# Patient Record
Sex: FEMALE | Race: WHITE | NOT HISPANIC OR LATINO | URBAN - METROPOLITAN AREA
[De-identification: names, ages, dates, MRNs, and addresses within clinical notes are randomized per-mention and may not be internally consistent; named-entity substitution may affect disease eponyms.]

---

## 2017-03-08 ENCOUNTER — APPOINTMENT (OUTPATIENT)
Dept: URBAN - METROPOLITAN AREA CLINIC 200 | Age: 62
Setting detail: DERMATOLOGY
End: 2017-03-15

## 2017-03-08 DIAGNOSIS — Z41.9 ENCOUNTER FOR PROCEDURE FOR PURPOSES OTHER THAN REMEDYING HEALTH STATE, UNSPECIFIED: ICD-10-CM

## 2017-03-08 DIAGNOSIS — L98.8 OTHER SPECIFIED DISORDERS OF THE SKIN AND SUBCUTANEOUS TISSUE: ICD-10-CM

## 2017-03-08 PROBLEM — L90.8 OTHER ATROPHIC DISORDERS OF SKIN: Status: ACTIVE | Noted: 2017-03-08

## 2017-03-08 PROCEDURE — OTHER BOTOX (U OR CC) ADDITIVE: OTHER

## 2017-03-08 PROCEDURE — OTHER EXCISION (ELECTIVE): OTHER

## 2017-03-08 PROCEDURE — OTHER BOTOX (U OR CC): OTHER

## 2017-03-08 ASSESSMENT — LOCATION ZONE DERM
LOCATION ZONE: FACE
LOCATION ZONE: EAR
LOCATION ZONE: FACE

## 2017-03-08 ASSESSMENT — LOCATION SIMPLE DESCRIPTION DERM
LOCATION SIMPLE: GLABELLA
LOCATION SIMPLE: RIGHT FOREHEAD
LOCATION SIMPLE: LEFT FOREHEAD
LOCATION SIMPLE: GLABELLA
LOCATION SIMPLE: LEFT EAR

## 2017-03-08 ASSESSMENT — LOCATION DETAILED DESCRIPTION DERM
LOCATION DETAILED: LEFT ANTERIOR EARLOBE
LOCATION DETAILED: GLABELLA
LOCATION DETAILED: RIGHT INFERIOR MEDIAL FOREHEAD
LOCATION DETAILED: GLABELLA
LOCATION DETAILED: LEFT INFERIOR MEDIAL FOREHEAD

## 2017-03-08 NOTE — PROCEDURE: EXCISION (ELECTIVE)
Price (Use Numbers Only, No Special Characters Or $): 586 Price (Use Numbers Only, No Special Characters Or $): 778

## 2017-03-15 ENCOUNTER — APPOINTMENT (OUTPATIENT)
Dept: URBAN - METROPOLITAN AREA CLINIC 200 | Age: 62
Setting detail: DERMATOLOGY
End: 2017-03-27

## 2017-03-15 ENCOUNTER — RX ONLY (RX ONLY)
Age: 62
End: 2017-03-15

## 2017-03-15 DIAGNOSIS — Z48.02 ENCOUNTER FOR REMOVAL OF SUTURES: ICD-10-CM

## 2017-03-15 PROCEDURE — OTHER PRESCRIPTION: OTHER

## 2017-03-15 PROCEDURE — OTHER SUTURE REMOVAL (GLOBAL PERIOD): OTHER

## 2017-03-15 RX ORDER — MUPIROCIN 20 MG/G
OINTMENT TOPICAL
Qty: 1 | Refills: 1 | Status: ERX | COMMUNITY
Start: 2017-03-15

## 2017-03-15 RX ORDER — TRIAMCINOLONE ACETONIDE 1 MG/G
CREAM TOPICAL
Qty: 1 | Refills: 3 | Status: ERX

## 2017-03-15 ASSESSMENT — LOCATION SIMPLE DESCRIPTION DERM: LOCATION SIMPLE: LEFT EAR

## 2017-03-15 ASSESSMENT — LOCATION ZONE DERM: LOCATION ZONE: EAR

## 2017-03-15 ASSESSMENT — LOCATION DETAILED DESCRIPTION DERM: LOCATION DETAILED: LEFT ANTERIOR EARLOBE

## 2017-03-15 NOTE — PROCEDURE: SUTURE REMOVAL (GLOBAL PERIOD)
Add 56581 Cpt? (Important Note: In 2017 The Use Of 94613 Is Being Tracked By Cms To Determine Future Global Period Reimbursement For Global Periods): no
Detail Level: Detailed

## 2017-04-04 ENCOUNTER — APPOINTMENT (OUTPATIENT)
Dept: URBAN - METROPOLITAN AREA CLINIC 200 | Age: 62
Setting detail: DERMATOLOGY
End: 2017-04-18

## 2017-04-04 ENCOUNTER — RX ONLY (RX ONLY)
Age: 62
End: 2017-04-04

## 2017-04-04 DIAGNOSIS — H61.11 ACQUIRED DEFORMITY OF PINNA: ICD-10-CM

## 2017-04-04 DIAGNOSIS — Z41.9 ENCOUNTER FOR PROCEDURE FOR PURPOSES OTHER THAN REMEDYING HEALTH STATE, UNSPECIFIED: ICD-10-CM

## 2017-04-04 PROBLEM — H61.111 ACQUIRED DEFORMITY OF PINNA, RIGHT EAR: Status: ACTIVE | Noted: 2017-04-04

## 2017-04-04 PROBLEM — Z85.828 PERSONAL HISTORY OF OTHER MALIGNANT NEOPLASM OF SKIN: Status: ACTIVE | Noted: 2017-04-04

## 2017-04-04 PROCEDURE — OTHER EARLOBE REPAIR COSMETIC: OTHER

## 2017-04-04 RX ORDER — OXYCODONE HYDROCHLORIDE AND ACETAMINOPHEN 5; 325 MG/1; MG/1
TABLET ORAL
Qty: 6 | Refills: 0

## 2017-04-04 ASSESSMENT — LOCATION ZONE DERM: LOCATION ZONE: EAR

## 2017-04-04 ASSESSMENT — LOCATION SIMPLE DESCRIPTION DERM: LOCATION SIMPLE: RIGHT EAR

## 2017-04-04 ASSESSMENT — LOCATION DETAILED DESCRIPTION DERM: LOCATION DETAILED: RIGHT ANTERIOR EARLOBE

## 2017-04-04 NOTE — PROCEDURE: EARLOBE REPAIR COSMETIC
Price (Use Numbers Only, No Special Characters Or $): 020 Price (Use Numbers Only, No Special Characters Or $): 134

## 2017-04-11 ENCOUNTER — APPOINTMENT (OUTPATIENT)
Dept: URBAN - METROPOLITAN AREA CLINIC 200 | Age: 62
Setting detail: DERMATOLOGY
End: 2017-04-20

## 2017-04-11 DIAGNOSIS — Z48.02 ENCOUNTER FOR REMOVAL OF SUTURES: ICD-10-CM

## 2017-04-11 PROCEDURE — OTHER SUTURE REMOVAL (GLOBAL PERIOD): OTHER

## 2017-04-11 ASSESSMENT — LOCATION ZONE DERM: LOCATION ZONE: EAR

## 2017-04-11 ASSESSMENT — LOCATION SIMPLE DESCRIPTION DERM: LOCATION SIMPLE: RIGHT EAR

## 2017-04-11 ASSESSMENT — LOCATION DETAILED DESCRIPTION DERM: LOCATION DETAILED: RIGHT ANTERIOR EARLOBE

## 2017-04-11 NOTE — PROCEDURE: SUTURE REMOVAL (GLOBAL PERIOD)
Add 55954 Cpt? (Important Note: In 2017 The Use Of 09501 Is Being Tracked By Cms To Determine Future Global Period Reimbursement For Global Periods): no
Detail Level: Detailed

## 2017-06-29 ENCOUNTER — APPOINTMENT (OUTPATIENT)
Dept: URBAN - METROPOLITAN AREA CLINIC 200 | Age: 62
Setting detail: DERMATOLOGY
End: 2017-07-18

## 2017-06-29 DIAGNOSIS — L98.8 OTHER SPECIFIED DISORDERS OF THE SKIN AND SUBCUTANEOUS TISSUE: ICD-10-CM

## 2017-06-29 PROBLEM — L90.8 OTHER ATROPHIC DISORDERS OF SKIN: Status: ACTIVE | Noted: 2017-06-29

## 2017-06-29 PROCEDURE — OTHER BOTOX (U OR CC) ADDITIVE: OTHER

## 2017-06-29 PROCEDURE — OTHER BOTOX (U OR CC): OTHER

## 2017-06-29 ASSESSMENT — LOCATION DETAILED DESCRIPTION DERM
LOCATION DETAILED: LEFT INFERIOR FOREHEAD
LOCATION DETAILED: GLABELLA
LOCATION DETAILED: RIGHT INFERIOR FOREHEAD

## 2017-06-29 ASSESSMENT — LOCATION SIMPLE DESCRIPTION DERM
LOCATION SIMPLE: LEFT FOREHEAD
LOCATION SIMPLE: RIGHT FOREHEAD
LOCATION SIMPLE: GLABELLA

## 2017-06-29 ASSESSMENT — LOCATION ZONE DERM: LOCATION ZONE: FACE

## 2017-07-19 ENCOUNTER — APPOINTMENT (OUTPATIENT)
Dept: URBAN - METROPOLITAN AREA CLINIC 200 | Age: 62
Setting detail: DERMATOLOGY
End: 2017-07-26

## 2017-07-19 DIAGNOSIS — T1490XA CONTUSION OF UNSPECIFIED SITE: ICD-10-CM

## 2017-07-19 DIAGNOSIS — S31000A OPEN WOUND(S) (MULTIPLE) OF UNSPECIFIED SITE(S), WITHOUT MENTION OF COMPLICATION: ICD-10-CM

## 2017-07-19 PROBLEM — S80.11XA CONTUSION OF RIGHT LOWER LEG, INITIAL ENCOUNTER: Status: ACTIVE | Noted: 2017-07-19

## 2017-07-19 PROBLEM — S01.302A UNSPECIFIED OPEN WOUND OF LEFT EAR, INITIAL ENCOUNTER: Status: ACTIVE | Noted: 2017-07-19

## 2017-07-19 PROBLEM — S01.301A UNSPECIFIED OPEN WOUND OF RIGHT EAR, INITIAL ENCOUNTER: Status: ACTIVE | Noted: 2017-07-19

## 2017-07-19 PROCEDURE — OTHER REASSURANCE: OTHER

## 2017-07-19 PROCEDURE — 99213 OFFICE O/P EST LOW 20 MIN: CPT

## 2017-07-19 ASSESSMENT — LOCATION SIMPLE DESCRIPTION DERM
LOCATION SIMPLE: LEFT EAR
LOCATION SIMPLE: RIGHT EAR
LOCATION SIMPLE: RIGHT PRETIBIAL REGION

## 2017-07-19 ASSESSMENT — LOCATION DETAILED DESCRIPTION DERM
LOCATION DETAILED: RIGHT DISTAL PRETIBIAL REGION
LOCATION DETAILED: LEFT ANTERIOR EARLOBE
LOCATION DETAILED: RIGHT ANTERIOR EARLOBE

## 2017-07-19 ASSESSMENT — LOCATION ZONE DERM
LOCATION ZONE: EAR
LOCATION ZONE: LEG

## 2017-08-05 ENCOUNTER — RX ONLY (RX ONLY)
Age: 62
End: 2017-08-05

## 2017-08-05 RX ORDER — BIMATOPROST 0.3 MG/ML
SOLUTION/ DROPS OPHTHALMIC
Qty: 1 | Refills: 11 | Status: ERX

## 2017-09-05 ENCOUNTER — RX ONLY (RX ONLY)
Age: 62
End: 2017-09-05

## 2017-09-05 RX ORDER — TRIAMCINOLONE ACETONIDE 1 MG/G
CREAM TOPICAL
Qty: 1 | Refills: 3 | Status: ERX

## 2017-10-30 ENCOUNTER — APPOINTMENT (OUTPATIENT)
Dept: URBAN - METROPOLITAN AREA CLINIC 200 | Age: 62
Setting detail: DERMATOLOGY
End: 2017-11-13

## 2017-10-30 DIAGNOSIS — L08.9 LOCAL INFECTION OF THE SKIN AND SUBCUTANEOUS TISSUE, UNSPECIFIED: ICD-10-CM

## 2017-10-30 PROCEDURE — 99212 OFFICE O/P EST SF 10 MIN: CPT

## 2017-10-30 PROCEDURE — OTHER PRESCRIPTION: OTHER

## 2017-10-30 PROCEDURE — OTHER ORDER TESTS: OTHER

## 2017-10-30 RX ORDER — LEVOFLOXACIN 500 MG/1
TABLET, FILM COATED ORAL
Qty: 10 | Refills: 0 | Status: ERX

## 2017-10-30 ASSESSMENT — LOCATION DETAILED DESCRIPTION DERM: LOCATION DETAILED: RIGHT DORSAL FOOT

## 2017-10-30 ASSESSMENT — LOCATION SIMPLE DESCRIPTION DERM: LOCATION SIMPLE: RIGHT FOOT

## 2017-10-30 ASSESSMENT — LOCATION ZONE DERM: LOCATION ZONE: FEET

## 2017-10-30 NOTE — HPI: WOUND
Is The Wound New Or Recurrent?: new
How Severe Is It?: moderate
How Is Your Wound Healing?: not healing
Is This A New Presentation, Or A Follow-Up?: Wound
Any Other Pertinent Features?: Hx of PG

## 2017-11-08 ENCOUNTER — APPOINTMENT (OUTPATIENT)
Dept: URBAN - METROPOLITAN AREA CLINIC 200 | Age: 62
Setting detail: DERMATOLOGY
End: 2017-11-10

## 2017-11-08 DIAGNOSIS — L08.9 LOCAL INFECTION OF THE SKIN AND SUBCUTANEOUS TISSUE, UNSPECIFIED: ICD-10-CM

## 2017-11-08 PROBLEM — Z85.828 PERSONAL HISTORY OF OTHER MALIGNANT NEOPLASM OF SKIN: Status: ACTIVE | Noted: 2017-11-08

## 2017-11-08 PROCEDURE — OTHER ORDER TESTS: OTHER

## 2017-11-08 PROCEDURE — OTHER RECOMMENDATIONS: OTHER

## 2017-11-08 PROCEDURE — OTHER OBSERVATION: OTHER

## 2017-11-08 PROCEDURE — 99212 OFFICE O/P EST SF 10 MIN: CPT

## 2017-11-08 PROCEDURE — OTHER OBSERVATION AND MEASURE: OTHER

## 2017-11-08 ASSESSMENT — LOCATION DETAILED DESCRIPTION DERM: LOCATION DETAILED: RIGHT DORSAL FOOT

## 2017-11-08 ASSESSMENT — LOCATION ZONE DERM: LOCATION ZONE: FEET

## 2017-11-08 ASSESSMENT — LOCATION SIMPLE DESCRIPTION DERM: LOCATION SIMPLE: RIGHT FOOT

## 2017-11-08 NOTE — PROCEDURE: RECOMMENDATIONS
Detail Level: Zone
Recommendation Preamble: The following recommendations were made during the visit:
Recommendations (Free Text): Gentamicin

## 2017-11-14 ENCOUNTER — APPOINTMENT (OUTPATIENT)
Dept: URBAN - METROPOLITAN AREA CLINIC 200 | Age: 62
Setting detail: DERMATOLOGY
End: 2017-11-17

## 2017-11-14 DIAGNOSIS — L98.8 OTHER SPECIFIED DISORDERS OF THE SKIN AND SUBCUTANEOUS TISSUE: ICD-10-CM

## 2017-11-14 DIAGNOSIS — L88 PYODERMA GANGRENOSUM: ICD-10-CM

## 2017-11-14 PROBLEM — L90.8 OTHER ATROPHIC DISORDERS OF SKIN: Status: ACTIVE | Noted: 2017-11-14

## 2017-11-14 PROCEDURE — OTHER PRESCRIPTION: OTHER

## 2017-11-14 PROCEDURE — OTHER INTRALESIONAL KENALOG: OTHER

## 2017-11-14 PROCEDURE — OTHER OBSERVATION: OTHER

## 2017-11-14 PROCEDURE — OTHER BOTOX (U OR CC): OTHER

## 2017-11-14 PROCEDURE — 11900 INJECT SKIN LESIONS </W 7: CPT

## 2017-11-14 PROCEDURE — OTHER BOTOX (U OR CC) ADDITIVE: OTHER

## 2017-11-14 PROCEDURE — OTHER ORDER TESTS: OTHER

## 2017-11-14 PROCEDURE — OTHER OBSERVATION AND MEASURE: OTHER

## 2017-11-14 RX ORDER — PREDNISONE 10 MG/1
TABLET ORAL QAM
Qty: 120 | Refills: 3

## 2017-11-14 ASSESSMENT — LOCATION SIMPLE DESCRIPTION DERM
LOCATION SIMPLE: RIGHT FOOT
LOCATION SIMPLE: LEFT FOREHEAD
LOCATION SIMPLE: RIGHT FOREHEAD
LOCATION SIMPLE: RIGHT TEMPLE
LOCATION SIMPLE: GLABELLA
LOCATION SIMPLE: LEFT TEMPLE

## 2017-11-14 ASSESSMENT — LOCATION DETAILED DESCRIPTION DERM
LOCATION DETAILED: LEFT INFERIOR TEMPLE
LOCATION DETAILED: RIGHT LATERAL DORSAL FOOT
LOCATION DETAILED: RIGHT DORSAL FOOT
LOCATION DETAILED: RIGHT INFERIOR MEDIAL FOREHEAD
LOCATION DETAILED: LEFT INFERIOR MEDIAL FOREHEAD
LOCATION DETAILED: GLABELLA
LOCATION DETAILED: RIGHT INFERIOR TEMPLE

## 2017-11-14 ASSESSMENT — LOCATION ZONE DERM
LOCATION ZONE: FEET
LOCATION ZONE: FACE

## 2017-11-14 NOTE — PROCEDURE: INTRALESIONAL KENALOG
Total Volume Injected (Ccs- Only Use Numbers And Decimals): .1
Administered By (Optional): ar
Detail Level: Detailed
Kenalog Preparation: Kenalog
X Size Of Lesion In Cm (Optional): 0
Consent: The risks of atrophy were reviewed with the patient.
Concentration Of Solution Injected (Mg/Ml): 5.0
Include Z78.9 (Other Specified Conditions Influencing Health Status) As An Associated Diagnosis?: No
Expiration Date (Optional): 05/2018
Medical Necessity Clause: This procedure was medically necessary because the lesions that were treated were:

## 2017-11-17 ENCOUNTER — APPOINTMENT (OUTPATIENT)
Dept: URBAN - METROPOLITAN AREA CLINIC 200 | Age: 62
Setting detail: DERMATOLOGY
End: 2017-11-17

## 2017-11-17 DIAGNOSIS — L88 PYODERMA GANGRENOSUM: ICD-10-CM

## 2017-11-17 PROCEDURE — OTHER OBSERVATION AND MEASURE: OTHER

## 2017-11-17 PROCEDURE — 99212 OFFICE O/P EST SF 10 MIN: CPT

## 2017-11-17 PROCEDURE — OTHER OBSERVATION: OTHER

## 2017-11-17 ASSESSMENT — LOCATION ZONE DERM: LOCATION ZONE: FEET

## 2017-11-17 ASSESSMENT — LOCATION SIMPLE DESCRIPTION DERM: LOCATION SIMPLE: RIGHT FOOT

## 2017-11-17 ASSESSMENT — PAIN INTENSITY VAS: HOW INTENSE IS YOUR PAIN 0 BEING NO PAIN, 10 BEING THE MOST SEVERE PAIN POSSIBLE?: 6/10 PAIN

## 2017-11-17 ASSESSMENT — LOCATION DETAILED DESCRIPTION DERM: LOCATION DETAILED: RIGHT DORSAL FOOT

## 2017-11-17 ASSESSMENT — SEVERITY ASSESSMENT: SEVERITY: MODERATE

## 2017-11-22 ENCOUNTER — APPOINTMENT (OUTPATIENT)
Dept: URBAN - METROPOLITAN AREA CLINIC 200 | Age: 62
Setting detail: DERMATOLOGY
End: 2017-11-22

## 2017-11-22 DIAGNOSIS — L88 PYODERMA GANGRENOSUM: ICD-10-CM

## 2017-11-22 PROCEDURE — OTHER PRESCRIPTION: OTHER

## 2017-11-22 PROCEDURE — OTHER PRESCRIPTION MEDICATION MANAGEMENT: OTHER

## 2017-11-22 PROCEDURE — 99212 OFFICE O/P EST SF 10 MIN: CPT

## 2017-11-22 PROCEDURE — OTHER OBSERVATION AND MEASURE: OTHER

## 2017-11-22 PROCEDURE — OTHER OBSERVATION: OTHER

## 2017-11-22 RX ORDER — CLOBETASOL PROPIONATE 0.5 MG/G
CREAM TOPICAL BID
Qty: 1 | Refills: 3 | Status: ERX | COMMUNITY
Start: 2017-11-22

## 2017-11-22 ASSESSMENT — LOCATION ZONE DERM: LOCATION ZONE: FEET

## 2017-11-22 ASSESSMENT — LOCATION SIMPLE DESCRIPTION DERM: LOCATION SIMPLE: RIGHT FOOT

## 2017-11-22 ASSESSMENT — LOCATION DETAILED DESCRIPTION DERM: LOCATION DETAILED: RIGHT DORSAL FOOT

## 2017-11-29 ENCOUNTER — RX ONLY (RX ONLY)
Age: 62
End: 2017-11-29

## 2017-11-29 ENCOUNTER — APPOINTMENT (OUTPATIENT)
Dept: URBAN - METROPOLITAN AREA CLINIC 200 | Age: 62
Setting detail: DERMATOLOGY
End: 2017-11-30

## 2017-11-29 DIAGNOSIS — L88 PYODERMA GANGRENOSUM: ICD-10-CM

## 2017-11-29 PROCEDURE — 99212 OFFICE O/P EST SF 10 MIN: CPT

## 2017-11-29 PROCEDURE — OTHER PRESCRIPTION MEDICATION MANAGEMENT: OTHER

## 2017-11-29 PROCEDURE — OTHER PRESCRIPTION: OTHER

## 2017-11-29 PROCEDURE — OTHER OBSERVATION AND MEASURE: OTHER

## 2017-11-29 PROCEDURE — OTHER OBSERVATION: OTHER

## 2017-11-29 RX ORDER — CLOBETASOL PROPIONATE 0.5 MG/G
CREAM TOPICAL BID
Qty: 1 | Refills: 3 | Status: CANCELLED

## 2017-11-29 RX ORDER — HYDROMORPHONE HYDROCHLORIDE 4 MG/1
TABLET ORAL
Qty: 80 | Refills: 0

## 2017-11-29 ASSESSMENT — LOCATION DETAILED DESCRIPTION DERM: LOCATION DETAILED: RIGHT DORSAL FOOT

## 2017-11-29 ASSESSMENT — LOCATION SIMPLE DESCRIPTION DERM: LOCATION SIMPLE: RIGHT FOOT

## 2017-11-29 ASSESSMENT — LOCATION ZONE DERM: LOCATION ZONE: FEET

## 2017-12-06 ENCOUNTER — APPOINTMENT (OUTPATIENT)
Dept: URBAN - METROPOLITAN AREA CLINIC 200 | Age: 62
Setting detail: DERMATOLOGY
End: 2017-12-13

## 2017-12-06 DIAGNOSIS — L88 PYODERMA GANGRENOSUM: ICD-10-CM

## 2017-12-06 PROCEDURE — OTHER OBSERVATION AND MEASURE: OTHER

## 2017-12-06 PROCEDURE — OTHER OBSERVATION: OTHER

## 2017-12-06 PROCEDURE — OTHER COUNSELING: OTHER

## 2017-12-06 PROCEDURE — 99212 OFFICE O/P EST SF 10 MIN: CPT

## 2017-12-06 ASSESSMENT — LOCATION DETAILED DESCRIPTION DERM
LOCATION DETAILED: RIGHT ANKLE
LOCATION DETAILED: RIGHT DORSAL FOOT

## 2017-12-06 ASSESSMENT — PAIN INTENSITY VAS: HOW INTENSE IS YOUR PAIN 0 BEING NO PAIN, 10 BEING THE MOST SEVERE PAIN POSSIBLE?: 8/10 PAIN

## 2017-12-06 ASSESSMENT — LOCATION SIMPLE DESCRIPTION DERM
LOCATION SIMPLE: RIGHT ANKLE
LOCATION SIMPLE: RIGHT FOOT

## 2017-12-06 ASSESSMENT — SEVERITY ASSESSMENT: SEVERITY: MILD TO MODERATE

## 2017-12-06 ASSESSMENT — LOCATION ZONE DERM
LOCATION ZONE: FEET
LOCATION ZONE: LEG

## 2017-12-13 ENCOUNTER — APPOINTMENT (OUTPATIENT)
Dept: URBAN - METROPOLITAN AREA CLINIC 200 | Age: 62
Setting detail: DERMATOLOGY
End: 2017-12-14

## 2017-12-13 DIAGNOSIS — L88 PYODERMA GANGRENOSUM: ICD-10-CM

## 2017-12-13 PROCEDURE — OTHER COUNSELING: OTHER

## 2017-12-13 PROCEDURE — 11900 INJECT SKIN LESIONS </W 7: CPT

## 2017-12-13 PROCEDURE — OTHER ORDER TESTS: OTHER

## 2017-12-13 PROCEDURE — OTHER OBSERVATION AND MEASURE: OTHER

## 2017-12-13 PROCEDURE — OTHER PRESCRIPTION: OTHER

## 2017-12-13 PROCEDURE — OTHER INTRALESIONAL KENALOG: OTHER

## 2017-12-13 PROCEDURE — OTHER OBSERVATION: OTHER

## 2017-12-13 RX ORDER — CYCLOSPORINE 100 MG/1
CAPSULE, GELATIN COATED ORAL
Qty: 60 | Refills: 4 | Status: ERX

## 2017-12-13 ASSESSMENT — LOCATION DETAILED DESCRIPTION DERM
LOCATION DETAILED: RIGHT ANKLE
LOCATION DETAILED: RIGHT DORSAL FOOT

## 2017-12-13 ASSESSMENT — LOCATION ZONE DERM
LOCATION ZONE: FEET
LOCATION ZONE: LEG

## 2017-12-13 ASSESSMENT — LOCATION SIMPLE DESCRIPTION DERM
LOCATION SIMPLE: RIGHT ANKLE
LOCATION SIMPLE: RIGHT FOOT

## 2017-12-13 ASSESSMENT — SEVERITY ASSESSMENT: SEVERITY: MODERATE

## 2017-12-13 ASSESSMENT — PAIN INTENSITY VAS: HOW INTENSE IS YOUR PAIN 0 BEING NO PAIN, 10 BEING THE MOST SEVERE PAIN POSSIBLE?: 10/10 PAIN

## 2017-12-20 ENCOUNTER — RX ONLY (RX ONLY)
Age: 62
End: 2017-12-20

## 2017-12-20 ENCOUNTER — APPOINTMENT (OUTPATIENT)
Dept: URBAN - METROPOLITAN AREA CLINIC 200 | Age: 62
Setting detail: DERMATOLOGY
End: 2017-12-29

## 2017-12-20 DIAGNOSIS — L88 PYODERMA GANGRENOSUM: ICD-10-CM

## 2017-12-20 PROCEDURE — OTHER OBSERVATION AND MEASURE: OTHER

## 2017-12-20 PROCEDURE — OTHER OBSERVATION: OTHER

## 2017-12-20 PROCEDURE — 99212 OFFICE O/P EST SF 10 MIN: CPT

## 2017-12-20 PROCEDURE — OTHER COUNSELING: OTHER

## 2017-12-20 RX ORDER — HYDROMORPHONE HYDROCHLORIDE 4 MG/1
TABLET ORAL
Qty: 84 | Refills: 0

## 2017-12-20 ASSESSMENT — LOCATION DETAILED DESCRIPTION DERM
LOCATION DETAILED: RIGHT DORSAL FOOT
LOCATION DETAILED: RIGHT ANKLE

## 2017-12-20 ASSESSMENT — LOCATION SIMPLE DESCRIPTION DERM
LOCATION SIMPLE: RIGHT FOOT
LOCATION SIMPLE: RIGHT ANKLE

## 2017-12-20 ASSESSMENT — LOCATION ZONE DERM
LOCATION ZONE: FEET
LOCATION ZONE: LEG

## 2017-12-27 ENCOUNTER — APPOINTMENT (OUTPATIENT)
Dept: URBAN - METROPOLITAN AREA CLINIC 200 | Age: 62
Setting detail: DERMATOLOGY
End: 2017-12-31

## 2017-12-27 DIAGNOSIS — L88 PYODERMA GANGRENOSUM: ICD-10-CM

## 2017-12-27 PROCEDURE — 99212 OFFICE O/P EST SF 10 MIN: CPT

## 2017-12-27 PROCEDURE — OTHER COUNSELING: OTHER

## 2017-12-27 PROCEDURE — OTHER ORDER TESTS: OTHER

## 2017-12-27 ASSESSMENT — LOCATION ZONE DERM
LOCATION ZONE: LEG
LOCATION ZONE: FEET
LOCATION ZONE: FEET

## 2017-12-27 ASSESSMENT — LOCATION DETAILED DESCRIPTION DERM
LOCATION DETAILED: RIGHT ANKLE
LOCATION DETAILED: RIGHT DORSAL FOOT
LOCATION DETAILED: RIGHT DORSAL FOOT

## 2017-12-27 ASSESSMENT — SEVERITY ASSESSMENT: SEVERITY: MILD TO MODERATE

## 2017-12-27 ASSESSMENT — LOCATION SIMPLE DESCRIPTION DERM
LOCATION SIMPLE: RIGHT ANKLE
LOCATION SIMPLE: RIGHT FOOT
LOCATION SIMPLE: RIGHT FOOT

## 2018-01-03 ENCOUNTER — APPOINTMENT (OUTPATIENT)
Dept: URBAN - METROPOLITAN AREA CLINIC 200 | Age: 63
Setting detail: DERMATOLOGY
End: 2018-01-14

## 2018-01-03 ENCOUNTER — RX ONLY (RX ONLY)
Age: 63
End: 2018-01-03

## 2018-01-03 DIAGNOSIS — L88 PYODERMA GANGRENOSUM: ICD-10-CM

## 2018-01-03 PROCEDURE — OTHER OBSERVATION: OTHER

## 2018-01-03 PROCEDURE — OTHER ORDER TESTS: OTHER

## 2018-01-03 PROCEDURE — OTHER OBSERVATION AND MEASURE: OTHER

## 2018-01-03 PROCEDURE — 99212 OFFICE O/P EST SF 10 MIN: CPT

## 2018-01-03 RX ORDER — HYDROMORPHONE HYDROCHLORIDE 4 MG/1
TABLET ORAL
Qty: 84 | Refills: 0

## 2018-01-03 ASSESSMENT — LOCATION ZONE DERM: LOCATION ZONE: FEET

## 2018-01-03 ASSESSMENT — LOCATION DETAILED DESCRIPTION DERM: LOCATION DETAILED: RIGHT DORSAL FOOT

## 2018-01-03 ASSESSMENT — PAIN INTENSITY VAS: HOW INTENSE IS YOUR PAIN 0 BEING NO PAIN, 10 BEING THE MOST SEVERE PAIN POSSIBLE?: 9/10 PAIN

## 2018-01-03 ASSESSMENT — LOCATION SIMPLE DESCRIPTION DERM: LOCATION SIMPLE: RIGHT FOOT

## 2018-01-03 ASSESSMENT — SEVERITY ASSESSMENT: SEVERITY: MILD TO MODERATE

## 2018-01-12 ENCOUNTER — APPOINTMENT (OUTPATIENT)
Dept: URBAN - METROPOLITAN AREA CLINIC 200 | Age: 63
Setting detail: DERMATOLOGY
End: 2018-01-14

## 2018-01-12 DIAGNOSIS — L88 PYODERMA GANGRENOSUM: ICD-10-CM

## 2018-01-12 PROCEDURE — OTHER ORDER TESTS: OTHER

## 2018-01-12 PROCEDURE — 99212 OFFICE O/P EST SF 10 MIN: CPT

## 2018-01-12 PROCEDURE — OTHER COUNSELING: OTHER

## 2018-01-12 ASSESSMENT — PAIN INTENSITY VAS: HOW INTENSE IS YOUR PAIN 0 BEING NO PAIN, 10 BEING THE MOST SEVERE PAIN POSSIBLE?: 4/10 PAIN

## 2018-01-12 ASSESSMENT — LOCATION SIMPLE DESCRIPTION DERM: LOCATION SIMPLE: RIGHT ANKLE

## 2018-01-12 ASSESSMENT — LOCATION DETAILED DESCRIPTION DERM: LOCATION DETAILED: RIGHT ANKLE

## 2018-01-12 ASSESSMENT — LOCATION ZONE DERM: LOCATION ZONE: LEG

## 2018-01-12 ASSESSMENT — SEVERITY ASSESSMENT: SEVERITY: MILD TO MODERATE

## 2018-01-17 ENCOUNTER — RX ONLY (RX ONLY)
Age: 63
End: 2018-01-17

## 2018-01-17 ENCOUNTER — APPOINTMENT (OUTPATIENT)
Dept: URBAN - METROPOLITAN AREA CLINIC 200 | Age: 63
Setting detail: DERMATOLOGY
End: 2018-02-01

## 2018-01-17 DIAGNOSIS — L88 PYODERMA GANGRENOSUM: ICD-10-CM

## 2018-01-17 PROCEDURE — OTHER COUNSELING: OTHER

## 2018-01-17 PROCEDURE — OTHER OBSERVATION: OTHER

## 2018-01-17 PROCEDURE — 99212 OFFICE O/P EST SF 10 MIN: CPT

## 2018-01-17 PROCEDURE — OTHER OBSERVATION AND MEASURE: OTHER

## 2018-01-17 RX ORDER — HYDROMORPHONE HYDROCHLORIDE 4 MG/1
TABLET ORAL
Qty: 84 | Refills: 0

## 2018-01-17 ASSESSMENT — LOCATION SIMPLE DESCRIPTION DERM
LOCATION SIMPLE: RIGHT FOOT
LOCATION SIMPLE: RIGHT ANKLE

## 2018-01-17 ASSESSMENT — LOCATION ZONE DERM
LOCATION ZONE: FEET
LOCATION ZONE: LEG

## 2018-01-17 ASSESSMENT — SEVERITY ASSESSMENT: SEVERITY: MILD TO MODERATE

## 2018-01-17 ASSESSMENT — PAIN INTENSITY VAS: HOW INTENSE IS YOUR PAIN 0 BEING NO PAIN, 10 BEING THE MOST SEVERE PAIN POSSIBLE?: 10/10 PAIN

## 2018-01-17 ASSESSMENT — LOCATION DETAILED DESCRIPTION DERM
LOCATION DETAILED: RIGHT DORSAL FOOT
LOCATION DETAILED: RIGHT ANKLE

## 2018-01-24 ENCOUNTER — APPOINTMENT (OUTPATIENT)
Dept: URBAN - METROPOLITAN AREA CLINIC 200 | Age: 63
Setting detail: DERMATOLOGY
End: 2018-01-25

## 2018-01-24 DIAGNOSIS — D47.2 MONOCLONAL GAMMOPATHY: ICD-10-CM

## 2018-01-24 DIAGNOSIS — L88 PYODERMA GANGRENOSUM: ICD-10-CM

## 2018-01-24 DIAGNOSIS — R74.01 ELEVATION OF LEVELS OF LIVER TRANSAMINASE LEVELS: ICD-10-CM

## 2018-01-24 PROBLEM — Z85.828 PERSONAL HISTORY OF OTHER MALIGNANT NEOPLASM OF SKIN: Status: ACTIVE | Noted: 2018-01-24

## 2018-01-24 PROBLEM — R74.0 NONSPECIFIC ELEVATION OF LEVELS OF TRANSAMINASE AND LACTIC ACID DEHYDROGENASE [LDH]: Status: ACTIVE | Noted: 2018-01-24

## 2018-01-24 PROCEDURE — OTHER RECORDS REQUESTED: OTHER

## 2018-01-24 PROCEDURE — OTHER OBSERVATION AND MEASURE: OTHER

## 2018-01-24 PROCEDURE — 99212 OFFICE O/P EST SF 10 MIN: CPT

## 2018-01-24 PROCEDURE — OTHER RECOMMENDATIONS: OTHER

## 2018-01-24 PROCEDURE — OTHER REFERRAL: OTHER

## 2018-01-24 PROCEDURE — OTHER OBSERVATION: OTHER

## 2018-01-24 PROCEDURE — OTHER ORDER TESTS: OTHER

## 2018-01-24 ASSESSMENT — LOCATION DETAILED DESCRIPTION DERM
LOCATION DETAILED: RIGHT DORSAL FOOT
LOCATION DETAILED: RIGHT DISTAL PRETIBIAL REGION

## 2018-01-24 ASSESSMENT — LOCATION SIMPLE DESCRIPTION DERM
LOCATION SIMPLE: RIGHT FOOT
LOCATION SIMPLE: RIGHT PRETIBIAL REGION

## 2018-01-24 ASSESSMENT — PAIN INTENSITY VAS: HOW INTENSE IS YOUR PAIN 0 BEING NO PAIN, 10 BEING THE MOST SEVERE PAIN POSSIBLE?: 8/10 PAIN

## 2018-01-24 ASSESSMENT — SEVERITY ASSESSMENT: SEVERITY: MILD TO MODERATE

## 2018-01-24 ASSESSMENT — LOCATION ZONE DERM
LOCATION ZONE: FEET
LOCATION ZONE: LEG

## 2018-01-24 NOTE — PROCEDURE: RECORDS REQUESTED
Detail Level: Zone
Preface: I requested the records from the following providers.(previous LFT and colonoscopy records)
Providers To Request Records From: Dr. Pedro James

## 2018-01-29 ENCOUNTER — RX ONLY (RX ONLY)
Age: 63
End: 2018-01-29

## 2018-01-29 RX ORDER — HYDROMORPHONE HYDROCHLORIDE 4 MG/1
TABLET ORAL
Qty: 42 | Refills: 0

## 2018-02-08 ENCOUNTER — APPOINTMENT (OUTPATIENT)
Dept: URBAN - METROPOLITAN AREA CLINIC 200 | Age: 63
Setting detail: DERMATOLOGY
End: 2018-02-09

## 2018-02-08 DIAGNOSIS — L88 PYODERMA GANGRENOSUM: ICD-10-CM

## 2018-02-08 PROCEDURE — 99212 OFFICE O/P EST SF 10 MIN: CPT

## 2018-02-08 PROCEDURE — OTHER RECOMMENDATIONS: OTHER

## 2018-02-08 ASSESSMENT — LOCATION ZONE DERM: LOCATION ZONE: FEET

## 2018-02-08 ASSESSMENT — SEVERITY ASSESSMENT: SEVERITY: MILD

## 2018-02-08 ASSESSMENT — LOCATION DETAILED DESCRIPTION DERM: LOCATION DETAILED: RIGHT DORSAL FOOT

## 2018-02-08 ASSESSMENT — PAIN INTENSITY VAS: HOW INTENSE IS YOUR PAIN 0 BEING NO PAIN, 10 BEING THE MOST SEVERE PAIN POSSIBLE?: 1/10 PAIN

## 2018-02-08 ASSESSMENT — LOCATION SIMPLE DESCRIPTION DERM: LOCATION SIMPLE: RIGHT FOOT

## 2018-02-08 NOTE — PROCEDURE: RECOMMENDATIONS
Recommendations (Free Text): Continue with pain clinic-pain controlled much better on oxycontin.  She returned the last dilaudid prescription to me since she did not need/use it\\nContinue cyclosporine
Recommendation Preamble: The following recommendations were made during the visit:
Detail Level: Zone

## 2018-02-14 ENCOUNTER — APPOINTMENT (OUTPATIENT)
Dept: URBAN - METROPOLITAN AREA CLINIC 200 | Age: 63
Setting detail: DERMATOLOGY
End: 2018-02-15

## 2018-02-14 DIAGNOSIS — L88 PYODERMA GANGRENOSUM: ICD-10-CM

## 2018-02-14 PROCEDURE — 99212 OFFICE O/P EST SF 10 MIN: CPT

## 2018-02-14 PROCEDURE — OTHER RECOMMENDATIONS: OTHER

## 2018-02-14 ASSESSMENT — LOCATION ZONE DERM: LOCATION ZONE: FEET

## 2018-02-14 ASSESSMENT — LOCATION SIMPLE DESCRIPTION DERM: LOCATION SIMPLE: RIGHT FOOT

## 2018-02-14 ASSESSMENT — LOCATION DETAILED DESCRIPTION DERM: LOCATION DETAILED: RIGHT DORSAL FOOT

## 2018-02-14 NOTE — PROCEDURE: RECOMMENDATIONS
Recommendations (Free Text): Continue with pain clinic-pain controlled much better on oxycontin. \\nContinue cyclosporine. \\nMake appointment with [facility] and [facility].\\nHave tried to find last colonoscopy.  Cannot find it.  It was reportedly normal.  This was more than 10 years ago and she needs a new one.
Detail Level: Zone
Recommendation Preamble: The following recommendations were made during the visit:

## 2018-02-21 ENCOUNTER — APPOINTMENT (OUTPATIENT)
Dept: URBAN - METROPOLITAN AREA CLINIC 200 | Age: 63
Setting detail: DERMATOLOGY
End: 2018-03-09

## 2018-02-21 DIAGNOSIS — L88 PYODERMA GANGRENOSUM: ICD-10-CM

## 2018-02-21 PROBLEM — Z85.828 PERSONAL HISTORY OF OTHER MALIGNANT NEOPLASM OF SKIN: Status: ACTIVE | Noted: 2018-02-21

## 2018-02-21 PROCEDURE — 99213 OFFICE O/P EST LOW 20 MIN: CPT

## 2018-02-21 PROCEDURE — OTHER RECOMMENDATIONS: OTHER

## 2018-02-21 PROCEDURE — OTHER OBSERVATION AND MEASURE: OTHER

## 2018-02-21 PROCEDURE — OTHER OBSERVATION: OTHER

## 2018-02-21 ASSESSMENT — LOCATION SIMPLE DESCRIPTION DERM: LOCATION SIMPLE: RIGHT FOOT

## 2018-02-21 ASSESSMENT — LOCATION DETAILED DESCRIPTION DERM: LOCATION DETAILED: RIGHT DORSAL FOOT

## 2018-02-21 ASSESSMENT — LOCATION ZONE DERM: LOCATION ZONE: FEET

## 2018-02-21 NOTE — PROCEDURE: RECOMMENDATIONS
Recommendations (Free Text): Continue with pain clinic-pain controlled much better on oxycontin. \\nContinue cyclosporine. \\nMake appointment with [facility] and [facility].\\nHave tried to find last colonoscopy.  Cannot find it.  It was reportedly normal.  This was more than 10 years ago and she needs a new one.
Recommendation Preamble: The following recommendations were made during the visit:
Detail Level: Zone

## 2018-03-01 ENCOUNTER — APPOINTMENT (OUTPATIENT)
Dept: URBAN - METROPOLITAN AREA CLINIC 200 | Age: 63
Setting detail: DERMATOLOGY
End: 2018-03-22

## 2018-03-01 DIAGNOSIS — L88 PYODERMA GANGRENOSUM: ICD-10-CM

## 2018-03-01 PROCEDURE — 99212 OFFICE O/P EST SF 10 MIN: CPT

## 2018-03-01 PROCEDURE — OTHER ORDER TESTS: OTHER

## 2018-03-01 PROCEDURE — OTHER OBSERVATION: OTHER

## 2018-03-01 PROCEDURE — OTHER OBSERVATION AND MEASURE: OTHER

## 2018-03-01 ASSESSMENT — LOCATION ZONE DERM: LOCATION ZONE: FEET

## 2018-03-01 ASSESSMENT — LOCATION DETAILED DESCRIPTION DERM: LOCATION DETAILED: RIGHT DORSAL FOOT

## 2018-03-01 ASSESSMENT — LOCATION SIMPLE DESCRIPTION DERM: LOCATION SIMPLE: RIGHT FOOT

## 2018-03-14 ENCOUNTER — APPOINTMENT (OUTPATIENT)
Dept: URBAN - METROPOLITAN AREA CLINIC 200 | Age: 63
Setting detail: DERMATOLOGY
End: 2018-03-27

## 2018-03-14 DIAGNOSIS — L88 PYODERMA GANGRENOSUM: ICD-10-CM

## 2018-03-14 PROBLEM — Z85.828 PERSONAL HISTORY OF OTHER MALIGNANT NEOPLASM OF SKIN: Status: ACTIVE | Noted: 2018-03-14

## 2018-03-14 PROCEDURE — 99212 OFFICE O/P EST SF 10 MIN: CPT

## 2018-03-14 PROCEDURE — OTHER OBSERVATION: OTHER

## 2018-03-14 PROCEDURE — OTHER OBSERVATION AND MEASURE: OTHER

## 2018-03-14 PROCEDURE — OTHER ORDER TESTS: OTHER

## 2018-03-14 ASSESSMENT — LOCATION SIMPLE DESCRIPTION DERM
LOCATION SIMPLE: RIGHT FOOT
LOCATION SIMPLE: RIGHT ANKLE

## 2018-03-14 ASSESSMENT — SEVERITY ASSESSMENT: SEVERITY: MODERATE

## 2018-03-14 ASSESSMENT — LOCATION DETAILED DESCRIPTION DERM
LOCATION DETAILED: RIGHT DORSAL FOOT
LOCATION DETAILED: RIGHT ANKLE

## 2018-03-14 ASSESSMENT — PAIN INTENSITY VAS: HOW INTENSE IS YOUR PAIN 0 BEING NO PAIN, 10 BEING THE MOST SEVERE PAIN POSSIBLE?: 2/10 PAIN

## 2018-03-14 ASSESSMENT — LOCATION ZONE DERM
LOCATION ZONE: LEG
LOCATION ZONE: FEET

## 2018-03-28 ENCOUNTER — APPOINTMENT (OUTPATIENT)
Dept: URBAN - METROPOLITAN AREA CLINIC 200 | Age: 63
Setting detail: DERMATOLOGY
End: 2018-03-28

## 2018-03-28 DIAGNOSIS — L88 PYODERMA GANGRENOSUM: ICD-10-CM

## 2018-03-28 PROCEDURE — OTHER OBSERVATION: OTHER

## 2018-03-28 PROCEDURE — OTHER ORDER TESTS: OTHER

## 2018-03-28 PROCEDURE — 99213 OFFICE O/P EST LOW 20 MIN: CPT

## 2018-03-28 PROCEDURE — OTHER OBSERVATION AND MEASURE: OTHER

## 2018-03-28 ASSESSMENT — LOCATION DETAILED DESCRIPTION DERM
LOCATION DETAILED: RIGHT ANKLE
LOCATION DETAILED: RIGHT DORSAL FOOT

## 2018-03-28 ASSESSMENT — LOCATION SIMPLE DESCRIPTION DERM
LOCATION SIMPLE: RIGHT FOOT
LOCATION SIMPLE: RIGHT ANKLE

## 2018-03-28 ASSESSMENT — LOCATION ZONE DERM
LOCATION ZONE: FEET
LOCATION ZONE: LEG

## 2018-04-04 ENCOUNTER — APPOINTMENT (OUTPATIENT)
Dept: URBAN - METROPOLITAN AREA CLINIC 200 | Age: 63
Setting detail: DERMATOLOGY
End: 2018-04-06

## 2018-04-04 DIAGNOSIS — L88 PYODERMA GANGRENOSUM: ICD-10-CM

## 2018-04-04 DIAGNOSIS — L57.8 OTHER SKIN CHANGES DUE TO CHRONIC EXPOSURE TO NONIONIZING RADIATION: ICD-10-CM

## 2018-04-04 DIAGNOSIS — D22 MELANOCYTIC NEVI: ICD-10-CM

## 2018-04-04 DIAGNOSIS — Z85.828 PERSONAL HISTORY OF OTHER MALIGNANT NEOPLASM OF SKIN: ICD-10-CM

## 2018-04-04 PROBLEM — D22.71 MELANOCYTIC NEVI OF RIGHT LOWER LIMB, INCLUDING HIP: Status: ACTIVE | Noted: 2018-04-04

## 2018-04-04 PROCEDURE — OTHER ORDER TESTS: OTHER

## 2018-04-04 PROCEDURE — OTHER PRESCRIPTION MEDICATION MANAGEMENT: OTHER

## 2018-04-04 PROCEDURE — OTHER OBSERVATION AND MEASURE: OTHER

## 2018-04-04 PROCEDURE — 99213 OFFICE O/P EST LOW 20 MIN: CPT

## 2018-04-04 PROCEDURE — OTHER COUNSELING: OTHER

## 2018-04-04 PROCEDURE — OTHER OBSERVATION: OTHER

## 2018-04-04 ASSESSMENT — LOCATION ZONE DERM
LOCATION ZONE: TRUNK
LOCATION ZONE: FEET
LOCATION ZONE: LEG

## 2018-04-04 ASSESSMENT — LOCATION SIMPLE DESCRIPTION DERM
LOCATION SIMPLE: RIGHT PLANTAR SURFACE
LOCATION SIMPLE: CHEST
LOCATION SIMPLE: RIGHT FOOT
LOCATION SIMPLE: RIGHT ANKLE

## 2018-04-04 ASSESSMENT — LOCATION DETAILED DESCRIPTION DERM
LOCATION DETAILED: LEFT MEDIAL SUPERIOR CHEST
LOCATION DETAILED: RIGHT DORSAL FOOT
LOCATION DETAILED: RIGHT MEDIAL PLANTAR MIDFOOT
LOCATION DETAILED: RIGHT ANKLE

## 2018-04-04 NOTE — PROCEDURE: OBSERVATION
Size Of Lesion: 5mm
Morphology Per Location (Optional): now appears reepithelialized
Detail Level: Detailed
Size Of Lesion: 10 x4

## 2018-04-18 ENCOUNTER — APPOINTMENT (OUTPATIENT)
Dept: URBAN - METROPOLITAN AREA CLINIC 200 | Age: 63
Setting detail: DERMATOLOGY
End: 2018-04-23

## 2018-04-18 DIAGNOSIS — L88 PYODERMA GANGRENOSUM: ICD-10-CM

## 2018-04-18 DIAGNOSIS — L98.8 OTHER SPECIFIED DISORDERS OF THE SKIN AND SUBCUTANEOUS TISSUE: ICD-10-CM

## 2018-04-18 PROCEDURE — OTHER OBSERVATION AND MEASURE: OTHER

## 2018-04-18 PROCEDURE — OTHER BOTOX (U OR CC) ADDITIVE: OTHER

## 2018-04-18 PROCEDURE — OTHER OBSERVATION: OTHER

## 2018-04-18 PROCEDURE — OTHER BOTOX (U OR CC): OTHER

## 2018-04-18 PROCEDURE — 99213 OFFICE O/P EST LOW 20 MIN: CPT

## 2018-04-18 ASSESSMENT — LOCATION DETAILED DESCRIPTION DERM
LOCATION DETAILED: RIGHT INFERIOR MEDIAL FOREHEAD
LOCATION DETAILED: RIGHT INFERIOR TEMPLE
LOCATION DETAILED: LEFT INFERIOR MEDIAL FOREHEAD
LOCATION DETAILED: LEFT INFERIOR TEMPLE
LOCATION DETAILED: GLABELLA
LOCATION DETAILED: RIGHT DORSAL FOOT

## 2018-04-18 ASSESSMENT — LOCATION SIMPLE DESCRIPTION DERM
LOCATION SIMPLE: RIGHT TEMPLE
LOCATION SIMPLE: LEFT FOREHEAD
LOCATION SIMPLE: LEFT TEMPLE
LOCATION SIMPLE: RIGHT FOOT
LOCATION SIMPLE: GLABELLA
LOCATION SIMPLE: RIGHT FOREHEAD

## 2018-04-18 ASSESSMENT — LOCATION ZONE DERM
LOCATION ZONE: FACE
LOCATION ZONE: FEET

## 2018-04-18 NOTE — PROCEDURE: OBSERVATION
Detail Level: Detailed
Morphology Per Location (Optional): now appears reepithelialized
Size Of Lesion: 10 x4

## 2018-08-30 ENCOUNTER — APPOINTMENT (OUTPATIENT)
Dept: URBAN - METROPOLITAN AREA CLINIC 200 | Age: 63
Setting detail: DERMATOLOGY
End: 2018-09-11

## 2018-08-30 DIAGNOSIS — L20.84 INTRINSIC (ALLERGIC) ECZEMA: ICD-10-CM

## 2018-08-30 DIAGNOSIS — L82.1 OTHER SEBORRHEIC KERATOSIS: ICD-10-CM

## 2018-08-30 DIAGNOSIS — L98.8 OTHER SPECIFIED DISORDERS OF THE SKIN AND SUBCUTANEOUS TISSUE: ICD-10-CM

## 2018-08-30 DIAGNOSIS — Z23 ENCOUNTER FOR IMMUNIZATION: ICD-10-CM

## 2018-08-30 PROBLEM — Z85.828 PERSONAL HISTORY OF OTHER MALIGNANT NEOPLASM OF SKIN: Status: ACTIVE | Noted: 2018-08-30

## 2018-08-30 PROCEDURE — OTHER RECOMMENDATIONS: OTHER

## 2018-08-30 PROCEDURE — OTHER BOTOX (U OR CC) ADDITIVE: OTHER

## 2018-08-30 PROCEDURE — OTHER BOTOX (U OR CC): OTHER

## 2018-08-30 PROCEDURE — 99213 OFFICE O/P EST LOW 20 MIN: CPT

## 2018-08-30 PROCEDURE — OTHER BENIGN DESTRUCTION COSMETIC: OTHER

## 2018-08-30 PROCEDURE — OTHER REASSURANCE: OTHER

## 2018-08-30 PROCEDURE — OTHER LIQUID NITROGEN (COSMETIC): OTHER

## 2018-08-30 ASSESSMENT — LOCATION SIMPLE DESCRIPTION DERM
LOCATION SIMPLE: RIGHT HAND
LOCATION SIMPLE: RIGHT TEMPLE
LOCATION SIMPLE: LEFT HAND
LOCATION SIMPLE: RIGHT FOREHEAD
LOCATION SIMPLE: LEFT SHOULDER
LOCATION SIMPLE: LEFT CHEEK
LOCATION SIMPLE: LEFT POSTERIOR THIGH
LOCATION SIMPLE: LEFT ANTERIOR NECK
LOCATION SIMPLE: CHEST
LOCATION SIMPLE: CHEST
LOCATION SIMPLE: LEFT FOREHEAD
LOCATION SIMPLE: LEFT TEMPLE
LOCATION SIMPLE: RIGHT CHEEK

## 2018-08-30 ASSESSMENT — LOCATION ZONE DERM
LOCATION ZONE: LEG
LOCATION ZONE: ARM
LOCATION ZONE: HAND
LOCATION ZONE: FACE
LOCATION ZONE: NECK
LOCATION ZONE: TRUNK
LOCATION ZONE: TRUNK

## 2018-08-30 ASSESSMENT — LOCATION DETAILED DESCRIPTION DERM
LOCATION DETAILED: LEFT MEDIAL SUPERIOR CHEST
LOCATION DETAILED: LEFT INFERIOR MEDIAL FOREHEAD
LOCATION DETAILED: LEFT SUPERIOR LATERAL FOREHEAD
LOCATION DETAILED: LEFT CLAVICULAR NECK
LOCATION DETAILED: LEFT INFERIOR TEMPLE
LOCATION DETAILED: UPPER STERNUM
LOCATION DETAILED: LEFT ANTERIOR SHOULDER
LOCATION DETAILED: LEFT SUPERIOR FOREHEAD
LOCATION DETAILED: RIGHT INFERIOR TEMPLE
LOCATION DETAILED: LEFT DISTAL POSTERIOR THIGH
LOCATION DETAILED: RIGHT THENAR EMINENCE
LOCATION DETAILED: LEFT SUPERIOR MEDIAL FOREHEAD
LOCATION DETAILED: LEFT SUPERIOR LATERAL MALAR CHEEK
LOCATION DETAILED: LEFT THENAR EMINENCE
LOCATION DETAILED: RIGHT INFERIOR MEDIAL FOREHEAD
LOCATION DETAILED: RIGHT SUPERIOR LATERAL MALAR CHEEK

## 2018-08-30 NOTE — PROCEDURE: RECOMMENDATIONS
Recommendations (Free Text): Cera Ve SA cream
Detail Level: Simple
Recommendation Preamble: The following recommendations were made during the visit:

## 2018-08-30 NOTE — PROCEDURE: REASSURANCE
Detail Level: Generalized
Additional Notes (Optional): Script for vaccine hand written and given
Hide Additional Notes?: No

## 2018-08-30 NOTE — PROCEDURE: BENIGN DESTRUCTION COSMETIC
Anesthesia Volume In Cc: 0
Post-Care Instructions: I reviewed with the patient in detail post-care instructions. Patient is to wear sunprotection, and avoid picking at any of the treated lesions. Pt may apply Vaseline to crusted or scabbing areas.
Consent: The patient's consent was obtained including but not limited to risks of crusting, scabbing, blistering, scarring, darker or lighter pigmentary change, recurrence, incomplete removal and infection.
Anesthesia Type: 2% lidocaine without epinephrine
Detail Level: Zone

## 2018-12-03 ENCOUNTER — APPOINTMENT (OUTPATIENT)
Dept: URBAN - METROPOLITAN AREA CLINIC 200 | Age: 63
Setting detail: DERMATOLOGY
End: 2018-12-14

## 2018-12-03 DIAGNOSIS — L98.8 OTHER SPECIFIED DISORDERS OF THE SKIN AND SUBCUTANEOUS TISSUE: ICD-10-CM

## 2018-12-03 PROCEDURE — OTHER BOTOX (U OR CC): OTHER

## 2018-12-03 PROCEDURE — OTHER BOTOX (U OR CC) ADDITIVE: OTHER

## 2018-12-03 ASSESSMENT — LOCATION SIMPLE DESCRIPTION DERM
LOCATION SIMPLE: RIGHT CHEEK
LOCATION SIMPLE: LEFT CHEEK
LOCATION SIMPLE: RIGHT TEMPLE
LOCATION SIMPLE: LEFT FOREHEAD
LOCATION SIMPLE: LEFT TEMPLE
LOCATION SIMPLE: RIGHT FOREHEAD

## 2018-12-03 ASSESSMENT — LOCATION DETAILED DESCRIPTION DERM
LOCATION DETAILED: LEFT INFERIOR TEMPLE
LOCATION DETAILED: LEFT INFERIOR MEDIAL FOREHEAD
LOCATION DETAILED: RIGHT INFERIOR MEDIAL FOREHEAD
LOCATION DETAILED: LEFT SUPERIOR LATERAL MALAR CHEEK
LOCATION DETAILED: RIGHT INFERIOR TEMPLE
LOCATION DETAILED: RIGHT SUPERIOR LATERAL MALAR CHEEK

## 2018-12-03 ASSESSMENT — LOCATION ZONE DERM: LOCATION ZONE: FACE

## 2019-01-30 ENCOUNTER — RX ONLY (RX ONLY)
Age: 64
End: 2019-01-30

## 2019-01-30 RX ORDER — CLOBETASOL PROPIONATE 0.5 MG/G
CREAM TOPICAL BID
Qty: 1 | Refills: 3 | Status: ERX

## 2019-01-30 RX ORDER — TRIAMCINOLONE ACETONIDE 1 MG/G
CREAM TOPICAL
Qty: 1 | Refills: 3 | Status: ERX | COMMUNITY
Start: 2019-01-30

## 2019-04-02 ENCOUNTER — APPOINTMENT (OUTPATIENT)
Dept: URBAN - METROPOLITAN AREA CLINIC 200 | Age: 64
Setting detail: DERMATOLOGY
End: 2019-04-09

## 2019-04-02 DIAGNOSIS — G90.09 OTHER IDIOPATHIC PERIPHERAL AUTONOMIC NEUROPATHY: ICD-10-CM

## 2019-04-02 DIAGNOSIS — L11.1 TRANSIENT ACANTHOLYTIC DERMATOSIS [GROVER]: ICD-10-CM

## 2019-04-02 DIAGNOSIS — L57.8 OTHER SKIN CHANGES DUE TO CHRONIC EXPOSURE TO NONIONIZING RADIATION: ICD-10-CM

## 2019-04-02 DIAGNOSIS — L57.0 ACTINIC KERATOSIS: ICD-10-CM

## 2019-04-02 DIAGNOSIS — Z85.828 PERSONAL HISTORY OF OTHER MALIGNANT NEOPLASM OF SKIN: ICD-10-CM

## 2019-04-02 DIAGNOSIS — L98.8 OTHER SPECIFIED DISORDERS OF THE SKIN AND SUBCUTANEOUS TISSUE: ICD-10-CM

## 2019-04-02 PROCEDURE — 99213 OFFICE O/P EST LOW 20 MIN: CPT | Mod: 25

## 2019-04-02 PROCEDURE — OTHER COUNSELING: OTHER

## 2019-04-02 PROCEDURE — OTHER LIQUID NITROGEN: OTHER

## 2019-04-02 PROCEDURE — OTHER BOTOX (U OR CC) ADDITIVE: OTHER

## 2019-04-02 PROCEDURE — OTHER BOTOX (U OR CC): OTHER

## 2019-04-02 PROCEDURE — OTHER ORDER TESTS: OTHER

## 2019-04-02 PROCEDURE — 17000 DESTRUCT PREMALG LESION: CPT

## 2019-04-02 PROCEDURE — OTHER PRESCRIPTION MEDICATION MANAGEMENT: OTHER

## 2019-04-02 ASSESSMENT — LOCATION ZONE DERM
LOCATION ZONE: TRUNK
LOCATION ZONE: FACE
LOCATION ZONE: NECK

## 2019-04-02 ASSESSMENT — LOCATION SIMPLE DESCRIPTION DERM
LOCATION SIMPLE: RIGHT UPPER BACK
LOCATION SIMPLE: CHEST
LOCATION SIMPLE: RIGHT CLAVICULAR SKIN
LOCATION SIMPLE: RIGHT CHEEK
LOCATION SIMPLE: LEFT ANTERIOR NECK
LOCATION SIMPLE: LEFT FOREHEAD
LOCATION SIMPLE: RIGHT FOREHEAD
LOCATION SIMPLE: UPPER BACK
LOCATION SIMPLE: LEFT CHEEK

## 2019-04-02 ASSESSMENT — PAIN INTENSITY VAS: HOW INTENSE IS YOUR PAIN 0 BEING NO PAIN, 10 BEING THE MOST SEVERE PAIN POSSIBLE?: NO PAIN

## 2019-04-02 ASSESSMENT — LOCATION DETAILED DESCRIPTION DERM
LOCATION DETAILED: RIGHT SUPERIOR LATERAL MALAR CHEEK
LOCATION DETAILED: LEFT SUPERIOR LATERAL MALAR CHEEK
LOCATION DETAILED: LEFT INFERIOR MEDIAL FOREHEAD
LOCATION DETAILED: RIGHT INFERIOR MEDIAL FOREHEAD
LOCATION DETAILED: RIGHT SUPERIOR MEDIAL UPPER BACK
LOCATION DETAILED: INFERIOR THORACIC SPINE
LOCATION DETAILED: LEFT INFERIOR ANTERIOR NECK
LOCATION DETAILED: RIGHT CLAVICULAR SKIN
LOCATION DETAILED: LEFT SUPERIOR CENTRAL MALAR CHEEK
LOCATION DETAILED: LEFT MEDIAL SUPERIOR CHEST

## 2019-04-02 NOTE — PROCEDURE: LIQUID NITROGEN
Render Post-Care Instructions In Note?: no
Duration Of Freeze Thaw-Cycle (Seconds): 2
Detail Level: Detailed
Post-Care Instructions: I reviewed with the patient in detail post-care instructions. Patient is to wear sunprotection, and avoid picking at any of the treated lesions. Pt may apply Vaseline to crusted or scabbing areas.
Consent: The patient's consent was obtained including but not limited to risks of crusting, scabbing, blistering, scarring, darker or lighter pigmentary change, recurrence, incomplete removal and infection.

## 2019-05-17 ENCOUNTER — APPOINTMENT (OUTPATIENT)
Dept: LAB | Facility: CLINIC | Age: 64
End: 2019-05-17
Attending: SPECIALIST
Payer: MEDICARE

## 2019-05-17 ENCOUNTER — TRANSCRIBE ORDERS (OUTPATIENT)
Dept: LAB | Facility: CLINIC | Age: 64
End: 2019-05-17

## 2019-05-17 DIAGNOSIS — E06.9 THYROIDITIS: ICD-10-CM

## 2019-05-17 DIAGNOSIS — D51.9 VITAMIN B12 DEFICIENCY ANEMIA: ICD-10-CM

## 2019-05-17 DIAGNOSIS — G90.09 OTHER IDIOPATHIC PERIPHERAL AUTONOMIC NEUROPATHY: Primary | ICD-10-CM

## 2019-05-17 DIAGNOSIS — R73.9 TEMPORARY INCREASE IN BLOOD SUGAR FOLLOWING PROCEDURE: ICD-10-CM

## 2019-05-17 DIAGNOSIS — G90.09 OTHER IDIOPATHIC PERIPHERAL AUTONOMIC NEUROPATHY: ICD-10-CM

## 2019-05-17 DIAGNOSIS — E53.8 FOLATE DEFICIENCY: ICD-10-CM

## 2019-05-17 LAB
ALBUMIN SERPL-MCNC: 2 G/DL (ref 3.4–5)
ALP SERPL-CCNC: 261 IU/L (ref 35–126)
ALT SERPL-CCNC: 61 IU/L (ref 11–54)
ANION GAP SERPL CALC-SCNC: 7 MEQ/L (ref 3–15)
AST SERPL-CCNC: 64 IU/L (ref 15–41)
BILIRUB SERPL-MCNC: 0.8 MG/DL (ref 0.3–1.2)
BUN SERPL-MCNC: 7 MG/DL (ref 8–20)
CALCIUM SERPL-MCNC: 8.1 MG/DL (ref 8.9–10.3)
CHLORIDE SERPL-SCNC: 100 MEQ/L (ref 98–109)
CO2 SERPL-SCNC: 28 MEQ/L (ref 22–32)
CREAT SERPL-MCNC: 0.6 MG/DL
EST. AVERAGE GLUCOSE BLD GHB EST-MCNC: 100 MG/DL
FOLATE SERPL-MCNC: 8.8 NG/ML
GFR SERPL CREATININE-BSD FRML MDRD: >60 ML/MIN/1.73M*2
GLUCOSE SERPL-MCNC: 90 MG/DL (ref 70–99)
HBA1C MFR BLD HPLC: 5.1 %
POTASSIUM SERPL-SCNC: 4 MEQ/L (ref 3.6–5.1)
PROT SERPL-MCNC: 4.4 G/DL (ref 6–8.2)
SODIUM SERPL-SCNC: 135 MEQ/L (ref 136–144)
T4 FREE SERPL-MCNC: 0.92 NG/DL (ref 0.58–1.64)
TSH SERPL DL<=0.05 MIU/L-ACNC: 6.42 MIU/L (ref 0.34–5.6)
VIT B12 SERPL-MCNC: >1500 PG/ML (ref 180–914)

## 2019-05-17 PROCEDURE — 36415 COLL VENOUS BLD VENIPUNCTURE: CPT

## 2019-05-17 PROCEDURE — 80053 COMPREHEN METABOLIC PANEL: CPT

## 2019-05-17 PROCEDURE — 84443 ASSAY THYROID STIM HORMONE: CPT | Mod: GA

## 2019-05-17 PROCEDURE — 86038 ANTINUCLEAR ANTIBODIES: CPT

## 2019-05-17 PROCEDURE — 84439 ASSAY OF FREE THYROXINE: CPT | Mod: GA

## 2019-05-17 PROCEDURE — 82607 VITAMIN B-12: CPT | Mod: GA

## 2019-05-17 PROCEDURE — 83036 HEMOGLOBIN GLYCOSYLATED A1C: CPT | Mod: GA

## 2019-05-17 PROCEDURE — 82746 ASSAY OF FOLIC ACID SERUM: CPT | Mod: GA

## 2019-05-21 ENCOUNTER — APPOINTMENT (OUTPATIENT)
Dept: URBAN - METROPOLITAN AREA CLINIC 200 | Age: 64
Setting detail: DERMATOLOGY
End: 2019-05-31

## 2019-05-21 ENCOUNTER — RX ONLY (RX ONLY)
Age: 64
End: 2019-05-21

## 2019-05-21 DIAGNOSIS — L88 PYODERMA GANGRENOSUM: ICD-10-CM

## 2019-05-21 DIAGNOSIS — G90.09 OTHER IDIOPATHIC PERIPHERAL AUTONOMIC NEUROPATHY: ICD-10-CM

## 2019-05-21 PROCEDURE — OTHER OBSERVATION AND MEASURE: OTHER

## 2019-05-21 PROCEDURE — 99213 OFFICE O/P EST LOW 20 MIN: CPT

## 2019-05-21 PROCEDURE — OTHER PRESCRIPTION: OTHER

## 2019-05-21 PROCEDURE — OTHER OBSERVATION: OTHER

## 2019-05-21 PROCEDURE — OTHER ORDER TESTS: OTHER

## 2019-05-21 RX ORDER — GABAPENTIN 100 MG/1
CAPSULE ORAL
Qty: 90 | Refills: 5 | Status: ERX | COMMUNITY

## 2019-05-21 RX ORDER — CIPROFLOXACIN HYDROCHLORIDE 500 MG/1
TABLET, FILM COATED ORAL
Qty: 20 | Refills: 0 | Status: ERX

## 2019-05-21 RX ORDER — DICAPRYLYL CARBONATE/DIMETH
SPRAY, NON-AEROSOL (ML) TOPICAL
Qty: 1 | Refills: 0 | Status: ERX | COMMUNITY
Start: 2019-05-21

## 2019-05-21 ASSESSMENT — LOCATION SIMPLE DESCRIPTION DERM
LOCATION SIMPLE: LEFT FOOT
LOCATION SIMPLE: RIGHT FOOT
LOCATION SIMPLE: RIGHT ANKLE

## 2019-05-21 ASSESSMENT — LOCATION DETAILED DESCRIPTION DERM
LOCATION DETAILED: RIGHT ANKLE
LOCATION DETAILED: RIGHT DORSAL FOOT
LOCATION DETAILED: LEFT DORSAL FOOT

## 2019-05-21 ASSESSMENT — LOCATION ZONE DERM
LOCATION ZONE: FEET
LOCATION ZONE: LEG

## 2019-05-21 NOTE — PROCEDURE: OBSERVATION
Morphology Per Location (Optional): now appears reepithelialized
Detail Level: Detailed
Size Of Lesion: 10 x4

## 2019-05-23 LAB — ANA SER QL HEP2 SUBST: NEGATIVE

## 2019-05-31 ENCOUNTER — APPOINTMENT (OUTPATIENT)
Dept: URBAN - METROPOLITAN AREA CLINIC 200 | Age: 64
Setting detail: DERMATOLOGY
End: 2019-06-17

## 2019-05-31 DIAGNOSIS — L88 PYODERMA GANGRENOSUM: ICD-10-CM

## 2019-05-31 PROBLEM — Z85.828 PERSONAL HISTORY OF OTHER MALIGNANT NEOPLASM OF SKIN: Status: ACTIVE | Noted: 2019-05-31

## 2019-05-31 PROCEDURE — OTHER OBSERVATION: OTHER

## 2019-05-31 PROCEDURE — OTHER PRESCRIPTION: OTHER

## 2019-05-31 PROCEDURE — 99213 OFFICE O/P EST LOW 20 MIN: CPT

## 2019-05-31 PROCEDURE — OTHER OBSERVATION AND MEASURE: OTHER

## 2019-05-31 RX ORDER — HYDROMORPHONE HYDROCHLORIDE 4 MG/1
TABLET ORAL
Qty: 14 | Refills: 0 | COMMUNITY
Start: 2019-05-31

## 2019-05-31 ASSESSMENT — SEVERITY ASSESSMENT: SEVERITY: MILD

## 2019-05-31 ASSESSMENT — LOCATION ZONE DERM: LOCATION ZONE: FEET

## 2019-05-31 ASSESSMENT — PAIN INTENSITY VAS: HOW INTENSE IS YOUR PAIN 0 BEING NO PAIN, 10 BEING THE MOST SEVERE PAIN POSSIBLE?: 8/10 PAIN

## 2019-05-31 ASSESSMENT — LOCATION SIMPLE DESCRIPTION DERM: LOCATION SIMPLE: RIGHT FOOT

## 2019-05-31 ASSESSMENT — LOCATION DETAILED DESCRIPTION DERM: LOCATION DETAILED: RIGHT DORSAL FOOT

## 2019-05-31 NOTE — PROCEDURE: OBSERVATION
Morphology Per Location (Optional): now appears to be reepithelialing
Detail Level: Detailed
Size Of Lesion: 1.7 x 0.4 cm

## 2019-06-07 ENCOUNTER — APPOINTMENT (OUTPATIENT)
Dept: URBAN - METROPOLITAN AREA CLINIC 200 | Age: 64
Setting detail: DERMATOLOGY
End: 2019-06-24

## 2019-06-07 DIAGNOSIS — L88 PYODERMA GANGRENOSUM: ICD-10-CM

## 2019-06-07 DIAGNOSIS — L08.9 LOCAL INFECTION OF THE SKIN AND SUBCUTANEOUS TISSUE, UNSPECIFIED: ICD-10-CM

## 2019-06-07 PROBLEM — Z85.828 PERSONAL HISTORY OF OTHER MALIGNANT NEOPLASM OF SKIN: Status: ACTIVE | Noted: 2019-06-07

## 2019-06-07 PROCEDURE — OTHER OBSERVATION AND MEASURE: OTHER

## 2019-06-07 PROCEDURE — OTHER OBSERVATION: OTHER

## 2019-06-07 PROCEDURE — OTHER PRESCRIPTION: OTHER

## 2019-06-07 PROCEDURE — 99213 OFFICE O/P EST LOW 20 MIN: CPT

## 2019-06-07 PROCEDURE — OTHER ORDER TESTS: OTHER

## 2019-06-07 RX ORDER — CLOBETASOL PROPIONATE 0.5 MG/G
OINTMENT TOPICAL
Qty: 1 | Refills: 3 | Status: ERX | COMMUNITY
Start: 2019-06-07

## 2019-06-07 ASSESSMENT — LOCATION DETAILED DESCRIPTION DERM: LOCATION DETAILED: RIGHT DORSAL FOOT

## 2019-06-07 ASSESSMENT — LOCATION ZONE DERM: LOCATION ZONE: FEET

## 2019-06-07 ASSESSMENT — LOCATION SIMPLE DESCRIPTION DERM: LOCATION SIMPLE: RIGHT FOOT

## 2019-06-13 ENCOUNTER — APPOINTMENT (OUTPATIENT)
Dept: URBAN - METROPOLITAN AREA CLINIC 200 | Age: 64
Setting detail: DERMATOLOGY
End: 2019-06-24

## 2019-06-13 DIAGNOSIS — L88 PYODERMA GANGRENOSUM: ICD-10-CM

## 2019-06-13 PROCEDURE — OTHER OBSERVATION: OTHER

## 2019-06-13 PROCEDURE — OTHER RECOMMENDATIONS: OTHER

## 2019-06-13 PROCEDURE — OTHER OBSERVATION AND MEASURE: OTHER

## 2019-06-13 PROCEDURE — 99212 OFFICE O/P EST SF 10 MIN: CPT

## 2019-06-13 ASSESSMENT — LOCATION SIMPLE DESCRIPTION DERM
LOCATION SIMPLE: RIGHT ANKLE
LOCATION SIMPLE: RIGHT FOOT

## 2019-06-13 ASSESSMENT — LOCATION DETAILED DESCRIPTION DERM
LOCATION DETAILED: RIGHT DORSAL FOOT
LOCATION DETAILED: RIGHT ANKLE

## 2019-06-13 ASSESSMENT — PAIN INTENSITY VAS: HOW INTENSE IS YOUR PAIN 0 BEING NO PAIN, 10 BEING THE MOST SEVERE PAIN POSSIBLE?: 2/10 PAIN

## 2019-06-13 ASSESSMENT — SEVERITY ASSESSMENT: SEVERITY: MODERATE

## 2019-06-13 ASSESSMENT — LOCATION ZONE DERM
LOCATION ZONE: LEG
LOCATION ZONE: FEET

## 2019-06-13 NOTE — PROCEDURE: RECOMMENDATIONS
Detail Level: Simple
Recommendations (Free Text): Agree with lymphedema specialist. I believe there may be a component of Venus stasis are associated with this. Lymphedema treatment should decrease the swelling and help with this component. Since pyoderma gangrenosum is caused by some type of injury the swelling and fall could have triggered this. Improving the swelling would likely greatly help the healing. I am hesitant to be in cyclosporine due to her extreme swelling and my concern that her kidney function, decrease there by impeding the improvement of the swelling.

## 2019-06-19 ENCOUNTER — APPOINTMENT (OUTPATIENT)
Dept: LAB | Facility: CLINIC | Age: 64
End: 2019-06-19
Attending: INTERNAL MEDICINE
Payer: MEDICARE

## 2019-06-19 ENCOUNTER — TRANSCRIBE ORDERS (OUTPATIENT)
Dept: LAB | Facility: CLINIC | Age: 64
End: 2019-06-19

## 2019-06-19 DIAGNOSIS — R94.5 ABNORMAL RESULTS OF LIVER FUNCTION STUDIES: Primary | ICD-10-CM

## 2019-06-19 DIAGNOSIS — R94.5 ABNORMAL RESULTS OF LIVER FUNCTION STUDIES: ICD-10-CM

## 2019-06-19 PROCEDURE — 86255 FLUORESCENT ANTIBODY SCREEN: CPT | Mod: 59

## 2019-06-19 PROCEDURE — 86255 FLUORESCENT ANTIBODY SCREEN: CPT

## 2019-06-19 PROCEDURE — 36415 COLL VENOUS BLD VENIPUNCTURE: CPT

## 2019-06-19 PROCEDURE — 83516 IMMUNOASSAY NONANTIBODY: CPT | Mod: 59

## 2019-06-19 PROCEDURE — 83516 IMMUNOASSAY NONANTIBODY: CPT

## 2019-06-20 ENCOUNTER — APPOINTMENT (OUTPATIENT)
Dept: URBAN - METROPOLITAN AREA CLINIC 200 | Age: 64
Setting detail: DERMATOLOGY
End: 2019-06-23

## 2019-06-20 DIAGNOSIS — L88 PYODERMA GANGRENOSUM: ICD-10-CM

## 2019-06-20 LAB
TTG IGA SER IA-ACNC: >128 ELIA U/ML
TTG IGG SER IA-ACNC: 0.6 ELIA U/ML

## 2019-06-20 PROCEDURE — OTHER INTRALESIONAL KENALOG: OTHER

## 2019-06-20 PROCEDURE — OTHER OBSERVATION: OTHER

## 2019-06-20 PROCEDURE — OTHER OBSERVATION AND MEASURE: OTHER

## 2019-06-20 PROCEDURE — 11900 INJECT SKIN LESIONS </W 7: CPT

## 2019-06-20 ASSESSMENT — LOCATION DETAILED DESCRIPTION DERM: LOCATION DETAILED: RIGHT DORSAL FOOT

## 2019-06-20 ASSESSMENT — LOCATION ZONE DERM: LOCATION ZONE: FEET

## 2019-06-20 ASSESSMENT — LOCATION SIMPLE DESCRIPTION DERM: LOCATION SIMPLE: RIGHT FOOT

## 2019-06-21 LAB
MITOCHONDRIA AB SER QL IF: NEGATIVE
SMOOTH MUSCLE AB SER QL IF: NEGATIVE

## 2019-07-09 ENCOUNTER — RX ONLY (RX ONLY)
Age: 64
End: 2019-07-09

## 2019-07-09 ENCOUNTER — APPOINTMENT (OUTPATIENT)
Dept: URBAN - METROPOLITAN AREA CLINIC 200 | Age: 64
Setting detail: DERMATOLOGY
End: 2019-07-10

## 2019-07-09 DIAGNOSIS — L88 PYODERMA GANGRENOSUM: ICD-10-CM

## 2019-07-09 DIAGNOSIS — L98.8 OTHER SPECIFIED DISORDERS OF THE SKIN AND SUBCUTANEOUS TISSUE: ICD-10-CM

## 2019-07-09 PROCEDURE — OTHER BOTOX (U OR CC): OTHER

## 2019-07-09 PROCEDURE — OTHER OBSERVATION AND MEASURE: OTHER

## 2019-07-09 PROCEDURE — OTHER ORDER TESTS: OTHER

## 2019-07-09 PROCEDURE — OTHER BOTOX (U OR CC) ADDITIVE: OTHER

## 2019-07-09 PROCEDURE — 99212 OFFICE O/P EST SF 10 MIN: CPT

## 2019-07-09 PROCEDURE — OTHER OBSERVATION: OTHER

## 2019-07-09 RX ORDER — CYCLOSPORINE 100 MG/1
CAPSULE, GELATIN COATED ORAL
Qty: 30 | Refills: 4 | Status: ERX

## 2019-07-09 ASSESSMENT — PAIN INTENSITY VAS: HOW INTENSE IS YOUR PAIN 0 BEING NO PAIN, 10 BEING THE MOST SEVERE PAIN POSSIBLE?: 5/10 PAIN

## 2019-07-09 ASSESSMENT — LOCATION DETAILED DESCRIPTION DERM
LOCATION DETAILED: RIGHT DORSAL FOOT
LOCATION DETAILED: LEFT SUPERIOR CENTRAL MALAR CHEEK
LOCATION DETAILED: LEFT INFERIOR MEDIAL FOREHEAD
LOCATION DETAILED: RIGHT INFERIOR MEDIAL FOREHEAD
LOCATION DETAILED: LEFT SUPERIOR LATERAL MALAR CHEEK
LOCATION DETAILED: RIGHT SUPERIOR LATERAL MALAR CHEEK

## 2019-07-09 ASSESSMENT — LOCATION SIMPLE DESCRIPTION DERM
LOCATION SIMPLE: RIGHT FOOT
LOCATION SIMPLE: LEFT CHEEK
LOCATION SIMPLE: RIGHT FOREHEAD
LOCATION SIMPLE: LEFT FOREHEAD
LOCATION SIMPLE: RIGHT CHEEK

## 2019-07-09 ASSESSMENT — SEVERITY ASSESSMENT: SEVERITY: MODERATE TO SEVERE

## 2019-07-09 ASSESSMENT — LOCATION ZONE DERM
LOCATION ZONE: FEET
LOCATION ZONE: FACE

## 2019-07-16 ENCOUNTER — APPOINTMENT (OUTPATIENT)
Dept: URBAN - METROPOLITAN AREA CLINIC 200 | Age: 64
Setting detail: DERMATOLOGY
End: 2019-07-23

## 2019-07-16 DIAGNOSIS — L88 PYODERMA GANGRENOSUM: ICD-10-CM

## 2019-07-16 PROCEDURE — OTHER OBSERVATION AND MEASURE: OTHER

## 2019-07-16 PROCEDURE — OTHER OBSERVATION: OTHER

## 2019-07-16 PROCEDURE — 99212 OFFICE O/P EST SF 10 MIN: CPT

## 2019-07-16 PROCEDURE — OTHER COUNSELING: OTHER

## 2019-07-16 ASSESSMENT — LOCATION SIMPLE DESCRIPTION DERM: LOCATION SIMPLE: RIGHT FOOT

## 2019-07-16 ASSESSMENT — LOCATION ZONE DERM: LOCATION ZONE: FEET

## 2019-07-16 ASSESSMENT — SEVERITY ASSESSMENT: SEVERITY: MODERATE TO SEVERE

## 2019-07-16 ASSESSMENT — LOCATION DETAILED DESCRIPTION DERM: LOCATION DETAILED: RIGHT DORSAL FOOT

## 2019-07-23 ENCOUNTER — APPOINTMENT (OUTPATIENT)
Dept: LAB | Facility: CLINIC | Age: 64
End: 2019-07-23
Attending: SPECIALIST
Payer: MEDICARE

## 2019-07-23 ENCOUNTER — APPOINTMENT (OUTPATIENT)
Dept: URBAN - METROPOLITAN AREA CLINIC 200 | Age: 64
Setting detail: DERMATOLOGY
End: 2019-07-24

## 2019-07-23 ENCOUNTER — TRANSCRIBE ORDERS (OUTPATIENT)
Dept: LAB | Facility: CLINIC | Age: 64
End: 2019-07-23

## 2019-07-23 DIAGNOSIS — L88 PYODERMA GANGRENOSUM: Primary | ICD-10-CM

## 2019-07-23 DIAGNOSIS — L88 PYODERMA GANGRENOSUM: ICD-10-CM

## 2019-07-23 LAB
ALBUMIN SERPL-MCNC: 2.6 G/DL (ref 3.4–5)
ALP SERPL-CCNC: 214 IU/L (ref 35–126)
ALT SERPL-CCNC: 64 IU/L (ref 11–54)
ANION GAP SERPL CALC-SCNC: 8 MEQ/L (ref 3–15)
AST SERPL-CCNC: 74 IU/L (ref 15–41)
BASOPHILS # BLD: 0.04 K/UL (ref 0.01–0.1)
BASOPHILS NFR BLD: 1 %
BILIRUB SERPL-MCNC: 0.9 MG/DL (ref 0.3–1.2)
BUN SERPL-MCNC: 7 MG/DL (ref 8–20)
CALCIUM SERPL-MCNC: 8.4 MG/DL (ref 8.9–10.3)
CHLORIDE SERPL-SCNC: 102 MEQ/L (ref 98–109)
CO2 SERPL-SCNC: 27 MEQ/L (ref 22–32)
CREAT SERPL-MCNC: 0.7 MG/DL
DIFFERENTIAL METHOD BLD: ABNORMAL
EOSINOPHIL # BLD: 0.02 K/UL (ref 0.04–0.36)
EOSINOPHIL NFR BLD: 0.5 %
ERYTHROCYTE [DISTWIDTH] IN BLOOD BY AUTOMATED COUNT: 15.5 % (ref 11.7–14.4)
GFR SERPL CREATININE-BSD FRML MDRD: >60 ML/MIN/1.73M*2
GLUCOSE SERPL-MCNC: 96 MG/DL (ref 70–99)
HCT VFR BLDCO AUTO: 36.2 %
HGB BLD-MCNC: 11.5 G/DL
IMM GRANULOCYTES # BLD AUTO: 0.01 K/UL (ref 0–0.08)
IMM GRANULOCYTES NFR BLD AUTO: 0.2 %
LYMPHOCYTES # BLD: 1.48 K/UL (ref 1.2–3.5)
LYMPHOCYTES NFR BLD: 36.3 %
MCH RBC QN AUTO: 33.4 PG (ref 28–33.2)
MCHC RBC AUTO-ENTMCNC: 31.8 G/DL (ref 32.2–35.5)
MCV RBC AUTO: 105.2 FL (ref 83–98)
MONOCYTES # BLD: 0.29 K/UL (ref 0.28–0.8)
MONOCYTES NFR BLD: 7.1 %
NEUTROPHILS # BLD: 2.24 K/UL (ref 1.7–7)
NEUTS SEG NFR BLD: 54.9 %
NRBC BLD-RTO: 0 %
PDW BLD AUTO: 10.6 FL (ref 9.4–12.3)
PLATELET # BLD AUTO: 311 K/UL
POTASSIUM SERPL-SCNC: 3.7 MEQ/L (ref 3.6–5.1)
PROT SERPL-MCNC: 5.1 G/DL (ref 6–8.2)
RBC # BLD AUTO: 3.44 M/UL (ref 3.93–5.22)
SODIUM SERPL-SCNC: 137 MEQ/L (ref 136–144)
WBC # BLD AUTO: 4.08 K/UL

## 2019-07-23 PROCEDURE — 85025 COMPLETE CBC W/AUTO DIFF WBC: CPT

## 2019-07-23 PROCEDURE — 36415 COLL VENOUS BLD VENIPUNCTURE: CPT

## 2019-07-23 PROCEDURE — OTHER COUNSELING: OTHER

## 2019-07-23 PROCEDURE — 80053 COMPREHEN METABOLIC PANEL: CPT

## 2019-07-23 PROCEDURE — OTHER PHOTO-DOCUMENTATION: OTHER

## 2019-07-23 PROCEDURE — 99212 OFFICE O/P EST SF 10 MIN: CPT

## 2019-07-23 PROCEDURE — OTHER OBSERVATION: OTHER

## 2019-07-23 PROCEDURE — OTHER OBSERVATION AND MEASURE: OTHER

## 2019-07-23 ASSESSMENT — LOCATION ZONE DERM: LOCATION ZONE: FEET

## 2019-07-23 ASSESSMENT — LOCATION DETAILED DESCRIPTION DERM: LOCATION DETAILED: RIGHT DORSAL FOOT

## 2019-07-23 ASSESSMENT — LOCATION SIMPLE DESCRIPTION DERM: LOCATION SIMPLE: RIGHT FOOT

## 2019-07-23 ASSESSMENT — SEVERITY ASSESSMENT: SEVERITY: MODERATE TO SEVERE

## 2019-07-23 ASSESSMENT — PAIN INTENSITY VAS: HOW INTENSE IS YOUR PAIN 0 BEING NO PAIN, 10 BEING THE MOST SEVERE PAIN POSSIBLE?: 3/10 PAIN

## 2019-07-23 NOTE — PROCEDURE: PHOTO-DOCUMENTATION
Photo Preface (Leave Blank If You Do Not Want): Photographs were obtained today
Detail Level: Zone
Details (Free Text): Pyoderma Gangrenosum 7/22/19

## 2019-07-30 ENCOUNTER — APPOINTMENT (OUTPATIENT)
Dept: URBAN - METROPOLITAN AREA CLINIC 200 | Age: 64
Setting detail: DERMATOLOGY
End: 2019-07-30

## 2019-07-30 DIAGNOSIS — L88 PYODERMA GANGRENOSUM: ICD-10-CM

## 2019-07-30 PROBLEM — Z85.828 PERSONAL HISTORY OF OTHER MALIGNANT NEOPLASM OF SKIN: Status: ACTIVE | Noted: 2019-07-30

## 2019-07-30 PROCEDURE — 99212 OFFICE O/P EST SF 10 MIN: CPT

## 2019-07-30 PROCEDURE — OTHER OBSERVATION: OTHER

## 2019-07-30 PROCEDURE — OTHER OBSERVATION AND MEASURE: OTHER

## 2019-07-30 PROCEDURE — OTHER PRESCRIPTION MEDICATION MANAGEMENT: OTHER

## 2019-07-30 PROCEDURE — OTHER COUNSELING: OTHER

## 2019-07-30 PROCEDURE — OTHER PRESCRIPTION: OTHER

## 2019-07-30 PROCEDURE — OTHER ORDER TESTS: OTHER

## 2019-07-30 RX ORDER — CLOBETASOL PROPIONATE 0.5 MG/G
CREAM TOPICAL BID
Qty: 1 | Refills: 3 | Status: CANCELLED
Stop reason: CLARIF

## 2019-07-30 ASSESSMENT — LOCATION ZONE DERM
LOCATION ZONE: LEG
LOCATION ZONE: FEET

## 2019-07-30 ASSESSMENT — SEVERITY ASSESSMENT: SEVERITY: MODERATE

## 2019-07-30 ASSESSMENT — LOCATION DETAILED DESCRIPTION DERM
LOCATION DETAILED: RIGHT ANKLE
LOCATION DETAILED: RIGHT DORSAL FOOT

## 2019-07-30 ASSESSMENT — LOCATION SIMPLE DESCRIPTION DERM
LOCATION SIMPLE: RIGHT ANKLE
LOCATION SIMPLE: RIGHT FOOT

## 2019-07-30 ASSESSMENT — PAIN INTENSITY VAS: HOW INTENSE IS YOUR PAIN 0 BEING NO PAIN, 10 BEING THE MOST SEVERE PAIN POSSIBLE?: 6/10 PAIN

## 2019-07-30 NOTE — PROCEDURE: PRESCRIPTION MEDICATION MANAGEMENT
Render In Strict Bullet Format?: No
Detail Level: Generalized
Continue Regimen: cyclosporine from me.  \\nPain management [facility]\\nConsult for medical cannabis with Dr. Valdovinos

## 2019-07-31 ENCOUNTER — APPOINTMENT (OUTPATIENT)
Dept: URBAN - METROPOLITAN AREA CLINIC 200 | Age: 64
Setting detail: DERMATOLOGY
End: 2019-08-05

## 2019-07-31 DIAGNOSIS — D64.9 ANEMIA, UNSPECIFIED: ICD-10-CM

## 2019-07-31 PROCEDURE — OTHER ORDER TESTS: OTHER

## 2019-07-31 ASSESSMENT — LOCATION SIMPLE DESCRIPTION DERM: LOCATION SIMPLE: ABDOMEN

## 2019-07-31 ASSESSMENT — LOCATION DETAILED DESCRIPTION DERM: LOCATION DETAILED: EPIGASTRIC SKIN

## 2019-07-31 ASSESSMENT — LOCATION ZONE DERM: LOCATION ZONE: TRUNK

## 2019-08-06 ENCOUNTER — APPOINTMENT (OUTPATIENT)
Dept: LAB | Facility: CLINIC | Age: 64
End: 2019-08-06
Attending: FAMILY MEDICINE
Payer: MEDICARE

## 2019-08-06 ENCOUNTER — TRANSCRIBE ORDERS (OUTPATIENT)
Dept: LAB | Facility: CLINIC | Age: 64
End: 2019-08-06

## 2019-08-06 DIAGNOSIS — R79.9 ABNORMAL FINDING OF BLOOD CHEMISTRY: ICD-10-CM

## 2019-08-06 DIAGNOSIS — E83.39 OTHER DISORDERS OF PHOSPHORUS METABOLISM: ICD-10-CM

## 2019-08-06 DIAGNOSIS — M89.00 ALGONEURODYSTROPHY: ICD-10-CM

## 2019-08-06 DIAGNOSIS — R74.02 NONSPECIFIC ELEVATION OF LEVELS OF TRANSAMINASE AND LACTIC ACID DEHYDROGENASE (LDH): Primary | ICD-10-CM

## 2019-08-06 DIAGNOSIS — E53.8 DEFICIENCY OF OTHER SPECIFIED B GROUP VITAMINS: ICD-10-CM

## 2019-08-06 DIAGNOSIS — E55.9 VITAMIN D DEFICIENCY: ICD-10-CM

## 2019-08-06 DIAGNOSIS — E83.42 HYPOMAGNESEMIA: ICD-10-CM

## 2019-08-06 DIAGNOSIS — R74.01 NONSPECIFIC ELEVATION OF LEVELS OF TRANSAMINASE AND LACTIC ACID DEHYDROGENASE (LDH): ICD-10-CM

## 2019-08-06 DIAGNOSIS — E61.1 IRON DEFICIENCY: ICD-10-CM

## 2019-08-06 DIAGNOSIS — E83.51 HYPOCALCEMIA: ICD-10-CM

## 2019-08-06 DIAGNOSIS — R74.01 NONSPECIFIC ELEVATION OF LEVELS OF TRANSAMINASE AND LACTIC ACID DEHYDROGENASE (LDH): Primary | ICD-10-CM

## 2019-08-06 DIAGNOSIS — R74.02 NONSPECIFIC ELEVATION OF LEVELS OF TRANSAMINASE AND LACTIC ACID DEHYDROGENASE (LDH): ICD-10-CM

## 2019-08-06 LAB
25(OH)D3 SERPL-MCNC: 20 NG/ML (ref 30–100)
ALBUMIN SERPL-MCNC: 2.8 G/DL (ref 3.4–5)
ALP SERPL-CCNC: 242 IU/L (ref 35–126)
ALT SERPL-CCNC: 61 IU/L (ref 11–54)
ANION GAP SERPL CALC-SCNC: 9 MEQ/L (ref 3–15)
AST SERPL-CCNC: 77 IU/L (ref 15–41)
BASOPHILS # BLD: 0.08 K/UL (ref 0.01–0.1)
BASOPHILS NFR BLD: 1.3 %
BILIRUB DIRECT SERPL-MCNC: 0.3 MG/DL
BILIRUB SERPL-MCNC: 0.8 MG/DL (ref 0.3–1.2)
BUN SERPL-MCNC: 5 MG/DL (ref 8–20)
CALCIUM SERPL-MCNC: 8.2 MG/DL (ref 8.9–10.3)
CALCIUM SERPL-MCNC: 8.2 MG/DL (ref 8.9–10.3)
CHLORIDE SERPL-SCNC: 101 MEQ/L (ref 98–109)
CO2 SERPL-SCNC: 28 MEQ/L (ref 22–32)
CREAT SERPL-MCNC: 0.6 MG/DL
DIFFERENTIAL METHOD BLD: ABNORMAL
EOSINOPHIL # BLD: 0.02 K/UL (ref 0.04–0.36)
EOSINOPHIL NFR BLD: 0.3 %
ERYTHROCYTE [DISTWIDTH] IN BLOOD BY AUTOMATED COUNT: 15.2 % (ref 11.7–14.4)
FERRITIN SERPL-MCNC: 104 NG/ML (ref 11–250)
FOLATE SERPL-MCNC: 14.5 NG/ML
GFR SERPL CREATININE-BSD FRML MDRD: >60 ML/MIN/1.73M*2
GGT SERPL-CCNC: 217 IU/L (ref 7–50)
GLUCOSE SERPL-MCNC: 85 MG/DL (ref 70–99)
HCT VFR BLDCO AUTO: 37.6 %
HGB BLD-MCNC: 11.8 G/DL
IMM GRANULOCYTES # BLD AUTO: 0.01 K/UL (ref 0–0.08)
IMM GRANULOCYTES NFR BLD AUTO: 0.2 %
IRON SATN MFR SERPL: 89 % (ref 15–45)
IRON SERPL-MCNC: 136 UG/DL (ref 35–150)
LYMPHOCYTES # BLD: 1.42 K/UL (ref 1.2–3.5)
LYMPHOCYTES NFR BLD: 22.7 %
MAGNESIUM SERPL-MCNC: 2 MG/DL (ref 1.8–2.5)
MCH RBC QN AUTO: 33.5 PG (ref 28–33.2)
MCHC RBC AUTO-ENTMCNC: 31.4 G/DL (ref 32.2–35.5)
MCV RBC AUTO: 106.8 FL (ref 83–98)
MONOCYTES # BLD: 0.44 K/UL (ref 0.28–0.8)
MONOCYTES NFR BLD: 7 %
NEUTROPHILS # BLD: 4.29 K/UL (ref 1.7–7)
NEUTS SEG NFR BLD: 68.5 %
NRBC BLD-RTO: 0 %
PDW BLD AUTO: 10.3 FL (ref 9.4–12.3)
PHOSPHATE SERPL-MCNC: 3.1 MG/DL (ref 2.4–4.7)
PLATELET # BLD AUTO: 374 K/UL
POTASSIUM SERPL-SCNC: 4.4 MEQ/L (ref 3.6–5.1)
PROT SERPL-MCNC: 5.6 G/DL (ref 6–8.2)
PTH-INTACT SERPL-MCNC: 165.1 PG/ML (ref 12–88)
RBC # BLD AUTO: 3.52 M/UL (ref 3.93–5.22)
SODIUM SERPL-SCNC: 138 MEQ/L (ref 136–144)
TIBC SERPL-MCNC: 153 UG/DL (ref 270–460)
UIBC SERPL-MCNC: 17 UG/DL (ref 180–360)
VIT B12 SERPL-MCNC: 952 PG/ML (ref 180–914)
WBC # BLD AUTO: 6.26 K/UL

## 2019-08-06 PROCEDURE — 82977 ASSAY OF GGT: CPT

## 2019-08-06 PROCEDURE — 83735 ASSAY OF MAGNESIUM: CPT

## 2019-08-06 PROCEDURE — 83921 ORGANIC ACID SINGLE QUANT: CPT

## 2019-08-06 PROCEDURE — 36415 COLL VENOUS BLD VENIPUNCTURE: CPT

## 2019-08-06 PROCEDURE — 80053 COMPREHEN METABOLIC PANEL: CPT

## 2019-08-06 PROCEDURE — 82746 ASSAY OF FOLIC ACID SERUM: CPT

## 2019-08-06 PROCEDURE — 83550 IRON BINDING TEST: CPT

## 2019-08-06 PROCEDURE — 82728 ASSAY OF FERRITIN: CPT

## 2019-08-06 PROCEDURE — 85025 COMPLETE CBC W/AUTO DIFF WBC: CPT

## 2019-08-06 PROCEDURE — 84100 ASSAY OF PHOSPHORUS: CPT

## 2019-08-06 PROCEDURE — 82306 VITAMIN D 25 HYDROXY: CPT

## 2019-08-06 PROCEDURE — 83970 ASSAY OF PARATHORMONE: CPT

## 2019-08-06 PROCEDURE — 82607 VITAMIN B-12: CPT

## 2019-08-07 ENCOUNTER — RX ONLY (RX ONLY)
Age: 64
End: 2019-08-07

## 2019-08-07 ENCOUNTER — APPOINTMENT (OUTPATIENT)
Dept: URBAN - METROPOLITAN AREA CLINIC 200 | Age: 64
Setting detail: DERMATOLOGY
End: 2019-08-09

## 2019-08-07 DIAGNOSIS — L88 PYODERMA GANGRENOSUM: ICD-10-CM

## 2019-08-07 PROCEDURE — OTHER OBSERVATION: OTHER

## 2019-08-07 PROCEDURE — OTHER COUNSELING: OTHER

## 2019-08-07 PROCEDURE — OTHER PRESCRIPTION: OTHER

## 2019-08-07 PROCEDURE — OTHER OBSERVATION AND MEASURE: OTHER

## 2019-08-07 PROCEDURE — 99212 OFFICE O/P EST SF 10 MIN: CPT

## 2019-08-07 RX ORDER — CYCLOSPORINE 25 MG/1
CAPSULE, GELATIN COATED ORAL
Qty: 60 | Refills: 3 | Status: ERX

## 2019-08-07 RX ORDER — CLOBETASOL PROPIONATE 0.5 MG/G
CREAM TOPICAL BID
Qty: 2 | Refills: 3 | Status: ERX

## 2019-08-07 ASSESSMENT — LOCATION DETAILED DESCRIPTION DERM: LOCATION DETAILED: RIGHT DORSAL FOOT

## 2019-08-07 ASSESSMENT — LOCATION SIMPLE DESCRIPTION DERM: LOCATION SIMPLE: RIGHT FOOT

## 2019-08-07 ASSESSMENT — LOCATION ZONE DERM: LOCATION ZONE: FEET

## 2019-08-12 LAB — METHYLMALONATE SERPL-SCNC: 227 NMOL/L (ref 87–318)

## 2019-08-14 ENCOUNTER — APPOINTMENT (OUTPATIENT)
Dept: URBAN - METROPOLITAN AREA CLINIC 200 | Age: 64
Setting detail: DERMATOLOGY
End: 2019-09-09

## 2019-08-14 DIAGNOSIS — L88 PYODERMA GANGRENOSUM: ICD-10-CM

## 2019-08-14 PROCEDURE — OTHER PRESCRIPTION: OTHER

## 2019-08-14 PROCEDURE — OTHER COUNSELING: OTHER

## 2019-08-14 PROCEDURE — 99212 OFFICE O/P EST SF 10 MIN: CPT

## 2019-08-14 PROCEDURE — OTHER OBSERVATION: OTHER

## 2019-08-14 PROCEDURE — OTHER OBSERVATION AND MEASURE: OTHER

## 2019-08-14 ASSESSMENT — LOCATION SIMPLE DESCRIPTION DERM: LOCATION SIMPLE: RIGHT FOOT

## 2019-08-14 ASSESSMENT — LOCATION DETAILED DESCRIPTION DERM: LOCATION DETAILED: RIGHT DORSAL FOOT

## 2019-08-14 ASSESSMENT — LOCATION ZONE DERM: LOCATION ZONE: FEET

## 2019-08-22 ENCOUNTER — APPOINTMENT (OUTPATIENT)
Dept: URBAN - METROPOLITAN AREA CLINIC 200 | Age: 64
Setting detail: DERMATOLOGY
End: 2019-09-05

## 2019-08-22 DIAGNOSIS — L88 PYODERMA GANGRENOSUM: ICD-10-CM

## 2019-08-22 PROCEDURE — OTHER OBSERVATION AND MEASURE: OTHER

## 2019-08-22 PROCEDURE — 99212 OFFICE O/P EST SF 10 MIN: CPT

## 2019-08-22 PROCEDURE — OTHER OBSERVATION: OTHER

## 2019-08-22 PROCEDURE — OTHER PRESCRIPTION MEDICATION MANAGEMENT: OTHER

## 2019-08-22 PROCEDURE — OTHER PHOTO-DOCUMENTATION: OTHER

## 2019-08-22 ASSESSMENT — LOCATION DETAILED DESCRIPTION DERM: LOCATION DETAILED: RIGHT DORSAL FOOT

## 2019-08-22 ASSESSMENT — LOCATION ZONE DERM: LOCATION ZONE: FEET

## 2019-08-22 ASSESSMENT — LOCATION SIMPLE DESCRIPTION DERM: LOCATION SIMPLE: RIGHT FOOT

## 2019-08-29 ENCOUNTER — APPOINTMENT (OUTPATIENT)
Dept: URBAN - METROPOLITAN AREA CLINIC 200 | Age: 64
Setting detail: DERMATOLOGY
End: 2019-09-10

## 2019-08-29 DIAGNOSIS — L88 PYODERMA GANGRENOSUM: ICD-10-CM

## 2019-08-29 PROCEDURE — OTHER OBSERVATION: OTHER

## 2019-08-29 PROCEDURE — OTHER OBSERVATION AND MEASURE: OTHER

## 2019-08-29 PROCEDURE — OTHER PRESCRIPTION: OTHER

## 2019-08-29 PROCEDURE — 99212 OFFICE O/P EST SF 10 MIN: CPT

## 2019-08-29 PROCEDURE — OTHER ORDER TESTS: OTHER

## 2019-08-29 RX ORDER — ADALIMUMAB 40MG/0.8ML
KIT SUBCUTANEOUS
Qty: 2 | Refills: 0 | Status: ERX | COMMUNITY
Start: 2019-08-29

## 2019-08-29 ASSESSMENT — LOCATION DETAILED DESCRIPTION DERM: LOCATION DETAILED: RIGHT ANKLE

## 2019-08-29 ASSESSMENT — SEVERITY ASSESSMENT: SEVERITY: MODERATE TO SEVERE

## 2019-08-29 ASSESSMENT — LOCATION ZONE DERM: LOCATION ZONE: LEG

## 2019-08-29 ASSESSMENT — LOCATION SIMPLE DESCRIPTION DERM: LOCATION SIMPLE: RIGHT ANKLE

## 2019-08-29 ASSESSMENT — PAIN INTENSITY VAS: HOW INTENSE IS YOUR PAIN 0 BEING NO PAIN, 10 BEING THE MOST SEVERE PAIN POSSIBLE?: 8/10 PAIN

## 2019-09-04 ENCOUNTER — RX ONLY (RX ONLY)
Age: 64
End: 2019-09-04

## 2019-09-04 ENCOUNTER — APPOINTMENT (OUTPATIENT)
Dept: URBAN - METROPOLITAN AREA CLINIC 200 | Age: 64
Setting detail: DERMATOLOGY
End: 2019-09-10

## 2019-09-04 DIAGNOSIS — Z79.899 OTHER LONG TERM (CURRENT) DRUG THERAPY: ICD-10-CM

## 2019-09-04 DIAGNOSIS — L88 PYODERMA GANGRENOSUM: ICD-10-CM

## 2019-09-04 PROBLEM — Z85.828 PERSONAL HISTORY OF OTHER MALIGNANT NEOPLASM OF SKIN: Status: ACTIVE | Noted: 2019-09-04

## 2019-09-04 PROCEDURE — OTHER PPD: OTHER

## 2019-09-04 PROCEDURE — 99213 OFFICE O/P EST LOW 20 MIN: CPT

## 2019-09-04 PROCEDURE — OTHER HUMIRA INITIATION: OTHER

## 2019-09-04 PROCEDURE — 86580 TB INTRADERMAL TEST: CPT

## 2019-09-04 PROCEDURE — OTHER PRESCRIPTION: OTHER

## 2019-09-04 PROCEDURE — OTHER OBSERVATION AND MEASURE: OTHER

## 2019-09-04 PROCEDURE — OTHER OBSERVATION: OTHER

## 2019-09-04 RX ORDER — CYCLOSPORINE 100 MG/1
CAPSULE, GELATIN COATED ORAL
Qty: 30 | Refills: 4

## 2019-09-04 RX ORDER — CYCLOSPORINE 100 MG/1
CAPSULE, GELATIN COATED ORAL
Qty: 30 | Refills: 4 | Status: ERX

## 2019-09-04 RX ORDER — CYCLOSPORINE 25 MG/1
CAPSULE, GELATIN COATED ORAL
Qty: 60 | Refills: 3 | Status: ERX

## 2019-09-04 RX ORDER — ADALIMUMAB 40MG/0.8ML
KIT SUBCUTANEOUS
Qty: 2 | Refills: 3 | Status: ERX

## 2019-09-04 RX ORDER — CYCLOSPORINE 25 MG/1
CAPSULE, GELATIN COATED ORAL
Qty: 60 | Refills: 3

## 2019-09-04 ASSESSMENT — LOCATION DETAILED DESCRIPTION DERM
LOCATION DETAILED: RIGHT ANKLE
LOCATION DETAILED: RIGHT VENTRAL PROXIMAL FOREARM

## 2019-09-04 ASSESSMENT — LOCATION SIMPLE DESCRIPTION DERM
LOCATION SIMPLE: RIGHT FOREARM
LOCATION SIMPLE: RIGHT ANKLE

## 2019-09-04 ASSESSMENT — LOCATION ZONE DERM
LOCATION ZONE: ARM
LOCATION ZONE: LEG

## 2019-09-04 NOTE — PROCEDURE: HUMIRA INITIATION
Detail Level: Zone
Is Dapsone Contraindicated?: No
Pregnancy And Lactation Warning Text: This medication is Pregnancy Category B and is considered safe during pregnancy. It is unknown if this medication is excreted in breast milk.
Diagnosis (Required): Psoriasis
Humira Monitoring Guidelines: A yearly test for tuberculosis is required while taking Humira.
Humira Dosing: 80 mg SC day 0, 40 mg SC day 7, then 40 mg SC every other week

## 2019-09-05 ENCOUNTER — RX ONLY (RX ONLY)
Age: 64
End: 2019-09-05

## 2019-09-05 RX ORDER — CYCLOSPORINE 25 MG/1
CAPSULE, GELATIN COATED ORAL
Qty: 60 | Refills: 3 | Status: ERX

## 2019-09-05 RX ORDER — CYCLOSPORINE 100 MG/1
CAPSULE, GELATIN COATED ORAL
Qty: 30 | Refills: 4 | Status: ERX

## 2019-09-06 ENCOUNTER — APPOINTMENT (OUTPATIENT)
Dept: URBAN - METROPOLITAN AREA CLINIC 200 | Age: 64
Setting detail: DERMATOLOGY
End: 2019-09-11

## 2019-09-06 DIAGNOSIS — Z79.899 OTHER LONG TERM (CURRENT) DRUG THERAPY: ICD-10-CM

## 2019-09-06 PROCEDURE — OTHER PPD RESULT: OTHER

## 2019-09-06 PROCEDURE — 99212 OFFICE O/P EST SF 10 MIN: CPT

## 2019-09-06 ASSESSMENT — LOCATION ZONE DERM: LOCATION ZONE: ARM

## 2019-09-06 ASSESSMENT — LOCATION SIMPLE DESCRIPTION DERM: LOCATION SIMPLE: RIGHT FOREARM

## 2019-09-06 ASSESSMENT — LOCATION DETAILED DESCRIPTION DERM: LOCATION DETAILED: RIGHT VENTRAL PROXIMAL FOREARM

## 2019-09-09 RX ORDER — CYCLOSPORINE 25 MG/1
CAPSULE, GELATIN COATED ORAL
Qty: 60 | Refills: 3 | Status: CANCELLED
Stop reason: CLARIF

## 2019-09-13 ENCOUNTER — APPOINTMENT (OUTPATIENT)
Dept: URBAN - METROPOLITAN AREA CLINIC 200 | Age: 64
Setting detail: DERMATOLOGY
End: 2019-09-17

## 2019-09-13 DIAGNOSIS — L88 PYODERMA GANGRENOSUM: ICD-10-CM

## 2019-09-13 PROCEDURE — OTHER OBSERVATION AND MEASURE: OTHER

## 2019-09-13 PROCEDURE — OTHER OBSERVATION: OTHER

## 2019-09-13 PROCEDURE — 99212 OFFICE O/P EST SF 10 MIN: CPT

## 2019-09-13 ASSESSMENT — LOCATION DETAILED DESCRIPTION DERM: LOCATION DETAILED: RIGHT ANKLE

## 2019-09-13 ASSESSMENT — LOCATION ZONE DERM: LOCATION ZONE: LEG

## 2019-09-13 ASSESSMENT — LOCATION SIMPLE DESCRIPTION DERM: LOCATION SIMPLE: RIGHT ANKLE

## 2019-09-20 ENCOUNTER — APPOINTMENT (OUTPATIENT)
Dept: URBAN - METROPOLITAN AREA CLINIC 200 | Age: 64
Setting detail: DERMATOLOGY
End: 2019-09-26

## 2019-09-20 DIAGNOSIS — L88 PYODERMA GANGRENOSUM: ICD-10-CM

## 2019-09-20 PROCEDURE — OTHER OBSERVATION: OTHER

## 2019-09-20 PROCEDURE — OTHER OBSERVATION AND MEASURE: OTHER

## 2019-09-20 PROCEDURE — 99212 OFFICE O/P EST SF 10 MIN: CPT

## 2019-09-20 ASSESSMENT — LOCATION DETAILED DESCRIPTION DERM: LOCATION DETAILED: RIGHT ANKLE

## 2019-09-20 ASSESSMENT — LOCATION ZONE DERM: LOCATION ZONE: LEG

## 2019-09-20 ASSESSMENT — LOCATION SIMPLE DESCRIPTION DERM: LOCATION SIMPLE: RIGHT ANKLE

## 2019-10-01 NOTE — PROCEDURE: RECOMMENDATIONS
Recommendation Preamble: The following recommendations were made during the visit:
Detail Level: Zone
Recommendations (Free Text): Referred patient to Hemology and GI. Ordering Ultrasound of Liver.
unable to perform/safety; chest tube

## 2019-10-02 ENCOUNTER — RX ONLY (RX ONLY)
Age: 64
End: 2019-10-02

## 2019-10-02 RX ORDER — ADALIMUMAB 40MG/0.4ML
KIT SUBCUTANEOUS
Qty: 1 | Refills: 3 | Status: ERX | COMMUNITY
Start: 2019-10-02

## 2019-10-08 ENCOUNTER — APPOINTMENT (OUTPATIENT)
Dept: URBAN - METROPOLITAN AREA CLINIC 200 | Age: 64
Setting detail: DERMATOLOGY
End: 2019-10-18

## 2019-10-08 DIAGNOSIS — L98.8 OTHER SPECIFIED DISORDERS OF THE SKIN AND SUBCUTANEOUS TISSUE: ICD-10-CM

## 2019-10-08 DIAGNOSIS — L88 PYODERMA GANGRENOSUM: ICD-10-CM

## 2019-10-08 PROCEDURE — 99212 OFFICE O/P EST SF 10 MIN: CPT

## 2019-10-08 PROCEDURE — OTHER ORDER TESTS: OTHER

## 2019-10-08 PROCEDURE — OTHER BOTOX (U OR CC) ADDITIVE: OTHER

## 2019-10-08 PROCEDURE — OTHER OBSERVATION: OTHER

## 2019-10-08 PROCEDURE — OTHER OBSERVATION AND MEASURE: OTHER

## 2019-10-08 PROCEDURE — OTHER PRESCRIPTION MEDICATION MANAGEMENT: OTHER

## 2019-10-08 PROCEDURE — OTHER BOTOX (U OR CC): OTHER

## 2019-10-08 ASSESSMENT — LOCATION ZONE DERM
LOCATION ZONE: FEET
LOCATION ZONE: LEG
LOCATION ZONE: FACE

## 2019-10-08 ASSESSMENT — LOCATION DETAILED DESCRIPTION DERM
LOCATION DETAILED: RIGHT MEDIAL EYEBROW
LOCATION DETAILED: LEFT SUPERIOR LATERAL MALAR CHEEK
LOCATION DETAILED: RIGHT SUPERIOR LATERAL MALAR CHEEK
LOCATION DETAILED: LEFT CENTRAL EYEBROW
LOCATION DETAILED: RIGHT ANKLE
LOCATION DETAILED: RIGHT SUPERIOR CENTRAL MALAR CHEEK
LOCATION DETAILED: RIGHT DORSAL FOOT

## 2019-10-08 ASSESSMENT — LOCATION SIMPLE DESCRIPTION DERM
LOCATION SIMPLE: RIGHT EYEBROW
LOCATION SIMPLE: LEFT CHEEK
LOCATION SIMPLE: RIGHT FOOT
LOCATION SIMPLE: RIGHT CHEEK
LOCATION SIMPLE: LEFT EYEBROW
LOCATION SIMPLE: RIGHT ANKLE

## 2019-10-08 NOTE — PROCEDURE: PRESCRIPTION MEDICATION MANAGEMENT
Otc Regimen: Microcyn
Detail Level: Detailed
Initiate Treatment: Prednisone 10 mg  1 tab po qd
Continue Regimen: Humira
Discontinue Regimen: Topical clobetasol
Render In Strict Bullet Format?: No

## 2019-10-14 ENCOUNTER — RX ONLY (RX ONLY)
Age: 64
End: 2019-10-14

## 2019-10-14 RX ORDER — AMOXICILLIN AND CLAVULANATE POTASSIUM 875; 125 MG/1; 1/1
TABLET, FILM COATED ORAL
Qty: 20 | Refills: 0 | Status: ERX | COMMUNITY
Start: 2019-10-14

## 2019-10-16 ENCOUNTER — APPOINTMENT (OUTPATIENT)
Dept: URBAN - METROPOLITAN AREA CLINIC 200 | Age: 64
Setting detail: DERMATOLOGY
End: 2019-10-21

## 2019-10-16 DIAGNOSIS — L88 PYODERMA GANGRENOSUM: ICD-10-CM

## 2019-10-16 PROCEDURE — OTHER PRESCRIPTION MEDICATION MANAGEMENT: OTHER

## 2019-10-16 PROCEDURE — OTHER OBSERVATION: OTHER

## 2019-10-16 PROCEDURE — 99212 OFFICE O/P EST SF 10 MIN: CPT

## 2019-10-16 PROCEDURE — OTHER OBSERVATION AND MEASURE: OTHER

## 2019-10-16 ASSESSMENT — LOCATION DETAILED DESCRIPTION DERM: LOCATION DETAILED: RIGHT ANKLE

## 2019-10-16 ASSESSMENT — LOCATION ZONE DERM: LOCATION ZONE: LEG

## 2019-10-16 ASSESSMENT — LOCATION SIMPLE DESCRIPTION DERM: LOCATION SIMPLE: RIGHT ANKLE

## 2019-10-23 ENCOUNTER — APPOINTMENT (OUTPATIENT)
Dept: URBAN - METROPOLITAN AREA CLINIC 200 | Age: 64
Setting detail: DERMATOLOGY
End: 2019-10-25

## 2019-10-23 DIAGNOSIS — L88 PYODERMA GANGRENOSUM: ICD-10-CM

## 2019-10-23 PROBLEM — Z85.828 PERSONAL HISTORY OF OTHER MALIGNANT NEOPLASM OF SKIN: Status: ACTIVE | Noted: 2019-10-23

## 2019-10-23 PROCEDURE — 99212 OFFICE O/P EST SF 10 MIN: CPT

## 2019-10-23 PROCEDURE — OTHER MIPS QUALITY: OTHER

## 2019-10-23 PROCEDURE — OTHER OBSERVATION: OTHER

## 2019-10-23 PROCEDURE — OTHER COUNSELING: OTHER

## 2019-10-23 PROCEDURE — OTHER OBSERVATION AND MEASURE: OTHER

## 2019-10-23 ASSESSMENT — LOCATION SIMPLE DESCRIPTION DERM: LOCATION SIMPLE: RIGHT FOOT

## 2019-10-23 ASSESSMENT — LOCATION ZONE DERM: LOCATION ZONE: FEET

## 2019-10-23 ASSESSMENT — LOCATION DETAILED DESCRIPTION DERM: LOCATION DETAILED: RIGHT DORSAL FOOT

## 2019-10-31 ENCOUNTER — APPOINTMENT (OUTPATIENT)
Dept: URBAN - METROPOLITAN AREA CLINIC 200 | Age: 64
Setting detail: DERMATOLOGY
End: 2019-11-05

## 2019-10-31 DIAGNOSIS — L88 PYODERMA GANGRENOSUM: ICD-10-CM

## 2019-10-31 PROCEDURE — OTHER PHOTO-DOCUMENTATION: OTHER

## 2019-10-31 PROCEDURE — OTHER OBSERVATION: OTHER

## 2019-10-31 PROCEDURE — 99212 OFFICE O/P EST SF 10 MIN: CPT

## 2019-10-31 PROCEDURE — OTHER OBSERVATION AND MEASURE: OTHER

## 2019-10-31 PROCEDURE — OTHER COUNSELING: OTHER

## 2019-10-31 ASSESSMENT — LOCATION SIMPLE DESCRIPTION DERM: LOCATION SIMPLE: RIGHT FOOT

## 2019-10-31 ASSESSMENT — LOCATION ZONE DERM: LOCATION ZONE: FEET

## 2019-10-31 ASSESSMENT — LOCATION DETAILED DESCRIPTION DERM: LOCATION DETAILED: RIGHT DORSAL FOOT

## 2019-11-07 ENCOUNTER — APPOINTMENT (OUTPATIENT)
Dept: URBAN - METROPOLITAN AREA CLINIC 200 | Age: 64
Setting detail: DERMATOLOGY
End: 2019-11-13

## 2019-11-07 DIAGNOSIS — L88 PYODERMA GANGRENOSUM: ICD-10-CM

## 2019-11-07 PROCEDURE — OTHER OBSERVATION AND MEASURE: OTHER

## 2019-11-07 PROCEDURE — 99212 OFFICE O/P EST SF 10 MIN: CPT

## 2019-11-07 PROCEDURE — OTHER OBSERVATION: OTHER

## 2019-11-07 ASSESSMENT — LOCATION DETAILED DESCRIPTION DERM
LOCATION DETAILED: RIGHT DORSAL FOOT
LOCATION DETAILED: RIGHT DORSAL FOOT

## 2019-11-07 ASSESSMENT — LOCATION ZONE DERM
LOCATION ZONE: FEET
LOCATION ZONE: FEET

## 2019-11-07 ASSESSMENT — LOCATION SIMPLE DESCRIPTION DERM
LOCATION SIMPLE: RIGHT FOOT
LOCATION SIMPLE: RIGHT FOOT

## 2019-11-13 ENCOUNTER — RX ONLY (RX ONLY)
Age: 64
End: 2019-11-13

## 2019-11-13 RX ORDER — ADALIMUMAB 40MG/0.4ML
KIT SUBCUTANEOUS
Qty: 1 | Refills: 3 | Status: ERX

## 2019-11-13 RX ORDER — ADALIMUMAB 40MG/0.4ML
KIT SUBCUTANEOUS
Qty: 2 | Refills: 3 | Status: ERX

## 2019-11-20 ENCOUNTER — APPOINTMENT (OUTPATIENT)
Dept: URBAN - METROPOLITAN AREA CLINIC 200 | Age: 64
Setting detail: DERMATOLOGY
End: 2019-12-02

## 2019-11-20 DIAGNOSIS — L88 PYODERMA GANGRENOSUM: ICD-10-CM

## 2019-11-20 PROCEDURE — OTHER COUNSELING: OTHER

## 2019-11-20 PROCEDURE — OTHER OBSERVATION AND MEASURE: OTHER

## 2019-11-20 PROCEDURE — OTHER OBSERVATION: OTHER

## 2019-11-20 PROCEDURE — OTHER PRESCRIPTION MEDICATION MANAGEMENT: OTHER

## 2019-11-20 PROCEDURE — 99212 OFFICE O/P EST SF 10 MIN: CPT

## 2019-11-20 ASSESSMENT — LOCATION ZONE DERM
LOCATION ZONE: FEET
LOCATION ZONE: LEG
LOCATION ZONE: FEET

## 2019-11-20 ASSESSMENT — LOCATION SIMPLE DESCRIPTION DERM
LOCATION SIMPLE: RIGHT FOOT
LOCATION SIMPLE: LEFT PRETIBIAL REGION
LOCATION SIMPLE: RIGHT FOOT

## 2019-11-20 ASSESSMENT — LOCATION DETAILED DESCRIPTION DERM
LOCATION DETAILED: RIGHT DORSAL FOOT
LOCATION DETAILED: LEFT PROXIMAL PRETIBIAL REGION
LOCATION DETAILED: RIGHT DORSAL FOOT

## 2019-11-20 NOTE — PROCEDURE: COUNSELING
Patient Specific Counseling (Will Not Stick From Patient To Patient): Nutrition recommendation
Detail Level: Detailed

## 2019-11-21 ENCOUNTER — RX ONLY (RX ONLY)
Age: 64
End: 2019-11-21

## 2019-11-21 RX ORDER — PREDNISONE 10 MG/1
TABLET ORAL
Qty: 60 | Refills: 0 | Status: ERX | COMMUNITY
Start: 2019-11-21

## 2019-12-06 ENCOUNTER — APPOINTMENT (OUTPATIENT)
Dept: URBAN - METROPOLITAN AREA CLINIC 200 | Age: 64
Setting detail: DERMATOLOGY
End: 2019-12-11

## 2019-12-06 DIAGNOSIS — L88 PYODERMA GANGRENOSUM: ICD-10-CM

## 2019-12-06 PROCEDURE — 99212 OFFICE O/P EST SF 10 MIN: CPT

## 2019-12-06 PROCEDURE — OTHER OBSERVATION: OTHER

## 2019-12-06 PROCEDURE — OTHER COUNSELING: OTHER

## 2019-12-06 PROCEDURE — OTHER OBSERVATION AND MEASURE: OTHER

## 2019-12-06 ASSESSMENT — LOCATION DETAILED DESCRIPTION DERM
LOCATION DETAILED: LEFT PROXIMAL PRETIBIAL REGION
LOCATION DETAILED: RIGHT DORSAL FOOT
LOCATION DETAILED: RIGHT DORSAL FOOT

## 2019-12-06 ASSESSMENT — LOCATION SIMPLE DESCRIPTION DERM
LOCATION SIMPLE: RIGHT FOOT
LOCATION SIMPLE: LEFT PRETIBIAL REGION
LOCATION SIMPLE: RIGHT FOOT

## 2019-12-06 ASSESSMENT — LOCATION ZONE DERM
LOCATION ZONE: FEET
LOCATION ZONE: LEG
LOCATION ZONE: FEET

## 2020-01-02 ENCOUNTER — APPOINTMENT (OUTPATIENT)
Dept: URBAN - METROPOLITAN AREA CLINIC 200 | Age: 65
Setting detail: DERMATOLOGY
End: 2020-01-07

## 2020-01-02 DIAGNOSIS — L88 PYODERMA GANGRENOSUM: ICD-10-CM

## 2020-01-02 PROBLEM — Z85.828 PERSONAL HISTORY OF OTHER MALIGNANT NEOPLASM OF SKIN: Status: ACTIVE | Noted: 2020-01-02

## 2020-01-02 PROCEDURE — OTHER OBSERVATION AND MEASURE: OTHER

## 2020-01-02 PROCEDURE — OTHER COUNSELING: OTHER

## 2020-01-02 PROCEDURE — 99212 OFFICE O/P EST SF 10 MIN: CPT

## 2020-01-02 PROCEDURE — OTHER OBSERVATION: OTHER

## 2020-01-02 ASSESSMENT — LOCATION SIMPLE DESCRIPTION DERM
LOCATION SIMPLE: RIGHT FOOT
LOCATION SIMPLE: LEFT PRETIBIAL REGION
LOCATION SIMPLE: RIGHT FOOT

## 2020-01-02 ASSESSMENT — LOCATION ZONE DERM
LOCATION ZONE: FEET
LOCATION ZONE: FEET
LOCATION ZONE: LEG

## 2020-01-02 NOTE — PROCEDURE: COUNSELING
Detail Level: Detailed
Patient Specific Counseling (Will Not Stick From Patient To Patient): Nutrition recommendation

## 2020-01-22 ENCOUNTER — APPOINTMENT (OUTPATIENT)
Dept: URBAN - METROPOLITAN AREA CLINIC 200 | Age: 65
Setting detail: DERMATOLOGY
End: 2020-02-04

## 2020-01-22 ENCOUNTER — APPOINTMENT (OUTPATIENT)
Dept: URBAN - METROPOLITAN AREA CLINIC 200 | Age: 65
Setting detail: DERMATOLOGY
End: 2020-01-30

## 2020-01-22 DIAGNOSIS — L88 PYODERMA GANGRENOSUM: ICD-10-CM

## 2020-01-22 DIAGNOSIS — L98.8 OTHER SPECIFIED DISORDERS OF THE SKIN AND SUBCUTANEOUS TISSUE: ICD-10-CM

## 2020-01-22 PROCEDURE — OTHER MIPS QUALITY: OTHER

## 2020-01-22 PROCEDURE — OTHER OBSERVATION: OTHER

## 2020-01-22 PROCEDURE — OTHER OBSERVATION AND MEASURE: OTHER

## 2020-01-22 PROCEDURE — OTHER BOTOX (U OR CC) ADDITIVE: OTHER

## 2020-01-22 PROCEDURE — 99212 OFFICE O/P EST SF 10 MIN: CPT

## 2020-01-22 PROCEDURE — OTHER BOTOX (U OR CC): OTHER

## 2020-01-22 ASSESSMENT — LOCATION SIMPLE DESCRIPTION DERM
LOCATION SIMPLE: RIGHT TEMPLE
LOCATION SIMPLE: RIGHT FOREHEAD
LOCATION SIMPLE: LEFT FOREHEAD
LOCATION SIMPLE: RIGHT FOOT
LOCATION SIMPLE: LEFT CHEEK
LOCATION SIMPLE: RIGHT CHEEK
LOCATION SIMPLE: LEFT TEMPLE

## 2020-01-22 ASSESSMENT — LOCATION DETAILED DESCRIPTION DERM
LOCATION DETAILED: RIGHT SUPERIOR CENTRAL MALAR CHEEK
LOCATION DETAILED: RIGHT INFERIOR FOREHEAD
LOCATION DETAILED: LEFT INFERIOR TEMPLE
LOCATION DETAILED: LEFT INFERIOR FOREHEAD
LOCATION DETAILED: RIGHT INFERIOR TEMPLE
LOCATION DETAILED: LEFT SUPERIOR LATERAL MALAR CHEEK
LOCATION DETAILED: RIGHT DORSAL FOOT

## 2020-01-22 ASSESSMENT — LOCATION ZONE DERM
LOCATION ZONE: FEET
LOCATION ZONE: FACE

## 2020-01-31 ENCOUNTER — RX ONLY (RX ONLY)
Age: 65
End: 2020-01-31

## 2020-02-04 ENCOUNTER — APPOINTMENT (OUTPATIENT)
Dept: URBAN - METROPOLITAN AREA CLINIC 200 | Age: 65
Setting detail: DERMATOLOGY
End: 2020-02-18

## 2020-02-04 DIAGNOSIS — L88 PYODERMA GANGRENOSUM: ICD-10-CM

## 2020-02-04 DIAGNOSIS — L57.0 ACTINIC KERATOSIS: ICD-10-CM

## 2020-02-04 PROCEDURE — OTHER LIQUID NITROGEN: OTHER

## 2020-02-04 PROCEDURE — OTHER OTHER: OTHER

## 2020-02-04 PROCEDURE — 99212 OFFICE O/P EST SF 10 MIN: CPT | Mod: 25

## 2020-02-04 PROCEDURE — 17000 DESTRUCT PREMALG LESION: CPT

## 2020-02-04 PROCEDURE — OTHER OBSERVATION AND MEASURE: OTHER

## 2020-02-04 PROCEDURE — OTHER OBSERVATION: OTHER

## 2020-02-04 PROCEDURE — OTHER PHOTO-DOCUMENTATION: OTHER

## 2020-02-04 ASSESSMENT — LOCATION ZONE DERM
LOCATION ZONE: FEET
LOCATION ZONE: TRUNK

## 2020-02-04 ASSESSMENT — LOCATION DETAILED DESCRIPTION DERM
LOCATION DETAILED: RIGHT MEDIAL SUPERIOR CHEST
LOCATION DETAILED: RIGHT DORSAL FOOT

## 2020-02-04 ASSESSMENT — LOCATION SIMPLE DESCRIPTION DERM
LOCATION SIMPLE: RIGHT FOOT
LOCATION SIMPLE: CHEST

## 2020-02-04 NOTE — PROCEDURE: OTHER
Other (Free Text): Take one prednisone every other day. Alternate with collagen
Detail Level: Zone
Note Text (......Xxx Chief Complaint.): This diagnosis correlates with the

## 2020-02-04 NOTE — PROCEDURE: LIQUID NITROGEN
Number Of Freeze-Thaw Cycles: 1 freeze-thaw cycle
Consent: The patient's consent was obtained including but not limited to risks of crusting, scabbing, blistering, scarring, darker or lighter pigmentary change, recurrence, incomplete removal and infection.
Duration Of Freeze Thaw-Cycle (Seconds): 2
Post-Care Instructions: I reviewed with the patient in detail post-care instructions. Patient is to wear sunprotection, and avoid picking at any of the treated lesions. Pt may apply Vaseline to crusted or scabbing areas.
Render Post-Care Instructions In Note?: no
Detail Level: Detailed

## 2020-02-11 ENCOUNTER — RX ONLY (RX ONLY)
Age: 65
End: 2020-02-11

## 2020-02-12 ENCOUNTER — RX ONLY (RX ONLY)
Age: 65
End: 2020-02-12

## 2020-02-18 ENCOUNTER — APPOINTMENT (OUTPATIENT)
Dept: URBAN - METROPOLITAN AREA CLINIC 200 | Age: 65
Setting detail: DERMATOLOGY
End: 2020-02-26

## 2020-02-18 DIAGNOSIS — L88 PYODERMA GANGRENOSUM: ICD-10-CM

## 2020-02-18 DIAGNOSIS — L97 NON-PRESSURE CHRONIC ULCER OF LOWER LIMB, NOT ELSEWHERE CLASSIFIED: ICD-10-CM

## 2020-02-18 PROBLEM — L97.529 NON-PRESSURE CHRONIC ULCER OF OTHER PART OF LEFT FOOT WITH UNSPECIFIED SEVERITY: Status: ACTIVE | Noted: 2020-02-18

## 2020-02-18 PROCEDURE — OTHER PRESCRIPTION: OTHER

## 2020-02-18 PROCEDURE — OTHER OTHER: OTHER

## 2020-02-18 PROCEDURE — OTHER OBSERVATION AND MEASURE: OTHER

## 2020-02-18 PROCEDURE — OTHER PHOTO-DOCUMENTATION: OTHER

## 2020-02-18 PROCEDURE — OTHER OBSERVATION: OTHER

## 2020-02-18 PROCEDURE — 99213 OFFICE O/P EST LOW 20 MIN: CPT

## 2020-02-18 RX ORDER — GENTAMICIN SULFATE 1 MG/G
CREAM TOPICAL
Qty: 1 | Refills: 4 | Status: ERX | COMMUNITY
Start: 2020-02-18

## 2020-02-18 ASSESSMENT — LOCATION SIMPLE DESCRIPTION DERM
LOCATION SIMPLE: RIGHT FOOT
LOCATION SIMPLE: LEFT 2ND TOE

## 2020-02-18 ASSESSMENT — AREA OF WOUND IN CM2: TOTAL AREA OF WOUND IN CM2: 1

## 2020-02-18 ASSESSMENT — LOCATION DETAILED DESCRIPTION DERM
LOCATION DETAILED: LEFT DORSAL 2ND TOE
LOCATION DETAILED: RIGHT DORSAL FOOT

## 2020-02-18 ASSESSMENT — LOCATION ZONE DERM
LOCATION ZONE: FEET
LOCATION ZONE: TOE

## 2020-02-18 NOTE — PROCEDURE: OTHER
Detail Level: Zone
Note Text (......Xxx Chief Complaint.): This diagnosis correlates with the
Other (Free Text): Take one prednisone every other day. Alternate with collagen

## 2020-03-03 ENCOUNTER — APPOINTMENT (OUTPATIENT)
Dept: URBAN - METROPOLITAN AREA CLINIC 200 | Age: 65
Setting detail: DERMATOLOGY
End: 2020-03-06

## 2020-03-03 DIAGNOSIS — L88 PYODERMA GANGRENOSUM: ICD-10-CM

## 2020-03-03 PROCEDURE — OTHER COUNSELING: OTHER

## 2020-03-03 PROCEDURE — 99212 OFFICE O/P EST SF 10 MIN: CPT

## 2020-03-03 PROCEDURE — OTHER OBSERVATION AND MEASURE: OTHER

## 2020-03-03 PROCEDURE — OTHER OBSERVATION: OTHER

## 2020-03-03 ASSESSMENT — LOCATION SIMPLE DESCRIPTION DERM: LOCATION SIMPLE: RIGHT FOOT

## 2020-03-03 ASSESSMENT — LOCATION DETAILED DESCRIPTION DERM: LOCATION DETAILED: RIGHT DORSAL FOOT

## 2020-03-03 ASSESSMENT — LOCATION ZONE DERM: LOCATION ZONE: FEET

## 2020-03-17 ENCOUNTER — APPOINTMENT (OUTPATIENT)
Dept: URBAN - METROPOLITAN AREA CLINIC 200 | Age: 65
Setting detail: DERMATOLOGY
End: 2020-03-24

## 2020-03-17 DIAGNOSIS — L88 PYODERMA GANGRENOSUM: ICD-10-CM

## 2020-03-17 PROCEDURE — OTHER OBSERVATION: OTHER

## 2020-03-17 PROCEDURE — OTHER COUNSELING: OTHER

## 2020-03-17 PROCEDURE — OTHER OBSERVATION AND MEASURE: OTHER

## 2020-03-17 PROCEDURE — 99212 OFFICE O/P EST SF 10 MIN: CPT

## 2020-03-17 ASSESSMENT — LOCATION ZONE DERM: LOCATION ZONE: FEET

## 2020-03-17 ASSESSMENT — LOCATION SIMPLE DESCRIPTION DERM: LOCATION SIMPLE: RIGHT FOOT

## 2020-03-17 ASSESSMENT — LOCATION DETAILED DESCRIPTION DERM: LOCATION DETAILED: RIGHT DORSAL FOOT

## 2020-06-22 ENCOUNTER — APPOINTMENT (OUTPATIENT)
Dept: URBAN - METROPOLITAN AREA CLINIC 200 | Age: 65
Setting detail: DERMATOLOGY
End: 2020-06-25

## 2020-06-22 ENCOUNTER — RX ONLY (RX ONLY)
Age: 65
End: 2020-06-22

## 2020-06-22 DIAGNOSIS — Z85.828 PERSONAL HISTORY OF OTHER MALIGNANT NEOPLASM OF SKIN: ICD-10-CM

## 2020-06-22 DIAGNOSIS — L08.9 LOCAL INFECTION OF THE SKIN AND SUBCUTANEOUS TISSUE, UNSPECIFIED: ICD-10-CM

## 2020-06-22 DIAGNOSIS — L88 PYODERMA GANGRENOSUM: ICD-10-CM

## 2020-06-22 DIAGNOSIS — L98.8 OTHER SPECIFIED DISORDERS OF THE SKIN AND SUBCUTANEOUS TISSUE: ICD-10-CM

## 2020-06-22 DIAGNOSIS — L57.8 OTHER SKIN CHANGES DUE TO CHRONIC EXPOSURE TO NONIONIZING RADIATION: ICD-10-CM

## 2020-06-22 PROBLEM — D23.72 OTHER BENIGN NEOPLASM OF SKIN OF LEFT LOWER LIMB, INCLUDING HIP: Status: ACTIVE | Noted: 2020-06-22

## 2020-06-22 PROCEDURE — OTHER ORDER TESTS: OTHER

## 2020-06-22 PROCEDURE — OTHER OBSERVATION: OTHER

## 2020-06-22 PROCEDURE — 99213 OFFICE O/P EST LOW 20 MIN: CPT | Mod: 25

## 2020-06-22 PROCEDURE — OTHER BOTOX (U OR CC) ADDITIVE: OTHER

## 2020-06-22 PROCEDURE — OTHER COUNSELING: OTHER

## 2020-06-22 PROCEDURE — OTHER PRESCRIPTION MEDICATION MANAGEMENT: OTHER

## 2020-06-22 PROCEDURE — OTHER OBSERVATION AND MEASURE: OTHER

## 2020-06-22 PROCEDURE — 17110 DESTRUCT B9 LESION 1-14: CPT

## 2020-06-22 PROCEDURE — OTHER LIQUID NITROGEN: OTHER

## 2020-06-22 PROCEDURE — OTHER BOTOX (U OR CC): OTHER

## 2020-06-22 RX ORDER — AMOXICILLIN AND CLAVULANATE POTASSIUM 875; 125 MG/1; 1/1
TABLET, FILM COATED ORAL
Qty: 20 | Refills: 0 | Status: ERX | COMMUNITY
Start: 2020-06-22

## 2020-06-22 ASSESSMENT — LOCATION SIMPLE DESCRIPTION DERM
LOCATION SIMPLE: ABDOMEN
LOCATION SIMPLE: RIGHT FOOT
LOCATION SIMPLE: RIGHT CHEEK
LOCATION SIMPLE: RIGHT FOREHEAD
LOCATION SIMPLE: LEFT CHEEK
LOCATION SIMPLE: RIGHT FOOT
LOCATION SIMPLE: LEFT TEMPLE
LOCATION SIMPLE: RIGHT TEMPLE
LOCATION SIMPLE: CHEST
LOCATION SIMPLE: LEFT FOREHEAD

## 2020-06-22 ASSESSMENT — LOCATION DETAILED DESCRIPTION DERM
LOCATION DETAILED: RIGHT MEDIAL DORSAL FOOT
LOCATION DETAILED: LEFT SUPERIOR LATERAL MALAR CHEEK
LOCATION DETAILED: LEFT MEDIAL SUPERIOR CHEST
LOCATION DETAILED: LEFT INFERIOR TEMPLE
LOCATION DETAILED: RIGHT DORSAL FOOT
LOCATION DETAILED: PERIUMBILICAL SKIN
LOCATION DETAILED: LEFT INFERIOR FOREHEAD
LOCATION DETAILED: RIGHT SUPERIOR CENTRAL MALAR CHEEK
LOCATION DETAILED: RIGHT INFERIOR TEMPLE
LOCATION DETAILED: RIGHT INFERIOR FOREHEAD
LOCATION DETAILED: RIGHT MEDIAL DORSAL FOOT

## 2020-06-22 ASSESSMENT — LOCATION ZONE DERM
LOCATION ZONE: TRUNK
LOCATION ZONE: FACE
LOCATION ZONE: FEET
LOCATION ZONE: FEET

## 2020-06-22 NOTE — PROCEDURE: LIQUID NITROGEN
Render Post-Care Instructions In Note?: no
Medical Necessity Information: It is in your best interest to select a reason for this procedure from the list below. All of these items fulfill various CMS LCD requirements except the new and changing color options.
Medical Necessity Clause: This procedure was medically necessary because the lesions that were treated were: causing pain
Consent: The patient's consent was obtained including but not limited to risks of crusting, scabbing, blistering, scarring, darker or lighter pigmentary change, recurrence, incomplete removal and infection.
Post-Care Instructions: I reviewed with the patient in detail post-care instructions. Patient is to wear sunprotection, and avoid picking at any of the treated lesions. Pt may apply Vaseline to crusted or scabbing areas.
Detail Level: Detailed
Include Z78.9 (Other Specified Conditions Influencing Health Status) As An Associated Diagnosis?: Yes
Number Of Freeze-Thaw Cycles: 2 freeze-thaw cycles

## 2020-07-13 RX ORDER — GABAPENTIN 100 MG/1
CAPSULE ORAL
Qty: 270 | Refills: 3 | Status: ERX

## 2020-08-05 ENCOUNTER — APPOINTMENT (EMERGENCY)
Dept: RADIOLOGY | Facility: HOSPITAL | Age: 65
DRG: 469 | End: 2020-08-05
Attending: ORTHOPAEDIC SURGERY
Payer: MEDICARE

## 2020-08-05 ENCOUNTER — APPOINTMENT (EMERGENCY)
Dept: RADIOLOGY | Facility: HOSPITAL | Age: 65
DRG: 469 | End: 2020-08-05
Attending: EMERGENCY MEDICINE
Payer: MEDICARE

## 2020-08-05 ENCOUNTER — HOSPITAL ENCOUNTER (INPATIENT)
Facility: HOSPITAL | Age: 65
LOS: 2 days | Discharge: HOME HEALTH | DRG: 469 | End: 2020-08-07
Attending: EMERGENCY MEDICINE | Admitting: HOSPITALIST
Payer: MEDICARE

## 2020-08-05 DIAGNOSIS — E87.6 HYPOKALEMIA: ICD-10-CM

## 2020-08-05 DIAGNOSIS — S72.001A DISPLACED FRACTURE OF RIGHT FEMORAL NECK (CMS/HCC): Primary | ICD-10-CM

## 2020-08-05 PROBLEM — L88 PYODERMA GANGRENOSUM: Chronic | Status: ACTIVE | Noted: 2020-08-05

## 2020-08-05 PROBLEM — K51.90 ULCERATIVE COLITIS (CMS/HCC): Chronic | Status: ACTIVE | Noted: 2020-08-05

## 2020-08-05 LAB
ABO + RH BLD: NORMAL
ANION GAP SERPL CALC-SCNC: 11 MEQ/L (ref 3–15)
APTT PPP: 31 SEC (ref 23–35)
BASOPHILS # BLD: 0.06 K/UL (ref 0.01–0.1)
BASOPHILS NFR BLD: 0.8 %
BLD GP AB SCN SERPL QL: NEGATIVE
BUN SERPL-MCNC: 11 MG/DL (ref 8–20)
CALCIUM SERPL-MCNC: 9 MG/DL (ref 8.9–10.3)
CHLORIDE SERPL-SCNC: 99 MEQ/L (ref 98–109)
CO2 SERPL-SCNC: 25 MEQ/L (ref 22–32)
CREAT SERPL-MCNC: 0.5 MG/DL (ref 0.6–1.1)
D AG BLD QL: POSITIVE
DIFFERENTIAL METHOD BLD: ABNORMAL
EOSINOPHIL # BLD: 0.15 K/UL (ref 0.04–0.36)
EOSINOPHIL NFR BLD: 2.1 %
ERYTHROCYTE [DISTWIDTH] IN BLOOD BY AUTOMATED COUNT: 13.4 % (ref 11.7–14.4)
GFR SERPL CREATININE-BSD FRML MDRD: >60 ML/MIN/1.73M*2
GLUCOSE SERPL-MCNC: 93 MG/DL (ref 70–99)
HCT VFR BLDCO AUTO: 39.5 % (ref 35–45)
HGB BLD-MCNC: 13 G/DL (ref 11.8–15.7)
IMM GRANULOCYTES # BLD AUTO: 0.02 K/UL (ref 0–0.08)
IMM GRANULOCYTES NFR BLD AUTO: 0.3 %
INR PPP: 1 INR
LABORATORY COMMENT REPORT: NORMAL
LYMPHOCYTES # BLD: 2.09 K/UL (ref 1.2–3.5)
LYMPHOCYTES NFR BLD: 29.4 %
MCH RBC QN AUTO: 36 PG (ref 28–33.2)
MCHC RBC AUTO-ENTMCNC: 32.9 G/DL (ref 32.2–35.5)
MCV RBC AUTO: 109.4 FL (ref 83–98)
MONOCYTES # BLD: 0.72 K/UL (ref 0.28–0.8)
MONOCYTES NFR BLD: 10.1 %
NEUTROPHILS # BLD: 4.08 K/UL (ref 1.7–7)
NEUTS SEG NFR BLD: 57.3 %
NRBC BLD-RTO: 0 %
PDW BLD AUTO: 9.3 FL (ref 9.4–12.3)
PLATELET # BLD AUTO: 398 K/UL (ref 150–369)
POTASSIUM SERPL-SCNC: 2.9 MEQ/L (ref 3.6–5.1)
PROTHROMBIN TIME: 12.7 SEC (ref 12.2–14.5)
RBC # BLD AUTO: 3.61 M/UL (ref 3.93–5.22)
SARS-COV-2 RNA RESP QL NAA+PROBE: NEGATIVE
SODIUM SERPL-SCNC: 135 MEQ/L (ref 136–144)
WBC # BLD AUTO: 7.12 K/UL (ref 3.8–10.5)

## 2020-08-05 PROCEDURE — 73552 X-RAY EXAM OF FEMUR 2/>: CPT | Mod: RT

## 2020-08-05 PROCEDURE — U0002 COVID-19 LAB TEST NON-CDC: HCPCS | Performed by: PHYSICIAN ASSISTANT

## 2020-08-05 PROCEDURE — 85025 COMPLETE CBC W/AUTO DIFF WBC: CPT | Performed by: PHYSICIAN ASSISTANT

## 2020-08-05 PROCEDURE — 80048 BASIC METABOLIC PNL TOTAL CA: CPT | Performed by: PHYSICIAN ASSISTANT

## 2020-08-05 PROCEDURE — 85730 THROMBOPLASTIN TIME PARTIAL: CPT | Performed by: PHYSICIAN ASSISTANT

## 2020-08-05 PROCEDURE — 12000000 HC ROOM AND CARE MED/SURG

## 2020-08-05 PROCEDURE — 99222 1ST HOSP IP/OBS MODERATE 55: CPT | Performed by: HOSPITALIST

## 2020-08-05 PROCEDURE — 63600000 HC DRUGS/DETAIL CODE: Performed by: PHYSICIAN ASSISTANT

## 2020-08-05 PROCEDURE — 36415 COLL VENOUS BLD VENIPUNCTURE: CPT | Performed by: PHYSICIAN ASSISTANT

## 2020-08-05 PROCEDURE — 85610 PROTHROMBIN TIME: CPT | Performed by: PHYSICIAN ASSISTANT

## 2020-08-05 PROCEDURE — 86900 BLOOD TYPING SEROLOGIC ABO: CPT

## 2020-08-05 PROCEDURE — 25800000 HC PHARMACY IV SOLUTIONS: Performed by: HOSPITALIST

## 2020-08-05 PROCEDURE — 86920 COMPATIBILITY TEST SPIN: CPT

## 2020-08-05 PROCEDURE — 99285 EMERGENCY DEPT VISIT HI MDM: CPT | Mod: 25

## 2020-08-05 PROCEDURE — 71045 X-RAY EXAM CHEST 1 VIEW: CPT

## 2020-08-05 PROCEDURE — 93005 ELECTROCARDIOGRAM TRACING: CPT | Performed by: PHYSICIAN ASSISTANT

## 2020-08-05 PROCEDURE — 73502 X-RAY EXAM HIP UNI 2-3 VIEWS: CPT | Mod: RT

## 2020-08-05 PROCEDURE — 63600000 HC DRUGS/DETAIL CODE: Performed by: HOSPITALIST

## 2020-08-05 RX ORDER — ACETAMINOPHEN 650 MG/20.3ML
650 LIQUID ORAL EVERY 4 HOURS PRN
Status: DISCONTINUED | OUTPATIENT
Start: 2020-08-05 | End: 2020-08-06

## 2020-08-05 RX ORDER — ASCORBIC ACID 500 MG
1000 TABLET ORAL DAILY
Status: DISCONTINUED | OUTPATIENT
Start: 2020-08-06 | End: 2020-08-07 | Stop reason: HOSPADM

## 2020-08-05 RX ORDER — ONDANSETRON HYDROCHLORIDE 2 MG/ML
4 INJECTION, SOLUTION INTRAVENOUS EVERY 8 HOURS PRN
Status: DISCONTINUED | OUTPATIENT
Start: 2020-08-05 | End: 2020-08-07 | Stop reason: HOSPADM

## 2020-08-05 RX ORDER — HYDROMORPHONE HYDROCHLORIDE 4 MG/1
4 TABLET ORAL EVERY 6 HOURS PRN
COMMUNITY
Start: 2020-08-05 | End: 2020-09-04

## 2020-08-05 RX ORDER — CETIRIZINE HYDROCHLORIDE 10 MG/1
10 TABLET ORAL NIGHTLY
Status: DISCONTINUED | OUTPATIENT
Start: 2020-08-05 | End: 2020-08-07 | Stop reason: HOSPADM

## 2020-08-05 RX ORDER — DEXTROSE 50 % IN WATER (D50W) INTRAVENOUS SYRINGE
25 AS NEEDED
Status: DISCONTINUED | OUTPATIENT
Start: 2020-08-05 | End: 2020-08-07 | Stop reason: HOSPADM

## 2020-08-05 RX ORDER — ONDANSETRON 4 MG/1
4 TABLET, ORALLY DISINTEGRATING ORAL EVERY 8 HOURS PRN
Status: DISCONTINUED | OUTPATIENT
Start: 2020-08-05 | End: 2020-08-07 | Stop reason: HOSPADM

## 2020-08-05 RX ORDER — HYDROMORPHONE HYDROCHLORIDE 1 MG/ML
1 INJECTION, SOLUTION INTRAMUSCULAR; INTRAVENOUS; SUBCUTANEOUS ONCE
Status: COMPLETED | OUTPATIENT
Start: 2020-08-05 | End: 2020-08-05

## 2020-08-05 RX ORDER — IBANDRONATE SODIUM 150 MG/1
150 TABLET, FILM COATED ORAL
COMMUNITY
End: 2023-10-06 | Stop reason: ALTCHOICE

## 2020-08-05 RX ORDER — DESLORATADINE 2.5 MG/1
2.5 TABLET, ORALLY DISINTEGRATING ORAL DAILY
COMMUNITY

## 2020-08-05 RX ORDER — GABAPENTIN 100 MG/1
100 CAPSULE ORAL 3 TIMES DAILY PRN
COMMUNITY

## 2020-08-05 RX ORDER — SODIUM CHLORIDE 9 MG/ML
5 INJECTION, SOLUTION INTRAVENOUS AS NEEDED
Status: DISCONTINUED | OUTPATIENT
Start: 2020-08-05 | End: 2020-08-05

## 2020-08-05 RX ORDER — DULOXETIN HYDROCHLORIDE 30 MG/1
30 CAPSULE, DELAYED RELEASE ORAL DAILY
Status: DISCONTINUED | OUTPATIENT
Start: 2020-08-06 | End: 2020-08-07 | Stop reason: HOSPADM

## 2020-08-05 RX ORDER — ACETAMINOPHEN 650 MG/1
650 SUPPOSITORY RECTAL EVERY 4 HOURS PRN
Status: DISCONTINUED | OUTPATIENT
Start: 2020-08-05 | End: 2020-08-06

## 2020-08-05 RX ORDER — OXYCODONE HCL 10 MG/1
10 TABLET, FILM COATED, EXTENDED RELEASE ORAL 2 TIMES DAILY
Status: DISCONTINUED | OUTPATIENT
Start: 2020-08-06 | End: 2020-08-07 | Stop reason: HOSPADM

## 2020-08-05 RX ORDER — ACETAMINOPHEN 325 MG/1
650 TABLET ORAL EVERY 4 HOURS PRN
Status: DISCONTINUED | OUTPATIENT
Start: 2020-08-05 | End: 2020-08-07 | Stop reason: HOSPADM

## 2020-08-05 RX ORDER — GABAPENTIN 100 MG/1
100 CAPSULE ORAL 2 TIMES DAILY
Status: DISCONTINUED | OUTPATIENT
Start: 2020-08-06 | End: 2020-08-07 | Stop reason: HOSPADM

## 2020-08-05 RX ORDER — IBUPROFEN 100 MG/5ML
1000 SUSPENSION, ORAL (FINAL DOSE FORM) ORAL DAILY
Status: ON HOLD | COMMUNITY
End: 2020-08-07

## 2020-08-05 RX ORDER — DULOXETIN HYDROCHLORIDE 30 MG/1
30 CAPSULE, DELAYED RELEASE ORAL DAILY
COMMUNITY

## 2020-08-05 RX ORDER — DEXTROSE 40 %
15-30 GEL (GRAM) ORAL AS NEEDED
Status: DISCONTINUED | OUTPATIENT
Start: 2020-08-05 | End: 2020-08-07 | Stop reason: HOSPADM

## 2020-08-05 RX ORDER — OXYCODONE HCL 10 MG/1
10 TABLET, FILM COATED, EXTENDED RELEASE ORAL 2 TIMES DAILY
COMMUNITY
Start: 2020-08-05 | End: 2020-09-04

## 2020-08-05 RX ORDER — HYDROMORPHONE HYDROCHLORIDE 1 MG/ML
1 INJECTION, SOLUTION INTRAMUSCULAR; INTRAVENOUS; SUBCUTANEOUS
Status: DISCONTINUED | OUTPATIENT
Start: 2020-08-05 | End: 2020-08-06

## 2020-08-05 RX ORDER — IBUPROFEN 200 MG
16-32 TABLET ORAL AS NEEDED
Status: DISCONTINUED | OUTPATIENT
Start: 2020-08-05 | End: 2020-08-07 | Stop reason: HOSPADM

## 2020-08-05 RX ADMIN — SODIUM CHLORIDE 40 MEQ: 9 INJECTION, SOLUTION INTRAVENOUS at 21:20

## 2020-08-05 RX ADMIN — HYDROMORPHONE HYDROCHLORIDE 1 MG: 1 INJECTION, SOLUTION INTRAMUSCULAR; INTRAVENOUS; SUBCUTANEOUS at 21:18

## 2020-08-05 ASSESSMENT — COGNITIVE AND FUNCTIONAL STATUS - GENERAL
DRESSING REGULAR LOWER BODY CLOTHING: 2 - A LOT
HELP NEEDED FOR PERSONAL GROOMING: 2 - A LOT
EATING MEALS: 4 - NONE
TOILETING: 2 - A LOT
HELP NEEDED FOR BATHING: 2 - A LOT
CLIMB 3 TO 5 STEPS WITH RAILING: 2 - A LOT
DRESSING REGULAR UPPER BODY CLOTHING: 2 - A LOT
MOVING TO AND FROM BED TO CHAIR: 2 - A LOT
WALKING IN HOSPITAL ROOM: 2 - A LOT
DO YOU HAVE SERIOUS DIFFICULTY WALKING OR CLIMBING STAIRS: AMBULATION DIFFICULTY, REQUIRES EQUIPMENT
STANDING UP FROM CHAIR USING ARMS: 2 - A LOT

## 2020-08-05 ASSESSMENT — ENCOUNTER SYMPTOMS
FEVER: 0
HEADACHES: 0
HIP PAIN: 1
NAUSEA: 0
SHORTNESS OF BREATH: 0
WEAKNESS: 0
DIZZINESS: 0
JOINT SWELLING: 1
ARTHRALGIAS: 1
VOMITING: 0
CHILLS: 0

## 2020-08-05 NOTE — CONSULTS
Orthopedic Consult Note    Subjective     Mary Moreno is a 65 y.o. female is a 65 year old community ambulator who fell from a stool at home last Tuesday. She was able to only minimally ambulate with the assistance of a cane / walker. She was scheduled for an outpatient appointment tomorrow with Dr. Gilmore. She went to the office today for an XR in anticipation of the appointment. It was found that she had a displaced right femoral neck fracture and was sent to the [facility]. Pain localized to her right hip. Denies any blood thinners. Of note, she has a PMH significant for colitis, celiac disease, and pyoderma gangrenosum for which she’s on humira (q2wk, last dose ). A new lesion on the dorsum of her right foot appeared about 30 days ago. She receives treatment from [facility] and has been having televists with them and performing daily local wound care herself. She is on chronic pain medications for the gangrenosum (4mg dilaudid and 10 oxytocin’s BID). Denies any numbness or tingling.     Outside records reviewed..    Medical History:   Past Medical History:   Diagnosis Date   • Celiac disease    • Osteopenia    • Pyoderma gangrenosum    • Ulcerative colitis (CMS/[facility])        Surgical History:   Past Surgical History:   Procedure Laterality Date   •  SECTION     • FOOT SURGERY     • HYSTERECTOMY         Social History:   Social History     Social History Narrative   • Not on file       Family History: History reviewed. No pertinent family history.    Allergies: Sulfa (sulfonamide antibiotics)    No current facility-administered medications for this encounter.         Medication List      ASK your doctor about these medications    adalimumab 40 mg/0.8 mL syringe kit  Commonly known as:  HUMIRA  Inject 1 pen under the skin 2 (two) times a week (Mon, Thu).  Dose:  1 pen     ascorbic acid (vitamin C) 1,000 mg tablet  Commonly known as:  VITAMIN C  Take 1,000 mg by mouth  daily.  Dose:  1,000 mg     BONIVA 150 mg tablet  Take 150 mg by mouth every 30 (thirty) days. Take in AM with glass of water prior to food, don't lie down for 30 minutes.  Dose:  150 mg  Generic drug:  ibandronate     desloratadine 2.5 mg/5 mL (0.5 mg/mL) syrup  Commonly known as:  CLARINEX  Take by mouth.     docosahexaenoic acid-epa 120-180 mg capsule  Take by mouth.     gabapentin 100 mg capsule  Commonly known as:  NEURONTIN  Take 100 mg by mouth 2 times daily.  Dose:  100 mg     HYDROmorphone 4 mg tablet  Commonly known as:  DILAUDID  Take 4 mg by mouth every 8 hours.  Dose:  4 mg     oxyCODONE 10 mg 12 hr abuse-deterrent tablet  Commonly known as:  OxyCONTIN  Take 10 mg by mouth 2 times daily.  Dose:  10 mg          Review of Systems  Pertinent items are noted in HPI.    Objective     Imaging  XR R hip/pelvis (8/5/20) Demonstrates a right displaced femoral neck fracture     Physical Exam  Right Lower Extremity:  Inspection - Compartments soft. No open wounds able the hip. Shortened, externally rotated.  Dorsum of foot has two pyoderma gangrenosum lesions  Palpation  - TTP over hip  Motion - limited 2/2 to pain  Sensory - SILT in the Saphenous / Sural / Superficial Peroneal / Deep Peroneal / Plantar nerve distributions  Motor - +EHL/FHL/TA/Gs  Palpable DP pulse  Extremity is warm  Cap refill <2secs in all toes    Assessment   65 y.o. female being consulted for right femoral neck hip fracture      Plan   NWB RLE  Pain control  COVID testing  Appreciate medical risk stratification and medical co-management   NPO at midnight  Consented  Tentatively added on for Thursday with Dr. Leiva for R LISA

## 2020-08-05 NOTE — ED PROVIDER NOTES
HPI     Chief Complaint   Patient presents with   • Hip Pain         History provided by:  Patient  Hip Pain   Location:  Right hip  Quality:  Throbbing  Severity:  Severe  Onset quality:  Sudden  Duration:  8 days  Timing:  Constant  Progression:  Unchanged  Chronicity:  New  Context:  Pt states she was seated on a stool and fell off onto ground, landing onto right hip  Worsened by:  Movement, walking  Ineffective treatments:  Oxycontin, dilaudid, aleve  Associated symptoms: no chest pain, no fever, no headaches, no nausea, no shortness of breath and no vomiting         Patient History     Past Medical History:   Diagnosis Date   • Celiac disease    • Osteopenia    • Pyoderma gangrenosum    • Ulcerative colitis (CMS/HCC)        Past Surgical History:   Procedure Laterality Date   •  SECTION     • FOOT SURGERY     • HYSTERECTOMY     • SKIN GRAFT         History reviewed. No pertinent family history.    Social History     Tobacco Use   • Smoking status: Former Smoker   • Smokeless tobacco: Never Used   Substance Use Topics   • Alcohol use: Yes     Comment: wine with dinner 2-3x week    • Drug use: Never       Systems Reviewed from Nursing Triage:          Review of Systems     Review of Systems   Constitutional: Negative for chills and fever.   Eyes: Negative for visual disturbance.   Respiratory: Negative for shortness of breath.    Cardiovascular: Negative for chest pain and leg swelling.   Gastrointestinal: Negative for nausea and vomiting.   Musculoskeletal: Positive for arthralgias and joint swelling.   Neurological: Negative for dizziness, syncope, weakness and headaches.        Physical Exam     ED Triage Vitals [20 1809]   Temp Heart Rate Resp BP SpO2   36.8 °C (98.3 °F) 86 16 105/61 96 %      Temp Source Heart Rate Source Patient Position BP Location FiO2 (%) (Set)   Oral Monitor Sitting Left upper arm --       Pulse Ox %: 96 % (20 1915)  Pulse Ox Interpretation: Normal (20  "1915)  Heart Rate: 82 (08/05/20 1915)  Rhythm Strip Interpretation: Normal Sinus Rhythm (08/05/20 1915)    Patient Vitals for the past 24 hrs:   BP Temp Temp src Pulse Resp SpO2 Height   08/05/20 1809 105/61 36.8 °C (98.3 °F) Oral 86 16 96 % 1.702 m (5' 7\")                                          Physical Exam   Constitutional: She is oriented to person, place, and time. She appears well-developed.   HENT:   Head:       Eyes: Pupils are equal, round, and reactive to light. Conjunctivae and EOM are normal.   Neck: Normal range of motion. Neck supple.   Cardiovascular: Normal rate and regular rhythm.   Pulses:       Dorsalis pedis pulses are 2+ on the right side, and 2+ on the left side.   Pulmonary/Chest: Effort normal and breath sounds normal.   Musculoskeletal:        Right hip: She exhibits decreased range of motion, decreased strength, tenderness and bony tenderness.        Right knee: No tenderness found.        Right ankle: No tenderness.   Right leg shortened   Neurological: She is alert and oriented to person, place, and time.   Skin: Skin is warm. Capillary refill takes less than 2 seconds.   Psychiatric: She has a normal mood and affect.   Nursing note and vitals reviewed.           Procedures    Labs Reviewed   CBC AND DIFF - Abnormal       Result Value    WBC 7.12      RBC 3.61 (*)     Hemoglobin 13.0      Hematocrit 39.5      .4 (*)     MCH 36.0 (*)     MCHC 32.9      RDW 13.4      Platelets 398 (*)     MPV 9.3 (*)     Differential Type Auto      nRBC 0.0      Immature Granulocytes 0.3      Neutrophils 57.3      Lymphocytes 29.4      Monocytes 10.1      Eosinophils 2.1      Basophils 0.8      Immature Granulocytes, Absolute 0.02      Neutrophils, Absolute 4.08      Lymphocytes, Absolute 2.09      Monocytes, Absolute 0.72      Eosinophils, Absolute 0.15      Basophils, Absolute 0.06     BASIC METABOLIC PANEL - Abnormal    Sodium 135 (*)     Potassium 2.9 (*)     Chloride 99      CO2 25      BUN 11 "      Creatinine 0.5 (*)     Glucose 93      Calcium 9.0      eGFR >60.0      Anion Gap 11     PTT - Normal    PTT 31     PROTIME-INR - Normal    PT 12.7      INR 1.0     SARS-COV-2 (COVID 19), PCR   TYPE AND SCREEN    ABO O      Rh Factor Positive      History Check Previous type on file     PREPARE RBC       X-RAY FEMUR RIGHT 2+ VIEW   ED Interpretation   Not ordered by ED, no mid-distal femur fracture      ECG 12 lead   ED Interpretation   NSR at 82 bpm   Normal EKG            X-RAY HIP WITH OR WITHOUT PELVIS 2-3 VW RIGHT   Final Result   IMPRESSION:      Right femoral neck fracture.            X-RAY CHEST 1 VIEW   Final Result   IMPRESSION:      No acute cardiopulmonary disease.                        ED Course & MDM     MDM         ED Course as of Aug 05 2012   Wed Aug 05, 2020   1904 Reviewed case with Ortho Rohit Dumont  He reviewed imaging, plan for OR tomorrow, basic labs, covid swab    [SS]   2010 Signed out to [facility] Dr Caraballo for admission  Pt will also need potassium replacement    [SS]      ED Course User Index  [SS] Sandhya Lowery PA C         Clinical Impressions as of Aug 05 2012   Displaced fracture of right femoral neck (CMS/HCC)   Hypokalemia        Sandhya Lowery PA C  08/05/20 2012

## 2020-08-06 ENCOUNTER — ANESTHESIA (INPATIENT)
Dept: OPERATING ROOM | Facility: HOSPITAL | Age: 65
DRG: 469 | End: 2020-08-06
Payer: MEDICARE

## 2020-08-06 ENCOUNTER — ANESTHESIA EVENT (INPATIENT)
Dept: OPERATING ROOM | Facility: HOSPITAL | Age: 65
DRG: 469 | End: 2020-08-06
Payer: MEDICARE

## 2020-08-06 ENCOUNTER — APPOINTMENT (INPATIENT)
Dept: RADIOLOGY | Facility: HOSPITAL | Age: 65
DRG: 469 | End: 2020-08-06
Attending: NURSE PRACTITIONER
Payer: MEDICARE

## 2020-08-06 PROBLEM — G89.29 CHRONIC PAIN: Status: ACTIVE | Noted: 2020-08-06

## 2020-08-06 PROBLEM — K90.0 CELIAC DISEASE: Status: ACTIVE | Noted: 2020-08-06

## 2020-08-06 PROBLEM — E46 MALNUTRITION (CMS/HCC): Status: ACTIVE | Noted: 2020-08-06

## 2020-08-06 LAB
ANION GAP SERPL CALC-SCNC: 7 MEQ/L (ref 3–15)
ATRIAL RATE: 82
BUN SERPL-MCNC: 11 MG/DL (ref 8–20)
CALCIUM SERPL-MCNC: 8.4 MG/DL (ref 8.9–10.3)
CHLORIDE SERPL-SCNC: 104 MEQ/L (ref 98–109)
CO2 SERPL-SCNC: 25 MEQ/L (ref 22–32)
CREAT SERPL-MCNC: 0.4 MG/DL (ref 0.6–1.1)
ERYTHROCYTE [DISTWIDTH] IN BLOOD BY AUTOMATED COUNT: 13.4 % (ref 11.7–14.4)
FOLATE SERPL-MCNC: 7.8 NG/ML
GFR SERPL CREATININE-BSD FRML MDRD: >60 ML/MIN/1.73M*2
GLUCOSE SERPL-MCNC: 105 MG/DL (ref 70–99)
HCT VFR BLDCO AUTO: 37.5 % (ref 35–45)
HCV AB SER QL: NONREACTIVE
HGB BLD-MCNC: 12.2 G/DL (ref 11.8–15.7)
MCH RBC QN AUTO: 35.9 PG (ref 28–33.2)
MCHC RBC AUTO-ENTMCNC: 32.5 G/DL (ref 32.2–35.5)
MCV RBC AUTO: 110.3 FL (ref 83–98)
P AXIS: 63
PDW BLD AUTO: 9.3 FL (ref 9.4–12.3)
PLATELET # BLD AUTO: 384 K/UL (ref 150–369)
POTASSIUM SERPL-SCNC: 3.4 MEQ/L (ref 3.6–5.1)
PR INTERVAL: 160
QRS DURATION: 86
QT INTERVAL: 378
QTC CALCULATION(BAZETT): 441
R AXIS: 61
RBC # BLD AUTO: 3.4 M/UL (ref 3.93–5.22)
SODIUM SERPL-SCNC: 136 MEQ/L (ref 136–144)
T WAVE AXIS: 62
VENTRICULAR RATE: 82
VIT B12 SERPL-MCNC: 291 PG/ML (ref 180–914)
WBC # BLD AUTO: 6.52 K/UL (ref 3.8–10.5)

## 2020-08-06 PROCEDURE — C1713 ANCHOR/SCREW BN/BN,TIS/BN: HCPCS | Performed by: ORTHOPAEDIC SURGERY

## 2020-08-06 PROCEDURE — 25000000 HC PHARMACY GENERAL: Performed by: ORTHOPAEDIC SURGERY

## 2020-08-06 PROCEDURE — 25800000 HC PHARMACY IV SOLUTIONS: Performed by: NURSE PRACTITIONER

## 2020-08-06 PROCEDURE — 80048 BASIC METABOLIC PNL TOTAL CA: CPT | Performed by: HOSPITALIST

## 2020-08-06 PROCEDURE — 63700000 HC SELF-ADMINISTRABLE DRUG: Performed by: NURSE PRACTITIONER

## 2020-08-06 PROCEDURE — 99233 SBSQ HOSP IP/OBS HIGH 50: CPT | Performed by: HOSPITALIST

## 2020-08-06 PROCEDURE — 63600000 HC DRUGS/DETAIL CODE: Performed by: STUDENT IN AN ORGANIZED HEALTH CARE EDUCATION/TRAINING PROGRAM

## 2020-08-06 PROCEDURE — 12000000 HC ROOM AND CARE MED/SURG

## 2020-08-06 PROCEDURE — 36415 COLL VENOUS BLD VENIPUNCTURE: CPT | Performed by: HOSPITALIST

## 2020-08-06 PROCEDURE — 71000001 HC PACU PHASE 1 INITIAL 30MIN: Performed by: ORTHOPAEDIC SURGERY

## 2020-08-06 PROCEDURE — 63600000 HC DRUGS/DETAIL CODE: Performed by: ORTHOPAEDIC SURGERY

## 2020-08-06 PROCEDURE — 63600000 HC DRUGS/DETAIL CODE: Performed by: NURSE PRACTITIONER

## 2020-08-06 PROCEDURE — 37000010 HC ANESTHESIA SPINAL: Performed by: ORTHOPAEDIC SURGERY

## 2020-08-06 PROCEDURE — 36000015 HC OR LEVEL 5 EA ADDL MIN: Performed by: ORTHOPAEDIC SURGERY

## 2020-08-06 PROCEDURE — 25800000 HC PHARMACY IV SOLUTIONS: Performed by: HOSPITALIST

## 2020-08-06 PROCEDURE — 36000005 HC OR LEVEL 5 INITIAL 30MIN: Performed by: ORTHOPAEDIC SURGERY

## 2020-08-06 PROCEDURE — 25000000 HC PHARMACY GENERAL: Performed by: NURSE ANESTHETIST, CERTIFIED REGISTERED

## 2020-08-06 PROCEDURE — C1776 JOINT DEVICE (IMPLANTABLE): HCPCS | Performed by: ORTHOPAEDIC SURGERY

## 2020-08-06 PROCEDURE — 82746 ASSAY OF FOLIC ACID SERUM: CPT | Performed by: HOSPITALIST

## 2020-08-06 PROCEDURE — 82607 VITAMIN B-12: CPT | Performed by: HOSPITALIST

## 2020-08-06 PROCEDURE — 63600000 HC DRUGS/DETAIL CODE: Performed by: HOSPITALIST

## 2020-08-06 PROCEDURE — 73501 X-RAY EXAM HIP UNI 1 VIEW: CPT | Mod: LT

## 2020-08-06 PROCEDURE — 25800000 HC PHARMACY IV SOLUTIONS: Performed by: PHYSICIAN ASSISTANT

## 2020-08-06 PROCEDURE — 85027 COMPLETE CBC AUTOMATED: CPT | Performed by: HOSPITALIST

## 2020-08-06 PROCEDURE — 25000000 HC PHARMACY GENERAL: Performed by: NURSE PRACTITIONER

## 2020-08-06 PROCEDURE — 63600000 HC DRUGS/DETAIL CODE: Performed by: ANESTHESIOLOGY

## 2020-08-06 PROCEDURE — 63600000 HC DRUGS/DETAIL CODE: Performed by: NURSE ANESTHETIST, CERTIFIED REGISTERED

## 2020-08-06 PROCEDURE — 25000000 HC PHARMACY GENERAL: Performed by: ANESTHESIOLOGY

## 2020-08-06 PROCEDURE — 0SR904Z REPLACEMENT OF RIGHT HIP JOINT WITH CERAMIC ON POLYETHYLENE SYNTHETIC SUBSTITUTE, OPEN APPROACH: ICD-10-PCS | Performed by: ORTHOPAEDIC SURGERY

## 2020-08-06 PROCEDURE — 86803 HEPATITIS C AB TEST: CPT | Performed by: HOSPITALIST

## 2020-08-06 PROCEDURE — 63600000 HC DRUGS/DETAIL CODE: Performed by: PHYSICIAN ASSISTANT

## 2020-08-06 PROCEDURE — 27200000 HC STERILE SUPPLY: Performed by: ORTHOPAEDIC SURGERY

## 2020-08-06 PROCEDURE — 63700000 HC SELF-ADMINISTRABLE DRUG: Performed by: HOSPITALIST

## 2020-08-06 PROCEDURE — 71000011 HC PACU PHASE 1 EA ADDL MIN: Performed by: ORTHOPAEDIC SURGERY

## 2020-08-06 PROCEDURE — 93010 ELECTROCARDIOGRAM REPORT: CPT | Performed by: INTERNAL MEDICINE

## 2020-08-06 DEVICE — INSERT TRIDENT X3 10 DEGREE POLYETHYLENE: Type: IMPLANTABLE DEVICE | Site: HIP | Status: FUNCTIONAL

## 2020-08-06 DEVICE — HIP STEM SIZE 4/35MM 132DEG ANGLE NECK: Type: IMPLANTABLE DEVICE | Site: HIP | Status: FUNCTIONAL

## 2020-08-06 DEVICE — SCREW CANCELLOUS 25MM: Type: IMPLANTABLE DEVICE | Site: HIP | Status: FUNCTIONAL

## 2020-08-06 DEVICE — SHELL TRIDENT ACETABULAR  PSL 50MM: Type: IMPLANTABLE DEVICE | Site: HIP | Status: FUNCTIONAL

## 2020-08-06 DEVICE — HEAD BIOLOX V40 DELTA CERAMIC 36MM/-2: Type: IMPLANTABLE DEVICE | Site: HIP | Status: FUNCTIONAL

## 2020-08-06 DEVICE — SCREW CANCELLOUS 20MM: Type: IMPLANTABLE DEVICE | Site: HIP | Status: FUNCTIONAL

## 2020-08-06 RX ORDER — TRANEXAMIC ACID 10 MG/ML
1000 INJECTION, SOLUTION INTRAVENOUS ONCE
Status: COMPLETED | OUTPATIENT
Start: 2020-08-06 | End: 2020-08-06

## 2020-08-06 RX ORDER — DEXAMETHASONE SODIUM PHOSPHATE 4 MG/ML
INJECTION, SOLUTION INTRA-ARTICULAR; INTRALESIONAL; INTRAMUSCULAR; INTRAVENOUS; SOFT TISSUE AS NEEDED
Status: DISCONTINUED | OUTPATIENT
Start: 2020-08-06 | End: 2020-08-06 | Stop reason: SURG

## 2020-08-06 RX ORDER — DIPHENHYDRAMINE HCL 50 MG/ML
12.5 VIAL (ML) INJECTION EVERY 6 HOURS PRN
Status: DISCONTINUED | OUTPATIENT
Start: 2020-08-06 | End: 2020-08-07 | Stop reason: HOSPADM

## 2020-08-06 RX ORDER — IBUPROFEN 200 MG
16-32 TABLET ORAL AS NEEDED
Status: DISCONTINUED | OUTPATIENT
Start: 2020-08-06 | End: 2020-08-06 | Stop reason: HOSPADM

## 2020-08-06 RX ORDER — KETAMINE HYDROCHLORIDE 50 MG/ML
INJECTION, SOLUTION INTRAMUSCULAR; INTRAVENOUS AS NEEDED
Status: DISCONTINUED | OUTPATIENT
Start: 2020-08-06 | End: 2020-08-06 | Stop reason: SURG

## 2020-08-06 RX ORDER — CEFAZOLIN SODIUM/WATER 2 G/20 ML
SYRINGE (ML) INTRAVENOUS
Status: COMPLETED
Start: 2020-08-06 | End: 2020-08-06

## 2020-08-06 RX ORDER — FENTANYL CITRATE 50 UG/ML
25 INJECTION, SOLUTION INTRAMUSCULAR; INTRAVENOUS
Status: DISCONTINUED | OUTPATIENT
Start: 2020-08-06 | End: 2020-08-06 | Stop reason: HOSPADM

## 2020-08-06 RX ORDER — MEPERIDINE HYDROCHLORIDE 25 MG/ML
12.5 INJECTION INTRAMUSCULAR; INTRAVENOUS; SUBCUTANEOUS EVERY 10 MIN PRN
Status: DISCONTINUED | OUTPATIENT
Start: 2020-08-06 | End: 2020-08-06 | Stop reason: HOSPADM

## 2020-08-06 RX ORDER — BUPIVACAINE HYDROCHLORIDE 7.5 MG/ML
INJECTION, SOLUTION INTRASPINAL
Status: COMPLETED
Start: 2020-08-06 | End: 2020-08-06

## 2020-08-06 RX ORDER — IBUPROFEN 200 MG
16-32 TABLET ORAL AS NEEDED
Status: DISCONTINUED | OUTPATIENT
Start: 2020-08-06 | End: 2020-08-07 | Stop reason: HOSPADM

## 2020-08-06 RX ORDER — DOCUSATE SODIUM 100 MG/1
100 CAPSULE, LIQUID FILLED ORAL 2 TIMES DAILY
Status: DISCONTINUED | OUTPATIENT
Start: 2020-08-06 | End: 2020-08-07 | Stop reason: HOSPADM

## 2020-08-06 RX ORDER — POLYETHYLENE GLYCOL 3350 17 G/17G
17 POWDER, FOR SOLUTION ORAL DAILY PRN
Status: DISCONTINUED | OUTPATIENT
Start: 2020-08-06 | End: 2020-08-07 | Stop reason: HOSPADM

## 2020-08-06 RX ORDER — BUPIVACAINE HYDROCHLORIDE 7.5 MG/ML
INJECTION, SOLUTION INTRASPINAL
Status: COMPLETED | OUTPATIENT
Start: 2020-08-06 | End: 2020-08-06

## 2020-08-06 RX ORDER — DEXTROSE 50 % IN WATER (D50W) INTRAVENOUS SYRINGE
25 AS NEEDED
Status: DISCONTINUED | OUTPATIENT
Start: 2020-08-06 | End: 2020-08-06 | Stop reason: HOSPADM

## 2020-08-06 RX ORDER — DEXTROSE 40 %
15-30 GEL (GRAM) ORAL AS NEEDED
Status: DISCONTINUED | OUTPATIENT
Start: 2020-08-06 | End: 2020-08-06 | Stop reason: HOSPADM

## 2020-08-06 RX ORDER — PHENYLEPHRINE HCL IN 0.9% NACL 1 MG/10 ML
SYRINGE (ML) INTRAVENOUS AS NEEDED
Status: DISCONTINUED | OUTPATIENT
Start: 2020-08-06 | End: 2020-08-06 | Stop reason: SURG

## 2020-08-06 RX ORDER — PROPOFOL 10 MG/ML
INJECTION, EMULSION INTRAVENOUS CONTINUOUS PRN
Status: DISCONTINUED | OUTPATIENT
Start: 2020-08-06 | End: 2020-08-06 | Stop reason: SURG

## 2020-08-06 RX ORDER — ALUMINUM HYDROXIDE, MAGNESIUM HYDROXIDE, AND SIMETHICONE 1200; 120; 1200 MG/30ML; MG/30ML; MG/30ML
30 SUSPENSION ORAL EVERY 4 HOURS PRN
Status: DISCONTINUED | OUTPATIENT
Start: 2020-08-06 | End: 2020-08-07 | Stop reason: HOSPADM

## 2020-08-06 RX ORDER — CEFAZOLIN SODIUM 1 G/50ML
1 SOLUTION INTRAVENOUS
Status: COMPLETED | OUTPATIENT
Start: 2020-08-06 | End: 2020-08-07

## 2020-08-06 RX ORDER — MIDAZOLAM HYDROCHLORIDE 2 MG/2ML
INJECTION, SOLUTION INTRAMUSCULAR; INTRAVENOUS AS NEEDED
Status: DISCONTINUED | OUTPATIENT
Start: 2020-08-06 | End: 2020-08-06 | Stop reason: SURG

## 2020-08-06 RX ORDER — DEXTROSE 40 %
15-30 GEL (GRAM) ORAL AS NEEDED
Status: DISCONTINUED | OUTPATIENT
Start: 2020-08-06 | End: 2020-08-07 | Stop reason: HOSPADM

## 2020-08-06 RX ORDER — FENTANYL CITRATE 50 UG/ML
INJECTION, SOLUTION INTRAMUSCULAR; INTRAVENOUS AS NEEDED
Status: DISCONTINUED | OUTPATIENT
Start: 2020-08-06 | End: 2020-08-06 | Stop reason: SURG

## 2020-08-06 RX ORDER — SODIUM CHLORIDE 9 MG/ML
5 INJECTION, SOLUTION INTRAVENOUS AS NEEDED
Status: DISCONTINUED | OUTPATIENT
Start: 2020-08-06 | End: 2020-08-06

## 2020-08-06 RX ORDER — GLYCOPYRROLATE 0.6MG/3ML
SYRINGE (ML) INTRAVENOUS AS NEEDED
Status: DISCONTINUED | OUTPATIENT
Start: 2020-08-06 | End: 2020-08-06 | Stop reason: SURG

## 2020-08-06 RX ORDER — HYDROMORPHONE HYDROCHLORIDE 1 MG/ML
0.5 INJECTION, SOLUTION INTRAMUSCULAR; INTRAVENOUS; SUBCUTANEOUS
Status: DISCONTINUED | OUTPATIENT
Start: 2020-08-06 | End: 2020-08-06 | Stop reason: HOSPADM

## 2020-08-06 RX ORDER — POVIDONE-IODINE 5 %
SOLUTION, NON-ORAL OPHTHALMIC (EYE) AS NEEDED
Status: DISCONTINUED | OUTPATIENT
Start: 2020-08-06 | End: 2020-08-06 | Stop reason: HOSPADM

## 2020-08-06 RX ORDER — SODIUM CHLORIDE, SODIUM LACTATE, POTASSIUM CHLORIDE, CALCIUM CHLORIDE 600; 310; 30; 20 MG/100ML; MG/100ML; MG/100ML; MG/100ML
INJECTION, SOLUTION INTRAVENOUS CONTINUOUS
Status: DISCONTINUED | OUTPATIENT
Start: 2020-08-06 | End: 2020-08-06

## 2020-08-06 RX ORDER — BISACODYL 10 MG/1
10 SUPPOSITORY RECTAL DAILY PRN
Status: DISCONTINUED | OUTPATIENT
Start: 2020-08-06 | End: 2020-08-07 | Stop reason: HOSPADM

## 2020-08-06 RX ORDER — EPHEDRINE SULFATE/0.9% NACL/PF 50 MG/5 ML
SYRINGE (ML) INTRAVENOUS AS NEEDED
Status: DISCONTINUED | OUTPATIENT
Start: 2020-08-06 | End: 2020-08-06 | Stop reason: SURG

## 2020-08-06 RX ORDER — ASPIRIN 81 MG/1
81 TABLET ORAL 2 TIMES DAILY
Status: DISCONTINUED | OUTPATIENT
Start: 2020-08-06 | End: 2020-08-07 | Stop reason: HOSPADM

## 2020-08-06 RX ORDER — DEXTROSE 50 % IN WATER (D50W) INTRAVENOUS SYRINGE
25 AS NEEDED
Status: DISCONTINUED | OUTPATIENT
Start: 2020-08-06 | End: 2020-08-07 | Stop reason: HOSPADM

## 2020-08-06 RX ORDER — SODIUM CHLORIDE 9 MG/ML
80 INJECTION, SOLUTION INTRAVENOUS CONTINUOUS
Status: DISCONTINUED | OUTPATIENT
Start: 2020-08-06 | End: 2020-08-06

## 2020-08-06 RX ORDER — CEFAZOLIN SODIUM/WATER 2 G/20 ML
2 SYRINGE (ML) INTRAVENOUS
Status: COMPLETED | OUTPATIENT
Start: 2020-08-06 | End: 2020-08-06

## 2020-08-06 RX ORDER — SODIUM CHLORIDE 9 MG/ML
125 INJECTION, SOLUTION INTRAVENOUS CONTINUOUS
Status: ACTIVE | OUTPATIENT
Start: 2020-08-06 | End: 2020-08-07

## 2020-08-06 RX ORDER — OXYCODONE HCL 10 MG/1
10 TABLET, FILM COATED, EXTENDED RELEASE ORAL ONCE
Status: COMPLETED | OUTPATIENT
Start: 2020-08-06 | End: 2020-08-06

## 2020-08-06 RX ORDER — OXYCODONE HYDROCHLORIDE 5 MG/1
5-10 TABLET ORAL EVERY 4 HOURS PRN
Status: DISCONTINUED | OUTPATIENT
Start: 2020-08-06 | End: 2020-08-07 | Stop reason: HOSPADM

## 2020-08-06 RX ORDER — MORPHINE SULFATE 2 MG/ML
1 INJECTION, SOLUTION INTRAMUSCULAR; INTRAVENOUS EVERY 4 HOURS PRN
Status: DISCONTINUED | OUTPATIENT
Start: 2020-08-06 | End: 2020-08-07 | Stop reason: HOSPADM

## 2020-08-06 RX ADMIN — SODIUM CHLORIDE, SODIUM LACTATE, POTASSIUM CHLORIDE, CALCIUM CHLORIDE: 600; 310; 30; 20 INJECTION, SOLUTION INTRAVENOUS at 16:56

## 2020-08-06 RX ADMIN — ASPIRIN 81 MG: 81 TABLET, COATED ORAL at 20:42

## 2020-08-06 RX ADMIN — Medication 200 MCG: at 15:46

## 2020-08-06 RX ADMIN — Medication 100 MCG: at 16:07

## 2020-08-06 RX ADMIN — MORPHINE SULFATE 1 MG: 2 INJECTION, SOLUTION INTRAMUSCULAR; INTRAVENOUS at 23:29

## 2020-08-06 RX ADMIN — Medication 10 MG: at 16:40

## 2020-08-06 RX ADMIN — Medication 200 MCG: at 16:19

## 2020-08-06 RX ADMIN — TRANEXAMIC ACID 1000 MG: 10 INJECTION, SOLUTION INTRAVENOUS at 14:31

## 2020-08-06 RX ADMIN — ONDANSETRON HYDROCHLORIDE 4 MG: 2 INJECTION, SOLUTION INTRAMUSCULAR; INTRAVENOUS at 16:00

## 2020-08-06 RX ADMIN — DEXAMETHASONE SODIUM PHOSPHATE 8 MG: 4 INJECTION, SOLUTION INTRA-ARTICULAR; INTRALESIONAL; INTRAMUSCULAR; INTRAVENOUS; SOFT TISSUE at 16:00

## 2020-08-06 RX ADMIN — MIDAZOLAM HYDROCHLORIDE 1 MG: 1 INJECTION, SOLUTION INTRAMUSCULAR; INTRAVENOUS at 15:26

## 2020-08-06 RX ADMIN — HYDROMORPHONE HYDROCHLORIDE 1 MG: 1 INJECTION, SOLUTION INTRAMUSCULAR; INTRAVENOUS; SUBCUTANEOUS at 11:22

## 2020-08-06 RX ADMIN — Medication 200 MCG: at 17:08

## 2020-08-06 RX ADMIN — CETIRIZINE HYDROCHLORIDE 10 MG: 10 TABLET, FILM COATED ORAL at 20:43

## 2020-08-06 RX ADMIN — Medication 200 MCG: at 15:36

## 2020-08-06 RX ADMIN — PROPOFOL INJECTABLE EMULSION 30 MCG/KG/MIN: 10 INJECTION, EMULSION INTRAVENOUS at 15:39

## 2020-08-06 RX ADMIN — OXYCODONE HYDROCHLORIDE 10 MG: 10 TABLET, FILM COATED, EXTENDED RELEASE ORAL at 21:43

## 2020-08-06 RX ADMIN — SODIUM CHLORIDE 125 ML/HR: 9 INJECTION, SOLUTION INTRAVENOUS at 19:48

## 2020-08-06 RX ADMIN — TRANEXAMIC ACID 1000 MG: 10 INJECTION, SOLUTION INTRAVENOUS at 17:58

## 2020-08-06 RX ADMIN — Medication 200 MCG: at 16:42

## 2020-08-06 RX ADMIN — OXYCODONE HYDROCHLORIDE 10 MG: 10 TABLET, FILM COATED, EXTENDED RELEASE ORAL at 07:54

## 2020-08-06 RX ADMIN — MIDAZOLAM HYDROCHLORIDE 2 MG: 1 INJECTION, SOLUTION INTRAMUSCULAR; INTRAVENOUS at 15:19

## 2020-08-06 RX ADMIN — GABAPENTIN 100 MG: 100 CAPSULE ORAL at 07:54

## 2020-08-06 RX ADMIN — KETAMINE HYDROCHLORIDE 10 MG: 50 INJECTION, SOLUTION INTRAMUSCULAR; INTRAVENOUS at 15:32

## 2020-08-06 RX ADMIN — FENTANYL CITRATE 25 MCG: 50 INJECTION INTRAMUSCULAR; INTRAVENOUS at 18:05

## 2020-08-06 RX ADMIN — CETIRIZINE HYDROCHLORIDE 10 MG: 10 TABLET, FILM COATED ORAL at 00:13

## 2020-08-06 RX ADMIN — SODIUM CHLORIDE 80 ML/HR: 9 INJECTION, SOLUTION INTRAVENOUS at 02:10

## 2020-08-06 RX ADMIN — SODIUM CHLORIDE 40 MEQ: 9 INJECTION, SOLUTION INTRAVENOUS at 07:55

## 2020-08-06 RX ADMIN — FENTANYL CITRATE 25 MCG: 50 INJECTION INTRAMUSCULAR; INTRAVENOUS at 19:15

## 2020-08-06 RX ADMIN — OXYCODONE HYDROCHLORIDE AND ACETAMINOPHEN 1000 MG: 500 TABLET ORAL at 07:54

## 2020-08-06 RX ADMIN — HYDROMORPHONE HYDROCHLORIDE 1 MG: 1 INJECTION, SOLUTION INTRAMUSCULAR; INTRAVENOUS; SUBCUTANEOUS at 14:05

## 2020-08-06 RX ADMIN — Medication 100 MCG: at 15:37

## 2020-08-06 RX ADMIN — Medication 100 MCG: at 15:57

## 2020-08-06 RX ADMIN — Medication 200 MCG: at 16:53

## 2020-08-06 RX ADMIN — Medication 100 MCG: at 15:27

## 2020-08-06 RX ADMIN — VANCOMYCIN HYDROCHLORIDE 750 MG: 750 INJECTION, POWDER, LYOPHILIZED, FOR SOLUTION INTRAVENOUS at 14:31

## 2020-08-06 RX ADMIN — BUPIVACAINE HYDROCHLORIDE IN DEXTROSE 1.5 ML: 7.5 INJECTION, SOLUTION SUBARACHNOID at 15:34

## 2020-08-06 RX ADMIN — OXYCODONE HYDROCHLORIDE 5 MG: 5 TABLET ORAL at 20:43

## 2020-08-06 RX ADMIN — DULOXETINE HYDROCHLORIDE 30 MG: 30 CAPSULE, DELAYED RELEASE ORAL at 07:54

## 2020-08-06 RX ADMIN — FENTANYL CITRATE 50 MCG: 50 INJECTION INTRAMUSCULAR; INTRAVENOUS at 15:29

## 2020-08-06 RX ADMIN — Medication 100 MCG: at 16:24

## 2020-08-06 RX ADMIN — FENTANYL CITRATE 50 MCG: 50 INJECTION INTRAMUSCULAR; INTRAVENOUS at 15:23

## 2020-08-06 RX ADMIN — Medication 200 MCG: at 16:33

## 2020-08-06 RX ADMIN — Medication 200 MCG: at 15:52

## 2020-08-06 RX ADMIN — MIDAZOLAM HYDROCHLORIDE 1 MG: 1 INJECTION, SOLUTION INTRAMUSCULAR; INTRAVENOUS at 15:33

## 2020-08-06 RX ADMIN — HYDROMORPHONE HYDROCHLORIDE 1 MG: 1 INJECTION, SOLUTION INTRAMUSCULAR; INTRAVENOUS; SUBCUTANEOUS at 06:49

## 2020-08-06 RX ADMIN — SODIUM CHLORIDE, POTASSIUM CHLORIDE, SODIUM LACTATE AND CALCIUM CHLORIDE 500 ML: 600; 310; 30; 20 INJECTION, SOLUTION INTRAVENOUS at 19:10

## 2020-08-06 RX ADMIN — SODIUM CHLORIDE, SODIUM LACTATE, POTASSIUM CHLORIDE, CALCIUM CHLORIDE: 600; 310; 30; 20 INJECTION, SOLUTION INTRAVENOUS at 14:32

## 2020-08-06 RX ADMIN — KETAMINE HYDROCHLORIDE 20 MG: 50 INJECTION, SOLUTION INTRAMUSCULAR; INTRAVENOUS at 15:27

## 2020-08-06 RX ADMIN — CEFAZOLIN SODIUM 1 G: 1 SOLUTION INTRAVENOUS at 23:10

## 2020-08-06 RX ADMIN — Medication 200 MCG: at 16:13

## 2020-08-06 RX ADMIN — Medication 2 G: at 15:25

## 2020-08-06 RX ADMIN — GLYCOPYRROLATE 0.2 MG: 0.2 INJECTION INTRAMUSCULAR; INTRAVENOUS at 15:27

## 2020-08-06 RX ADMIN — HYDROMORPHONE HYDROCHLORIDE 1 MG: 1 INJECTION, SOLUTION INTRAMUSCULAR; INTRAVENOUS; SUBCUTANEOUS at 00:13

## 2020-08-06 RX ADMIN — Medication 100 MCG: at 16:58

## 2020-08-06 NOTE — ANESTHESIA PREPROCEDURE EVALUATION
Relevant Problems   URINARY SYSTEM   (+) Acute hypokalemia      Other   (+) Displaced fracture of right femoral neck (CMS/HCC)   (+) Pyoderma gangrenosum   (+) Ulcerative colitis (CMS/HCC)   celiac disease  COVID neg    Anesthesia ROS/MED HX      Musculoskeletal  Chronic musculoskeletal pain  Endo/Other  Body Habitus: Cachectic       Past Surgical History:   Procedure Laterality Date   •  SECTION     • FOOT SURGERY     • HYSTERECTOMY     • SKIN GRAFT         Physical Exam    Airway   Mallampati: II   TM distance: >3 FB   Neck ROM: full  Cardiovascular - normal   Rhythm: regular   Rate: normalPulmonary - normal   clear to auscultation  Dental - normal        Anesthesia Plan    Plan: spinal    Technique: spinal     Lines and Monitors: PIV     Airway: natural airway / supplemental oxygen   ASA 2  Blood Products:     Use of Blood Products Discussed: Yes     Consented to blood products  Anesthetic plan and risks discussed with: patient  Postop Plan:   Pain Management: IV analgesics and spinal  Comments:    Plan: Spinal w/Sedation. GETA as backup.   Intraop ketamine.

## 2020-08-06 NOTE — ANESTHESIA PROCEDURE NOTES
Spinal Block    Patient location during procedure: OR  Start time: 8/6/2020 3:30 PM  End time: 8/6/2020 3:34 PM  Staffing  Anesthesiologist: Christopher Echeverria MD  Performed: anesthesiologist   Reason for block: primary anesthetic  Preanesthetic Checklist  Completed: patient identified, surgical consent, pre-op evaluation, timeout performed, IV checked, risks and benefits discussed, monitors and equipment checked and sterile field maintained during procedure  Spinal Block  Patient position: right lateral decubitus  Prep: ChloraPrep and site prepped and draped  Patient monitoring: heart rate, cardiac monitor, continuous pulse ox and blood pressure  Approach: midline  Location: L3-4  Injection technique: single-shot  Needle  Needle type: Sprotte   Needle gauge: 22 G  Needle length: 3.5 in  Assessment  Sensory level: T10  Events: cerebrospinal fluid  Additional Notes  Procedure well tolerated. Vital signs stable.    Medications Administered - 8/6/2020 3:34 PM   Bupivacaine PF (MARCAINE SPINAL) 0.75% intrathecal injection, 1.5 mL

## 2020-08-06 NOTE — ED ATTESTATION NOTE
The patient was evaluated and managed by the physician assistant / nurse practitioner.     Deandre Waggoner MD  08/05/20 2028

## 2020-08-06 NOTE — OR SURGEON
Pre-Procedure patient identification:  I am the primary operating surgeon/proceduralist and I have identified the patient on 08/06/20 at 3:07 PM Terrell Estrella MD  Phone Number: [phone]

## 2020-08-06 NOTE — PROGRESS NOTES
Pt. Seen and examined; Ortho moonlighter note rev-I confimed  History, etc. w pt. And agree w Dr. Li's consult  PE: R hip pain w any ROM; NV intact, skin intact  IMAGING: xray rev-c/w R displaced fem neck fx  Labs rev: satisfactory for surgery  A/P: Pt. approx 5 days s/p mech fall w R hip pain, xray  C/w R displaced fem neck fx; Will be best treated w  R hip john arthoplasty vs. Total hip; I rev w pt. The nature  Of the procedure, anticipated periop course, risks, alternatives,  Potential benefits and pt. Voiced and signed informed consent;  Will proceed as soon as feasible based on OR availability

## 2020-08-06 NOTE — ASSESSMENT & PLAN NOTE
Patient on gabapentin, oxycodone and also dilaudid   PDMP shows her script from a doctor in [place]  Will d/w patient

## 2020-08-06 NOTE — PROGRESS NOTES
Hospital Medicine Service -  Daily Progress Note       SUBJECTIVE   Patient awake and alert.  Has pain in right foot where she has chronic sore.  Pain in right hip which is worse with movement.  Was using a walker for the last eight days.     OBJECTIVE      Vital signs in last 24 hours:  Temp:  [36.6 °C (97.9 °F)-36.8 °C (98.3 °F)] 36.7 °C (98 °F)  Heart Rate:  [67-86] 86  Resp:  [16-18] 18  BP: ()/(56-95) 108/95  No intake or output data in the 24 hours ending 08/06/20 1608    PHYSICAL EXAMINATION      Physical Exam   Gen:  Frail female, in NAD  HEENT:NCAT, EOMI, Hearing Intact, Nasal Passages Clear, Moist Oral Mucosa  Neck:  FROM, Supple  Lungs: CTA B,  No RRW, no accessory muscle use, good air exchange  CVS: S1 S2 RRR, no mrg  Abd:  soft, NT no rebound/guarding, BS +  Msk: Decreased rom right hip  Neuro: A&O x 3, no focal deficits, no sensory deficits  Psyhe: Pleasant and cooperative, normal affect  Skin: Right foot with wound on dorsal surface-she says it is too painful to take dressing off  Ext: No edema x 2       LINES, CATHETERS, DRAINS, AIRWAYS, AND WOUNDS   Lines, Drains, Airways, Wounds:  Urethral Catheter 16 Fr (Active)   Number of days: 0       Wound Abrasion Anterior;Right Pedal (Active)   Number of days: 1       Wound Graft Anterior;Left Foot (Active)   Number of days: 1       Wound Skin Tear Upper Face (Active)   Number of days: 1          LABS / IMAGING / TELE          ASSESSMENT AND PLAN      * Displaced fracture of right femoral neck (CMS/HCC)  Assessment & Plan  Admit 8/5 with a right femoral neck fracture.  Patient had fallen 8 days prior to admission and had been ambulating with a walker.Finally had an outpatient x-ray that showed the fracture    Patient does not have a history of diabetes, kidney disease, heart disease and is therefore low risk for the surgical procedure.  No contraindication to surgery.    Pain control and DVT prophylaxis  Early ambulation    Ulcerative colitis  (CMS/LTAC, located within [facility])  Assessment & Plan  Monitor    Pyoderma gangrenosum  Assessment & Plan  Diagnosed approximately 7 years ago, states that she had a 6-month hospital stay requiring multiple surgeries to her left foot  Treated with Humira  Active wound on right foot  Wound care consult for dressing changes    Chronic pain  Assessment & Plan  Patient on gabapentin, oxycodone and also dilaudid   PDMP shows her script from a doctor in [place]  Will d/w patient    Celiac disease  Assessment & Plan  Gluten-free diet    Malnutrition (CMS/LTAC, located within [facility])  Assessment & Plan  Nutrition evaluated the patient because of Nutrition Screen: BMI < 18.5  Weight trend (per Epic records)--   BMI (per Epic records)  Body Mass Index (BMI)  BMI (Calculated): 17.2  BMI (kg/m2): 17.2  BMI Assessment: BMI Less then 18.5: Underweight  Nutritional Status/Malnutrition: Chronic illness - Severe Malnutrition  Per nutritionist's consult note as well as my own assessment, the patient meets for moderate protein calorie malnutrition based off of the following:    Subcutaneous Fat (Malnutrition): severe depletion  Muscle Mass (Malnutrition): severe depletion  The patient's current nutrition intake is fair to good  Physical exam findings consistent with malnutrition, per nutrition documentation:  Physical Findings  Overall Physical Appearance: (severe muscle and fat wasting, generalized )  Last Bowel Movement: 08/06/20  Inpatient treatment plans include: add high protein snacks    Acute hypokalemia  Assessment & Plan  Monitor and replete prn         VTE Assessment: Padua VTE Score: 5  VTE Prophylaxis Plan: SCDS  Code Status: Full Code  Estimated Discharge Date: 8/8/2020  Disposition Planning: home vs SNF after surgery     Kraig Jones MD  8/6/2020

## 2020-08-06 NOTE — ASSESSMENT & PLAN NOTE
Nutrition evaluated the patient because of Nutrition Screen: BMI < 18.5  Weight trend (per Epic records)--   BMI (per Epic records)  Body Mass Index (BMI)  BMI (Calculated): 17.2  BMI (kg/m2): 17.2  BMI Assessment: BMI Less then 18.5: Underweight  Nutritional Status/Malnutrition: Chronic illness - Severe Malnutrition  Per nutritionist's consult note as well as my own assessment, the patient meets for moderate protein calorie malnutrition based off of the following:    Subcutaneous Fat (Malnutrition): severe depletion  Muscle Mass (Malnutrition): severe depletion  The patient's current nutrition intake is fair to good  Physical exam findings consistent with malnutrition, per nutrition documentation:  Physical Findings  Overall Physical Appearance: (severe muscle and fat wasting, generalized )  Last Bowel Movement: 08/06/20  Inpatient treatment plans include: add high protein snacks

## 2020-08-06 NOTE — ASSESSMENT & PLAN NOTE
Admit 8/5 with a right femoral neck fracture.  Patient had fallen 8 days prior to admission and had been ambulating with a walker.Finally had an outpatient x-ray that showed the fracture    Patient does not have a history of diabetes, kidney disease, heart disease and is therefore low risk for the surgical procedure.  No contraindication to surgery.    Pain control and DVT prophylaxis  Early ambulation

## 2020-08-06 NOTE — H&P
Hospital Medicine Service -  History & Physical        CHIEF COMPLAINT   Fall with injury to R hip     HISTORY OF PRESENT ILLNESS      Mary Moreno is a 65 y.o. female with a past medical history of celiac disease, ulcerative colitis, osteopenia and pyoderma gangrenosum who presents with right hip pain.  She sustained injury 8 days ago after falling from a stool in her kitchen. She had pain immediately after and it has persisted since the fall. She has been able to ambulate with the assistance of a walker.  Patient was seen as an outpatient and X-ray was recommended.  She was referred to the ER for further evaluation.      ER workup included labs notable for hypokalemia and R hip xray showing displaced femoral neck fracture.  Ortho was consulted and evaluated patient during her ER course.  COVID screen was negative.  PAST MEDICAL AND SURGICAL HISTORY      Past Medical History:   Diagnosis Date   • Celiac disease    • Osteopenia    • Pyoderma gangrenosum    • Ulcerative colitis (CMS/HCC)        Past Surgical History:   Procedure Laterality Date   •  SECTION     • FOOT SURGERY     • HYSTERECTOMY     • SKIN GRAFT         PCP: Leeanne Farah, DO    MEDICATIONS      Prior to Admission medications    Medication Sig Start Date End Date Taking? Authorizing Provider   desloratadine (CLARINEX REDITAB) 2.5 mg disintegrating tablet Take 2.5 mg by mouth daily.   Yes ProviderBarbara MD   DULoxetine (CYMBALTA) 30 mg capsule Take 30 mg by mouth daily.   Yes ProviderBarbara MD   HYDROmorphone (DILAUDID) 4 mg tablet Take 4 mg by mouth every 6 (six) hours as needed.   20 Yes Barbara Cho MD   ibandronate (BONIVA) 150 mg tablet Take 150 mg by mouth every 30 (thirty) days. Take in AM with glass of water prior to food, don't lie down for 30 minutes.   Yes ProviderBarbara MD   oxyCODONE (OxyCONTIN) 10 mg 12 hr abuse-deterrent tablet Take 10 mg by mouth 2 times daily. 20  Yes Barbara Cho MD   adalimumab (HUMIRA) 40 mg/0.8 mL syringe kit Inject 1 pen under the skin. Every 2 weeks on Thursdays     Barbara Cho MD   ascorbic acid, vitamin C, (VITAMIN C) 1,000 mg tablet Take 1,000 mg by mouth daily.    Barbara Cho MD   docosahexaenoic acid-epa 120-180 mg capsule Take 1 capsule by mouth daily.      Barbara Cho MD   gabapentin (NEURONTIN) 100 mg capsule Take 100 mg by mouth 2 times daily.    Barbara Cho MD   desloratadine (CLARINEX) 2.5 mg/5 mL (0.5 mg/mL) syrup Take by mouth.  8/5/20  Barbara Cho MD       ALLERGIES      Sulfa (sulfonamide antibiotics)    FAMILY HISTORY      History reviewed. No pertinent family history.    SOCIAL HISTORY      Social History     Socioeconomic History   • Marital status:      Spouse name: None   • Number of children: None   • Years of education: None   • Highest education level: None   Occupational History   • None   Social Needs   • Financial resource strain: None   • Food insecurity:     Worry: None     Inability: None   • Transportation needs:     Medical: None     Non-medical: None   Tobacco Use   • Smoking status: Former Smoker   • Smokeless tobacco: Never Used   Substance and Sexual Activity   • Alcohol use: Yes     Comment: wine with dinner 2-3x week    • Drug use: Never   • Sexual activity: Defer   Lifestyle   • Physical activity:     Days per week: None     Minutes per session: None   • Stress: None   Relationships   • Social connections:     Talks on phone: None     Gets together: None     Attends [employer] service: None     Active member of club or organization: None     Attends meetings of clubs or organizations: None     Relationship status: None   • Intimate partner violence:     Fear of current or ex partner: None     Emotionally abused: None     Physically abused: None     Forced sexual activity: None   Other Topics Concern   • None   Social History Narrative   • None        REVIEW OF SYSTEMS      All other systems reviewed and negative except as noted in HPI    PHYSICAL EXAMINATION      Temp:  [36.7 °C (98 °F)-36.8 °C (98.3 °F)] 36.7 °C (98 °F)  Heart Rate:  [73-86] 73  Resp:  [16-18] 16  BP: ()/(61-64) 99/62  Body mass index is 17.16 kg/m².    Physical Exam   Constitutional: She is oriented to person, place, and time.   Frail, thin female, NAD     HENT:   Head: Normocephalic. Head is with laceration.       Nose: Nose normal.   Mouth/Throat: Oropharynx is clear and moist.   Eyes: Pupils are equal, round, and reactive to light. EOM are normal.   Neck: Neck supple.   Cardiovascular: Regular rhythm and intact distal pulses.   Pulmonary/Chest: Effort normal and breath sounds normal. No respiratory distress.   Abdominal: Soft. Bowel sounds are normal. There is no tenderness.   Musculoskeletal: She exhibits tenderness and deformity.        Right hip: She exhibits decreased range of motion, tenderness and bony tenderness.   RLE: ROM limited due to pain, abducted/externally rotated   Neurological: She is alert and oriented to person, place, and time.   Skin: Skin is warm and dry. Capillary refill takes less than 2 seconds. Lesion noted. There is erythema.        Psychiatric: She has a normal mood and affect. Her behavior is normal.   Vitals reviewed.      LABS / IMAGING / EKG        Labs  I have reviewed the patient's pertinent labs.  Significant abnormals are potassium=2.9.    Imaging  CXR:  IMPRESSION:  No acute cardiopulmonary disease.   X-ray (R hip):  There is a fracture of the right femoral neck.  There is varus angulation and superior displacement of the distal fracture fragment.  The femoral head remains within the acetabulum.    X-ray (R femur):  There is partial evaluation of the proximal right femoral fracture.  There is no other evidence for fracture distally.     ECG/Telemetry  I have independently reviewed the ECG. No significant findings.    ASSESSMENT AND PLAN            * Displaced fracture of right femoral neck (CMS/HCC)  Assessment & Plan  X-ray result as above  No hx of CAD, CHF, DM2. She is independent in all ADLs. Based on NSQIP she is a low risk candidate for the procedure.  Ortho consult  PRN analgesics  NPO after midnight    Acute hypokalemia  Assessment & Plan  Repletion ordered --> trend serum potassium, replete PRN    Ulcerative colitis (CMS/HCC)  Assessment & Plan  Chronic --> no acute flare symptoms  Continue to monitor    Pyoderma gangrenosum  Assessment & Plan  Chronic --> treated with Humira  Wound care consult for dressing changes       VTE Assessment: Padua VTE Score: 0  VTE Prophylaxis Plan: SCDs   Code Status: Full Code  Estimated Discharge Date: 8/8/2020  Disposition Planning: Pending clinical response     Dennise Caraballo MD  8/6/2020

## 2020-08-06 NOTE — ASSESSMENT & PLAN NOTE
Diagnosed approximately 7 years ago, states that she had a 6-month hospital stay requiring multiple surgeries to her left foot  Treated with Humira  Active wound on right foot  Wound care consult for dressing changes

## 2020-08-06 NOTE — ANESTHESIOLOGIST PRE-PROCEDURE ATTESTATION
Pre-Procedure Patient Identification:  I am the Primary Anesthesiologist and have identified the patient on 08/06/20 at 2:39 PM.   I have confirmed the following procedure(s) HIP ARTHROPLASTY TOTAL (R) will be performed by the following surgeon/proceduralist Terrell Estrella MD.

## 2020-08-06 NOTE — CONSULTS
Nutrition Assessment  Reason for consult: MST    Recommendations:  1. Diet as able - gluten free, no beef, no pork.    2.  Weekly weights  3. Recommend daily MVI supplement  4. Will add high protein snacks between meals  5. Check B12 and folate as .3    Pt qualifies for severe malnutrition in context of chronic illness as evidenced by severe fat and muscle mass depletion noted in the severe muscle and fat wasting noted in buccinator, temporalis, maseter, scapular, tricep, bicep.     Monitor:  1. %PO intake  2. Diet tolerance  3. Weight changes  4. Skin integrity  5.  Monitor lytes/labs, replete PRN  6. Bowel fx    Goals:   1. To consume and tolerate >/= 75% of estimated needs  2. Consider supplements if po intake <50%  3. Labs and electrolytes remain WDL    Subjective:     Spoke with pt, eats high protein snacks constantly. Weight loss over 1 year s/p celiac disease dx. Was inadvertently taking a vit C supplement that contained gluten. Agrees to high protein snacks between meals herbert diet advanced post op.     Clinical Course: Patient is a 65 y.o. female who was admitted on 8/5/2020 with a diagnosis of Hypokalemia [E87.6]  Displaced fracture of right femoral neck (CMS/HCC) [S72.001A].     NPO for OR for hip fx repair. Hx celiac and UC.   Weight 1 year ago 120#, now 109#, a non-severe loss of 8% but has severe muscle and fat wasting noted in buccinator, temporalis, maseter, scapular, tricep, bicep. Qualifies for severe chronic PCM.   Claims to eat constantly and high protein foods. Good appetite.          Dietary Orders   (From admission, onward)             Start     Ordered    08/06/20 0001  NPO Diet  Diet effective midnight      08/05/20 2108              Wt Readings from Last 3 Encounters:   08/05/20 49.7 kg (109 lb 9.1 oz)     Weights (last 7 days)     Date/Time   Weight    08/05/20 2215   49.7 kg (109 lb 9.1 oz)              Past Medical History:   Diagnosis Date   • Celiac disease    • Osteopenia    •  "Pyoderma gangrenosum    • Ulcerative colitis (CMS/HCC)      Past Surgical History:   Procedure Laterality Date   •  SECTION     • FOOT SURGERY     • HYSTERECTOMY     • SKIN GRAFT              MST Nutrition Screen Tool  Has patient lost weight without trying?: 0-->Yes  If yes,how much weight has been lost?: 2-->14-23 pounds  Has patient been eating poorly due to decreased appetite?: 0-->No  MST Nutrition Screen Score: 2    Nutrition/Diet History  Diet Prior to Admission: (gluten free, high protein, high produce )  Appetite Prior to Admission: Yrsdfayor-%  Vitamin/Mineral/Herbal Supplements: (C)    Physical Findings  Overall Physical Appearance: (severe muscle and fat wasting, generalized )  Last Bowel Movement: 20    Last Bowel Movement: 20    Nutrition Order  Nutrition Order Review: does not meet nutritional requirements  Nutrition Order Comments: (NPO )    Anthropometrics  Height: 170.2 cm (5' 7\")    Admit Weight  Admit Weight: (109)    Current Weight  Weight Method: Bed scale  Weight: 49.7 kg (109 lb 9.1 oz)    Ideal Body Weight (IBW)  Ideal Body Weight (IBW) (kg): 61.86  % Ideal Body Weight: 80.34    Usual Body Weight (UBW)  Usual Body Weight: (120#)  Time Frame: (8.3% in 1 year)    Body Mass Index (BMI)  BMI (Calculated): 17.2  BMI (kg/m2): 17.2  BMI Assessment: BMI Less then 18.5: Underweight  Nutritional Status/Malnutrition: Chronic illness - Severe Malnutrition    Malnutrition (Moderate to Severe)  Subcutaneous Fat (Malnutrition): severe depletion  Muscle Mass (Malnutrition): severe depletion                      Labs/Procedures/Meds  Lab Results Reviewed: reviewed, pertinent  Lab Results Comments: (K 3.4, , .3)      Results from last 7 days   Lab Units 20  0224 20  1856   SODIUM mEQ/L 136 135*   POTASSIUM mEQ/L 3.4* 2.9*   CHLORIDE mEQ/L 104 99   CO2 mEQ/L 25 25   BUN mg/dL 11 11   CREATININE mg/dL 0.4* 0.5*   GLUCOSE mg/dL 105* 93   CALCIUM mg/dL 8.4* 9.0    "            Lab Results   Component Value Date    HGBA1C 5.1 05/17/2019     Lab Results   Component Value Date    TOPNMWXM56 952 (H) 08/06/2019     Lab Results   Component Value Date    CALCIUM 8.4 (L) 08/06/2020    PHOS 3.1 08/06/2019     Results from last 7 days   Lab Units 08/06/20  0224 08/05/20  1856   WBC K/uL 6.52 7.12   HEMOGLOBIN g/dL 12.2 13.0   HEMATOCRIT % 37.5 39.5   PLATELETS K/uL 384* 398*                             Medications  Pertinent Medications Reviewed: reviewed, pertinent  Pertinent Medications Comments: (C, Kcl, IVF)  • ascorbic acid (vitamin C)  1,000 mg oral Daily   • cetirizine  10 mg oral Nightly   • docosahexaenoic acid-epa  1 capsule oral Daily   • DULoxetine  30 mg oral Daily   • gabapentin  100 mg oral BID   • oxyCODONE  10 mg oral BID     • sodium chloride 0.9 %  80 mL/hr 80 mL/hr (08/06/20 0210)       Estimated/Assessed Needs  Additional Documentation: Calorie Requirements (Group), Fluid Requirements (Group), Protein Requirements (Group)    Calorie Requirements  Estimated kCal Needs: Actual Body Weight  Estimated Calorie Need Method: kcal/kg  Calorie/kg Recommended: Other (comments)(35 - 40 )  Calorie Recommendations: (1739 - 1990 )                Protein Requirements  Recommended Dosing Weight (Estimated Protein Needs): Actual Body Weight  Est Protein Requirement Amount (gms/kg): 1.5 gm protein  Protein Recommendations: (75 g)    Fluid Requirements  Fluid Recommendation (mL): 25-30ml  Recommended Fluid Needs Dosing Weight: Actual Body Weight  Fluid Requirements (mL/day): (1240 - 1490 )  Rach-Selina Method (over 20 kg): 7650                                                                Nutritional Needs Met?: No  Nutrition Status Classification: Moderate nutritional compromise(PCM, weight loss, hip fx)      PES  Statement: PES Statement  Nutrition Diagnosis: Malnutrition, Unintended Weight Loss  Related To:: Decreased ability to consume sufficient energy  As Evidenced By::  (severe PCM wasting, 8% loss)  Nutritional Needs Met?: No                                       Date: 08/06/20  Signature: Rossy Estrella RD, LDN

## 2020-08-07 VITALS
SYSTOLIC BLOOD PRESSURE: 95 MMHG | OXYGEN SATURATION: 97 % | BODY MASS INDEX: 17.2 KG/M2 | TEMPERATURE: 98 F | WEIGHT: 109.57 LBS | DIASTOLIC BLOOD PRESSURE: 65 MMHG | RESPIRATION RATE: 17 BRPM | HEART RATE: 81 BPM | HEIGHT: 67 IN

## 2020-08-07 LAB
ANION GAP SERPL CALC-SCNC: 6 MEQ/L (ref 3–15)
BUN SERPL-MCNC: 7 MG/DL (ref 8–20)
CALCIUM SERPL-MCNC: 8 MG/DL (ref 8.9–10.3)
CHLORIDE SERPL-SCNC: 107 MEQ/L (ref 98–109)
CO2 SERPL-SCNC: 23 MEQ/L (ref 22–32)
CREAT SERPL-MCNC: 0.5 MG/DL (ref 0.6–1.1)
ERYTHROCYTE [DISTWIDTH] IN BLOOD BY AUTOMATED COUNT: 13.4 % (ref 11.7–14.4)
GFR SERPL CREATININE-BSD FRML MDRD: >60 ML/MIN/1.73M*2
GLUCOSE SERPL-MCNC: 126 MG/DL (ref 70–99)
HCT VFR BLDCO AUTO: 37.3 % (ref 35–45)
HGB BLD-MCNC: 12.1 G/DL (ref 11.8–15.7)
MAGNESIUM SERPL-MCNC: 1.8 MG/DL (ref 1.8–2.5)
MCH RBC QN AUTO: 36.2 PG (ref 28–33.2)
MCHC RBC AUTO-ENTMCNC: 32.4 G/DL (ref 32.2–35.5)
MCV RBC AUTO: 111.7 FL (ref 83–98)
PDW BLD AUTO: 9.4 FL (ref 9.4–12.3)
PLATELET # BLD AUTO: 407 K/UL (ref 150–369)
POTASSIUM SERPL-SCNC: 3.7 MEQ/L (ref 3.6–5.1)
RBC # BLD AUTO: 3.34 M/UL (ref 3.93–5.22)
SODIUM SERPL-SCNC: 136 MEQ/L (ref 136–144)
WBC # BLD AUTO: 10.11 K/UL (ref 3.8–10.5)

## 2020-08-07 PROCEDURE — 97162 PT EVAL MOD COMPLEX 30 MIN: CPT | Mod: GP

## 2020-08-07 PROCEDURE — 80048 BASIC METABOLIC PNL TOTAL CA: CPT | Performed by: NURSE PRACTITIONER

## 2020-08-07 PROCEDURE — 63700000 HC SELF-ADMINISTRABLE DRUG: Performed by: NURSE PRACTITIONER

## 2020-08-07 PROCEDURE — 97166 OT EVAL MOD COMPLEX 45 MIN: CPT | Mod: GO

## 2020-08-07 PROCEDURE — 83735 ASSAY OF MAGNESIUM: CPT | Performed by: HOSPITALIST

## 2020-08-07 PROCEDURE — 85027 COMPLETE CBC AUTOMATED: CPT | Performed by: NURSE PRACTITIONER

## 2020-08-07 PROCEDURE — 63600000 HC DRUGS/DETAIL CODE: Performed by: NURSE PRACTITIONER

## 2020-08-07 PROCEDURE — 99239 HOSP IP/OBS DSCHRG MGMT >30: CPT | Performed by: HOSPITALIST

## 2020-08-07 RX ORDER — OXYCODONE HYDROCHLORIDE 5 MG/1
5 TABLET ORAL EVERY 6 HOURS PRN
Qty: 20 TABLET | Refills: 0 | Status: SHIPPED | OUTPATIENT
Start: 2020-08-07 | End: 2020-08-12

## 2020-08-07 RX ORDER — VIT C/E/ZN/COPPR/LUTEIN/ZEAXAN 250MG-90MG
1000 CAPSULE ORAL DAILY
Qty: 60 TABLET | Refills: 0 | Status: SHIPPED | OUTPATIENT
Start: 2020-08-07 | End: 2023-10-06 | Stop reason: ALTCHOICE

## 2020-08-07 RX ORDER — DOCUSATE SODIUM 100 MG/1
100 CAPSULE, LIQUID FILLED ORAL 2 TIMES DAILY
Qty: 60 CAPSULE | Refills: 0 | Status: SHIPPED | OUTPATIENT
Start: 2020-08-07 | End: 2023-10-06 | Stop reason: ALTCHOICE

## 2020-08-07 RX ORDER — ASPIRIN 81 MG/1
81 TABLET ORAL 2 TIMES DAILY
Qty: 60 TABLET | Refills: 0 | Status: SHIPPED | OUTPATIENT
Start: 2020-08-07 | End: 2023-10-06 | Stop reason: ALTCHOICE

## 2020-08-07 RX ORDER — FOLIC ACID 1 MG/1
1 TABLET ORAL DAILY
Qty: 30 TABLET | Refills: 0 | Status: SHIPPED | OUTPATIENT
Start: 2020-08-07 | End: 2023-10-06 | Stop reason: ALTCHOICE

## 2020-08-07 RX ADMIN — ASPIRIN 81 MG: 81 TABLET, COATED ORAL at 09:30

## 2020-08-07 RX ADMIN — OXYCODONE HYDROCHLORIDE 10 MG: 5 TABLET ORAL at 06:50

## 2020-08-07 RX ADMIN — OXYCODONE HYDROCHLORIDE 10 MG: 5 TABLET ORAL at 12:08

## 2020-08-07 RX ADMIN — OXYCODONE HYDROCHLORIDE 10 MG: 10 TABLET, FILM COATED, EXTENDED RELEASE ORAL at 09:30

## 2020-08-07 RX ADMIN — OXYCODONE HYDROCHLORIDE 10 MG: 5 TABLET ORAL at 02:10

## 2020-08-07 RX ADMIN — OXYCODONE HYDROCHLORIDE AND ACETAMINOPHEN 1000 MG: 500 TABLET ORAL at 09:29

## 2020-08-07 RX ADMIN — OXYCODONE HYDROCHLORIDE 10 MG: 5 TABLET ORAL at 17:02

## 2020-08-07 RX ADMIN — CEFAZOLIN SODIUM 1 G: 1 SOLUTION INTRAVENOUS at 09:29

## 2020-08-07 RX ADMIN — DULOXETINE HYDROCHLORIDE 30 MG: 30 CAPSULE, DELAYED RELEASE ORAL at 09:30

## 2020-08-07 RX ADMIN — DOCUSATE SODIUM 100 MG: 100 CAPSULE, LIQUID FILLED ORAL at 09:30

## 2020-08-07 RX ADMIN — GABAPENTIN 100 MG: 100 CAPSULE ORAL at 09:30

## 2020-08-07 ASSESSMENT — COGNITIVE AND FUNCTIONAL STATUS - GENERAL
DRESSING REGULAR UPPER BODY CLOTHING: 3 - A LITTLE
WALKING IN HOSPITAL ROOM: 3 - A LITTLE
HELP NEEDED FOR PERSONAL GROOMING: 3 - A LITTLE
MOVING TO AND FROM BED TO CHAIR: 3 - A LITTLE
CLIMB 3 TO 5 STEPS WITH RAILING: 3 - A LITTLE
AFFECT: WFL
AFFECT: WFL
STANDING UP FROM CHAIR USING ARMS: 3 - A LITTLE
TOILETING: 3 - A LITTLE
DRESSING REGULAR LOWER BODY CLOTHING: 2 - A LOT
EATING MEALS: 4 - NONE
HELP NEEDED FOR BATHING: 3 - A LITTLE

## 2020-08-07 ASSESSMENT — PAIN SCALES - GENERAL: PAIN_LEVEL: 0

## 2020-08-07 NOTE — OP NOTE
REPORT TYPE:  Operative Note    DATE OF OPERATION:  08/06/2020      SURGEON:  Terrell Estrella MD    PREOPERATIVE DIAGNOSIS:  Right femoral neck fracture, displaced.    POSTOPERATIVE DIAGNOSIS:  Right femoral neck fracture, displaced.    PROCEDURE PERFORMED:  Open treatment with total hip arthroplasty.    ANESTHESIA:  Spinal.    INDICATIONS:  The patient is a 65-year-old woman ____ who sustained a displaced right hip femoral neck fracture, presents for surgery.    DESCRIPTION OF PROCEDURE:  Patient correctly identified, right hip initialed.  The patient was taken to the operating room where spinal anesthetic was performed.  Antibiotics were given.  Patient already had a Kelly catheter placed, was placed supine on   the operating room table and then placed in the lateral decubitus position with the right hip superior which was then prepped and draped in standard surgical fashion.  Of note, care was taken to wrap and take care of the right foot, which had an ongoing   wound issue.  Once the right hip was prepped and draped in standard surgical fashion, a 4-inch incision was outlined and centered over the trochanter and then incision was made through skin and subcutaneous tissue and through the gluteal fascia.  A   self-retaining retractor was applied.  Leg was internally rotated and the external rotators and capsule were taken down as a single layer from the piriformis tendon to the lesser trochanter.  The leg was internally rotated, exposing the femoral neck   fracture and this was cleaned up with an oscillating saw and the bone fragment removed.  The leg was then put in neutral position and the head was removed with a corkscrew device.  Retractors were placed around the acetabulum and the labrum was excised   as well as the foveal contents and the acetabulum was sequentially reamed from 44 to 50 mm and a [place] Trident PSL HA coated cup size 50 was impacted in anatomic position.  Fixation was excellent, augmented  with 2 cancellous screws followed by   insertion of an X3 polyethylene liner for a 36 mm head with a 10-degree lip with the apex at the 9 o'clock position.  This was protected with a lap sponge.  The leg was internally rotated and flexed at the hip, exposing the femoral neck which was   repaired with a box osteotome and , then broached from 0 to a size 4.  Preoperative x-rays of 132 neck angle was chosen and a minus.  After trialing the -2.5 mm neck length gave excellent range of motion and stability.  The trial components   were removed.  An Accolade II stem size 4 with 132-degree neck angle was impacted into the femur with excellent fixation.  The 36 mm diameter ceramic head  with a -2.5 neck was gently tapped on the end of the component and reduced.  The wound was   thoroughly irrigated including a dilute iodine solution, which was then evacuated with normal saline after sitting for 2 minutes.  The external rotators and capsule were repaired back to the trochanter through 2 drill holes using #2 FiberWire suture.    The sciatic nerve was identified and intact.  The quadratus muscle was repaired with #1 Vicryl, the gluteal fascia repaired with #1 Vicryl and a running #2 Quill.  The skin closed with 2-0 Monocryl, 2-0 Monoderm and Dermabond.  A sterile dressing   applied.  The patient was placed in hip abduction orthosis and transferred to recovery in stable condition.      HERMELINDA OLIVAS MD        CC:     DD: 08/06/2020 20:06  DT: 08/07/2020 11:16  Voice ID: [id number]/Report ID: [id number]  [email or web address]

## 2020-08-07 NOTE — PROGRESS NOTES
Patient seen and examined at the bedside.   Pain is well controlled.   Denies numbness and tingling.    NAD  AOx3  Resting comfortably in bed     RLE  Abduction pillow in place  Aquacel dressing in place, c/d/i  Compartments soft/compressible   + EHL/FHL/TA/GS  SILT DP/SP/T/S/S   BCR all toes     Kelly in place draining clear yellow urine    A/P: 65 y.o. female s/p R LISA (8/5/20)   - pain control  - dvt ppx: ASA 81 bid x 4wks  - WBAT RLE, posterior hip precautions  - PT/OOB  - periop ancef  - dispo planning

## 2020-08-07 NOTE — PROGRESS NOTES
[facility]:  Received referral from Debbi Salamanca CM.  Chart reviewed.  Met with pt agreeable with SN, PT, OT services via our agency.  Lives with spouse multistory home FF to second floor B/B. NV first floor.  Has RW, transport chair.  POLO issued commode.  P.T. Issued RW.  D/C home this evening with spouse transporting.  Provided our agency booklet.  Informed pt's nurse of above.  Referral completed.  Merry GERMANN, RN, CCM x [phone]

## 2020-08-07 NOTE — PROGRESS NOTES
Pt. Seen and examined; reports min well controlled R hip pain  VSS, afeb  Hgb 12 today  NV intact bilat LE  Leg lengths clin equal  R hip drsg clean and dry  A/P: stable; mob wbat w PT; plan d/c home w home PT,  On ASA 81 mg po bid for dvt proph; office followup  Dr. Estrella approx 2 wks postop

## 2020-08-07 NOTE — CONSULTS
Consult - met patient in the room, introduced self, verified ID, discussed the plan of d/s to home today and the recommendation for HC services, provided agency options, she would like referral to [facility], notified Brittni Martinez via pager.  Provided the patient with a 3:1 commode, demonstrated its use, paperwork competed knows she might have a co-pay.  Spouse called while I was in the room, asked that he plans 4 pm  time, updated nursing.   patient has her own WC at the bedside, no other needs

## 2020-08-07 NOTE — PROGRESS NOTES
Patient: Mary Moreno  Location: [address]  MRN: [id number]  Today's date: 8/7/2020   Pt left bedside chair with personal items in place and call bell within reach, posey alarm intact. Pt reports no further questions for OT at this time and all needs are met. RN notified    Mary is a 65 y.o. female admitted on 8/5/2020 with Displaced fracture of right femoral neck (CMS/HCC). Principal problem is Displaced fracture of right femoral neck (CMS/HCC).    Past Medical History  Mary has a past medical history of Celiac disease, Osteopenia, Pyoderma gangrenosum, and Ulcerative colitis (CMS/[facility]).    History of Present Illness   Admitted after fall resulting in R femoral neck fx s/p LISA 8/6/20      OT Vitals    Date/Time Pulse HR Source SpO2 Pt Activity O2 Therapy BP BP Location BP Method Pt Position [facility]   08/07/20 0851 100 Monitor 100 % At rest None (Room air) 114/75 Right upper arm Automatic Lying KEYLA   08/07/20 0852 100 -- 100 % At rest None (Room air) 114/75 -- -- -- CK      OT Pain    Date/Time Pain Type Pref Pain Scale Side Location Rating: Rest Rating: Activity Interventions [facility]   08/07/20 0851 Pain Assessment number (Numeric Rating Pain Scale) Right hip 3 5 position adjusted KEYLA   08/07/20 0852 Pain Assessment number (Numeric Rating Pain Scale) Right hip 3 5 position adjusted CK          Prior Living Environment      Most Recent Value   Lives With  spouse   Living Arrangements  house   Living Environment Comment  2SH with WILI, railings,  also 1STE without railing          Prior Level of Function      Most Recent Value   Dominant Hand  right   Ambulation  independent   Transferring  independent   Toileting  independent   Bathing  independent   Dressing  independent   Eating  independent   Prior Level of Function Comment  Prior to recent fall/hip fx.   Assistive Device/Animal Currently Used at Home  none          OT Evaluation and Treatment - 08/07/20 0833        Time Calculation    Start  Time  0833     Stop Time  0855     Time Calculation (min)  22 min        Session Details    Document Type  initial evaluation     Mode of Treatment  occupational therapy        General Information    Patient Profile Reviewed?  yes     Onset of Illness/Injury or Date of Surgery  08/05/20     Referring Physician  Robert     General Observations of Patient  Pt rec'd supine in bed with abductor wedge in place, agreeable to participate in therapy     Existing Precautions/Restrictions  fall;weight bearing;hip        Weight-Bearing Status    Right LE Weight-Bearing Status  weight-bearing as tolerated (WBAT)        Occupational Profile    Reason for Services/Referral (Occupational Profile)  ADL / amb dysfunction post R hip fx     Successful Occupations (Occupational Profile)  on disability, marketing     Environmental Supports and Barriers (Occupational Profile)  supportive family     Patient Goals (Occupational Profile)  to go home        Cognition/Psychosocial    Affect/Mental Status (Cognitive)  WFL     Orientation Status (Cognition)  oriented x 4     Follows Commands (Cognition)  WFL     Cognitive Function (Cognitive)  WFL     Comment, Cognition  Pt alert and cooperative, follows all commands and ask for assistance appropriately. Educated pt on 3/3 hip precautions, pt receptive        Hearing Assessment    Hearing Status  WFL        Vision Assessment/Intervention    Visual Impairment/Limitations  corrective lenses for reading        Sensory Assessment (Somatosensory)    Sensory Assessment (Somatosensory)  UE sensation intact        Range of Motion (ROM)    Range of Motion  ROM is WFL;bilateral upper extremities        Strength (Manual Muscle Testing)    Strength (Manual Muscle Testing)  strength is WFL;bilateral upper extremities    4/5 grossly       Bed Mobility    [place], Supine to Sit  modified independence     Assistive Device (Bed Mobility)  head of bed elevated     Comment (Bed Mobility)  Mod I with HOB  elevated, able to bring b/l LE to EOB with increased time s/s pain, G balance sitting EOB        Transfers    Transfers  toilet transfer     Maintains Weight-Bearing Status (Transfers)  able to maintain weight-bearing status        Sit to Stand Transfer    [place], Sit to Stand Transfer  supervision;verbal cues     Verbal Cues  hand placement;safety;technique     Assistive Device  walker, front-wheeled     Comment  initial cueing provided for hand placement, G balance in stance and able to maintain WBAT        Stand to Sit Transfer    [place], Stand to Sit Transfer  supervision;verbal cues     Verbal Cues  safety;technique     Assistive Device  walker, front-wheeled     Comment  good controlled descent        Toilet Transfer    Transfer Technique  sit-stand;stand-sit     [place], Toilet Transfer  modified independence     Assistive Device  grab bars/safety frame     Comment  educated pt on use of environmental supports at home to assist with transfer, pt receptive        Safety Issues, Functional Mobility    Impairments Affecting Function (Mobility)  endurance/activity tolerance;pain     Comment, Safety Issues/Impairments (Mobility)  increased pain with exertion, able to maintain hip precautions        Balance    Balance Assessment  sitting static balance;sitting dynamic balance;sit to stand dynamic balance;standing static balance;standing dynamic balance     Static Sitting Balance  WFL     Dynamic Sitting Balance  mild impairment     Sit to Stand Dynamic Balance  mild impairment     Static Standing Balance  mild impairment     Dynamic Standing Balance  mild impairment     Comment, Balance  supervision for safety in stance with RW        Lower Body Dressing    Self-Performance  dons/doffs left sock;dons/doffs right sock     [place]  moderate assist (50-74% patient effort)     Position  supported sitting     Adaptive Equipment  none     Comment  mod A for LBD s/s R LE hip precautions, pt reports  has supportive family at home to assist with LE management        Toileting    [place]  perform bladder hygiene;supervision     Position  supported sitting     Adaptive Equipment  grab bar/safety frame     Comment  completes pericare in supported sitting with supervision for safety        BADL Safety/Performance    Impairments, BADL Safety/Performance  endurance/activity tolerance;pain     Skilled BADL Treatment/Intervention  BADL process/adaptation training     Progress in BADL Status  level of supervision required        AM-PAC (TM) - ADL (Current Function)    Putting on and taking off regular lower body clothing?  2 - A Lot     Bathing?  3 - A Little     Toileting?  3 - A Little     Putting on/taking off regular upper body clothing?  3 - A Little     How much help for taking care of personal grooming?  3 - A Little     Eating meals?  4 - None     AM-PAC (TM) ADL Score  18        Therapy Assessment/Plan (OT)    Rehab Potential (OT)  good, to achieve stated therapy goals     Therapy Frequency (OT)  5 times/wk        Progress Summary (OT)    Daily Outcome Statement (OT)  Pt seen for OT eval. Pt mod I for bed mobility, supervision for fxl transfers/mobility with RW, supervision toilet transfer, Mod A for LBD. Pt appears to be limited by pain and dec endurance/activity tolerance. Will continue to follow for OT to address POC. Rec home with assist upon d/c.     Symptoms Noted During/After Treatment  fatigue        Therapy Plan Review/Discharge Plan (OT)    OT Recommended Discharge Disposition  home with assist     Anticipated Equipment Needs At Discharge (OT)  commode, 3-in-1;dressing equipment;shower chair                   Education provided this session. See the Patient Education summary report for full details.         OT Goals      Most Recent Value   Transfer Goal 1   Activity/Assistive Device  toilet at 08/07/2020 0833   [place]  modified independence at 08/07/2020 0833   Time Frame  by discharge at  08/07/2020 0833   Progress/Outcome  goal ongoing at 08/07/2020 0833   Transfer Goal 2   Activity/Assistive Device  shower at 08/07/2020 0833   [place]  modified independence at 08/07/2020 0833   Time Frame  by discharge at 08/07/2020 0833   Progress/Outcome  goal ongoing at 08/07/2020 0833   Dressing Goal 1   Activity/Adaptive Equipment  lower body dressing at 08/07/2020 0833   [place]  modified independence at 08/07/2020 0833   Time Frame  by discharge at 08/07/2020 0833   Strategies/Barriers  LHAE at 08/07/2020 0833   Progress/Outcome  goal ongoing at 08/07/2020 0833   Toileting Goal 1   Activity/Assistive Device  toileting skills, all at 08/07/2020 0833   [place]  modified independence at 08/07/2020 0833   Time Frame  by discharge at 08/07/2020 0833   Progress/Outcome  goal ongoing at 08/07/2020 0833

## 2020-08-07 NOTE — PROGRESS NOTES
Ortho Late Entry  S:  Pain controlled on current regimen  Reports she ambulates with a walker at baseline     O:  Pt seen and examined this am  A&O, NAD, appears comfortable  VSS, afeb  Right hip Aquacel dressing c/d/i  Right thigh with minimal edema, compartments soft and compressible  SILT RLE; + DF, PF, and EHL  Right DP pulse confirmed by doppler; toes cool, pink, brisk cap refill  Right foot dressing c/d/i - not removed  Abductor pillow in place  DTV s/p Kelly removal  Hgb 12.1     A/P:  POD #1 s/p right total hip arthroplasty for femoral neck fracture  Continue pain management  DVT prophylaxis: multimodal and includes Aspirin 81 mg BID x4 weeks, SCD's B/L LE, and early ambulation   PT/OT as per total hip protocol with WBAT RLE with posterior hip precautions  Medical management as per primary  See orders for details  Pt for D/C home today  Ortho stable  Please see D/C instructions for further details

## 2020-08-07 NOTE — PERIOPERATIVE NURSING NOTE
Late addition: 1910 Dr Machuca updated on BP stable low, 80's systolic, HR 80's SR; AAOx3; MBP's 60-70's. Fluid bolus ordered: 500cc's LR over half hour.   1945: Dr Machuca updated after fluid bolus with VS and patient condition. AAOx3; SR in 80's, R hip dressing dry/intact. NV check remains intact from admission to PACU.  OK to transfer back to room, 4411W.

## 2020-08-07 NOTE — DISCHARGE INSTRUCTIONS
Please follow-up with your primary care provider within 1 week of discharge.  Call for appointment.    Please follow-up with Dr. Estrella, your orthopedic surgeon, in 2 weeks.  Call for appointment ([phone])    Your treated in the hospital for a hip fracture.    You are weightbearing as tolerated on your right leg.  Maintain posterior hip precautions as instructed    To prevent blood clots, the orthopedics team started you on aspirin 81 mg twice daily.  Please follow-up with orthopedics regarding how long to be on this medication.    Your B12 and folate acid levels were low in the hospital, so we started you on a B12 and folate supplement.  Please take 1 of each once daily.    It was a pleasure taking care of you!       [facility]   Nitesh Mansfield M.D. STEPHENIE Davis M.D. James T. McGlynn, M.D. Frank P Giammattei, M.D. Raymond M. Wolfe, , M.D. Charles D. Hummer, III, M.D. David T. Yucha, M.D. Craig G. Kriza, D.P.M., FRIDA Bush M.D.   Post-Operative Instructions for Total Hip Replacement   We commend you on taking the first steps towards living an active independent lifestyle. We are here for support and to cheer you on but YOU play an important role in your rehabilitative road to recovery. We have developed the following guidelines to assist you as you navigate through this process.   Pain Control   • You were given prescriptions on discharge from the hospital, please get these filled as soon as possible. Some of your pain medication may be taken on a regular basis such as Gabapentin, Celebrex or Meloxicam. Other medications such as narcotics, should be taken as needed. Taking your medication before bedtime will help with sleep. Do not drink alcohol or drive while taking narcotic medication. If your pain is not relieved, or worsens, call us at the number listed below.     Stool Softener (Usually Colace)   • This helps to avoid constipation caused by  the other medications. Take this 2 times per day for 2-4 weeks, or as long as you are taking narcotics. *If you are not constipated or you experience diarrhea, stop taking Colace!     Swelling   • To help and try to keep swelling at a minimum elevate your legs 4-5 times a day for about 15-30 minutes at a time. Remember to keep your knees straight, put both legs on 4-5 pillows, and lie flat on your back. Elevating your legs allows gravity to rid your legs of excess fluid.     Preventing Blood Clots:   • If you are not allergic to aspirin and you are not already taking blood thinners you will be instructed to take one 81mg enteric coated Aspirin twice a day for four to six weeks post op. If you were prescribed a different blood thinner follow those instructions.   • The following symptoms may indicate the formation of a blood clot. If you notice any of these symptoms please call the office immediately:   o Calf is painful and feels warm to the touch.   o Persistent swelling, of the foot, ankle, or calf that does not go away with elevation of the leg.   o Chest pain or shortness of breath.     Preventing Infection   • Preventing infection is extremely important for the rest of your life. Your new hip is artificial and does not have your body’s natural protection against infection. Bacteria from a variety of sources can enter your bloodstream and invade the area surrounding your new hip. This can eventually cause it to become loose and painful. A list of possible sources of infection follows, along with things you can do to minimize the risk to your new hip.   o Dental work: Cleaning, drilling, extraction, or root canal. Take antibiotics as prescribed prior to dental work for the first two years post op, after two years       o Signs of infection such as urinary tract, ear, or throat should be treated by your primary physician and followed appropriately.   o Any invasive procedure, where there is high risk of infection:  Inform your doctor that you have an artificial joint and need to be given antibiotics to protect it during these tests.     Wound Care   • As you leave the hospital, you will have a dressing over your surgical incision. Keep the dressing clean and dry. You can expect a small amount of blood on the dressing. You will see one of the three options below depending on your surgeon’s preference.     o Staple Closure: Remove your dressing at 2 days 5 days after surgery. After you remove the dressing, you will see staples; these will remain for 10-14 days and will be removed by the visiting nurse or at your follow up visit. After you have removed the dressing, you may shower but remember no baths or swimming. (You don’t want to soak the area until you have seen the doctor). After showering, pat the area dry and leave open to air.     o Prineo Closure: You are able to shower with this dressing on the 2nd day after surgery. This dressing must stay intact and should not be removed. This dressing will be removed by your surgeon at your initial post op visit around 10-14 days post operatively. You may shower but remember no baths or swimming. (You don’t want to soak the area until you have seen the doctor). After showering, pat the area dry and leave open to air.     o Surgical Glue Closure: Remove the bandage on the 2nd day after surgery. You may shower on the second day after surgery. Do not pick at the incision where the surgical glue is visible. The surgical glue will wear off over several weeks’ time. After you have removed the dressing, you may shower but remember no baths or swimming. (You don’t want to soak the area until you have seen the doctor). After showering, pat the area dry and leave open to air.     Activity   • Use your walker and bear weight on your leg as tolerated. Soon after discharge you will begin outpatient therapy via home care or at an outpatient facility where you will progress to a cane under their  guidance.   • Do not sit for longer than 30-45 minutes at a time. Use chairs with arms. You may nap if you are tired, but DO NOT stay in bed all day. Frequent short walks, either indoors or outdoors, are key to a successful recovery.   • You will experience discomfort in your operated hip and may have difficulty sleeping at night. This is part of the recovery process. It is important that you elevate your legs throughout the day to decrease the swelling that accumulates at night. The key to a successful recovery is movement - both exercise and walking. When lying on your side to sleep, you may use a pillow between your knees for comfort, if necessary.   • You must have advanced to a cane, be off narcotics, and feel comfortable and safe before driving. You should consult with your physician at your first post op visit before driving, drive only if you feel safe.     Average recovery time   • 2-4 Weeks - Acute pain resolves; you may experience soreness, stiffness, swelling throughout the rehab period but it will begin to diminish.   • 3 Months - Most activities of daily living are performed comfortably. Soreness, stiffness, swelling begin to resolve.   • Up to 2 Years - Full recovery - On the road to optimum recovery!     When to call you doctor   • Please contact your orthopaedic surgeon’s office if you notice any swelling, redness, large amount of drainage from the surgical site, develop a fever greater than 101?F, or excessive pain not relieved by your pain medication. Please contact us at [phone] or [phone].

## 2020-08-07 NOTE — DISCHARGE SUMMARY
[facility] Medicine Service -  Inpatient Discharge Summary        BRIEF OVERVIEW   Admitting Provider: Dennise Caraballo MD  Attending Provider: Kraig Jones MD Attending phys phone: [phone]    PCP: Leeanne Farah [phone]    Admission Date: 8/5/2020  Discharge Date: 8/7/2020     DISCHARGE DIAGNOSES      Primary Discharge Diagnosis  Displaced fracture of right femoral neck (CMS/HCC)    Secondary Discharge Diagnoses  Active Hospital Problems    Diagnosis Date Noted   • Malnutrition (CMS/HCC) 08/06/2020   • Celiac disease 08/06/2020   • Chronic pain 08/06/2020   • Displaced fracture of right femoral neck (CMS/HCC) 08/05/2020   • Acute hypokalemia 08/05/2020   • Pyoderma gangrenosum 08/05/2020   • Ulcerative colitis (CMS/[facility]) 08/05/2020      Resolved Hospital Problems   No resolved problems to display.       Problem List on Day of Discharge  See hospital course    SUMMARY OF HOSPITALIZATION      Presenting Problem/History of Present Illness  Displaced fracture of right femoral neck (CMS/HCC)    This is a 65 y.o. year-old female admitted on 8/5/2020 with Hypokalemia [E87.6]  Displaced fracture of right femoral neck (CMS/HCC) [S72.001A].    Per admission H&P: Mary Moreno is a 65 y.o. female with a past medical history of celiac disease, ulcerative colitis, osteopenia and pyoderma gangrenosum who presents with right hip pain.  She sustained injury 8 days ago after falling from a stool in her kitchen. She had pain immediately after and it has persisted since the fall. She has been able to ambulate with the assistance of a walker.  Patient was seen as an outpatient and X-ray was recommended.  She was referred to the ER for further evaluation.       ER workup included labs notable for hypokalemia and R hip xray showing displaced femoral neck fracture.  Ortho was consulted and evaluated patient during her ER course.  COVID screen was negative.    Hospital Course    Patient was admitted to the  MedSur unit at [facility] on 8/5/2020 after suffering a fall with an injury to her right hip.  X-ray showed a displaced femoral neck fracture.  She was taken to the operating room with Dr. Estrella on 8/6/2024 a total hip arthroplasty.  She tolerated the procedure well.  Postoperatively she was placed on aspirin 81 mg twice daily by the orthopedics team.  She had evaluations with physical therapy and Occupational Therapy.    Patient was deemed to be in stable, good condition for discharge to home.  We recommend that she follow-up with her primary care provider within 1 week of discharge and with her orthopedist in 2 weeks.    Exam on Day of Discharge  General: Alert and oriented ×3, not in acute distress.  HEENT: Normocephalic atraumatic.    Cardiovascular: S1, S2 without S3 or S4.  There is no murmurs, rubs, or gallops.  Pulses are 2+.  Neck: No jugular venous distention, no carotid bruits, no thyromegaly.  Pulmonary: Clear to auscultation bilaterally.  There is no wheezing, rhonchi, or crackles.  Abdomen: Soft, bowel sounds are present, nontender, nondistended.  No rebound or guarding.  Extremities: No edema, cyanosis or lymphadenopathy.  Neurologic examination: Alert and oriented x4.  Skin: Skin is warm, dry, well-perfused.  Musculoskeletal: Dressing CDI  Psychiatric: Appropriate mood and affect.  No hallucinations, delusions, or suicidal thoughts.    Consults During Admission  IP CONSULT TO WOUND OSTOMY CONTINENCE  IP CONSULT TO ORTHOPEDIC SURGERY  IP CONSULT TO CASE MANAGEMENT    DISCHARGE MEDICATIONS        Medication List      START taking these medications    aspirin 81 mg enteric coated tablet  Take 1 tablet (81 mg total) by mouth 2 (two) times a day.  Dose:  81 mg     cyanocobalamin 500 mcg tablet  Commonly known as:  vitamin B-12  Take 2 tablets (1,000 mcg total) by mouth daily.  Dose:  1,000 mcg     docusate sodium 100 mg capsule  Commonly known as:  COLACE  Take 1 capsule (100 mg total) by  mouth 2 (two) times a day.  Dose:  100 mg     folic acid 1 mg tablet  Commonly known as:  FOLVITE  Take 1 tablet (1 mg total) by mouth daily.  Dose:  1 mg        CHANGE how you take these medications    * oxyCODONE 10 mg 12 hr abuse-deterrent tablet  Commonly known as:  OxyCONTIN  Take 10 mg by mouth 2 times daily.  Dose:  10 mg  What changed:  Another medication with the same name was added. Make sure you understand how and when to take each.     * oxyCODONE 5 mg immediate release tablet  Commonly known as:  ROXICODONE  Take 1 tablet (5 mg total) by mouth every 6 (six) hours as needed for moderate pain for up to 5 days.  Dose:  5 mg  What changed:  You were already taking a medication with the same name, and this prescription was added. Make sure you understand how and when to take each.         * This list has 2 medication(s) that are the same as other medications prescribed for you. Read the directions carefully, and ask your doctor or other care provider to review them with you.            CONTINUE taking these medications    adalimumab 40 mg/0.8 mL syringe kit  Commonly known as:  HUMIRA  Inject 1 pen under the skin. Every 2 weeks on Thursdays  Dose:  1 pen     BONIVA 150 mg tablet  Take 150 mg by mouth every 30 (thirty) days. Take in AM with glass of water prior to food, don't lie down for 30 minutes.  Dose:  150 mg  Generic drug:  ibandronate     desloratadine 2.5 mg disintegrating tablet  Commonly known as:  CLARINEX REDITAB  Take 2.5 mg by mouth daily.  Dose:  2.5 mg     docosahexaenoic acid-epa 120-180 mg capsule  Take 1 capsule by mouth daily.  Dose:  1 capsule     DULoxetine 30 mg capsule  Commonly known as:  CYMBALTA  Take 30 mg by mouth daily.  Dose:  30 mg     gabapentin 100 mg capsule  Commonly known as:  NEURONTIN  Take 100 mg by mouth 3 (three) times a day as needed (Neuro pain).  Dose:  100 mg     HYDROmorphone 4 mg tablet  Commonly known as:  DILAUDID  Take 4 mg by mouth every 6 (six) hours as  needed.  Dose:  4 mg            Instructions for after discharge     Call provider for:  difficulty breathing, headache or visual disturbances      Call provider for:  extreme fatigue      Call provider for:  persistent dizziness or light-headedness      Call provider for:  persistent nausea or vomiting      Call provider for:  redness, tenderness, or signs of infection (pain, swelling, redness, odor or green/yellow discharge around incision site)      Call provider for:  severe uncontrolled pain      Call provider for:  temperature >100.4      Discharge diet      Diet Type / Texture:  Regular    Follow-up with Provider:      Call for appointment    Terrell Estrella MD   [phone] [id number]  [facility] [place] [address]       Follow-up with primary physician (PCP)      Call for appointment             PROCEDURES / LABS / IMAGING      Operative Procedures  Total hip arthroplasty with Dr. Estrella on 8/6/2020    Other Procedures  none    Pertinent Labs  Results from last 7 days   Lab Units 08/07/20  0501 08/06/20  0224 08/05/20  1856   WBC K/uL 10.11 6.52 7.12   HEMOGLOBIN g/dL 12.1 12.2 13.0   HEMATOCRIT % 37.3 37.5 39.5   PLATELETS K/uL 407* 384* 398*     Results from last 7 days   Lab Units 08/07/20  0501 08/06/20  0224 08/05/20  1856   SODIUM mEQ/L 136 136 135*   POTASSIUM mEQ/L 3.7 3.4* 2.9*   CHLORIDE mEQ/L 107 104 99   CO2 mEQ/L 23 25 25   BUN mg/dL 7* 11 11   CREATININE mg/dL 0.5* 0.4* 0.5*   GLUCOSE mg/dL 126* 105* 93   CALCIUM mg/dL 8.0* 8.4* 9.0         Pertinent Imaging  X-ray Chest 1 View    Result Date: 8/5/2020  IMPRESSION: No acute cardiopulmonary disease.     X-ray Femur Right 2+ View    Result Date: 8/5/2020  IMPRESSION: Proximal right femoral fracture.     X-ray Hip With Or Without Pelvis 1 Vw Left    Result Date: 8/6/2020  IMPRESSION: Satisfactory postoperative appearance status post right total hip replacement.     X-ray Hip With Or Without Pelvis 2-3 Vw Right    Result Date:  8/5/2020  IMPRESSION: Right femoral neck fracture.       OUTPATIENT  FOLLOW-UP / REFERRALS / PENDING TESTS        Outpatient Follow-Up Appointments  Encounter Information     You do not currently have any appointments scheduled.          Referrals  No orders of the defined types were placed in this encounter.      Test Results Pending at Discharge  Unresulted Labs (From admission, onward)     Start     Ordered    08/07/20 0600  Basic metabolic panel  Daily     Start Status   08/08/20 0600 Scheduled   08/09/20 0600 Scheduled       08/06/20 2016 08/07/20 0600  CBC  Daily     Start Status   08/08/20 0600 Scheduled   08/09/20 0600 Scheduled       08/06/20 2016 08/07/20 0600  CBC  Daily     Start Status     08/07/20 0600 Collected (08/07/20 0501) Order ID: [id number]   08/08/20 0600 Scheduled    08/09/20 0600 Scheduled    08/10/20 0600 Scheduled        08/06/20 1550    08/07/20 0600  Magnesium  Daily     Start Status   08/08/20 0600 Scheduled   08/09/20 0600 Scheduled   08/10/20 0600 Scheduled       08/06/20 1550    08/07/20 0600  Basic metabolic panel  Daily     Start Status   08/08/20 0600 Scheduled   08/09/20 0600 Scheduled   08/10/20 0600 Scheduled       08/06/20 1550    08/06/20 0652  Prepare RBC- Infuse within 4hrs of issue time: 2 Units Transfusion indications: Pre-OP major surgery with bleeding risk  (Adult Blood Administration - Red Blood Cells)  Blood - Once     Question Answer Comment   Transfusion indications Pre-OP major surgery with bleeding risk    Has consent been obtained? No    Consent Not Obtained Other (Specify)    Explanatory Comment: to be obtained by Attending    Are you aware of this patient having previously identified antibodies? NO    Does this Patient have Sickle Cell Disease/Sickle Cell Anemia? NO        08/06/20 0657                Important Issues to Address in Follow-Up  Please follow-up with your primary care provider within 1 week of discharge.  Call for appointment.    Please  follow-up with Dr. Estrella, your orthopedic surgeon, in 2 weeks.  Call for appointment.    Your treated in the hospital for a hip fracture.    You are weightbearing as tolerated on your right leg.    To prevent blood clots, would the orthopedics team started you on aspirin 81 mg twice daily.  Please follow-up with orthopedics regarding how long to be on this medication.    DISCHARGE DISPOSITION      Disposition: Home     Code Status At Discharge: Full Code    Physician Order for Life-Sustaining Treatment Document Status      No documents found

## 2020-08-07 NOTE — PLAN OF CARE
Problem: Adult Inpatient Plan of Care  Goal: Plan of Care Review  Flowsheets (Taken 8/6/2020 6673)  Outcome Summary: SW attempted to contact pt via telephone to complete assessment, was informed by nuring that pt was sleeping soundly. Attempted later and informed pt on her way to OR for hip surgery. SW to reattempt assessment in AM.     
  Problem: Adult Inpatient Plan of Care  Goal: Plan of Care Review  Outcome: Progressing  Flowsheets (Taken 8/6/2020 0404)  Progress: no change  Outcome Summary: Pt Stable. K rider finished. Meds given per order. Labs drawn per order. Updated  on K+ repeat. NPO @ 0000. Voiding w/o issues in bedpan. Pain controlled with dilauted.     Problem: Adult Inpatient Plan of Care  Goal: Patient-Specific Goal (Individualization)  Outcome: Progressing  Flowsheets (Taken 8/6/2020 0404)  Patient-Specific Goals (Include Timeframe): Get hip fixed  Individualized Care Needs: Gluten Free when not NPO  Anxieties, Fears or Concerns: None at this time     
  Problem: Adult Inpatient Plan of Care  Goal: Plan of Care Review  Outcome: Progressing  Flowsheets (Taken 8/7/2020 0908)  Plan of Care Reviewed With: patient  Outcome Summary: Supervision overall mobility level, limited by R hip pain but should progress quickly for home D/C with family assistance as needed     Problem: Functional Ability Impaired (Hip Fracture)  Goal: Optimal Functional Performance  Outcome: Progressing     Problem: Pain (Hip Fracture)  Goal: Acceptable Pain Level  Outcome: Progressing     
  Problem: Adult Inpatient Plan of Care  Goal: Plan of Care Review  Outcome: Progressing  Flowsheets (Taken 8/7/2020 0952)  Plan of Care Reviewed With: patient  Outcome Summary: Pt seen for OT eval. Pt mod I for bed mobility, supervision for fxl transfers/mobility with RW, supervision toilet transfer, Mod A for LBD. Pt appears to be limited by pain and dec endurance/activity tolerance. Will continue to follow for OT to address POC. Rec home with assist upon d/c.  Goal: Patient-Specific Goal (Individualization)  Outcome: Progressing  Flowsheets (Taken 8/7/2020 0952)  Patient-Specific Goals (Include Timeframe): to go home     Problem: Functional Ability Impaired (Hip Fracture)  Goal: Optimal Functional Performance  Outcome: Progressing     
  Problem: Adult Inpatient Plan of Care  Goal: Plan of Care Review  Outcome: Progressing  Flowsheets (Taken 8/7/2020 4142)  Progress: improving  Plan of Care Reviewed With: patient  Outcome Summary: patient pain controlled. nunez removed. awaiting void.     
  Problem: Adult Inpatient Plan of Care  Goal: Readiness for Transition of Care  Intervention: Mutually Develop Transition Plan  Flowsheets (Taken 8/7/2020 0938)  Anticipated Discharge Disposition: skilled nursing facility;home with home health services  Discharge Coordination/Progress: SW placed call to pt this AM, introduced self and role of CC MSW. PCP is Dr Farah, pharmacy is [facility] in [place]. Pt reports she lives with her spouse in a 2 story home with bed/bath on 2nd floor. Pt was independent prior to her fall about a week ago, at which time she was still able to ambulate and care for self, but with some difficulty. Pt reports she did very well in PT and is hopeful she will be able to return home upon dc. She is receptive to homecare services with [facility] if indicated. Discussed SNF if needed. Pt would be receptive to TCC. SW to follow PT progress to assist with d/c planning as needed.  Assistive Device/Animal Currently Used at Home: none  Transportation Concerns: car, none  Readmission Within the Last 30 Days: no previous admission in last 30 days  Patient/Family Anticipated Services at Transition: skilled nursing  Patient/Family Anticipates Transition to: home with family;inpatient rehabilitation facility  Transportation Anticipated: family or friend will provide  Concerns to be Addressed: discharge planning     
Recommendations:  1. Diet as able - gluten free, no beef, no pork.    2.  Weekly weights  3. Recommend daily MVI supplement  4. Will add high protein snacks between meals  5. Check B12 and folate as .3    Pt qualifies for severe malnutrition in context of chronic illness as evidenced by severe fat and muscle mass depletion noted in the severe muscle and fat wasting noted in buccinator, temporalis, maseter, scapular, tricep, bicep.  
normal...

## 2020-08-07 NOTE — PROGRESS NOTES
Patient: Mary Moreno  Location: [address]  MRN: [id number]  Today's date: 8/7/2020     Left patient supine in recliner, call bell within reach, vss/NAD, SCD's applied, RN aware      Mary is a 65 y.o. female admitted on 8/5/2020 with Displaced fracture of right femoral neck (CMS/HCC). Principal problem is Displaced fracture of right femoral neck (CMS/HCC).    Past Medical History  Mary has a past medical history of Celiac disease, Osteopenia, Pyoderma gangrenosum, and Ulcerative colitis (CMS/HCC).    History of Present Illness   Admitted after fall resulting in R femoral neck fx s/p LISA 8/6/20      PT Vitals    Date/Time Pulse HR Source SpO2 Pt Activity O2 Therapy BP BP Location BP Method Pt Position [facility]   08/07/20 0851 100 Monitor 100 % At rest None (Room air) 114/75 Right upper arm Automatic Lying KEYLA   08/07/20 0852 100 -- 100 % At rest None (Room air) 114/75 -- -- -- CK      PT Pain    Date/Time Pain Type Pref Pain Scale Side Location Rating: Rest Rating: Activity Interventions [facility]   08/07/20 0851 Pain Assessment number (Numeric Rating Pain Scale) Right hip 3 5 position adjusted KEYLA   08/07/20 0852 Pain Assessment number (Numeric Rating Pain Scale) Right hip 3 5 position adjusted CK          Prior Living Environment      Most Recent Value   Lives With  spouse   Living Arrangements  house   Living Environment Comment  2SH with WILI, railings,  also 1STE without railing          Prior Level of Function      Most Recent Value   Ambulation  independent   Transferring  independent   Prior Level of Function Comment  Prior to recent fall/hip fx.   Assistive Device/Animal Currently Used at Home  none          PT Evaluation and Treatment - 08/07/20 0852        Time Calculation    Start Time  0817     Stop Time  0852     Time Calculation (min)  35 min        Session Details    Document Type  initial evaluation     Mode of Treatment  physical therapy        General Information    Patient  Profile Reviewed?  yes     Onset of Illness/Injury or Date of Surgery  08/05/20     Referring Physician  Robert     General Observations of Patient  Presented supine in bed     Existing Precautions/Restrictions  fall        Cognition/Psychosocial    Affect/Mental Status (Cognitive)  WFL     Orientation Status (Cognition)  oriented x 4     Follows Commands (Cognition)  WFL     Cognitive Function (Cognitive)  WFL        Range of Motion (ROM)    Range of Motion  ROM is WFL        Range of Motion Comprehensive    Comprehensive Range of Motion  ROM is WFL        Strength (Manual Muscle Testing)    Strength (Manual Muscle Testing)  strength is WFL        Bed Mobility    [place], Supine to Sit  modified independence        Sit to Stand Transfer    [place], Sit to Stand Transfer  supervision;verbal cues     Verbal Cues  hand placement;preparatory posture;proper use of assistive device;safety;technique     Assistive Device  walker, front-wheeled        Stand to Sit Transfer    [place], Stand to Sit Transfer  supervision;verbal cues     Verbal Cues  hand placement;preparatory posture;proper use of assistive device;safety;technique     Assistive Device  walker, front-wheeled        Gait Training    [place], Gait  supervision     Assistive Device  walker, front-wheeled     Distance in Feet  50 feet     Gait Pattern Utilized  step-through     Deviations/Abnormal Patterns (Gait)  antalgic;base of support, wide;alexx decreased;gait speed decreased        AM-PAC (TM) - Mobility (Current Function)    Turning from your back to your side while in a flat bed without using bedrails?  4 - None     Moving from lying on your back to sitting on the side of a flat bed without using bedrails?  4 - None     Moving to and from a bed to a chair?  3 - A Little     Standing up from a chair using your arms?  3 - A Little     To walk in a hospital room?  3 - A Little     Climbing 3-5 steps with a railing?  3 - A Little      AM-PAC (TM) Mobility Score  20        Therapy Assessment/Plan (PT)    Rehab Potential (PT)  good, to achieve stated therapy goals     Therapy Frequency (PT)  3-5 times/wk        Progress Summary (PT)    Daily Outcome Statement (PT)  Supervision overall mobility level, limited by R hip pain but should progress quickly for home D/C with family assistance as needed     Symptoms Noted During/After Treatment  fatigue        Therapy Plan Review/Discharge Plan (PT)    PT Recommended Discharge Disposition  home with assist     Anticipated Equipment Needs at Discharge (PT Eval)  walker, front-wheeled     Therapy Plan Review (PT)  evaluation/treatment results reviewed;care plan/treatment goals reviewed;risks/benefits reviewed;current/potential barriers reviewed;participants voiced agreement with care plan;participants included;patient        Plan of Care Review    Plan of Care Reviewed With  patient                       Education provided this session. See the Patient Education summary report for full details.    PT Goals      Most Recent Value   Transfer Goal 1   Activity/Assistive Device  sit-to-stand/stand-to-sit, car transfer, walker, front-wheeled at 08/07/2020 0852   [place]  modified independence at 08/07/2020 0852   Time Frame  3 - 5 days at 08/07/2020 0852   Gait Training Goal 1   Activity/Assistive Device  gait (walking locomotion), walker, front-wheeled at 08/07/2020 0852   [place]  modified independence at 08/07/2020 0852   Distance  50 at 08/07/2020 0852   Time Frame  3 - 5 days at 08/07/2020 0852   Stairs Goal 1   Activity/Assistive Device  descending stairs, ascending stairs, using handrail, left, using handrail, right, walker, front-wheeled at 08/07/2020 0852   [place]  modified independence at 08/07/2020 0852   Number of Stairs  4 at 08/07/2020 0852   Time Frame  3 - 5 days at 08/07/2020 0852

## 2020-08-07 NOTE — HOSPITAL COURSE
Mary is a 65 y.o. female admitted on 8/5/2020 with Displaced fracture of right femoral neck (CMS/HCC). Principal problem is Displaced fracture of right femoral neck (CMS/HCC).    Past Medical History  Mary has a past medical history of Celiac disease, Osteopenia, Pyoderma gangrenosum, and Ulcerative colitis (CMS/[facility]).    History of Present Illness   Admitted after fall resulting in R femoral neck fx s/p LISA 8/6/20

## 2020-08-07 NOTE — ANESTHESIA POSTPROCEDURE EVALUATION
Patient: Mary Moreno    Procedure Summary     Date:  08/06/20 Room / Location:   OR 4 / [facility] OR    Anesthesia Start:  1520 Anesthesia Stop:  1730    Procedure:  HIP ARTHROPLASTY TOTAL (Right Hip) Diagnosis:       Displaced fracture of right femoral neck (CMS/HCC)      (Displaced fracture of right femoral neck (CMS/HCC) [S72.001A])    Surgeon:  Terrell Estrella MD Responsible Provider:  Christopher Echeverria MD    Anesthesia Type:  spinal ASA Status:  2          Anesthesia Type: spinal  PACU Vitals  8/6/2020 1719 - 8/6/2020 1819      8/6/2020  1725 8/6/2020  1730 8/6/2020  1745 8/6/2020  1800    BP:  93/67  (!) 83/57  (!) 87/46  (!) 81/60    Temp:  36.1 °C (97 °F)  --  --  --    Pulse:  83  82  81  79    Resp:  (!) 26  18  18  16    SpO2:  100 %  100 %  (!) 72 %  100 %              8/6/2020 1815             BP:  (!) 84/54       Temp:  --       Pulse:  82       Resp:  12       SpO2:  100 %               Anesthesia Post Evaluation    Pain score: 0  Pain management: adequate  Mode of pain management: IV medication  Patient location during evaluation: PACU  Patient participation: complete - patient participated  Level of consciousness: awake and alert  Cardiovascular status: acceptable  Airway Patency: adequate  Respiratory status: acceptable  Hydration status: acceptable  Anesthetic complications: no

## 2020-08-09 LAB
CROSSMATCH: NORMAL
CROSSMATCH: NORMAL
ISBT CODE: 5100
ISBT CODE: 5100
PRODUCT CODE: NORMAL
PRODUCT CODE: NORMAL
PRODUCT STATUS: NORMAL
PRODUCT STATUS: NORMAL
SPECIMEN EXP DATE BLD: NORMAL
SPECIMEN EXP DATE BLD: NORMAL
UNIT ABO: NORMAL
UNIT ABO: NORMAL
UNIT ID: NORMAL
UNIT ID: NORMAL
UNIT RH: POSITIVE
UNIT RH: POSITIVE

## 2020-08-24 RX ORDER — GABAPENTIN 100 MG/1
CAPSULE ORAL
Qty: 270 | Refills: 3 | Status: ERX

## 2020-09-01 ENCOUNTER — APPOINTMENT (OUTPATIENT)
Dept: URBAN - METROPOLITAN AREA CLINIC 200 | Age: 65
Setting detail: DERMATOLOGY
End: 2020-09-17

## 2020-09-01 DIAGNOSIS — L88 PYODERMA GANGRENOSUM: ICD-10-CM

## 2020-09-01 PROCEDURE — OTHER STELARA INITIATION: OTHER

## 2020-09-01 PROCEDURE — OTHER PRESCRIPTION: OTHER

## 2020-09-01 PROCEDURE — 99212 OFFICE O/P EST SF 10 MIN: CPT

## 2020-09-01 PROCEDURE — OTHER PHOTO-DOCUMENTATION: OTHER

## 2020-09-01 PROCEDURE — OTHER COUNSELING: OTHER

## 2020-09-01 ASSESSMENT — LOCATION SIMPLE DESCRIPTION DERM
LOCATION SIMPLE: RIGHT ANKLE
LOCATION SIMPLE: RIGHT FOOT
LOCATION SIMPLE: RIGHT PRETIBIAL REGION
LOCATION SIMPLE: RIGHT FOOT

## 2020-09-01 ASSESSMENT — LOCATION ZONE DERM
LOCATION ZONE: FEET
LOCATION ZONE: LEG
LOCATION ZONE: FEET

## 2020-09-01 ASSESSMENT — LOCATION DETAILED DESCRIPTION DERM
LOCATION DETAILED: RIGHT ANKLE
LOCATION DETAILED: RIGHT MEDIAL DORSAL FOOT
LOCATION DETAILED: RIGHT DISTAL PRETIBIAL REGION
LOCATION DETAILED: RIGHT DORSAL FOOT

## 2020-09-01 NOTE — PROCEDURE: STELARA INITIATION
Diagnosis (Required): Psoriasis
Is Phototherapy Contraindicated?: No
Stelara Dosing: 45mg SC at week 0 and 4 and then every 12 weeks thereafter
Detail Level: Zone
Stelara Monitoring Guidelines: A yearly test for tuberculosis is required while taking Stelara.
Pregnancy And Lactation Warning Text: This medication is Pregnancy Category B and is considered safe during pregnancy. It is unknown if this medication is excreted in breast milk.

## 2020-09-02 ENCOUNTER — RX ONLY (RX ONLY)
Age: 65
End: 2020-09-02

## 2020-09-02 RX ORDER — BIMATOPROST 0.3 MG/ML
SOLUTION/ DROPS OPHTHALMIC
Qty: 1 | Refills: 6 | Status: ERX | COMMUNITY
Start: 2020-09-02

## 2020-09-11 RX ORDER — USTEKINUMAB 45 MG/.5ML
INJECTION, SOLUTION SUBCUTANEOUS
Qty: 1 | Refills: 0 | Status: ERX | COMMUNITY
Start: 2020-09-11

## 2020-09-21 ENCOUNTER — APPOINTMENT (OUTPATIENT)
Dept: URBAN - METROPOLITAN AREA CLINIC 200 | Age: 65
Setting detail: DERMATOLOGY
End: 2020-10-04

## 2020-09-21 ENCOUNTER — RX ONLY (RX ONLY)
Age: 65
End: 2020-09-21

## 2020-09-21 DIAGNOSIS — L98.8 OTHER SPECIFIED DISORDERS OF THE SKIN AND SUBCUTANEOUS TISSUE: ICD-10-CM

## 2020-09-21 DIAGNOSIS — L03.01 CELLULITIS OF FINGER: ICD-10-CM

## 2020-09-21 DIAGNOSIS — L88 PYODERMA GANGRENOSUM: ICD-10-CM

## 2020-09-21 PROBLEM — L03.011 CELLULITIS OF RIGHT FINGER: Status: ACTIVE | Noted: 2020-09-21

## 2020-09-21 PROCEDURE — 99213 OFFICE O/P EST LOW 20 MIN: CPT

## 2020-09-21 PROCEDURE — OTHER PRESCRIPTION: OTHER

## 2020-09-21 PROCEDURE — OTHER ORDER TESTS: OTHER

## 2020-09-21 PROCEDURE — OTHER BOTOX (U OR CC) ADDITIVE: OTHER

## 2020-09-21 PROCEDURE — OTHER BOTOX (U OR CC): OTHER

## 2020-09-21 PROCEDURE — OTHER COUNSELING: OTHER

## 2020-09-21 PROCEDURE — OTHER PHOTO-DOCUMENTATION: OTHER

## 2020-09-21 PROCEDURE — OTHER STELARA INITIATION: OTHER

## 2020-09-21 RX ORDER — GENTAMICIN SULFATE 1 MG/G
CREAM TOPICAL
Qty: 1 | Refills: 4 | Status: ERX

## 2020-09-21 RX ORDER — USTEKINUMAB 45 MG/.5ML
INJECTION, SOLUTION SUBCUTANEOUS
Qty: 1 | Refills: 0 | Status: CANCELLED
Stop reason: SDUPTHER

## 2020-09-21 ASSESSMENT — LOCATION DETAILED DESCRIPTION DERM
LOCATION DETAILED: RIGHT INFERIOR FOREHEAD
LOCATION DETAILED: LEFT INFERIOR FOREHEAD
LOCATION DETAILED: RIGHT DORSAL FOOT
LOCATION DETAILED: RIGHT DISTAL PRETIBIAL REGION
LOCATION DETAILED: RIGHT INFERIOR TEMPLE
LOCATION DETAILED: RIGHT SUPERIOR CENTRAL MALAR CHEEK
LOCATION DETAILED: RIGHT ANKLE
LOCATION DETAILED: LEFT SUPERIOR LATERAL MALAR CHEEK
LOCATION DETAILED: RIGHT MEDIAL DORSAL FOOT
LOCATION DETAILED: PERIUNGUAL SKIN RIGHT THUMB
LOCATION DETAILED: LEFT INFERIOR TEMPLE

## 2020-09-21 ASSESSMENT — LOCATION ZONE DERM
LOCATION ZONE: LEG
LOCATION ZONE: FACE
LOCATION ZONE: FINGER
LOCATION ZONE: FEET
LOCATION ZONE: FEET

## 2020-09-21 ASSESSMENT — LOCATION SIMPLE DESCRIPTION DERM
LOCATION SIMPLE: RIGHT FOREHEAD
LOCATION SIMPLE: RIGHT CHEEK
LOCATION SIMPLE: LEFT CHEEK
LOCATION SIMPLE: RIGHT PRETIBIAL REGION
LOCATION SIMPLE: RIGHT FOOT
LOCATION SIMPLE: LEFT TEMPLE
LOCATION SIMPLE: RIGHT FOOT
LOCATION SIMPLE: RIGHT TEMPLE
LOCATION SIMPLE: RIGHT ANKLE
LOCATION SIMPLE: RIGHT THUMB
LOCATION SIMPLE: LEFT FOREHEAD

## 2020-09-21 NOTE — PROCEDURE: STELARA INITIATION
Detail Level: Zone
Is Cyclosporine Contraindicated?: No
Pregnancy And Lactation Warning Text: This medication is Pregnancy Category B and is considered safe during pregnancy. It is unknown if this medication is excreted in breast milk.
Stelara Dosing: 45mg SC at week 0 and 4 and then every 12 weeks thereafter
Stelara Monitoring Guidelines: A yearly test for tuberculosis is required while taking Stelara.
Diagnosis (Required): Psoriasis

## 2020-10-13 ENCOUNTER — RX ONLY (RX ONLY)
Age: 65
End: 2020-10-13

## 2020-10-13 ENCOUNTER — APPOINTMENT (OUTPATIENT)
Dept: URBAN - METROPOLITAN AREA CLINIC 200 | Age: 65
Setting detail: DERMATOLOGY
End: 2020-11-01

## 2020-10-13 DIAGNOSIS — B07.8 OTHER VIRAL WARTS: ICD-10-CM

## 2020-10-13 DIAGNOSIS — L88 PYODERMA GANGRENOSUM: ICD-10-CM

## 2020-10-13 PROCEDURE — OTHER COUNSELING: OTHER

## 2020-10-13 PROCEDURE — 99212 OFFICE O/P EST SF 10 MIN: CPT | Mod: 25

## 2020-10-13 PROCEDURE — 17110 DESTRUCT B9 LESION 1-14: CPT

## 2020-10-13 PROCEDURE — OTHER ORDER TESTS: OTHER

## 2020-10-13 PROCEDURE — OTHER LIQUID NITROGEN: OTHER

## 2020-10-13 PROCEDURE — OTHER MIPS QUALITY: OTHER

## 2020-10-13 RX ORDER — TRIAMCINOLONE ACETONIDE 1 MG/G
CREAM TOPICAL
Qty: 1 | Refills: 3 | Status: ERX

## 2020-10-13 ASSESSMENT — LOCATION ZONE DERM
LOCATION ZONE: FEET
LOCATION ZONE: ARM

## 2020-10-13 ASSESSMENT — LOCATION SIMPLE DESCRIPTION DERM
LOCATION SIMPLE: RIGHT ELBOW
LOCATION SIMPLE: RIGHT FOOT

## 2020-10-13 ASSESSMENT — LOCATION DETAILED DESCRIPTION DERM
LOCATION DETAILED: RIGHT DORSAL FOOT
LOCATION DETAILED: RIGHT ELBOW

## 2020-10-13 NOTE — PROCEDURE: LIQUID NITROGEN
Post-Care Instructions: I reviewed with the patient in detail post-care instructions. Patient is to wear sunprotection, and avoid picking at any of the treated lesions. Pt may apply Vaseline to crusted or scabbing areas.
Medical Necessity Clause: This procedure was medically necessary because the lesions that were treated were: causing pain
Medical Necessity Information: It is in your best interest to select a reason for this procedure from the list below. All of these items fulfill various CMS LCD requirements except the new and changing color options.
Include Z78.9 (Other Specified Conditions Influencing Health Status) As An Associated Diagnosis?: Yes
Render Post-Care Instructions In Note?: no
Consent: The patient's consent was obtained including but not limited to risks of crusting, scabbing, blistering, scarring, darker or lighter pigmentary change, recurrence, incomplete removal and infection.
Detail Level: Detailed
Number Of Freeze-Thaw Cycles: 2 freeze-thaw cycles

## 2020-10-16 ENCOUNTER — RX ONLY (RX ONLY)
Age: 65
End: 2020-10-16

## 2020-10-16 RX ORDER — CIPROFLOXACIN 500 MG/1
TABLET, FILM COATED ORAL
Qty: 14 | Refills: 0 | Status: ERX | COMMUNITY
Start: 2020-10-16

## 2020-10-20 RX ORDER — USTEKINUMAB 45 MG/.5ML
INJECTION, SOLUTION SUBCUTANEOUS
Qty: 1 | Refills: 3 | Status: ERX

## 2020-10-29 ENCOUNTER — APPOINTMENT (OUTPATIENT)
Dept: URBAN - METROPOLITAN AREA CLINIC 200 | Age: 65
Setting detail: DERMATOLOGY
End: 2020-11-01

## 2020-10-29 DIAGNOSIS — L88 PYODERMA GANGRENOSUM: ICD-10-CM

## 2020-10-29 PROCEDURE — OTHER PHOTO-DOCUMENTATION: OTHER

## 2020-10-29 PROCEDURE — 99212 OFFICE O/P EST SF 10 MIN: CPT

## 2020-10-29 PROCEDURE — OTHER PRESCRIPTION MEDICATION MANAGEMENT: OTHER

## 2020-10-29 ASSESSMENT — LOCATION DETAILED DESCRIPTION DERM: LOCATION DETAILED: RIGHT DORSAL FOOT

## 2020-10-29 ASSESSMENT — LOCATION ZONE DERM: LOCATION ZONE: FEET

## 2020-10-29 ASSESSMENT — LOCATION SIMPLE DESCRIPTION DERM: LOCATION SIMPLE: RIGHT FOOT

## 2020-12-02 ENCOUNTER — APPOINTMENT (OUTPATIENT)
Dept: URBAN - METROPOLITAN AREA CLINIC 200 | Age: 65
Setting detail: DERMATOLOGY
End: 2020-12-15

## 2020-12-02 DIAGNOSIS — B07.8 OTHER VIRAL WARTS: ICD-10-CM

## 2020-12-02 DIAGNOSIS — L57.8 OTHER SKIN CHANGES DUE TO CHRONIC EXPOSURE TO NONIONIZING RADIATION: ICD-10-CM

## 2020-12-02 DIAGNOSIS — L98.8 OTHER SPECIFIED DISORDERS OF THE SKIN AND SUBCUTANEOUS TISSUE: ICD-10-CM

## 2020-12-02 DIAGNOSIS — L88 PYODERMA GANGRENOSUM: ICD-10-CM

## 2020-12-02 PROBLEM — Z85.828 PERSONAL HISTORY OF OTHER MALIGNANT NEOPLASM OF SKIN: Status: ACTIVE | Noted: 2020-12-02

## 2020-12-02 PROCEDURE — 17110 DESTRUCT B9 LESION 1-14: CPT

## 2020-12-02 PROCEDURE — 99213 OFFICE O/P EST LOW 20 MIN: CPT | Mod: 25

## 2020-12-02 PROCEDURE — OTHER BOTOX (U OR CC) ADDITIVE: OTHER

## 2020-12-02 PROCEDURE — OTHER COUNSELING: OTHER

## 2020-12-02 PROCEDURE — OTHER ORDER TESTS: OTHER

## 2020-12-02 PROCEDURE — OTHER MIPS QUALITY: OTHER

## 2020-12-02 PROCEDURE — OTHER BOTOX (U OR CC): OTHER

## 2020-12-02 PROCEDURE — OTHER LIQUID NITROGEN: OTHER

## 2020-12-02 ASSESSMENT — LOCATION ZONE DERM
LOCATION ZONE: ARM
LOCATION ZONE: TRUNK
LOCATION ZONE: LEG
LOCATION ZONE: FACE
LOCATION ZONE: FACE

## 2020-12-02 ASSESSMENT — LOCATION SIMPLE DESCRIPTION DERM
LOCATION SIMPLE: GLABELLA
LOCATION SIMPLE: RIGHT ANKLE
LOCATION SIMPLE: GLABELLA
LOCATION SIMPLE: RIGHT POSTERIOR THIGH
LOCATION SIMPLE: CHEST
LOCATION SIMPLE: RIGHT FOREHEAD
LOCATION SIMPLE: LEFT FOREHEAD
LOCATION SIMPLE: RIGHT POSTERIOR UPPER ARM

## 2020-12-02 ASSESSMENT — LOCATION DETAILED DESCRIPTION DERM
LOCATION DETAILED: GLABELLA
LOCATION DETAILED: RIGHT INFERIOR MEDIAL FOREHEAD
LOCATION DETAILED: LEFT INFERIOR FOREHEAD
LOCATION DETAILED: GLABELLA
LOCATION DETAILED: RIGHT ANKLE
LOCATION DETAILED: LEFT MEDIAL SUPERIOR CHEST
LOCATION DETAILED: RIGHT DISTAL POSTERIOR UPPER ARM
LOCATION DETAILED: RIGHT DISTAL LATERAL POSTERIOR THIGH

## 2020-12-02 NOTE — PROCEDURE: LIQUID NITROGEN
Medical Necessity Information: It is in your best interest to select a reason for this procedure from the list below. All of these items fulfill various CMS LCD requirements except the new and changing color options.
Render Note In Bullet Format When Appropriate: No
Number Of Freeze-Thaw Cycles: 2 freeze-thaw cycles
Post-Care Instructions: I reviewed with the patient in detail post-care instructions. Patient is to wear sunprotection, and avoid picking at any of the treated lesions. Pt may apply Vaseline to crusted or scabbing areas.
Medical Necessity Clause: This procedure was medically necessary because the lesions that were treated were: causing pain
Consent: The patient's consent was obtained including but not limited to risks of crusting, scabbing, blistering, scarring, darker or lighter pigmentary change, recurrence, incomplete removal and infection.
Include Z78.9 (Other Specified Conditions Influencing Health Status) As An Associated Diagnosis?: Yes
Detail Level: Detailed

## 2020-12-03 ENCOUNTER — TRANSCRIBE ORDERS (OUTPATIENT)
Dept: LAB | Facility: CLINIC | Age: 65
End: 2020-12-03

## 2020-12-03 ENCOUNTER — APPOINTMENT (OUTPATIENT)
Dept: LAB | Facility: CLINIC | Age: 65
End: 2020-12-03
Attending: SPECIALIST
Payer: MEDICARE

## 2020-12-03 DIAGNOSIS — L88 PYODERMA GANGRENOSUM: ICD-10-CM

## 2020-12-03 DIAGNOSIS — L88 PYODERMA GANGRENOSUM: Primary | ICD-10-CM

## 2020-12-03 PROCEDURE — 86480 TB TEST CELL IMMUN MEASURE: CPT

## 2020-12-03 PROCEDURE — 36415 COLL VENOUS BLD VENIPUNCTURE: CPT

## 2020-12-07 LAB
M TB IFN-G BLD-IMP: NEGATIVE
MITOGEN-NIL: >10 IU/ML
NIL: 0.03 IU/ML
TB AG-NIL: 0.01 IU/ML
TB2 AG - NIL: 0.01 IU/ML

## 2020-12-15 ENCOUNTER — TRANSCRIBE ORDERS (OUTPATIENT)
Dept: LAB | Facility: CLINIC | Age: 65
End: 2020-12-15

## 2020-12-15 ENCOUNTER — APPOINTMENT (OUTPATIENT)
Dept: LAB | Facility: CLINIC | Age: 65
End: 2020-12-15
Attending: INTERNAL MEDICINE
Payer: MEDICARE

## 2020-12-15 DIAGNOSIS — K90.0 CELIAC DISEASE: Primary | ICD-10-CM

## 2020-12-15 DIAGNOSIS — K90.0 CELIAC DISEASE: ICD-10-CM

## 2020-12-15 DIAGNOSIS — K52.9 NONINFECTIVE GASTROENTERITIS AND COLITIS, UNSPECIFIED: ICD-10-CM

## 2020-12-15 LAB
25(OH)D3 SERPL-MCNC: 95 NG/ML (ref 30–100)
ALBUMIN SERPL-MCNC: 3.1 G/DL (ref 3.4–5)
ALP SERPL-CCNC: 178 IU/L (ref 35–126)
ALT SERPL-CCNC: 24 IU/L (ref 11–54)
ANION GAP SERPL CALC-SCNC: 11 MEQ/L (ref 3–15)
AST SERPL-CCNC: 38 IU/L (ref 15–41)
BASOPHILS # BLD: 0.07 K/UL (ref 0.01–0.1)
BASOPHILS NFR BLD: 0.7 %
BILIRUB SERPL-MCNC: 0.8 MG/DL (ref 0.3–1.2)
BUN SERPL-MCNC: 6 MG/DL (ref 8–20)
CALCIUM SERPL-MCNC: 9.9 MG/DL (ref 8.9–10.3)
CHLORIDE SERPL-SCNC: 96 MEQ/L (ref 98–109)
CO2 SERPL-SCNC: 27 MEQ/L (ref 22–32)
CREAT SERPL-MCNC: 0.7 MG/DL (ref 0.6–1.1)
CRP SERPL-MCNC: 7.37 MG/L
DIFFERENTIAL METHOD BLD: ABNORMAL
EOSINOPHIL # BLD: 0.22 K/UL (ref 0.04–0.36)
EOSINOPHIL NFR BLD: 2.2 %
ERYTHROCYTE [DISTWIDTH] IN BLOOD BY AUTOMATED COUNT: 14.7 % (ref 11.7–14.4)
FERRITIN SERPL-MCNC: 65 NG/ML (ref 11–250)
GFR SERPL CREATININE-BSD FRML MDRD: >60 ML/MIN/1.73M*2
GLUCOSE SERPL-MCNC: 92 MG/DL (ref 70–99)
HCT VFR BLDCO AUTO: 41.5 % (ref 35–45)
HGB BLD-MCNC: 13.4 G/DL (ref 11.8–15.7)
IMM GRANULOCYTES # BLD AUTO: 0.03 K/UL (ref 0–0.08)
IMM GRANULOCYTES NFR BLD AUTO: 0.3 %
IRON SATN MFR SERPL: 36 % (ref 15–45)
IRON SERPL-MCNC: 101 UG/DL (ref 35–150)
LYMPHOCYTES # BLD: 1.81 K/UL (ref 1.2–3.5)
LYMPHOCYTES NFR BLD: 17.8 %
MCH RBC QN AUTO: 33.9 PG (ref 28–33.2)
MCHC RBC AUTO-ENTMCNC: 32.3 G/DL (ref 32.2–35.5)
MCV RBC AUTO: 105.1 FL (ref 83–98)
MONOCYTES # BLD: 0.69 K/UL (ref 0.28–0.8)
MONOCYTES NFR BLD: 6.8 %
NEUTROPHILS # BLD: 7.36 K/UL (ref 1.7–7)
NEUTS SEG NFR BLD: 72.2 %
NRBC BLD-RTO: 0 %
PDW BLD AUTO: 9.7 FL (ref 9.4–12.3)
PLATELET # BLD AUTO: 380 K/UL (ref 150–369)
POTASSIUM SERPL-SCNC: 2.8 MEQ/L (ref 3.6–5.1)
PROT SERPL-MCNC: 5.8 G/DL (ref 6–8.2)
RBC # BLD AUTO: 3.95 M/UL (ref 3.93–5.22)
SODIUM SERPL-SCNC: 134 MEQ/L (ref 136–144)
TIBC SERPL-MCNC: 284 UG/DL (ref 270–460)
UIBC SERPL-MCNC: 183 UG/DL (ref 180–360)
VIT B12 SERPL-MCNC: 378 PG/ML (ref 180–914)
WBC # BLD AUTO: 10.18 K/UL (ref 3.8–10.5)

## 2020-12-15 PROCEDURE — 83516 IMMUNOASSAY NONANTIBODY: CPT

## 2020-12-15 PROCEDURE — 82306 VITAMIN D 25 HYDROXY: CPT

## 2020-12-15 PROCEDURE — 83516 IMMUNOASSAY NONANTIBODY: CPT | Mod: 59

## 2020-12-15 PROCEDURE — 85025 COMPLETE CBC W/AUTO DIFF WBC: CPT

## 2020-12-15 PROCEDURE — 86255 FLUORESCENT ANTIBODY SCREEN: CPT

## 2020-12-15 PROCEDURE — 86140 C-REACTIVE PROTEIN: CPT

## 2020-12-15 PROCEDURE — 36415 COLL VENOUS BLD VENIPUNCTURE: CPT

## 2020-12-15 PROCEDURE — 82728 ASSAY OF FERRITIN: CPT

## 2020-12-15 PROCEDURE — 82784 ASSAY IGA/IGD/IGG/IGM EACH: CPT

## 2020-12-15 PROCEDURE — 84597 ASSAY OF VITAMIN K: CPT

## 2020-12-15 PROCEDURE — 83540 ASSAY OF IRON: CPT

## 2020-12-15 PROCEDURE — 82607 VITAMIN B-12: CPT

## 2020-12-15 PROCEDURE — 80053 COMPREHEN METABOLIC PANEL: CPT

## 2020-12-16 ENCOUNTER — APPOINTMENT (OUTPATIENT)
Dept: URBAN - METROPOLITAN AREA CLINIC 200 | Age: 65
Setting detail: DERMATOLOGY
End: 2020-12-28

## 2020-12-16 DIAGNOSIS — L88 PYODERMA GANGRENOSUM: ICD-10-CM

## 2020-12-16 DIAGNOSIS — L08.9 LOCAL INFECTION OF THE SKIN AND SUBCUTANEOUS TISSUE, UNSPECIFIED: ICD-10-CM

## 2020-12-16 LAB — IGA SERPL-MCNC: 406 MG/DL (ref 84.5–499)

## 2020-12-16 PROCEDURE — OTHER COUNSELING: OTHER

## 2020-12-16 PROCEDURE — OTHER ORDER TESTS: OTHER

## 2020-12-16 PROCEDURE — 99213 OFFICE O/P EST LOW 20 MIN: CPT

## 2020-12-16 ASSESSMENT — LOCATION DETAILED DESCRIPTION DERM
LOCATION DETAILED: LEFT ANKLE
LOCATION DETAILED: LEFT DORSAL FOOT
LOCATION DETAILED: RIGHT DORSAL FOOT

## 2020-12-16 ASSESSMENT — LOCATION SIMPLE DESCRIPTION DERM
LOCATION SIMPLE: LEFT ANKLE
LOCATION SIMPLE: LEFT FOOT
LOCATION SIMPLE: RIGHT FOOT

## 2020-12-16 ASSESSMENT — LOCATION ZONE DERM
LOCATION ZONE: FEET
LOCATION ZONE: LEG

## 2020-12-18 LAB
ENDOMYSIUM IGA SER QL: NEGATIVE
GLIADIN PEPTIDE IGA SER IA-ACNC: >142 ELIA U/ML
GLIADIN PEPTIDE IGG SER IA-ACNC: 183 ELIA U/ML
TTG IGA SER IA-ACNC: 54 ELIA U/ML
TTG IGG SER IA-ACNC: 1.1 ELIA U/ML

## 2020-12-22 ENCOUNTER — RX ONLY (RX ONLY)
Age: 65
End: 2020-12-22

## 2020-12-22 RX ORDER — AMOXICILLIN AND CLAVULANATE POTASSIUM 875; 125 MG/1; 1/1
TABLET, FILM COATED ORAL
Qty: 20 | Refills: 0 | Status: ERX | COMMUNITY
Start: 2020-12-22

## 2020-12-23 ENCOUNTER — RX ONLY (RX ONLY)
Age: 65
End: 2020-12-23

## 2020-12-23 ENCOUNTER — APPOINTMENT (OUTPATIENT)
Dept: URBAN - METROPOLITAN AREA CLINIC 200 | Age: 65
Setting detail: DERMATOLOGY
End: 2020-12-31

## 2020-12-23 DIAGNOSIS — L88 PYODERMA GANGRENOSUM: ICD-10-CM

## 2020-12-23 DIAGNOSIS — L08.9 LOCAL INFECTION OF THE SKIN AND SUBCUTANEOUS TISSUE, UNSPECIFIED: ICD-10-CM

## 2020-12-23 PROBLEM — Z85.828 PERSONAL HISTORY OF OTHER MALIGNANT NEOPLASM OF SKIN: Status: ACTIVE | Noted: 2020-12-23

## 2020-12-23 PROCEDURE — OTHER COUNSELING: OTHER

## 2020-12-23 PROCEDURE — 99213 OFFICE O/P EST LOW 20 MIN: CPT

## 2020-12-23 PROCEDURE — OTHER PHOTO-DOCUMENTATION: OTHER

## 2020-12-23 PROCEDURE — OTHER PRESCRIPTION MEDICATION MANAGEMENT: OTHER

## 2020-12-23 RX ORDER — CIPROFLOXACIN 500 MG/1
TABLET, FILM COATED ORAL
Qty: 14 | Refills: 0 | Status: ERX | COMMUNITY
Start: 2020-12-23

## 2020-12-23 ASSESSMENT — LOCATION SIMPLE DESCRIPTION DERM
LOCATION SIMPLE: RIGHT FOOT
LOCATION SIMPLE: LEFT ANKLE

## 2020-12-23 ASSESSMENT — LOCATION ZONE DERM
LOCATION ZONE: LEG
LOCATION ZONE: FEET

## 2020-12-23 ASSESSMENT — LOCATION DETAILED DESCRIPTION DERM
LOCATION DETAILED: LEFT ANKLE
LOCATION DETAILED: RIGHT DORSAL FOOT

## 2021-01-05 ENCOUNTER — RX ONLY (RX ONLY)
Age: 66
End: 2021-01-05

## 2021-01-05 RX ORDER — CIPROFLOXACIN 500 MG/1
TABLET, FILM COATED ORAL
Qty: 14 | Refills: 0 | Status: ERX

## 2021-01-07 ENCOUNTER — APPOINTMENT (OUTPATIENT)
Dept: URBAN - METROPOLITAN AREA CLINIC 200 | Age: 66
Setting detail: DERMATOLOGY
End: 2021-01-07

## 2021-01-07 ENCOUNTER — APPOINTMENT (OUTPATIENT)
Dept: URBAN - METROPOLITAN AREA CLINIC 200 | Age: 66
Setting detail: DERMATOLOGY
End: 2021-01-19

## 2021-01-07 DIAGNOSIS — L88 PYODERMA GANGRENOSUM: ICD-10-CM

## 2021-01-07 PROCEDURE — OTHER OBSERVATION: OTHER

## 2021-01-07 PROCEDURE — 99212 OFFICE O/P EST SF 10 MIN: CPT

## 2021-01-07 PROCEDURE — OTHER COUNSELING: OTHER

## 2021-01-07 PROCEDURE — OTHER PHOTO-DOCUMENTATION: OTHER

## 2021-01-07 PROCEDURE — OTHER OBSERVATION AND MEASURE: OTHER

## 2021-01-07 ASSESSMENT — LOCATION ZONE DERM
LOCATION ZONE: FEET
LOCATION ZONE: LEG

## 2021-01-07 ASSESSMENT — LOCATION DETAILED DESCRIPTION DERM
LOCATION DETAILED: RIGHT DORSAL FOOT
LOCATION DETAILED: LEFT ANKLE
LOCATION DETAILED: RIGHT ANKLE

## 2021-01-07 ASSESSMENT — LOCATION SIMPLE DESCRIPTION DERM
LOCATION SIMPLE: RIGHT FOOT
LOCATION SIMPLE: RIGHT ANKLE
LOCATION SIMPLE: LEFT ANKLE

## 2021-01-13 ENCOUNTER — APPOINTMENT (OUTPATIENT)
Dept: LAB | Facility: CLINIC | Age: 66
End: 2021-01-13
Attending: INTERNAL MEDICINE
Payer: MEDICARE

## 2021-01-13 ENCOUNTER — TRANSCRIBE ORDERS (OUTPATIENT)
Dept: LAB | Facility: CLINIC | Age: 66
End: 2021-01-13

## 2021-01-13 DIAGNOSIS — K90.0 CELIAC DISEASE: Primary | ICD-10-CM

## 2021-01-13 DIAGNOSIS — K90.0 CELIAC DISEASE: ICD-10-CM

## 2021-01-13 LAB — C DIFF STL QL: NEGATIVE

## 2021-01-13 PROCEDURE — 87045 FECES CULTURE AEROBIC BACT: CPT

## 2021-01-13 PROCEDURE — 87324 CLOSTRIDIUM AG IA: CPT

## 2021-01-13 PROCEDURE — 87177 OVA AND PARASITES SMEARS: CPT

## 2021-01-13 PROCEDURE — 82274 ASSAY TEST FOR BLOOD FECAL: CPT

## 2021-01-13 PROCEDURE — 82656 EL-1 FECAL QUAL/SEMIQ: CPT

## 2021-01-13 PROCEDURE — 83993 ASSAY FOR CALPROTECTIN FECAL: CPT

## 2021-01-14 LAB
HEMOCCULT STL QL IA: NEGATIVE
O+P SPEC MICRO: NORMAL

## 2021-01-16 LAB — BACTERIA STL CULT: NORMAL

## 2021-01-21 ENCOUNTER — APPOINTMENT (OUTPATIENT)
Dept: URBAN - METROPOLITAN AREA CLINIC 200 | Age: 66
Setting detail: DERMATOLOGY
End: 2021-02-01

## 2021-01-21 DIAGNOSIS — L88 PYODERMA GANGRENOSUM: ICD-10-CM

## 2021-01-21 LAB — ELASTASE PANC STL-MCNT: 162 MCG/G

## 2021-01-21 PROCEDURE — 99212 OFFICE O/P EST SF 10 MIN: CPT

## 2021-01-21 PROCEDURE — OTHER OBSERVATION: OTHER

## 2021-01-21 PROCEDURE — OTHER PHOTO-DOCUMENTATION: OTHER

## 2021-01-21 PROCEDURE — OTHER OBSERVATION AND MEASURE: OTHER

## 2021-01-21 ASSESSMENT — LOCATION DETAILED DESCRIPTION DERM
LOCATION DETAILED: LEFT ANKLE
LOCATION DETAILED: RIGHT DORSAL FOOT
LOCATION DETAILED: RIGHT ANKLE

## 2021-01-21 ASSESSMENT — LOCATION ZONE DERM
LOCATION ZONE: LEG
LOCATION ZONE: FEET

## 2021-01-21 ASSESSMENT — LOCATION SIMPLE DESCRIPTION DERM
LOCATION SIMPLE: LEFT ANKLE
LOCATION SIMPLE: RIGHT FOOT
LOCATION SIMPLE: RIGHT ANKLE

## 2021-01-22 LAB — CALPROTECTIN STL-MCNT: 79 MCG/G

## 2021-01-25 ENCOUNTER — APPOINTMENT (OUTPATIENT)
Dept: LAB | Facility: CLINIC | Age: 66
End: 2021-01-25
Attending: INTERNAL MEDICINE
Payer: MEDICARE

## 2021-01-25 ENCOUNTER — TRANSCRIBE ORDERS (OUTPATIENT)
Dept: LAB | Facility: CLINIC | Age: 66
End: 2021-01-25

## 2021-01-25 DIAGNOSIS — K90.0 CELIAC DISEASE: Primary | ICD-10-CM

## 2021-01-25 DIAGNOSIS — K90.0 CELIAC DISEASE: ICD-10-CM

## 2021-01-25 LAB
ANION GAP SERPL CALC-SCNC: 9 MEQ/L (ref 3–15)
BUN SERPL-MCNC: 13 MG/DL (ref 8–20)
CALCIUM SERPL-MCNC: 9.1 MG/DL (ref 8.9–10.3)
CHLORIDE SERPL-SCNC: 97 MEQ/L (ref 98–109)
CO2 SERPL-SCNC: 31 MEQ/L (ref 22–32)
CREAT SERPL-MCNC: 0.6 MG/DL (ref 0.6–1.1)
GFR SERPL CREATININE-BSD FRML MDRD: >60 ML/MIN/1.73M*2
GLUCOSE SERPL-MCNC: 80 MG/DL (ref 70–99)
POTASSIUM SERPL-SCNC: 4.6 MEQ/L (ref 3.6–5.1)
SODIUM SERPL-SCNC: 137 MEQ/L (ref 136–144)

## 2021-01-25 PROCEDURE — 36415 COLL VENOUS BLD VENIPUNCTURE: CPT

## 2021-01-25 PROCEDURE — 80048 BASIC METABOLIC PNL TOTAL CA: CPT

## 2021-02-04 ENCOUNTER — APPOINTMENT (OUTPATIENT)
Dept: URBAN - METROPOLITAN AREA CLINIC 200 | Age: 66
Setting detail: DERMATOLOGY
End: 2021-02-18

## 2021-02-04 DIAGNOSIS — L88 PYODERMA GANGRENOSUM: ICD-10-CM

## 2021-02-04 DIAGNOSIS — L98.8 OTHER SPECIFIED DISORDERS OF THE SKIN AND SUBCUTANEOUS TISSUE: ICD-10-CM

## 2021-02-04 PROCEDURE — OTHER BOTOX (U OR CC) ADDITIVE: OTHER

## 2021-02-04 PROCEDURE — OTHER BOTOX (U OR CC): OTHER

## 2021-02-04 PROCEDURE — OTHER OBSERVATION AND MEASURE: OTHER

## 2021-02-04 PROCEDURE — OTHER PRESCRIPTION MEDICATION MANAGEMENT: OTHER

## 2021-02-04 PROCEDURE — OTHER PHOTO-DOCUMENTATION: OTHER

## 2021-02-04 PROCEDURE — 99213 OFFICE O/P EST LOW 20 MIN: CPT

## 2021-02-04 PROCEDURE — OTHER OBSERVATION: OTHER

## 2021-02-04 ASSESSMENT — LOCATION ZONE DERM
LOCATION ZONE: FACE
LOCATION ZONE: LEG
LOCATION ZONE: FEET

## 2021-02-04 ASSESSMENT — LOCATION SIMPLE DESCRIPTION DERM
LOCATION SIMPLE: RIGHT FOOT
LOCATION SIMPLE: LEFT TEMPLE
LOCATION SIMPLE: LEFT PRETIBIAL REGION
LOCATION SIMPLE: LEFT ANKLE
LOCATION SIMPLE: RIGHT TEMPLE
LOCATION SIMPLE: RIGHT ANKLE

## 2021-02-04 ASSESSMENT — LOCATION DETAILED DESCRIPTION DERM
LOCATION DETAILED: LEFT ANKLE
LOCATION DETAILED: LEFT DISTAL PRETIBIAL REGION
LOCATION DETAILED: RIGHT INFERIOR TEMPLE
LOCATION DETAILED: RIGHT ANKLE
LOCATION DETAILED: RIGHT DORSAL FOOT
LOCATION DETAILED: LEFT INFERIOR TEMPLE

## 2021-02-04 NOTE — PROCEDURE: PRESCRIPTION MEDICATION MANAGEMENT
Plan: Entocort has improved diarrhea. Patient has gained a significant amount of weight and wound healing has improved dramatically.  Discussed plan with Dr. Hernandez.  Agree that patient should meet with nutritionist to closely assess diet.  She has checked with pharmacy to assure gluten free meds.  Dr. Hernandez stated entocort could be continued at a lower dose but Stellara dose cannot be increased.  If Lara truly has recalcitrant Celiac disease despite all dietary assessment and modification, she will be referred to [facility] for further evaluation.  CT scan is also pending to rule out GI lymphoma.  Her original one was cancelled and rescheduled Plan: Entocort has improved diarrhea. Patient has gained a significant amount of weight and wound healing has improved dramatically.  Discussed plan with Dr. Hernandez.  Agree that patient should meet with nutritionist to closely assess diet.  She has checked with pharmacy to assure gluten free meds.  Dr. Hernandez stated entocort could be continued at a lower dose but Stellara dose cannot be increased.  If Lara truly has recalcitrant Celiac disease despite all dietary assessment and modification, she will be referred to [facility] for further evaluation.  CT scan is also pending to rule out GI lymphoma.  Her original one was cancelled and rescheduled

## 2021-02-23 ENCOUNTER — TRANSCRIBE ORDERS (OUTPATIENT)
Dept: LAB | Facility: CLINIC | Age: 66
End: 2021-02-23

## 2021-02-23 ENCOUNTER — APPOINTMENT (OUTPATIENT)
Dept: LAB | Facility: CLINIC | Age: 66
End: 2021-02-23
Attending: INTERNAL MEDICINE
Payer: MEDICARE

## 2021-02-23 ENCOUNTER — APPOINTMENT (OUTPATIENT)
Dept: URBAN - METROPOLITAN AREA CLINIC 200 | Age: 66
Setting detail: DERMATOLOGY
End: 2021-02-24

## 2021-02-23 DIAGNOSIS — Z13.29 ENCOUNTER FOR SCREENING FOR OTHER SUSPECTED ENDOCRINE DISORDER: ICD-10-CM

## 2021-02-23 DIAGNOSIS — Z13.828 ENCOUNTER FOR SCREENING FOR OTHER MUSCULOSKELETAL DISORDER: ICD-10-CM

## 2021-02-23 DIAGNOSIS — M81.0 AGE-RELATED OSTEOPOROSIS WITHOUT CURRENT PATHOLOGICAL FRACTURE: Primary | ICD-10-CM

## 2021-02-23 DIAGNOSIS — Z78.9 OTHER SPECIFIED HEALTH STATUS: ICD-10-CM

## 2021-02-23 DIAGNOSIS — L88 PYODERMA GANGRENOSUM: ICD-10-CM

## 2021-02-23 DIAGNOSIS — M81.0 AGE-RELATED OSTEOPOROSIS WITHOUT CURRENT PATHOLOGICAL FRACTURE: ICD-10-CM

## 2021-02-23 DIAGNOSIS — K90.0 CELIAC DISEASE: ICD-10-CM

## 2021-02-23 LAB
ALBUMIN SERPL-MCNC: 3.8 G/DL (ref 3.4–5)
ALP SERPL-CCNC: 107 IU/L (ref 35–126)
ALT SERPL-CCNC: 53 IU/L (ref 11–54)
ANION GAP SERPL CALC-SCNC: 10 MEQ/L (ref 3–15)
ANISOCYTOSIS BLD QL SMEAR: ABNORMAL
AST SERPL-CCNC: 68 IU/L (ref 15–41)
BASOPHILS # BLD: 0.08 K/UL (ref 0.01–0.1)
BASOPHILS NFR BLD: 0.9 %
BILIRUB SERPL-MCNC: 1 MG/DL (ref 0.3–1.2)
BUN SERPL-MCNC: 18 MG/DL (ref 8–20)
CALCIUM SERPL-MCNC: 9 MG/DL (ref 8.9–10.3)
CALCIUM SERPL-MCNC: 9 MG/DL (ref 8.9–10.3)
CHLORIDE SERPL-SCNC: 97 MEQ/L (ref 98–109)
CO2 SERPL-SCNC: 31 MEQ/L (ref 22–32)
CREAT SERPL-MCNC: 0.4 MG/DL (ref 0.6–1.1)
DIFFERENTIAL METHOD BLD: ABNORMAL
EOSINOPHIL # BLD: 0.11 K/UL (ref 0.04–0.36)
EOSINOPHIL NFR BLD: 1.2 %
ERYTHROCYTE [DISTWIDTH] IN BLOOD BY AUTOMATED COUNT: 13.8 % (ref 11.7–14.4)
GFR SERPL CREATININE-BSD FRML MDRD: >60 ML/MIN/1.73M*2
GLUCOSE SERPL-MCNC: 73 MG/DL (ref 70–99)
HCT VFR BLDCO AUTO: 35.4 % (ref 35–45)
HGB BLD-MCNC: 11.2 G/DL (ref 11.8–15.7)
HYPOCHROMIA BLD QL SMEAR: ABNORMAL
IMM GRANULOCYTES # BLD AUTO: 0.05 K/UL (ref 0–0.08)
IMM GRANULOCYTES NFR BLD AUTO: 0.5 %
LYMPHOCYTES # BLD: 1.76 K/UL (ref 1.2–3.5)
LYMPHOCYTES NFR BLD: 18.9 %
MACROCYTES BLD QL SMEAR: ABNORMAL
MCH RBC QN AUTO: 34.9 PG (ref 28–33.2)
MCHC RBC AUTO-ENTMCNC: 31.6 G/DL (ref 32.2–35.5)
MCV RBC AUTO: 110.3 FL (ref 83–98)
MONOCYTES # BLD: 0.72 K/UL (ref 0.28–0.8)
MONOCYTES NFR BLD: 7.7 %
NEUTROPHILS # BLD: 6.59 K/UL (ref 1.7–7)
NEUTS SEG NFR BLD: 70.8 %
NRBC BLD-RTO: 0 %
PDW BLD AUTO: 9.4 FL (ref 9.4–12.3)
PLATELET # BLD AUTO: 380 K/UL (ref 150–369)
PLATELET # BLD EST: ABNORMAL 10*3/UL
PLATELET CLUMP BLD QL SMEAR: PRESENT
POLYCHROMASIA BLD QL SMEAR: ABNORMAL
POTASSIUM SERPL-SCNC: 4.7 MEQ/L (ref 3.6–5.1)
PROT SERPL-MCNC: 6.3 G/DL (ref 6–8.2)
PTH-INTACT SERPL-MCNC: 78.4 PG/ML (ref 12–88)
RBC # BLD AUTO: 3.21 M/UL (ref 3.93–5.22)
SODIUM SERPL-SCNC: 138 MEQ/L (ref 136–144)
STOMATOCYTES BLD QL SMEAR: ABNORMAL
WBC # BLD AUTO: 9.31 K/UL (ref 3.8–10.5)

## 2021-02-23 PROCEDURE — 80053 COMPREHEN METABOLIC PANEL: CPT

## 2021-02-23 PROCEDURE — 83519 RIA NONANTIBODY: CPT

## 2021-02-23 PROCEDURE — OTHER OBSERVATION AND MEASURE: OTHER

## 2021-02-23 PROCEDURE — OTHER COUNSELING: OTHER

## 2021-02-23 PROCEDURE — 83970 ASSAY OF PARATHORMONE: CPT

## 2021-02-23 PROCEDURE — OTHER PRESCRIPTION MEDICATION MANAGEMENT: OTHER

## 2021-02-23 PROCEDURE — 84165 PROTEIN E-PHORESIS SERUM: CPT

## 2021-02-23 PROCEDURE — OTHER OBSERVATION: OTHER

## 2021-02-23 PROCEDURE — 99213 OFFICE O/P EST LOW 20 MIN: CPT

## 2021-02-23 PROCEDURE — 36415 COLL VENOUS BLD VENIPUNCTURE: CPT

## 2021-02-23 PROCEDURE — 85025 COMPLETE CBC W/AUTO DIFF WBC: CPT | Mod: GZ

## 2021-02-23 ASSESSMENT — LOCATION DETAILED DESCRIPTION DERM
LOCATION DETAILED: LEFT LATERAL ABDOMEN
LOCATION DETAILED: RIGHT DISTAL PRETIBIAL REGION
LOCATION DETAILED: LEFT DISTAL PRETIBIAL REGION
LOCATION DETAILED: LEFT ANKLE

## 2021-02-23 ASSESSMENT — LOCATION ZONE DERM
LOCATION ZONE: TRUNK
LOCATION ZONE: LEG

## 2021-02-23 ASSESSMENT — LOCATION SIMPLE DESCRIPTION DERM
LOCATION SIMPLE: RIGHT PRETIBIAL REGION
LOCATION SIMPLE: ABDOMEN
LOCATION SIMPLE: LEFT ANKLE
LOCATION SIMPLE: LEFT PRETIBIAL REGION

## 2021-02-23 NOTE — PROCEDURE: PRESCRIPTION MEDICATION MANAGEMENT
Render In Strict Bullet Format?: No
Continue Regimen: Stelara at Gi dosing q 8 weeks and entocort
Detail Level: Detailed

## 2021-02-24 LAB
ALBUMIN MFR UR ELPH: 65.8 %
ALBUMIN SERPL ELPH-MCNC: 4.15 G/DL (ref 3.2–4.9)
ALBUMIN/GLOB SERPL: 1.9 {RATIO} (ref 1.1–2.1)
ALPHA1 GLOB MFR SERPL ELPH: 2 %
ALPHA1 GLOB SERPL ELPH-MCNC: 0.13 G/DL (ref 0.08–0.23)
ALPHA2 GLOB MFR UR ELPH: 12.2 %
ALPHA2 GLOB SERPL ELPH-MCNC: 0.77 G/DL (ref 0.45–0.92)
B-GLOBULIN SERPL ELPH-MCNC: 0.69 G/DL (ref 0.5–1.03)
BETA1 GLOB MFR UR ELPH: 11 %
GAMMA GLOB MFR UR ELPH: 9 %
GAMMA GLOB SERPL ELPH-MCNC: 0.57 G/DL (ref 0.6–1.6)
PROT PATTERN SERPL ELPH-IMP: NORMAL
PROT SERPL-MCNC: 6.3 G/DL (ref 6–8.2)

## 2021-03-02 LAB — TSH BII SER-ACNC: <1 IU/L

## 2021-03-08 ENCOUNTER — TRANSCRIBE ORDERS (OUTPATIENT)
Dept: LAB | Facility: CLINIC | Age: 66
End: 2021-03-08

## 2021-03-08 ENCOUNTER — APPOINTMENT (OUTPATIENT)
Dept: LAB | Facility: CLINIC | Age: 66
End: 2021-03-08
Attending: PHYSICIAN ASSISTANT
Payer: MEDICARE

## 2021-03-08 DIAGNOSIS — M81.0 AGE-RELATED OSTEOPOROSIS WITHOUT CURRENT PATHOLOGICAL FRACTURE: ICD-10-CM

## 2021-03-08 DIAGNOSIS — M81.0 AGE-RELATED OSTEOPOROSIS WITHOUT CURRENT PATHOLOGICAL FRACTURE: Primary | ICD-10-CM

## 2021-03-08 LAB
ALBUMIN SERPL-MCNC: 3.7 G/DL (ref 3.4–5)
ALP SERPL-CCNC: 157 IU/L (ref 35–126)
ALT SERPL-CCNC: 54 IU/L (ref 11–54)
ANION GAP SERPL CALC-SCNC: 10 MEQ/L (ref 3–15)
AST SERPL-CCNC: 104 IU/L (ref 15–41)
BASOPHILS # BLD: 0.07 K/UL (ref 0.01–0.1)
BASOPHILS NFR BLD: 1 %
BILIRUB SERPL-MCNC: 0.8 MG/DL (ref 0.3–1.2)
BUN SERPL-MCNC: 14 MG/DL (ref 8–20)
CALCIUM SERPL-MCNC: 8.9 MG/DL (ref 8.9–10.3)
CHLORIDE SERPL-SCNC: 103 MEQ/L (ref 98–109)
CO2 SERPL-SCNC: 27 MEQ/L (ref 22–32)
CREAT SERPL-MCNC: 0.6 MG/DL (ref 0.6–1.1)
DIFFERENTIAL METHOD BLD: ABNORMAL
EOSINOPHIL # BLD: 0.2 K/UL (ref 0.04–0.36)
EOSINOPHIL NFR BLD: 3 %
ERYTHROCYTE [DISTWIDTH] IN BLOOD BY AUTOMATED COUNT: 13.5 % (ref 11.7–14.4)
GFR SERPL CREATININE-BSD FRML MDRD: >60 ML/MIN/1.73M*2
GLUCOSE SERPL-MCNC: 117 MG/DL (ref 70–99)
HCT VFR BLDCO AUTO: 34.9 % (ref 35–45)
HGB BLD-MCNC: 11.3 G/DL (ref 11.8–15.7)
HYPOCHROMIA BLD QL SMEAR: ABNORMAL
IMM GRANULOCYTES # BLD AUTO: 0.04 K/UL (ref 0–0.08)
IMM GRANULOCYTES NFR BLD AUTO: 0.6 %
LYMPHOCYTES # BLD: 2.33 K/UL (ref 1.2–3.5)
LYMPHOCYTES NFR BLD: 34.6 %
MACROCYTES BLD QL SMEAR: ABNORMAL
MCH RBC QN AUTO: 35.6 PG (ref 28–33.2)
MCHC RBC AUTO-ENTMCNC: 32.4 G/DL (ref 32.2–35.5)
MCV RBC AUTO: 110.1 FL (ref 83–98)
MONOCYTES # BLD: 0.67 K/UL (ref 0.28–0.8)
MONOCYTES NFR BLD: 9.9 %
NEUTROPHILS # BLD: 3.43 K/UL (ref 1.7–7)
NEUTS SEG NFR BLD: 50.9 %
NRBC BLD-RTO: 0 %
PDW BLD AUTO: 9.2 FL (ref 9.4–12.3)
PLAT MORPH BLD: NORMAL
PLATELET # BLD AUTO: 393 K/UL (ref 150–369)
PLATELET # BLD EST: ABNORMAL 10*3/UL
POLYCHROMASIA BLD QL SMEAR: ABNORMAL
POTASSIUM SERPL-SCNC: 4.5 MEQ/L (ref 3.6–5.1)
PROT SERPL-MCNC: 6.4 G/DL (ref 6–8.2)
RBC # BLD AUTO: 3.17 M/UL (ref 3.93–5.22)
SODIUM SERPL-SCNC: 140 MEQ/L (ref 136–144)
STOMATOCYTES BLD QL SMEAR: ABNORMAL
TARGETS BLD QL SMEAR: ABNORMAL
WBC # BLD AUTO: 6.74 K/UL (ref 3.8–10.5)

## 2021-03-08 PROCEDURE — 80053 COMPREHEN METABOLIC PANEL: CPT

## 2021-03-08 PROCEDURE — 36415 COLL VENOUS BLD VENIPUNCTURE: CPT

## 2021-03-08 PROCEDURE — 85025 COMPLETE CBC W/AUTO DIFF WBC: CPT | Mod: GA

## 2021-03-09 ENCOUNTER — APPOINTMENT (OUTPATIENT)
Dept: URBAN - METROPOLITAN AREA CLINIC 200 | Age: 66
Setting detail: DERMATOLOGY
End: 2021-03-12

## 2021-03-09 DIAGNOSIS — L88 PYODERMA GANGRENOSUM: ICD-10-CM

## 2021-03-09 PROCEDURE — 99212 OFFICE O/P EST SF 10 MIN: CPT

## 2021-03-09 PROCEDURE — OTHER OBSERVATION AND MEASURE: OTHER

## 2021-03-09 PROCEDURE — OTHER OBSERVATION: OTHER

## 2021-03-09 ASSESSMENT — LOCATION ZONE DERM
LOCATION ZONE: LEG
LOCATION ZONE: FEET

## 2021-03-09 ASSESSMENT — LOCATION DETAILED DESCRIPTION DERM
LOCATION DETAILED: RIGHT DISTAL PRETIBIAL REGION
LOCATION DETAILED: RIGHT LATERAL DORSAL FOOT
LOCATION DETAILED: LEFT ANKLE

## 2021-03-09 ASSESSMENT — LOCATION SIMPLE DESCRIPTION DERM
LOCATION SIMPLE: RIGHT PRETIBIAL REGION
LOCATION SIMPLE: RIGHT FOOT
LOCATION SIMPLE: LEFT ANKLE

## 2021-03-09 NOTE — PROCEDURE: OBSERVATION
Size Of Lesion: 0.5x 0.3 cm
Detail Level: Detailed
Size Of Lesion: 2.5 x 1
Size Of Lesion: 2.0 cm x 3 cm

## 2021-03-30 ENCOUNTER — APPOINTMENT (OUTPATIENT)
Dept: URBAN - METROPOLITAN AREA CLINIC 200 | Age: 66
Setting detail: DERMATOLOGY
End: 2021-04-05

## 2021-03-30 DIAGNOSIS — L88 PYODERMA GANGRENOSUM: ICD-10-CM

## 2021-03-30 DIAGNOSIS — L98.8 OTHER SPECIFIED DISORDERS OF THE SKIN AND SUBCUTANEOUS TISSUE: ICD-10-CM

## 2021-03-30 PROBLEM — Z85.828 PERSONAL HISTORY OF OTHER MALIGNANT NEOPLASM OF SKIN: Status: ACTIVE | Noted: 2021-03-30

## 2021-03-30 PROCEDURE — OTHER BOTOX (U OR CC): OTHER

## 2021-03-30 PROCEDURE — OTHER BOTOX (U OR CC) ADDITIVE: OTHER

## 2021-03-30 PROCEDURE — OTHER OBSERVATION AND MEASURE: OTHER

## 2021-03-30 PROCEDURE — 99212 OFFICE O/P EST SF 10 MIN: CPT

## 2021-03-30 PROCEDURE — OTHER OBSERVATION: OTHER

## 2021-03-30 ASSESSMENT — LOCATION ZONE DERM
LOCATION ZONE: FACE
LOCATION ZONE: FACE
LOCATION ZONE: LEG
LOCATION ZONE: FEET

## 2021-03-30 ASSESSMENT — LOCATION SIMPLE DESCRIPTION DERM
LOCATION SIMPLE: LEFT ANKLE
LOCATION SIMPLE: RIGHT FOREHEAD
LOCATION SIMPLE: LEFT TEMPLE
LOCATION SIMPLE: GLABELLA
LOCATION SIMPLE: RIGHT FOOT
LOCATION SIMPLE: RIGHT PRETIBIAL REGION
LOCATION SIMPLE: GLABELLA
LOCATION SIMPLE: LEFT FOREHEAD
LOCATION SIMPLE: RIGHT TEMPLE

## 2021-03-30 ASSESSMENT — LOCATION DETAILED DESCRIPTION DERM
LOCATION DETAILED: LEFT INFERIOR FOREHEAD
LOCATION DETAILED: GLABELLA
LOCATION DETAILED: RIGHT INFERIOR TEMPLE
LOCATION DETAILED: RIGHT INFERIOR MEDIAL FOREHEAD
LOCATION DETAILED: LEFT INFERIOR TEMPLE
LOCATION DETAILED: GLABELLA
LOCATION DETAILED: RIGHT LATERAL DORSAL FOOT
LOCATION DETAILED: RIGHT DISTAL PRETIBIAL REGION
LOCATION DETAILED: LEFT ANKLE

## 2021-03-30 NOTE — PROCEDURE: OBSERVATION
Size Of Lesion: 2.5 x 1
Detail Level: Detailed
Size Of Lesion: 2.0 cm x 3 cm
Size Of Lesion: 0.5x 0.3 cm

## 2021-04-16 ENCOUNTER — RX ONLY (RX ONLY)
Age: 66
End: 2021-04-16

## 2021-04-16 RX ORDER — CIPROFLOXACIN 500 MG/1
TABLET, FILM COATED ORAL
Qty: 14 | Refills: 0 | Status: ERX | COMMUNITY
Start: 2021-04-16

## 2021-04-23 ENCOUNTER — RX ONLY (RX ONLY)
Age: 66
End: 2021-04-23

## 2021-04-23 RX ORDER — CIPROFLOXACIN 500 MG/1
TABLET, FILM COATED ORAL
Qty: 28 | Refills: 0 | Status: ERX

## 2021-04-28 ENCOUNTER — APPOINTMENT (OUTPATIENT)
Dept: URBAN - METROPOLITAN AREA CLINIC 200 | Age: 66
Setting detail: DERMATOLOGY
End: 2021-05-03

## 2021-04-28 DIAGNOSIS — L88 PYODERMA GANGRENOSUM: ICD-10-CM

## 2021-04-28 PROCEDURE — OTHER COUNSELING: OTHER

## 2021-04-28 PROCEDURE — OTHER PRESCRIPTION MEDICATION MANAGEMENT: OTHER

## 2021-04-28 PROCEDURE — 99213 OFFICE O/P EST LOW 20 MIN: CPT

## 2021-04-28 ASSESSMENT — LOCATION ZONE DERM
LOCATION ZONE: LEG
LOCATION ZONE: FEET
LOCATION ZONE: LEG

## 2021-04-28 ASSESSMENT — SEVERITY ASSESSMENT: SEVERITY: MODERATE TO SEVERE

## 2021-04-28 ASSESSMENT — LOCATION SIMPLE DESCRIPTION DERM
LOCATION SIMPLE: LEFT ANKLE
LOCATION SIMPLE: LEFT FOOT
LOCATION SIMPLE: LEFT ACHILLES SKIN
LOCATION SIMPLE: RIGHT ANKLE

## 2021-04-28 ASSESSMENT — LOCATION DETAILED DESCRIPTION DERM
LOCATION DETAILED: LEFT ANTERIOR MEDIAL MALLEOLUS
LOCATION DETAILED: LEFT ANKLE
LOCATION DETAILED: RIGHT ANKLE
LOCATION DETAILED: LEFT DORSAL FOOT
LOCATION DETAILED: LEFT ACHILLES SKIN

## 2021-05-11 ENCOUNTER — APPOINTMENT (OUTPATIENT)
Dept: URBAN - METROPOLITAN AREA CLINIC 200 | Age: 66
Setting detail: DERMATOLOGY
End: 2021-05-13

## 2021-05-11 DIAGNOSIS — L88 PYODERMA GANGRENOSUM: ICD-10-CM

## 2021-05-11 PROBLEM — Z85.828 PERSONAL HISTORY OF OTHER MALIGNANT NEOPLASM OF SKIN: Status: ACTIVE | Noted: 2021-05-11

## 2021-05-11 PROCEDURE — OTHER OBSERVATION AND MEASURE: OTHER

## 2021-05-11 PROCEDURE — OTHER OBSERVATION: OTHER

## 2021-05-11 PROCEDURE — 99213 OFFICE O/P EST LOW 20 MIN: CPT

## 2021-05-11 PROCEDURE — OTHER PRESCRIPTION MEDICATION MANAGEMENT: OTHER

## 2021-05-11 ASSESSMENT — LOCATION ZONE DERM
LOCATION ZONE: FEET
LOCATION ZONE: FEET
LOCATION ZONE: LEG
LOCATION ZONE: LEG

## 2021-05-11 ASSESSMENT — LOCATION DETAILED DESCRIPTION DERM
LOCATION DETAILED: LEFT ANKLE
LOCATION DETAILED: RIGHT MEDIAL DORSAL FOOT
LOCATION DETAILED: LEFT ACHILLES SKIN
LOCATION DETAILED: LEFT POSTERIOR ANKLE
LOCATION DETAILED: LEFT ANKLE
LOCATION DETAILED: LEFT ANTERIOR MEDIAL MALLEOLUS
LOCATION DETAILED: LEFT DISTAL PRETIBIAL REGION
LOCATION DETAILED: RIGHT DORSAL FOOT
LOCATION DETAILED: LEFT DORSAL FOOT

## 2021-05-11 ASSESSMENT — LOCATION SIMPLE DESCRIPTION DERM
LOCATION SIMPLE: LEFT ANKLE
LOCATION SIMPLE: LEFT PRETIBIAL REGION
LOCATION SIMPLE: RIGHT FOOT
LOCATION SIMPLE: LEFT ANKLE
LOCATION SIMPLE: LEFT ACHILLES SKIN
LOCATION SIMPLE: LEFT FOOT

## 2021-05-11 NOTE — PROCEDURE: PRESCRIPTION MEDICATION MANAGEMENT
Plan: Recommend a nutritionist (Susie Ceja)
Detail Level: Detailed
Render In Strict Bullet Format?: No

## 2021-05-11 NOTE — PROCEDURE: OBSERVATION
Size Of Lesion: 0.5 x 0.5
Detail Level: Detailed
Size Of Lesion: 3 x 2
Size Of Lesion: 4 x 2
Size Of Lesion: 1 x 1
Size Of Lesion: 3 x 1
Size Of Lesion: 4 by 4
none

## 2021-06-03 ENCOUNTER — APPOINTMENT (OUTPATIENT)
Dept: URBAN - METROPOLITAN AREA CLINIC 200 | Age: 66
Setting detail: DERMATOLOGY
End: 2021-06-06

## 2021-06-03 DIAGNOSIS — L88 PYODERMA GANGRENOSUM: ICD-10-CM

## 2021-06-03 PROCEDURE — OTHER PRESCRIPTION: OTHER

## 2021-06-03 PROCEDURE — OTHER OBSERVATION: OTHER

## 2021-06-03 PROCEDURE — OTHER PRESCRIPTION MEDICATION MANAGEMENT: OTHER

## 2021-06-03 PROCEDURE — OTHER OBSERVATION AND MEASURE: OTHER

## 2021-06-03 PROCEDURE — 99213 OFFICE O/P EST LOW 20 MIN: CPT

## 2021-06-03 RX ORDER — USTEKINUMAB 45 MG/.5ML
INJECTION, SOLUTION SUBCUTANEOUS
Qty: 1 | Refills: 3 | Status: ERX

## 2021-06-03 RX ORDER — OSTOMY SUPPLY 1"
EACH MISCELLANEOUS
Qty: 1 | Refills: 0 | Status: CANCELLED
Stop reason: ENTERED-IN-ERROR

## 2021-06-03 RX ORDER — OSTOMY SUPPLY 2 3/4"
EACH MISCELLANEOUS
Qty: 1 | Refills: 3 | Status: ERX | COMMUNITY
Start: 2021-06-03

## 2021-06-03 ASSESSMENT — LOCATION ZONE DERM
LOCATION ZONE: LEG
LOCATION ZONE: FEET
LOCATION ZONE: FEET
LOCATION ZONE: LEG

## 2021-06-03 ASSESSMENT — LOCATION DETAILED DESCRIPTION DERM
LOCATION DETAILED: LEFT POSTERIOR ANKLE
LOCATION DETAILED: LEFT ANKLE
LOCATION DETAILED: LEFT ANTERIOR MEDIAL MALLEOLUS
LOCATION DETAILED: LEFT DISTAL PRETIBIAL REGION
LOCATION DETAILED: RIGHT MEDIAL DORSAL FOOT
LOCATION DETAILED: LEFT ACHILLES SKIN
LOCATION DETAILED: LEFT DORSAL FOOT
LOCATION DETAILED: RIGHT DORSAL FOOT
LOCATION DETAILED: LEFT ANKLE

## 2021-06-03 ASSESSMENT — LOCATION SIMPLE DESCRIPTION DERM
LOCATION SIMPLE: LEFT ANKLE
LOCATION SIMPLE: LEFT ACHILLES SKIN
LOCATION SIMPLE: LEFT PRETIBIAL REGION
LOCATION SIMPLE: RIGHT FOOT
LOCATION SIMPLE: LEFT FOOT
LOCATION SIMPLE: LEFT ANKLE

## 2021-06-23 ENCOUNTER — APPOINTMENT (OUTPATIENT)
Dept: URBAN - METROPOLITAN AREA CLINIC 200 | Age: 66
Setting detail: DERMATOLOGY
End: 2021-06-28

## 2021-06-23 DIAGNOSIS — L88 PYODERMA GANGRENOSUM: ICD-10-CM

## 2021-06-23 DIAGNOSIS — Z11.52 ENCOUNTER FOR SCREENING FOR COVID-19: ICD-10-CM

## 2021-06-23 DIAGNOSIS — L08.9 LOCAL INFECTION OF THE SKIN AND SUBCUTANEOUS TISSUE, UNSPECIFIED: ICD-10-CM

## 2021-06-23 PROCEDURE — OTHER SCREENING FOR COVID-19: OTHER

## 2021-06-23 PROCEDURE — OTHER ORDER TESTS: OTHER

## 2021-06-23 PROCEDURE — OTHER OBSERVATION: OTHER

## 2021-06-23 PROCEDURE — OTHER RECOMMENDATIONS: OTHER

## 2021-06-23 PROCEDURE — 99213 OFFICE O/P EST LOW 20 MIN: CPT

## 2021-06-23 PROCEDURE — OTHER OBSERVATION AND MEASURE: OTHER

## 2021-06-23 PROCEDURE — OTHER PRESCRIPTION MEDICATION MANAGEMENT: OTHER

## 2021-06-23 ASSESSMENT — LOCATION DETAILED DESCRIPTION DERM
LOCATION DETAILED: LEFT ACHILLES SKIN
LOCATION DETAILED: LEFT ANTERIOR MEDIAL MALLEOLUS
LOCATION DETAILED: RIGHT DORSAL FOOT
LOCATION DETAILED: LEFT DORSAL FOOT
LOCATION DETAILED: LEFT POSTERIOR ANKLE
LOCATION DETAILED: LEFT DISTAL PRETIBIAL REGION
LOCATION DETAILED: LEFT ANKLE
LOCATION DETAILED: RIGHT MEDIAL UPPER BACK
LOCATION DETAILED: LEFT ANKLE
LOCATION DETAILED: RIGHT MEDIAL DORSAL FOOT

## 2021-06-23 ASSESSMENT — LOCATION SIMPLE DESCRIPTION DERM
LOCATION SIMPLE: LEFT ANKLE
LOCATION SIMPLE: LEFT FOOT
LOCATION SIMPLE: LEFT PRETIBIAL REGION
LOCATION SIMPLE: RIGHT UPPER BACK
LOCATION SIMPLE: LEFT ACHILLES SKIN
LOCATION SIMPLE: RIGHT FOOT
LOCATION SIMPLE: LEFT ANKLE

## 2021-06-23 ASSESSMENT — LOCATION ZONE DERM
LOCATION ZONE: LEG
LOCATION ZONE: LEG
LOCATION ZONE: TRUNK
LOCATION ZONE: FEET
LOCATION ZONE: FEET

## 2021-06-23 NOTE — PROCEDURE: RECOMMENDATIONS
Detail Level: Simple
Recommendations (Free Text): Dr. Godoy would care.
Render Risk Assessment In Note?: yes

## 2021-06-23 NOTE — PROCEDURE: OBSERVATION
Detail Level: Detailed
Size Of Lesion: 1 x 1
Size Of Lesion: 0.5 x 0.5
Size Of Lesion: 4 by 4
Size Of Lesion: 4 x 2
Size Of Lesion: 3 x 2
Size Of Lesion: 3 x 1

## 2021-06-28 ENCOUNTER — RX ONLY (RX ONLY)
Age: 66
End: 2021-06-28

## 2021-06-28 RX ORDER — AZITHROMYCIN DIHYDRATE 250 MG/1
TABLET, FILM COATED ORAL
Qty: 1 | Refills: 0 | Status: ERX | COMMUNITY
Start: 2021-06-28

## 2021-07-14 ENCOUNTER — APPOINTMENT (OUTPATIENT)
Dept: URBAN - METROPOLITAN AREA CLINIC 200 | Age: 66
Setting detail: DERMATOLOGY
End: 2021-07-14

## 2021-07-14 ENCOUNTER — APPOINTMENT (OUTPATIENT)
Dept: URBAN - METROPOLITAN AREA CLINIC 200 | Age: 66
Setting detail: DERMATOLOGY
End: 2021-07-19

## 2021-07-14 DIAGNOSIS — L88 PYODERMA GANGRENOSUM: ICD-10-CM

## 2021-07-14 DIAGNOSIS — Z11.52 ENCOUNTER FOR SCREENING FOR COVID-19: ICD-10-CM

## 2021-07-14 DIAGNOSIS — L98.8 OTHER SPECIFIED DISORDERS OF THE SKIN AND SUBCUTANEOUS TISSUE: ICD-10-CM

## 2021-07-14 PROCEDURE — OTHER SCREENING FOR COVID-19: OTHER

## 2021-07-14 PROCEDURE — OTHER OBSERVATION AND MEASURE: OTHER

## 2021-07-14 PROCEDURE — 99213 OFFICE O/P EST LOW 20 MIN: CPT

## 2021-07-14 PROCEDURE — OTHER BOTOX (U OR CC): OTHER

## 2021-07-14 PROCEDURE — OTHER BOTOX (U OR CC) ADDITIVE: OTHER

## 2021-07-14 PROCEDURE — OTHER PRESCRIPTION MEDICATION MANAGEMENT: OTHER

## 2021-07-14 PROCEDURE — OTHER OBSERVATION: OTHER

## 2021-07-14 ASSESSMENT — LOCATION ZONE DERM
LOCATION ZONE: FACE
LOCATION ZONE: LEG
LOCATION ZONE: FACE
LOCATION ZONE: TRUNK

## 2021-07-14 ASSESSMENT — LOCATION SIMPLE DESCRIPTION DERM
LOCATION SIMPLE: LEFT TEMPLE
LOCATION SIMPLE: RIGHT TEMPLE
LOCATION SIMPLE: LEFT ANKLE
LOCATION SIMPLE: GLABELLA
LOCATION SIMPLE: LEFT FOREHEAD
LOCATION SIMPLE: ABDOMEN
LOCATION SIMPLE: GLABELLA
LOCATION SIMPLE: LEFT PRETIBIAL REGION
LOCATION SIMPLE: RIGHT FOREHEAD
LOCATION SIMPLE: RIGHT ANKLE

## 2021-07-14 ASSESSMENT — LOCATION DETAILED DESCRIPTION DERM
LOCATION DETAILED: RIGHT INFERIOR MEDIAL FOREHEAD
LOCATION DETAILED: GLABELLA
LOCATION DETAILED: LEFT INFERIOR FOREHEAD
LOCATION DETAILED: PERIUMBILICAL SKIN
LOCATION DETAILED: RIGHT ANKLE
LOCATION DETAILED: LEFT POSTERIOR ANKLE
LOCATION DETAILED: LEFT DISTAL PRETIBIAL REGION
LOCATION DETAILED: LEFT INFERIOR TEMPLE
LOCATION DETAILED: GLABELLA
LOCATION DETAILED: RIGHT INFERIOR TEMPLE

## 2021-07-14 NOTE — PROCEDURE: OBSERVATION
Detail Level: Detailed
Size Of Lesion: 2.  x 1
Size Of Lesion: 5 x 4.5
Size Of Lesion: 0.5 x 0.5
Size Of Lesion: 1.5 x 1

## 2021-07-21 ENCOUNTER — RX ONLY (RX ONLY)
Age: 66
End: 2021-07-21

## 2021-07-21 RX ORDER — CLOBETASOL PROPIONATE 0.5 MG/G
CREAM TOPICAL BID
Qty: 2 | Refills: 3 | Status: CANCELLED | COMMUNITY
Start: 2021-07-21

## 2021-07-30 ENCOUNTER — APPOINTMENT (OUTPATIENT)
Dept: URBAN - METROPOLITAN AREA CLINIC 200 | Age: 66
Setting detail: DERMATOLOGY
End: 2021-07-30

## 2021-07-30 DIAGNOSIS — L98.8 OTHER SPECIFIED DISORDERS OF THE SKIN AND SUBCUTANEOUS TISSUE: ICD-10-CM

## 2021-07-30 PROBLEM — Z85.828 PERSONAL HISTORY OF OTHER MALIGNANT NEOPLASM OF SKIN: Status: ACTIVE | Noted: 2021-07-30

## 2021-07-30 PROCEDURE — OTHER BOTOX (U OR CC): OTHER

## 2021-07-30 PROCEDURE — OTHER BOTOX (U OR CC) ADDITIVE: OTHER

## 2021-07-30 ASSESSMENT — LOCATION SIMPLE DESCRIPTION DERM
LOCATION SIMPLE: GLABELLA
LOCATION SIMPLE: RIGHT CHEEK
LOCATION SIMPLE: RIGHT FOREHEAD
LOCATION SIMPLE: RIGHT TEMPLE
LOCATION SIMPLE: GLABELLA
LOCATION SIMPLE: LEFT CHEEK
LOCATION SIMPLE: LEFT FOREHEAD
LOCATION SIMPLE: LEFT TEMPLE

## 2021-07-30 ASSESSMENT — LOCATION DETAILED DESCRIPTION DERM
LOCATION DETAILED: LEFT MID TEMPLE
LOCATION DETAILED: RIGHT SUPERIOR LATERAL MALAR CHEEK
LOCATION DETAILED: GLABELLA
LOCATION DETAILED: GLABELLA
LOCATION DETAILED: RIGHT MID TEMPLE
LOCATION DETAILED: LEFT INFERIOR FOREHEAD
LOCATION DETAILED: LEFT SUPERIOR CENTRAL MALAR CHEEK
LOCATION DETAILED: RIGHT INFERIOR MEDIAL FOREHEAD

## 2021-07-30 ASSESSMENT — LOCATION ZONE DERM
LOCATION ZONE: FACE
LOCATION ZONE: FACE

## 2021-08-24 ENCOUNTER — APPOINTMENT (OUTPATIENT)
Dept: URBAN - METROPOLITAN AREA CLINIC 200 | Age: 66
Setting detail: DERMATOLOGY
End: 2021-08-25

## 2021-08-24 DIAGNOSIS — L88 PYODERMA GANGRENOSUM: ICD-10-CM

## 2021-08-24 DIAGNOSIS — Z11.52 ENCOUNTER FOR SCREENING FOR COVID-19: ICD-10-CM

## 2021-08-24 PROCEDURE — OTHER PRESCRIPTION MEDICATION MANAGEMENT: OTHER

## 2021-08-24 PROCEDURE — OTHER SCREENING FOR COVID-19: OTHER

## 2021-08-24 PROCEDURE — 99213 OFFICE O/P EST LOW 20 MIN: CPT

## 2021-08-24 PROCEDURE — OTHER PHOTO-DOCUMENTATION: OTHER

## 2021-08-24 ASSESSMENT — LOCATION SIMPLE DESCRIPTION DERM
LOCATION SIMPLE: RIGHT ANKLE
LOCATION SIMPLE: ABDOMEN
LOCATION SIMPLE: LEFT ACHILLES SKIN

## 2021-08-24 ASSESSMENT — LOCATION ZONE DERM
LOCATION ZONE: LEG
LOCATION ZONE: TRUNK

## 2021-08-24 ASSESSMENT — LOCATION DETAILED DESCRIPTION DERM
LOCATION DETAILED: RIGHT ANKLE
LOCATION DETAILED: PERIUMBILICAL SKIN
LOCATION DETAILED: LEFT ACHILLES SKIN

## 2021-08-24 ASSESSMENT — SEVERITY ASSESSMENT: SEVERITY: MODERATE TO SEVERE

## 2021-08-24 ASSESSMENT — PAIN INTENSITY VAS: HOW INTENSE IS YOUR PAIN 0 BEING NO PAIN, 10 BEING THE MOST SEVERE PAIN POSSIBLE?: 6/10 PAIN

## 2021-08-24 NOTE — PROCEDURE: PRESCRIPTION MEDICATION MANAGEMENT
Plan: Continue gluten free diet to maximize nutrition. Continue meeting with Susie Nevarez RD. Continue compression wraps.  Continue Iodoform with adaptic to l achilles tendon area per Dr. Walters at [facility]. Plan: Continue gluten free diet to maximize nutrition. Continue meeting with Susie Nevarez RD. Continue compression wraps.  Continue Iodoform with adaptic to l achilles tendon area per Dr. Walters at [facility].

## 2021-08-27 ENCOUNTER — APPOINTMENT (OUTPATIENT)
Dept: URBAN - METROPOLITAN AREA CLINIC 200 | Age: 66
Setting detail: DERMATOLOGY
End: 2021-08-31

## 2021-08-27 DIAGNOSIS — L98.0 PYOGENIC GRANULOMA: ICD-10-CM

## 2021-08-27 PROCEDURE — OTHER ORDER TESTS: OTHER

## 2021-08-27 ASSESSMENT — LOCATION SIMPLE DESCRIPTION DERM: LOCATION SIMPLE: RIGHT ANKLE

## 2021-08-27 ASSESSMENT — LOCATION DETAILED DESCRIPTION DERM: LOCATION DETAILED: RIGHT ANKLE

## 2021-08-27 ASSESSMENT — LOCATION ZONE DERM: LOCATION ZONE: LEG

## 2021-09-13 ENCOUNTER — RX ONLY (RX ONLY)
Age: 66
End: 2021-09-13

## 2021-09-13 RX ORDER — CLOBETASOL PROPIONATE 0.5 MG/G
CREAM TOPICAL
Qty: 180 | Refills: 3 | Status: ERX | COMMUNITY
Start: 2021-09-13

## 2021-09-16 ENCOUNTER — APPOINTMENT (OUTPATIENT)
Dept: URBAN - METROPOLITAN AREA CLINIC 200 | Age: 66
Setting detail: DERMATOLOGY
End: 2021-09-17

## 2021-09-16 DIAGNOSIS — L88 PYODERMA GANGRENOSUM: ICD-10-CM

## 2021-09-16 DIAGNOSIS — Z11.52 ENCOUNTER FOR SCREENING FOR COVID-19: ICD-10-CM

## 2021-09-16 PROCEDURE — OTHER SCREENING FOR COVID-19: OTHER

## 2021-09-16 PROCEDURE — OTHER PRESCRIPTION MEDICATION MANAGEMENT: OTHER

## 2021-09-16 PROCEDURE — 99213 OFFICE O/P EST LOW 20 MIN: CPT

## 2021-09-16 PROCEDURE — OTHER PHOTO-DOCUMENTATION: OTHER

## 2021-09-16 ASSESSMENT — LOCATION DETAILED DESCRIPTION DERM
LOCATION DETAILED: PERIUMBILICAL SKIN
LOCATION DETAILED: LEFT ACHILLES SKIN
LOCATION DETAILED: RIGHT ANKLE

## 2021-09-16 ASSESSMENT — LOCATION ZONE DERM
LOCATION ZONE: LEG
LOCATION ZONE: TRUNK

## 2021-09-16 ASSESSMENT — LOCATION SIMPLE DESCRIPTION DERM
LOCATION SIMPLE: LEFT ACHILLES SKIN
LOCATION SIMPLE: ABDOMEN
LOCATION SIMPLE: RIGHT ANKLE

## 2021-09-16 NOTE — PROCEDURE: PRESCRIPTION MEDICATION MANAGEMENT
Plan: Continue gluten free diet to maximize nutrition. Continue meeting with Susie Nevarez RD. Continue compression wraps.  Continue Iodoform with adaptic to l achilles tendon area per Dr. Walters at [facility]. Plan: Continue gluten free diet to maximize nutrition. Continue meeting with Susie Nevarez RD. Continue compression wraps.  Continue Iodoform with adaptic to l achilles tendon area per Dr. Walters at [facility].

## 2021-10-07 ENCOUNTER — APPOINTMENT (OUTPATIENT)
Dept: URBAN - METROPOLITAN AREA CLINIC 200 | Age: 66
Setting detail: DERMATOLOGY
End: 2021-10-08

## 2021-10-07 DIAGNOSIS — L88 PYODERMA GANGRENOSUM: ICD-10-CM

## 2021-10-07 DIAGNOSIS — Z11.52 ENCOUNTER FOR SCREENING FOR COVID-19: ICD-10-CM

## 2021-10-07 PROCEDURE — 99213 OFFICE O/P EST LOW 20 MIN: CPT

## 2021-10-07 PROCEDURE — OTHER MIPS QUALITY: OTHER

## 2021-10-07 PROCEDURE — OTHER PRESCRIPTION MEDICATION MANAGEMENT: OTHER

## 2021-10-07 PROCEDURE — OTHER SCREENING FOR COVID-19: OTHER

## 2021-10-07 PROCEDURE — OTHER PHOTO-DOCUMENTATION: OTHER

## 2021-10-07 ASSESSMENT — LOCATION DETAILED DESCRIPTION DERM
LOCATION DETAILED: LEFT ACHILLES SKIN
LOCATION DETAILED: RIGHT ANKLE
LOCATION DETAILED: PERIUMBILICAL SKIN

## 2021-10-07 ASSESSMENT — LOCATION SIMPLE DESCRIPTION DERM
LOCATION SIMPLE: LEFT ACHILLES SKIN
LOCATION SIMPLE: ABDOMEN
LOCATION SIMPLE: RIGHT ANKLE

## 2021-10-07 ASSESSMENT — LOCATION ZONE DERM
LOCATION ZONE: TRUNK
LOCATION ZONE: LEG

## 2021-10-07 NOTE — PROCEDURE: PRESCRIPTION MEDICATION MANAGEMENT
Detail Level: Detailed
Render In Strict Bullet Format?: No
Plan: Only Vaseline gauze
Continue Regimen: Stelara\\nClobetasol to left heel
Initiate Treatment: Vaseline to right leg
Discontinue Regimen: Iodisorp for a few days

## 2021-10-26 ENCOUNTER — APPOINTMENT (OUTPATIENT)
Dept: URBAN - METROPOLITAN AREA CLINIC 200 | Age: 66
Setting detail: DERMATOLOGY
End: 2021-10-26

## 2021-10-26 DIAGNOSIS — L57.8 OTHER SKIN CHANGES DUE TO CHRONIC EXPOSURE TO NONIONIZING RADIATION: ICD-10-CM

## 2021-10-26 DIAGNOSIS — Z85.828 PERSONAL HISTORY OF OTHER MALIGNANT NEOPLASM OF SKIN: ICD-10-CM

## 2021-10-26 DIAGNOSIS — D22 MELANOCYTIC NEVI: ICD-10-CM

## 2021-10-26 DIAGNOSIS — L82.1 OTHER SEBORRHEIC KERATOSIS: ICD-10-CM

## 2021-10-26 DIAGNOSIS — L81.0 POSTINFLAMMATORY HYPERPIGMENTATION: ICD-10-CM

## 2021-10-26 DIAGNOSIS — Z11.52 ENCOUNTER FOR SCREENING FOR COVID-19: ICD-10-CM

## 2021-10-26 PROBLEM — D22.4 MELANOCYTIC NEVI OF SCALP AND NECK: Status: ACTIVE | Noted: 2021-10-26

## 2021-10-26 PROCEDURE — OTHER SUNSCREEN RECOMMENDATIONS: OTHER

## 2021-10-26 PROCEDURE — OTHER REASSURANCE: OTHER

## 2021-10-26 PROCEDURE — OTHER MIPS QUALITY: OTHER

## 2021-10-26 PROCEDURE — OTHER PRESCRIPTION: OTHER

## 2021-10-26 PROCEDURE — OTHER COUNSELING: OTHER

## 2021-10-26 PROCEDURE — OTHER SCREENING FOR COVID-19: OTHER

## 2021-10-26 PROCEDURE — OTHER PRESCRIPTION MEDICATION MANAGEMENT: OTHER

## 2021-10-26 PROCEDURE — 99213 OFFICE O/P EST LOW 20 MIN: CPT

## 2021-10-26 RX ORDER — HYDROQUINONE 4 %
CREAM (GRAM) TOPICAL
Qty: 30 | Refills: 3 | COMMUNITY
Start: 2021-10-26

## 2021-10-26 ASSESSMENT — LOCATION DETAILED DESCRIPTION DERM
LOCATION DETAILED: RIGHT DISTAL PRETIBIAL REGION
LOCATION DETAILED: PERIUMBILICAL SKIN
LOCATION DETAILED: RIGHT SUPERIOR LATERAL NECK
LOCATION DETAILED: LEFT INFERIOR ANTERIOR NECK
LOCATION DETAILED: RIGHT POSTERIOR SHOULDER

## 2021-10-26 ASSESSMENT — LOCATION SIMPLE DESCRIPTION DERM
LOCATION SIMPLE: RIGHT PRETIBIAL REGION
LOCATION SIMPLE: NECK
LOCATION SIMPLE: RIGHT SHOULDER
LOCATION SIMPLE: ABDOMEN
LOCATION SIMPLE: LEFT ANTERIOR NECK

## 2021-10-26 ASSESSMENT — LOCATION ZONE DERM
LOCATION ZONE: LEG
LOCATION ZONE: TRUNK
LOCATION ZONE: NECK
LOCATION ZONE: ARM

## 2021-10-26 ASSESSMENT — PAIN INTENSITY VAS: HOW INTENSE IS YOUR PAIN 0 BEING NO PAIN, 10 BEING THE MOST SEVERE PAIN POSSIBLE?: NO PAIN

## 2021-10-26 NOTE — PROCEDURE: PRESCRIPTION MEDICATION MANAGEMENT
Initiate Treatment: hydroquinone 4 % topical cream
Detail Level: Detailed
Render In Strict Bullet Format?: No

## 2021-11-24 ENCOUNTER — RX ONLY (RX ONLY)
Age: 66
End: 2021-11-24

## 2021-11-24 ENCOUNTER — APPOINTMENT (OUTPATIENT)
Dept: URBAN - METROPOLITAN AREA CLINIC 200 | Age: 66
Setting detail: DERMATOLOGY
End: 2021-11-29

## 2021-11-24 DIAGNOSIS — Z11.52 ENCOUNTER FOR SCREENING FOR COVID-19: ICD-10-CM

## 2021-11-24 DIAGNOSIS — L88 PYODERMA GANGRENOSUM: ICD-10-CM

## 2021-11-24 DIAGNOSIS — L57.0 ACTINIC KERATOSIS: ICD-10-CM

## 2021-11-24 DIAGNOSIS — L81.0 POSTINFLAMMATORY HYPERPIGMENTATION: ICD-10-CM

## 2021-11-24 PROBLEM — Z85.828 PERSONAL HISTORY OF OTHER MALIGNANT NEOPLASM OF SKIN: Status: ACTIVE | Noted: 2021-11-24

## 2021-11-24 PROCEDURE — 17003 DESTRUCT PREMALG LES 2-14: CPT

## 2021-11-24 PROCEDURE — OTHER PHOTO-DOCUMENTATION: OTHER

## 2021-11-24 PROCEDURE — OTHER HUMIRA INITIATION: OTHER

## 2021-11-24 PROCEDURE — OTHER ORDER TESTS: OTHER

## 2021-11-24 PROCEDURE — OTHER PRESCRIPTION MEDICATION MANAGEMENT: OTHER

## 2021-11-24 PROCEDURE — OTHER LIQUID NITROGEN: OTHER

## 2021-11-24 PROCEDURE — 99213 OFFICE O/P EST LOW 20 MIN: CPT | Mod: 25

## 2021-11-24 PROCEDURE — 17000 DESTRUCT PREMALG LESION: CPT

## 2021-11-24 PROCEDURE — OTHER SCREENING FOR COVID-19: OTHER

## 2021-11-24 RX ORDER — HYDROQUINONE 4 %
CREAM (GRAM) TOPICAL
Qty: 30 | Refills: 3 | Status: ERX

## 2021-11-24 RX ORDER — BIMATOPROST 0.3 MG/ML
SOLUTION/ DROPS OPHTHALMIC
Qty: 5 | Refills: 5 | Status: ERX | COMMUNITY
Start: 2021-11-24

## 2021-11-24 ASSESSMENT — LOCATION ZONE DERM
LOCATION ZONE: ARM
LOCATION ZONE: TRUNK
LOCATION ZONE: LEG

## 2021-11-24 ASSESSMENT — LOCATION SIMPLE DESCRIPTION DERM
LOCATION SIMPLE: CHEST
LOCATION SIMPLE: RIGHT ANKLE
LOCATION SIMPLE: RIGHT PRETIBIAL REGION
LOCATION SIMPLE: LEFT ACHILLES SKIN
LOCATION SIMPLE: LEFT ANKLE
LOCATION SIMPLE: LEFT UPPER ARM
LOCATION SIMPLE: ABDOMEN

## 2021-11-24 ASSESSMENT — LOCATION DETAILED DESCRIPTION DERM
LOCATION DETAILED: RIGHT ANTERIOR MEDIAL MALLEOLUS
LOCATION DETAILED: PERIUMBILICAL SKIN
LOCATION DETAILED: RIGHT ANKLE
LOCATION DETAILED: LEFT ACHILLES SKIN
LOCATION DETAILED: RIGHT MEDIAL SUPERIOR CHEST
LOCATION DETAILED: LEFT ANKLE
LOCATION DETAILED: LEFT ANTERIOR PROXIMAL UPPER ARM
LOCATION DETAILED: RIGHT DISTAL PRETIBIAL REGION

## 2021-11-24 ASSESSMENT — PAIN INTENSITY VAS: HOW INTENSE IS YOUR PAIN 0 BEING NO PAIN, 10 BEING THE MOST SEVERE PAIN POSSIBLE?: NO PAIN

## 2021-11-24 NOTE — PROCEDURE: PRESCRIPTION MEDICATION MANAGEMENT
Render In Strict Bullet Format?: No
Detail Level: Detailed
Continue Regimen: hydroquinone 4 % topical cream

## 2021-11-24 NOTE — PROCEDURE: LIQUID NITROGEN
Consent: The patient's consent was obtained including but not limited to risks of crusting, scabbing, blistering, scarring, darker or lighter pigmentary change, recurrence, incomplete removal and infection.
Show Aperture Variable?: Yes
Render Post-Care Instructions In Note?: no
Post-Care Instructions: I reviewed with the patient in detail post-care instructions. Patient is to wear sunprotection, and avoid picking at any of the treated lesions. Pt may apply Vaseline to crusted or scabbing areas.
Number Of Freeze-Thaw Cycles: 1 freeze-thaw cycle
Duration Of Freeze Thaw-Cycle (Seconds): 2
Detail Level: Detailed

## 2021-11-24 NOTE — PROCEDURE: HUMIRA INITIATION
Is Phototherapy Contraindicated?: No
Pregnancy And Lactation Warning Text: This medication is Pregnancy Category B and is considered safe during pregnancy. It is unknown if this medication is excreted in breast milk.
Humira Dosing: 80 mg SC day 0, 40 mg SC day 7, then 40 mg SC every other week
Humira Monitoring Guidelines: A yearly test for tuberculosis is required while taking Humira.
Detail Level: Zone
Comments: pyoderma gangrenosum
Diagnosis (Required): Psoriasis

## 2021-11-30 ENCOUNTER — RX ONLY (RX ONLY)
Age: 66
End: 2021-11-30

## 2021-11-30 RX ORDER — ADALIMUMAB 4 MG/ML
KIT INJECTION
Qty: 1 | Refills: 0 | Status: ERX | COMMUNITY
Start: 2021-11-30

## 2021-12-02 ENCOUNTER — APPOINTMENT (OUTPATIENT)
Dept: LAB | Facility: CLINIC | Age: 66
End: 2021-12-02
Attending: SPECIALIST
Payer: MEDICARE

## 2021-12-02 ENCOUNTER — TRANSCRIBE ORDERS (OUTPATIENT)
Dept: LAB | Facility: CLINIC | Age: 66
End: 2021-12-02
Payer: MEDICARE

## 2021-12-02 DIAGNOSIS — L88 PYODERMA GANGRENOSUM: ICD-10-CM

## 2021-12-02 DIAGNOSIS — L88 PYODERMA GANGRENOSUM: Primary | ICD-10-CM

## 2021-12-02 PROCEDURE — 86480 TB TEST CELL IMMUN MEASURE: CPT

## 2021-12-02 PROCEDURE — 36415 COLL VENOUS BLD VENIPUNCTURE: CPT

## 2021-12-06 LAB
M TB IFN-G BLD-IMP: NEGATIVE
MITOGEN-NIL: >10 IU/ML
NIL: 0.02 IU/ML
TB AG-NIL: 0 IU/ML
TB2 AG - NIL: 0 IU/ML

## 2021-12-14 ENCOUNTER — APPOINTMENT (OUTPATIENT)
Dept: URBAN - METROPOLITAN AREA CLINIC 200 | Age: 66
Setting detail: DERMATOLOGY
End: 2021-12-19

## 2021-12-14 DIAGNOSIS — Z11.52 ENCOUNTER FOR SCREENING FOR COVID-19: ICD-10-CM

## 2021-12-14 DIAGNOSIS — L88 PYODERMA GANGRENOSUM: ICD-10-CM

## 2021-12-14 DIAGNOSIS — L98.8 OTHER SPECIFIED DISORDERS OF THE SKIN AND SUBCUTANEOUS TISSUE: ICD-10-CM

## 2021-12-14 DIAGNOSIS — L82.1 OTHER SEBORRHEIC KERATOSIS: ICD-10-CM

## 2021-12-14 PROCEDURE — OTHER PRESCRIPTION MEDICATION MANAGEMENT: OTHER

## 2021-12-14 PROCEDURE — OTHER BOTOX (U OR CC) ADDITIVE: OTHER

## 2021-12-14 PROCEDURE — OTHER PHOTO-DOCUMENTATION: OTHER

## 2021-12-14 PROCEDURE — 99214 OFFICE O/P EST MOD 30 MIN: CPT

## 2021-12-14 PROCEDURE — OTHER SCREENING FOR COVID-19: OTHER

## 2021-12-14 PROCEDURE — OTHER MIPS QUALITY: OTHER

## 2021-12-14 PROCEDURE — OTHER REASSURANCE: OTHER

## 2021-12-14 PROCEDURE — OTHER BOTOX (U OR CC): OTHER

## 2021-12-14 ASSESSMENT — LOCATION SIMPLE DESCRIPTION DERM
LOCATION SIMPLE: ABDOMEN
LOCATION SIMPLE: LEFT CLAVICULAR SKIN
LOCATION SIMPLE: RIGHT ANKLE
LOCATION SIMPLE: RIGHT CHEEK
LOCATION SIMPLE: LEFT FOREHEAD
LOCATION SIMPLE: LEFT ACHILLES SKIN
LOCATION SIMPLE: RIGHT FOREHEAD
LOCATION SIMPLE: LEFT TEMPLE
LOCATION SIMPLE: ABDOMEN
LOCATION SIMPLE: CHEST
LOCATION SIMPLE: LEFT ANKLE
LOCATION SIMPLE: GLABELLA
LOCATION SIMPLE: GLABELLA
LOCATION SIMPLE: RIGHT TEMPLE
LOCATION SIMPLE: LEFT CHEEK

## 2021-12-14 ASSESSMENT — LOCATION DETAILED DESCRIPTION DERM
LOCATION DETAILED: RIGHT SUPERIOR LATERAL MALAR CHEEK
LOCATION DETAILED: RIGHT ANKLE
LOCATION DETAILED: GLABELLA
LOCATION DETAILED: LEFT MID TEMPLE
LOCATION DETAILED: RIGHT MID TEMPLE
LOCATION DETAILED: GLABELLA
LOCATION DETAILED: PERIUMBILICAL SKIN
LOCATION DETAILED: LEFT ANKLE
LOCATION DETAILED: LEFT CLAVICULAR SKIN
LOCATION DETAILED: UPPER STERNUM
LOCATION DETAILED: LEFT ACHILLES SKIN
LOCATION DETAILED: LEFT INFERIOR FOREHEAD
LOCATION DETAILED: LEFT SUPERIOR CENTRAL MALAR CHEEK
LOCATION DETAILED: RIGHT INFERIOR MEDIAL FOREHEAD
LOCATION DETAILED: PERIUMBILICAL SKIN

## 2021-12-14 ASSESSMENT — PAIN INTENSITY VAS: HOW INTENSE IS YOUR PAIN 0 BEING NO PAIN, 10 BEING THE MOST SEVERE PAIN POSSIBLE?: NO PAIN

## 2021-12-14 ASSESSMENT — LOCATION ZONE DERM
LOCATION ZONE: FACE
LOCATION ZONE: FACE
LOCATION ZONE: LEG
LOCATION ZONE: TRUNK
LOCATION ZONE: TRUNK

## 2021-12-14 NOTE — PROCEDURE: PRESCRIPTION MEDICATION MANAGEMENT
Plan is for patient to see pain management, neurology and physical therapy.  Found chronic pain program at [facility].  Will do PT, OT eval.  Also will advise on non medication modalities of pain control.  Spoke to Celia Vicente from [facility] who has been managing her pain.  Pt off all oxycontin and only on 4 dilaudid a day which is a great improvement.  We are in touch with Dr. Patton  at [facility] to attempt to transition her narcotic management to him. Plan is for patient to see pain management, neurology and physical therapy.  Found chronic pain program at [facility].  Will do PT, OT eval.  Also will advise on non medication modalities of pain control.  Spoke to Celia Vicente from [facility] who has been managing her pain.  Pt off all oxycontin and only on 4 dilaudid a day which is a great improvement.  We are in touch with Dr. Patton  at [facility] to attempt to transition her narcotic management to him.

## 2022-01-11 ENCOUNTER — APPOINTMENT (OUTPATIENT)
Dept: URBAN - METROPOLITAN AREA CLINIC 200 | Age: 67
Setting detail: DERMATOLOGY
End: 2022-01-13

## 2022-01-11 DIAGNOSIS — L08.9 LOCAL INFECTION OF THE SKIN AND SUBCUTANEOUS TISSUE, UNSPECIFIED: ICD-10-CM

## 2022-01-11 DIAGNOSIS — Z11.52 ENCOUNTER FOR SCREENING FOR COVID-19: ICD-10-CM

## 2022-01-11 DIAGNOSIS — L88 PYODERMA GANGRENOSUM: ICD-10-CM

## 2022-01-11 PROCEDURE — OTHER PRESCRIPTION MEDICATION MANAGEMENT: OTHER

## 2022-01-11 PROCEDURE — OTHER ORDER TESTS: OTHER

## 2022-01-11 PROCEDURE — OTHER RECOMMENDATIONS: OTHER

## 2022-01-11 PROCEDURE — OTHER SCREENING FOR COVID-19: OTHER

## 2022-01-11 PROCEDURE — 99213 OFFICE O/P EST LOW 20 MIN: CPT

## 2022-01-11 ASSESSMENT — LOCATION ZONE DERM
LOCATION ZONE: TRUNK
LOCATION ZONE: LEG

## 2022-01-11 ASSESSMENT — LOCATION DETAILED DESCRIPTION DERM
LOCATION DETAILED: PERIUMBILICAL SKIN
LOCATION DETAILED: LEFT ANTERIOR LATERAL MALLEOLUS
LOCATION DETAILED: LEFT ANKLE

## 2022-01-11 ASSESSMENT — LOCATION SIMPLE DESCRIPTION DERM
LOCATION SIMPLE: LEFT ANKLE
LOCATION SIMPLE: ABDOMEN

## 2022-01-11 NOTE — PROCEDURE: RECOMMENDATIONS
Detail Level: Simple
Render Risk Assessment In Note?: no
Recommendations (Free Text): Vinegar soaks (1:4 dilution \\nReferral to [facility] for PT, OT, AND EVALUATION by Dr. Patton for medication management.  Patient is down to 2-3 dilaudid per day.

## 2022-01-24 ENCOUNTER — RX ONLY (RX ONLY)
Age: 67
End: 2022-01-24

## 2022-01-24 RX ORDER — CEPHALEXIN 500 MG/1
CAPSULE ORAL
Qty: 14 | Refills: 0 | Status: ERX | COMMUNITY
Start: 2022-01-24

## 2022-02-02 NOTE — PROCEDURE: ORDER TESTS
Chief Complaint :  Elevated ferritin level. Delta beta thalassemia    Hx of Present illness :  Patient is a 68 y.o. year old male who presents to the clinic today for   Oncology  Followup. Had therapeutic Phlebotomy on 2/17/21.     Interval History : Followed by Dr. Kitchen  Doing well   No complaints  No SOB/CP   He is followed by Hepatology for fatty liver  He reports he quit drinking alcohol  He plans on going to [place] this summer   C282Y: Not detected.   H63D: Not detected.     Allergies :    Review of patient's allergies indicates:  No Known Allergies    Occupation :  [employer]    Transfusion :  None          Present Meds :   Medication List with Changes/Refills   Current Medications    AMLODIPINE (NORVASC) 5 MG TABLET    Take 1 tablet (5 mg total) by mouth once daily.    FOLIC ACID (FOLVITE) 1 MG TABLET    TAKE 1 TABLET BY MOUTH ONCE DAILY       Past Medical Hx :   Known to have Beta Thalassemia, elevated Ferritin, Hypertension, Colon Polyps. Has One son. He is Known to have Thalssemia as a child. He has not had transfusions    Past Medical Hx :  Past Medical History:   Diagnosis Date    Delta-beta thalassemia 7/24/2018    Elevated ferritin 7/24/2018    Essential hypertension 7/10/2018    History of colonic polyps 7/10/2018    Liver fibrosis, F3 10/25/2021       Travel Hx :   N/A    Immunization :  Immunization History   Administered Date(s) Administered    COVID-19, MRNA, LN-S, PF (MODERNA FULL 0.5 ML DOSE) 02/17/2021, 03/17/2021    Hepatitis B (recombinant) Adjuvanted, 2 dose 03/31/2021, 04/27/2021    Influenza (FLUAD) - Quadrivalent - Adjuvanted - PF *Preferred* (65+) 10/19/2021    Influenza - Quadrivalent - High Dose - PF (65 years and older) 09/09/2020    Influenza - Quadrivalent - PF *Preferred* (6 months and older) 09/06/2018, 09/04/2019       Family Hx :  Born and raised in [facility]. Later moved to   Family History   Problem Relation Age of Onset    Cirrhosis Father        Social 
Hx :  Social History     Socioeconomic History    Marital status:    Tobacco Use    Smoking status: Former Smoker    Smokeless tobacco: Never Used   Substance and Sexual Activity    Alcohol use: Yes     Alcohol/week: 8.0 standard drinks     Types: 8 Cans of beer per week    Drug use: Never       Surgery :   Colonoscopy x2    Symptoms :    Review of Systems   Constitutional: Negative for chills, fever, malaise/fatigue and weight loss.   HENT: Negative for congestion, hearing loss, nosebleeds and sore throat.    Eyes: Negative for blurred vision, double vision and photophobia.        Wears Glasses.   Respiratory: Negative for cough, hemoptysis and shortness of breath.    Cardiovascular: Negative for chest pain and claudication.   Gastrointestinal: Negative for abdominal pain, blood in stool, constipation, diarrhea, heartburn, nausea and vomiting.   Genitourinary: Negative for dysuria, flank pain, hematuria and urgency.   Musculoskeletal: Positive for back pain. Negative for falls, joint pain, myalgias and neck pain.   Skin: Negative for itching and rash.   Neurological: Negative for dizziness, tingling and headaches.        Has numbness of toes   Endo/Heme/Allergies: Negative for polydipsia. Does not bruise/bleed easily.   Psychiatric/Behavioral: Negative for depression. The patient has insomnia. The patient is not nervous/anxious.        Physical Exam :   Physical Exam   Constitutional: He is oriented to person, place, and time. He appears well-developed and well-nourished.   HENT:   Head: Normocephalic.   Mouth/Throat: Oropharynx is clear and moist.  Eyes: No scleral icterus.   Neck: Normal range of motion. Neck supple. No thyromegaly present.   Cardiovascular: Normal rate, regular rhythm and normal heart sounds.    No murmur heard.  Pulmonary/Chest: Effort normal and breath sounds normal. He has no wheezes. He has no rales.   Abdominal: Soft. Bowel sounds are normal. He exhibits no distension. There is 
no tenderness. There is no rebound and no guarding.   Musculoskeletal: Normal range of motion. He exhibits no edema.   Lymphadenopathy:     He has no cervical adenopathy.     He has no axillary adenopathy.        Right: No supraclavicular adenopathy present.        Left: No supraclavicular adenopathy present.   Neurological: He is alert and oriented to person, place, and time. No cranial nerve deficit.   Skin: No rash noted. No erythema.   Psychiatric: He has a normal mood and affect.           Labs & Imaging :  03/16/21 : Hgb 13.3; Hct 41.2; MCV 68; PLatelerts 253,000 ANC 4,100. NFBS 214; Cr.1.1; ca 9.2. Bili 1.5. LAST 45; ALT 79.      eGFR > 60.   Serum iron 179;p TIBC 379; Sat  47 %.   fERRITIN 510           12/14: Hgb 13.4; Hct 43; MCV 72; Platelets 187,000 ANC 3,300.     NFBS 164; Cr.1.0; Ca 9.3; Bili 2.3; AST 45; ALT 79; eGFR > 60    Ferritin 873; U/S abdomen  2/10/21 : Hepatosplenomegaly. Fatty Liver;  Cholelithiasis.      11/4/19 :  NFBS 192; cr. 1.2; eGFR >60; Ca 9.1. Bili 1.8; liver enzymes normal.  Hgb 13.2; hct 41.2; MCV 70. Platelets 192,000 ANC 3,400 Serum ferritin 690 to 607 to 599 to 644.    Results for FLORENCIA GONZÁLES (MRN [id number]) as of 11/28/2021 14:12   Ref. Range 3/6/2020 11:33 12/14/2020 13:30 3/16/2021 13:33 5/18/2021 09:05   Ferritin Latest Ref Range: 20.0 - 300.0 ng/mL 890 (H) 873 (H) 510 (H) 494 (H)       US 10/25/21   Impression:     Fatty change in the liver.  No masses identified.     Cholelithiasis.     Splenomegaly.    C282Y: Not detected.   H63D: Not detected  Results for FLORENCIA GONZÁLES (MRN [id number]) as of 2/13/2022 22:48   Ref. Range 5/18/2021 09:05 1/18/2022 10:07   Ferritin Latest Ref Range: 20.0 - 300.0 ng/mL 494 (H) 371 (H)       Dx :   Delta Beta Thalassemia. Increased Serum Ferritin level.    Fatty liver.     Assessment:  Reviewed with Patient.  Increased ferritin Multifactorial.     DNA hemochromatosis testing neg  Ferritin level improved  No  Indication for 
phlebotomy  F/u with Hepatology   F/u with PCP for med mgmt  CBC,Ferritin, CMP iron studies in 6 months  Patient Understands and verbalised.    Erinn Bynum MD  Hematology/Oncology  [facility] [address]  [address]  [place] [address]        
Expected Date Of Service: 03/14/2018
Billing Type: Third-Party Bill
Bill For Surgical Tray: no

## 2022-02-03 ENCOUNTER — TRANSCRIBE ORDERS (OUTPATIENT)
Dept: SCHEDULING | Facility: REHABILITATION | Age: 67
End: 2022-02-03

## 2022-02-03 ENCOUNTER — APPOINTMENT (OUTPATIENT)
Dept: URBAN - METROPOLITAN AREA CLINIC 200 | Age: 67
Setting detail: DERMATOLOGY
End: 2022-02-07

## 2022-02-03 DIAGNOSIS — L88 PYODERMA GANGRENOSUM: ICD-10-CM

## 2022-02-03 DIAGNOSIS — L08.9 LOCAL INFECTION OF THE SKIN AND SUBCUTANEOUS TISSUE, UNSPECIFIED: ICD-10-CM

## 2022-02-03 DIAGNOSIS — G89.4 CHRONIC PAIN SYNDROME: Primary | ICD-10-CM

## 2022-02-03 PROCEDURE — OTHER RECOMMENDATIONS: OTHER

## 2022-02-03 PROCEDURE — OTHER PRESCRIPTION MEDICATION MANAGEMENT: OTHER

## 2022-02-03 PROCEDURE — OTHER PHOTO-DOCUMENTATION: OTHER

## 2022-02-03 PROCEDURE — 99214 OFFICE O/P EST MOD 30 MIN: CPT

## 2022-02-03 PROCEDURE — OTHER ORDER TESTS: OTHER

## 2022-02-03 ASSESSMENT — LOCATION SIMPLE DESCRIPTION DERM
LOCATION SIMPLE: LEFT PRETIBIAL REGION
LOCATION SIMPLE: LEFT ANKLE

## 2022-02-03 ASSESSMENT — LOCATION ZONE DERM: LOCATION ZONE: LEG

## 2022-02-03 ASSESSMENT — LOCATION DETAILED DESCRIPTION DERM
LOCATION DETAILED: LEFT DISTAL PRETIBIAL REGION
LOCATION DETAILED: LEFT ANTERIOR LATERAL MALLEOLUS
LOCATION DETAILED: LEFT ANKLE

## 2022-02-04 ENCOUNTER — DOCUMENTATION (OUTPATIENT)
Dept: SOCIAL WORK | Facility: REHABILITATION | Age: 67
End: 2022-02-04
Payer: MEDICARE

## 2022-02-09 ENCOUNTER — DOCUMENTATION (OUTPATIENT)
Dept: SOCIAL WORK | Facility: REHABILITATION | Age: 67
End: 2022-02-09
Payer: MEDICARE

## 2022-03-04 ENCOUNTER — APPOINTMENT (OUTPATIENT)
Dept: URBAN - METROPOLITAN AREA CLINIC 200 | Age: 67
Setting detail: DERMATOLOGY
End: 2022-03-06

## 2022-03-04 DIAGNOSIS — L88 PYODERMA GANGRENOSUM: ICD-10-CM

## 2022-03-04 PROCEDURE — OTHER PHOTO-DOCUMENTATION: OTHER

## 2022-03-04 PROCEDURE — OTHER PRESCRIPTION MEDICATION MANAGEMENT: OTHER

## 2022-03-04 PROCEDURE — 99213 OFFICE O/P EST LOW 20 MIN: CPT

## 2022-03-04 ASSESSMENT — LOCATION ZONE DERM: LOCATION ZONE: LEG

## 2022-03-04 ASSESSMENT — LOCATION DETAILED DESCRIPTION DERM
LOCATION DETAILED: LEFT ANKLE
LOCATION DETAILED: LEFT ANTERIOR LATERAL MALLEOLUS

## 2022-03-04 ASSESSMENT — LOCATION SIMPLE DESCRIPTION DERM: LOCATION SIMPLE: LEFT ANKLE

## 2022-03-04 ASSESSMENT — SEVERITY ASSESSMENT: SEVERITY: MODERATE

## 2022-03-21 ENCOUNTER — HOSPITAL ENCOUNTER (OUTPATIENT)
Dept: OCCUPATIONAL THERAPY | Facility: REHABILITATION | Age: 67
Setting detail: THERAPIES SERIES
Discharge: HOME | End: 2022-03-21
Attending: PHYSICAL MEDICINE & REHABILITATION
Payer: MEDICARE

## 2022-03-21 ENCOUNTER — APPOINTMENT (OUTPATIENT)
Dept: URBAN - METROPOLITAN AREA CLINIC 200 | Age: 67
Setting detail: DERMATOLOGY
End: 2022-03-25

## 2022-03-21 DIAGNOSIS — G89.4 CHRONIC PAIN SYNDROME: Primary | ICD-10-CM

## 2022-03-21 DIAGNOSIS — Z11.52 ENCOUNTER FOR SCREENING FOR COVID-19: ICD-10-CM

## 2022-03-21 DIAGNOSIS — L88 PYODERMA GANGRENOSUM: ICD-10-CM

## 2022-03-21 DIAGNOSIS — L98.8 OTHER SPECIFIED DISORDERS OF THE SKIN AND SUBCUTANEOUS TISSUE: ICD-10-CM

## 2022-03-21 PROCEDURE — OTHER PRESCRIPTION: OTHER

## 2022-03-21 PROCEDURE — OTHER BOTOX (U OR CC) ADDITIVE: OTHER

## 2022-03-21 PROCEDURE — 97162 PT EVAL MOD COMPLEX 30 MIN: CPT | Mod: GP

## 2022-03-21 PROCEDURE — OTHER PHOTO-DOCUMENTATION: OTHER

## 2022-03-21 PROCEDURE — 99213 OFFICE O/P EST LOW 20 MIN: CPT

## 2022-03-21 PROCEDURE — OTHER SCREENING FOR COVID-19: OTHER

## 2022-03-21 PROCEDURE — OTHER BOTOX (U OR CC): OTHER

## 2022-03-21 PROCEDURE — OTHER PRESCRIPTION MEDICATION MANAGEMENT: OTHER

## 2022-03-21 PROCEDURE — 97167 OT EVAL HIGH COMPLEX 60 MIN: CPT | Mod: GO

## 2022-03-21 RX ORDER — ADALIMUMAB 40MG/0.8ML
KIT SUBCUTANEOUS
Qty: 6 | Refills: 1 | Status: ERX | COMMUNITY
Start: 2022-03-21

## 2022-03-21 ASSESSMENT — LOCATION SIMPLE DESCRIPTION DERM
LOCATION SIMPLE: LEFT FOREHEAD
LOCATION SIMPLE: RIGHT TEMPLE
LOCATION SIMPLE: GLABELLA
LOCATION SIMPLE: GLABELLA
LOCATION SIMPLE: LEFT TEMPLE
LOCATION SIMPLE: LEFT ANKLE
LOCATION SIMPLE: RIGHT FOREHEAD
LOCATION SIMPLE: LEFT CHEEK
LOCATION SIMPLE: ABDOMEN
LOCATION SIMPLE: RIGHT CHEEK

## 2022-03-21 ASSESSMENT — LOCATION ZONE DERM
LOCATION ZONE: FACE
LOCATION ZONE: LEG
LOCATION ZONE: FACE
LOCATION ZONE: TRUNK

## 2022-03-21 ASSESSMENT — LOCATION DETAILED DESCRIPTION DERM
LOCATION DETAILED: LEFT INFERIOR FOREHEAD
LOCATION DETAILED: PERIUMBILICAL SKIN
LOCATION DETAILED: LEFT ANTERIOR LATERAL MALLEOLUS
LOCATION DETAILED: LEFT ANKLE
LOCATION DETAILED: RIGHT SUPERIOR LATERAL MALAR CHEEK
LOCATION DETAILED: RIGHT MID TEMPLE
LOCATION DETAILED: LEFT SUPERIOR CENTRAL MALAR CHEEK
LOCATION DETAILED: LEFT MID TEMPLE
LOCATION DETAILED: GLABELLA
LOCATION DETAILED: RIGHT INFERIOR MEDIAL FOREHEAD
LOCATION DETAILED: GLABELLA

## 2022-03-21 NOTE — PROGRESS NOTES
Referring Provider: By co-signing this Plan of Care (POC) either electronically or physically you agree to the following:    I have reviewed the the Plan of Care established by the therapist within this document and certify that the services are skilled and medically necessary. I have reviewed the plan and recommend that these services continue to meet the goals stated in this document.       EXTERNAL PROVIDER FAXING BACK:    PHYSICIAN SIGNATURE: __________________________________     DATE: ___________________  TIME: _____________    IMPORTANT:  If returning this Plan of Care by fax, please fax back ONLY the signature page.   _________________________________________________________________________      [facility] Fax: [phone]        PT EVALUATION FOR OUTPATIENT THERAPY    Patient: Mary Moreno    MRN: [id number]  : 1955 66 y.o.   Referring Physician: Case Francois MD  Date of Visit: 3/21/2022      Certification Dates:  22 through 22  6-8 weeks planned unless her ankle wounds clear up and she get in the pool then we may extend time      Recommended Frequency & Duration:  2 times/week for up to 3 months     Diagnosis:   1. Chronic pain syndrome        Chief Complaints:   Chief Complaint   Patient presents with   • Pain       Precautions: fall    Past Medical History:   Past Medical History:   Diagnosis Date   • Celiac disease    • Fractures    • Osteopenia    • Pyoderma gangrenosum    • Ulcerative colitis (CMS/HCC)        Past Surgical History:   Past Surgical History:   Procedure Laterality Date   •  SECTION     • FOOT SURGERY     • HYSTERECTOMY     • SKIN GRAFT           LEARNING ASSESSMENT    Assessment completed:  Yes    Learner name:  Amisha Moreno    Relationship: Patient    Learning Barriers:  Learning barriers: No Barriers    Preferred Language: English     Needed: No    Learning New Concepts: Listening, Demonstration and  Pictures/Video            CO-LEARNER ASSESSMENT:    Completed: No            OBJECTIVE MEASUREMENTS/DATA:    Time In Session:  Start Time: 0715  Stop Time: 0800  Time Calculation (min): 45 min   Assessment and Plan - 03/21/22 0712        Assessment    Plan of Care reviewed and patient/family in agreement Yes     System Pathology/Pathophysiology Noted musculoskeletal;neuromuscular;integumentary     Functional Limitations in Following Categories (PT Eval) community/leisure;home management     Rehab Potential/Prognosis adequate, monitor progress closely;re-evaluate goals as necessary     Problem List pain;impaired balance;decreased ROM;decreased strength;impaired sensory feedback     Clinical Assessment Ms Moreno presents with neuropathy B legs and pyoderma gangrenosum the causes multi ulcers that are painful.  She went on to have multiple surgeries in 2013 that led to a 6 month hospitalization. She has a few active ulcers around her ankle today and are wrapped with a dressing.  Pt also has history celiac disease, hx of R femoral neck fracture in 2020 resulting in R LISA . Pt had lost weight and weighed 88 pounds due to the gut issues.  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 SHe is now able to get her heel down. She is reporting balance issues.  She is clawing her toes as well now and has develpoed some sores on the 2nd toe. Pt has been referred to the chronic pain program for comprehensive PT and OT evaluations. Pt demonstrated minimal DF B as well as limited PF inversion and eversion. There are some strength deficits as well in the LE and core. Hip extension limited B with + gait dysfunction and balance deficits.  Formal FGA and 6 min walk test to be assessed in future visits. Pt is approprate for PT to work on strength and mobility/balance and gait. Pt would like to do aquatics but has open wounds at this time. If her woulds close we can add aquatics  pending release from MD.     Plan and Recommendations 6-8 weeks 2x/week. We will start with land therapy and add pool if wounds close. and possible add more appointments.     Planned Services CPT 70455 Aquatic therapy/exercises;CPT 19692 Manual therapy;CPT 19573 Neuromuscular Reeducation;CPT 36231 Therapeutic exercises;CPT 70844 Therapeutic Massage;CPT 82312 Therapeutic activities;CPT 87457 Hot/Cold Packs therapy                General Information - 03/21/22 0783        Session Details    Document Type initial evaluation     Mode of Treatment individual therapy;physical therapy     Patient/Family/Caregiver Comments/Observations Pt arrived 15 min late for appointment     OP Specialty --   chronic pain       General Information    Onset of Illness/Injury or Date of Surgery --   2013 hospitalized for the open wounds in [facility]'s    Referring Physician Case Francois MD     History of present illness/functional impairment Ms Moreno presents with neuropathy B legs and pyoderma gangrenosum the causes multi ulcers that are painful.  She went on to have multiple surgeries in 2013 that led to a 6 month hospitalization. She has a few active ulcers around her ankle today and are wrapped with a dressing.  Pt also has history celiac disease, hx of R femoral neck fracture in 2020 resulting in R LISA . Pt had lost weight and weighed 88 pounds due to the gut issues.  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 SHe is now able to get her heel down. She is reporting balance issues.  She is clawing her toes as well now and has develpoed some sores on the 2nd toe. Pt has been referred to the chronic pain program for comprehensive PT and OT evaluations.     Existing Precautions/Restrictions fall     Precautions comments open wounds on ankles B                Pain/Vitals - 03/21/22 3939        Pain Assessment    Currently in pain Yes     Preferred Pain Scale Chronic Pain      Pain Side/Orientation bilateral;generalized     Pain: Body location Back;Foot;Shoulder     Pain Level at Best 1     Pain Level at Worst 10     Pain Comments average pain 3/10 in past 2 weeks     Nonverbal Indicators of Pain activity pattern change;guarding        Pre Activity Vital Signs    Pulse 94     /80     BP Location Left upper arm     BP Method Automatic     Patient Position Sitting                  PT - 03/21/22 0727        Physical Therapy    Physical Therapy Chronic Pain        PT Plan    Frequency of treatment 2 times/week     PT Duration 3 months     PT Custom Frequency and Duration 6-8 weeks planned unless her ankle wounds clear up and she get in the pool then we may extend time     PT Cert From 03/21/22     PT Cert To 06/19/22     Date PT POC was sent to provider 03/21/22     Signed PT Plan of Care received?  No                   Living Environment    Living Environment - 03/21/22 0727        Living Environment    People in Home spouse     Living Arrangements house     Living Environment Comment multi story home with accessible bathroom     Transportation Concerns car, none               PLOF:    Prior Level of Function - 03/21/22 0727        OTHER    Previous level of function Pt worked in , she enjoys yoga and dance. Pt notes she tries to continue with some yoga. Pt notes she was active with family and friends prior to pain issues               Outcome Measures     Special Tests - 03/21/22 0727        Outcome measures tracking    Outcome measure used: FGA, LEFS and 6 min walk test to be assessed               Sensory Tests    Sensory Testing - 03/21/22 0727        Sensory Assessment (Somatosensory)    Sensory Assessment (Somatosensory) bilateral LE     Bilateral LE Sensory Assessment impaired     Sensory Subjective Reports tingling   particularly with wt bearing              Skin    Skin Assessment - 03/21/22 0727        Skin    Skin Condition Impaired     Skin dressings Pt had  naveed aroind B ankles. She reports open wounds on bith feet. Did not have patient remove dressings today. She is following with a  wound specialist and has appointment today. There is small open woundss R>L second toe from claw toes               ROM    Range of Motion - 03/21/22 1000        RIGHT: Lower Extremity PROM Assessment    Hip Extension  5 degrees     Ankle Dorsiflexion  0 degrees     Ankle Plantarflexion  20 degrees        LEFT: Lower Extremity PROM Assessment    Hip Extension  5 degrees     Ankle Inversion  0 degrees     Ankle Eversion  10 degrees        RIGHT: Lower Extremity AROM Assessment    Right LE AROM normal WFL   except at the ankles/toes       LEFT: Lower Extremity AROM Assessment    Left LE AROM normal WFL   except ankles and toes              MMT    Manual Muscle Tests - 03/21/22 0727        RIGHT: Lower Extremity Manual Muscle Test Assessment    Right LE MMT comments: 4+/5 grossly except IR 4/5  ankle strength 4/5 grossly        LEFT: Lower Extremity Manual Muscle Test Assessment    Left LE MMT comments: 4+/5 and ankle 4/5               Transfers    Transfers - 03/21/22 0727        Bed Mobility    Bed Mobility bed mobility (all) activities     [place], All Bed Mobility Activities independent     [place], Supine to Sit independent     [place], Sit to Supine independent     Assistive Device none        Sit to Stand Transfer    [place], Sit to Stand Transfer independent        Stand to Sit Transfer    [place], Stand to Sit Transfer independent               Gait and Mobility    Gait and Mobility - 03/21/22 0727        Gait Training    [place], Gait independent     Variable surfaces Flat surface     Assistive Device none     Distance in Feet 50 feet     Pattern (Gait) step-through     Deviations/Abnormal Patterns (Gait) base of support, wide;bilateral deviations;antalgic;step length decreased     Comment (Gait/Stairs) limited heel strike and push off. Due to  lack of ankle DF pt demonstrates decreased hip extension B. She tries to improve stride length with ER at the hips L>R.     6 Minute Walk Test TBA        Stairs Assessment    [place], Stairs modified independence     Assistive Device railing     Handrail Location (Stairs) left side (ascending)     Number of Steps (Stairs) 11     Stair Height 7 inches     Ascending Stairs Technique step-over-step     Descending Stairs Technique step-to-step     Comment leads with L descending stairs, L leg appears weaker ascending the stairs. Pt would prefer to use B railings. She only has single rail at home               Balance/Posture    Balance and Posture - 03/21/22 6008        Balance Assessment    Comment, Balance static single leg stance unable to perform without UE assist. Tandem needed assist to get into position but once there can maintain 20 dec each leg in front        Postural Deviations    Postural Deviations head and neck;shoulder;low back     Head and Neck forward head     Shoulder left shoulder elevation     Low Back deviation;flattened     Comment, Postural Deviations mild scoliosis with trunk shift to the R        FGA     FGA Score (out of 30 total) --   TBA       Posture    Posture postural deviations                  Goals        Patient Stated    •  <enter goal here> (pt-stated)       Know how to properly stretch my foot and ankle           Other    •  Mutually agreed upon pain goal       Mutually agreed upon pain goal: decrease pain in feet to allow increased activity        •  PT chronic pain goals       Short and Long Term Goals Time Frame Result Comment/Progress   FGA , LEFS and 6 min walk test to be assessed and set appropriate goals 4 -5 weeks       Increase hip strength 1/2 mm grade 8 weeks     Single leg stance 5 sec either leg and tandem stance 30 sec with S to get into position 4-5 weeks     Increase hip extension to 8 degrees B and DF to 5 degrees B to improve gait quality 4-5 weeks     No  report of falls 8 weeks     Pt I in HEP and understand how to progress 8 weeks                                          TREATMENT PLAN:    Mary Moreno     Neuropathy, open wounds balance issues     Precautions open wounds at ankles/feet          THER ACT  CPT 83465 TOTAL TIME FOR SESSION Not performed           THER EX CPT 66619 TOTAL TIME FOR SESSION Not performed   Supine trunk rotation    Piriformis stretch    Hamstring stretch    Hip flexor stretch    Calf stretch    Toe extension    Pelvic tilts    Alternating leg raises    Straight leg raises    Ball squeezes     S/L hip abduction    clams    90-90's    bridging    squats May need wt under the heels   Bird dog    Eh Crunch    Cardio bike       NEURO RE-ED CPT 74871 TOTAL TIME FOR SESSION Not performed               GAIT TRAINING CPT 08700 TOTAL TIME FOR SESSION Not performed           MANUAL CPT 37615  Massage CPT 32684 TOTAL TIME FOR SESSION Not performed   Ankle and toes ROM as toelerated (pt has open wounds         MODALITIES  Heat /ice 34555 TOTAL TIME FOR SESSION Not performed                               ASSESSMENT:    This 66 y.o. year old female presents to PT with above stated diagnosis. Physical Therapy evaluation reveals pain, impaired balance, decreased ROM, decreased strength, impaired sensory feedback resulting in community/leisure, home management limitations. Mary Moreno will benefit from skilled PT services to address limitation, work towards rehab and patient goals and maximize PLOF of chosen ADLs.     Planned Services: The patient's treatment will include CPT 98071 Aquatic therapy/exercises, CPT 12855 Manual therapy, CPT 20458 Neuromuscular Reeducation, CPT 42127 Therapeutic exercises, CPT 64318 Therapeutic Massage, CPT 26827 Therapeutic activities, CPT 33059 Hot/Cold Packs therapy, .

## 2022-03-21 NOTE — PROGRESS NOTES
Referring Provider: By co-signing this Plan of Care (POC) either electronically or physically you agree to the following:    I have reviewed the the Plan of Care established by the therapist within this document and certify that the services are skilled and medically necessary. I have reviewed the plan and recommend that these services continue to meet the goals stated in this document.       EXTERNAL PROVIDER FAXING BACK:    PHYSICIAN SIGNATURE: __________________________________     DATE: ___________________   TIME: _________    IMPORTANT:  If returning this Plan of Care by fax, please fax back ONLY the signature page.   _____________________________________________________________________      [facility] Fax: [phone]      OT EVALUATION FOR OUTPATIENT THERAPY    Patient: Mary Moreno   MRN: [id number]  : 1955 66 y.o.  Referring Physician: Case Francois MD  Date of Visit: 3/21/2022    Certification Dates:   22 through 22    Recommended Frequency & Duration:  2 times/week for up to 8 weeks        Diagnosis:   1. Chronic pain syndrome        Chief Complaints:   Chief Complaint   Patient presents with   • Pain   • Dec ROM   • Dec Strength   • Decreased recreational/play activity   • Difficulty Walking   •  Difficulty Performing Work/school Tasks   • Poor Symptom Management       Precautions: fall    Past Medical History:   Past Medical History:   Diagnosis Date   • Celiac disease    • Fractures    • Osteopenia    • Pyoderma gangrenosum    • Ulcerative colitis (CMS/HCC)        Past Surgical History:   Past Surgical History:   Procedure Laterality Date   •  SECTION     • FOOT SURGERY     • HYSTERECTOMY     • SKIN GRAFT           LEARNING ASSESSMENT    Assessment completed: Yes      Relationship: Patient    Learning Barriers:  Learning barriers: No Barriers    Preferred Language: English     Needed: No    Learning New Concepts: Listening, Demonstration and  Pictures/Video    Education Provided: plan of care reviewed with good understanding demonstrated       CO-LEARNER ASSESSMENT:    Completed: No            OBJECTIVE MEASUREMENTS/DATA:    Time In Session:  Start Time: 0800  Stop Time: 0900  Time Calculation (min): 60 min   Assessment - 03/21/22 1310        Assessment    Plan of Care reviewed and patient/family in agreement Yes     System Pathology/Pathophysiology Noted musculoskeletal     Functional Limitations in Following Categories self-care;home management;community/leisure     Problem List: Occupational Therapy pain;ROM decreased;strength decreased;impaired balance;other (see comments)   limitied functional abilities/capacities; poor body mechanics/movement patterns; no established HEP    Rehab Potential/Prognosis: Occupational Therapy adequate, monitor progress closely     Clinical Assessment Patient presents with 9 year history of pain, decreased functional abilities and progressive inactivity.  Today's evaluation indicates impaired balance, decreased upper body ROM/strength, limitied functional capacities and no HEP performance.  She appears appropriate to begin a multi-disciplinary program with OT goals listed below.  Her general goals are more mobility, more flexibility and able to perform tasks/activities easier.  Eventually she would like to trial aquatics becuase it has been suggested to her but open wounds prohibit this at this time.     Planned Services CPT 18944 Therapeutic exercises;CPT 38157 Therapeutic activities;CPT 99989 Self-care/Home management training;CPT 71006 Hot/Cold Packs therapy                General Information - 03/21/22 1139        General Information    Document Type initial evaluation     Onset of Illness/Injury or Date of Surgery --   2013 hospitalized for the open wounds in B LE's    History of present illness/functional impairment Ms Moreno presents with neuropathy B legs and pyoderma gangrenosum the causes multi ulcers that  are painful.  She went on to have multiple surgeries in 2013 that led to a 6 month hospitalization. She has a few active ulcers around her ankle today and are wrapped with a dressing.  Pt also has history celiac disease, hx of R femoral neck fracture in 2020 resulting in R LISA, hx of right shoulder injury . Pt had lost weight and weighed 88 pounds due to the gut issues.  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 She is now able to get her heel down. She is reporting balance issues.  She is clawing her toes as well now and has develpoed some sores on the 2nd toe. Pt has been referred to the chronic pain program for comprehensive PT and OT evaluations.     Patient/Family/Caregiver Comments/Observations Pt arrived from PT eval ambulating with no assistive device.     Existing Precautions/Restrictions fall     Precautions comments open wounds on ankles B                Pain/Vitals - 03/21/22 1139        Pain Assessment    Currently in pain Yes     Preferred Pain Scale Chronic Pain     Pain Side/Orientation bilateral;generalized     Pain: Body location Back;Foot;Shoulder     Pain Level at Best 1     Pain Level at Worst 10     Pain Comments Average pain=3/10 in the past 2 weeks     Nonverbal Indicators of Pain activity pattern change;guarding        Pre Activity Vital Signs    Pulse 94     /80     BP Method Automatic     Patient Position Sitting                OT - 03/21/22 1139        Occupational Therapy Encounter Type Details    Occupational Therapy Chronic Pain     Document Type initial evaluation        OT Frequency and Duration    Frequency of treatment 2 times/week     OT Duration 8 weeks     OT Cert From 03/21/22     OT Cert To 06/04/22     Date OT POC was sent to provider 03/21/22     Signed OT Plan of Care received?  No                Falls Assessment - 03/21/22 1139        Initial Falls Assessment    One or more falls in the last year No                  PLOF:    Prior Level of Function - 03/21/22 1139        OTHER    Previous level of function Patient worked in , enjoyed yoga and dance ans was active with family/friends prior to pain issues               Living Environment    Living Environment - 03/21/22 1139        Living Environment    People in Home spouse     Living Arrangements house     Living Environment Comment multi-story home with accessible bathroom, stairs with handrails     Transportation Concerns car, none               Range of Motion     PROM/AROM - 03/21/22 1100        RIGHT: Upper Extremity AROM Assessment    Shoulder Flexion   160 degrees     Shoulder Abduction   160 degrees        LEFT: Upper Extremity AROM Assessment    Left UE AROM normal WFL        OTHER    Cervical/Lumbar Spine ROM comments Cervical AROM is WFL's t/o               MMT     Manual Muscle Tests - 03/21/22 1139        RIGHT: Upper Extremity Manual Muscle Test Assessment    Shoulder Flexion gross movement (4-/5) good minus     Shoulder Extension gross movement (4/5) good     Shoulder Adduction gross movement (4+/5) good plus     Shoulder Abduction gross movement (4-/5) good minus     Shoulder Internal Rotation gross movement (4/5) good     Shoulder External Rotation gross movement (4-/5) good minus     Elbow Flex gross movement (4+/5) good plus     Elbow Extension gross movement (4+/5) good plus     Forearm Supination gross movement (4+/5) good plus     Forearm Pronation gross movement (4+/5) good plus     Wrist Flexion gross movement (4+/5) good plus     Wrist Extension gross movement (4+/5) good plus        LEFT: Upper Extremity Manual Muscle Test Assessment    Shoulder Flexion gross movement (4+/5) good plus     Shoulder Extension gross movement (4+/5) good plus     Shoulder Adduction gross movement (4+/5) good plus     Shoulder Abduction gross movement (4+/5) good plus     Shoulder Internal Rotation gross movement (4+/5) good plus     Shoulder  External Rotation gross movement (4+/5) good plus     Elbow Flexion gross movement (4+/5) good plus     Elbow Extension gross movement (4+/5) good plus     Forearm Supination gross movement (4+/5) good plus     Forearm Pronation gross movement (4+/5) good plus     Wrist Flexion gross movement (4+/5) good plus     Wrist Extension gross movement (4+/5) good plus               Transfers     Bed Mobility/Transfers - 03/21/22 1139        Transfers    Comment Patient is independdnet with all transfers/changes of position however avoids getting down/up from floor because of foot/ankle pain is she does attempt.               BADL/IADL    BADL/IADL - 03/21/22 1139        BADL Interventions Assessment    Upper Body Dressing Independent     Lower Body Dressing Modified independence     Lower body dressing comments From seated position     Bathing Modified independence     Bathing comments Has renovated bathroom with steam shower with seat and hand bars to enter/exit     Toileting Modified independence     Toileting comments High toilet installed since hip fx     Grooming Independent        IADL/Home Interventions Assessment    Home management/maintenance Moderate assist (50% patient effort)     Home management/maintenance comments Tries to do most activities however does have a cleaning service and her  will do heavier tasks such as carrying groceries after shopping and all outside yard tasks     Meal prep / clean up Independent               Work and School     Work and School Assessment - 03/21/22 1139        Work/School/Leisure Assessment    Job Performance (comments) Stopped working in  in 2013 due to LE wounds and treatment     Leisure / Social Participation (comments) Enjoyed yoga and her and her  were doing ballroom dance however has not been able to do either regularly due to pain for some time     Exercise Assessment (comments) No formal HEP at this time               [facility]       Functional Assessment - 03/21/22 1159        Hand Dominance    Right Hand  Strength (Level 2) 42#     Left Hand  Strength (Level 2) 44#        Weight Capacity (in lbs)    Lift: Floor to Waist (max effort) Tested to 12.5# max effort     Push: Dynamic - 10 ft Light grocery cart max reported     Pull: Dynamic - 10 ft Light grocery cart max reported     Carry: Front at Waist 50 ft Tested to 12.5# max effort        Flexibility/Positional    Kneel - 1 min Unable     [place] - deep static - 1 min Unable        Static Work    Static Work: Sitting  WFL's reported/demonstrated     Static Work: Standing   min periods max reported        Ambulation    Ambulation 20-30 min periods max reported        Able to perform functional movements:    Ability to Move Into and Out of Kneel, Bend, and Squat) Guarded movement patterns t/o evaluation and guarding of right hip/LE during functional testing                   GOALS:     Goals     • OT [facility]       Goals Time Frame Result Comment/Progress   Establish basic HEP of mostly stretching for better pain control  4-6 wks     Increase R UE AROM to WFL's for ease of ADL performance  4-6 wks     Improve body mechanics to 70% or better while in therapy for improved pain control  4-6 wks     Increase bilat UE strength by 1/2 muscle grade t/o for participation in more leisure/social activities  4-6 wks     Independent use of appropriate modalities, use of good body mechanics, use of good pacing and use of HEP performance to keep worse pain ratings <10/10  6-8 wks     Improve ability to move into and out of all positions including up/down from floor for ease of pet care   6-8 wks     Independent with full HEP and able to transition to community fitness/yoga program if desired  6-8 wks     Improve lifting/carrying abilities to 15-20# in all planes without fear of falling for improved HHA and leisure activity performance  6-8 wks                     TREATMENT PLAN:            This  66 y.o. year old female presents to OT with above stated diagnosis. Occupational Therapy evaluation reveals pain, ROM decreased, strength decreased, impaired balance, other (see comments) (limitied functional abilities/capacities; poor body mechanics/movement patterns; no established HEP) resulting in self-care, home management, community/leisure limitations. Mary Moreno will benefit from skilled OT services to address limitation, work towards rehab and patient goals and maximize PLOF of chosen ADLs.     Planned Services: The patient’s treatment will include CPT 42969 Therapeutic exercises, CPT 67869 Therapeutic activities, CPT 22166 Self-care/Home management training, CPT 30822 Hot/Cold Packs therapy, .

## 2022-03-21 NOTE — OP PT TREATMENT LOG
Mary Moreno     Neuropathy, open wounds balance issues     Precautions open wounds at ankles/feet          THER ACT  CPT 32033 TOTAL TIME FOR SESSION Not performed           THER EX CPT 73897 TOTAL TIME FOR SESSION Not performed   Supine trunk rotation    Piriformis stretch    Hamstring stretch    Hip flexor stretch    Calf stretch    Toe extension    Pelvic tilts    Alternating leg raises    Straight leg raises    Ball squeezes     S/L hip abduction    clams    90-90's    bridging    squats May need wt under the heels   Bird dog    Eh Crunch    Cardio bike       NEURO RE-ED CPT 16803 TOTAL TIME FOR SESSION Not performed               GAIT TRAINING CPT 63810 TOTAL TIME FOR SESSION Not performed           MANUAL CPT 58573  Massage CPT 14276 TOTAL TIME FOR SESSION Not performed   Ankle and toes ROM as toelerated (pt has open wounds         MODALITIES  Heat /ice 87445 TOTAL TIME FOR SESSION Not performed

## 2022-03-21 NOTE — Clinical Note
[address] [place] [address]  [phone]      Dear DR. Francois,      Thank you for this referral. Please review the attached notes and plan of care for your approval.  Please contact our department with any questions.     Sincerely,     Mata Sellers, OT    Referring Provider: By co-signing this Plan of Care (POC) either electronically or physically you agree to the following:    I have reviewed the the Plan of Care established by the therapist within this document and certify that the services are skilled and medically necessary. I have reviewed the plan and recommend that these services continue to meet the goals stated in this document.       EXTERNAL PROVIDER FAXING BACK:    PHYSICIAN SIGNATURE: __________________________________     DATE: ___________________   TIME: _________    IMPORTANT:  If returning this Plan of Care by fax, please fax back ONLY the signature page.   _____________________________________________________________________      [facility] Fax: [phone]      OT EVALUATION FOR OUTPATIENT THERAPY    Patient: Mary Moreno   MRN: [id number]  : 1955 66 y.o.  Referring Physician: Case Francois MD  Date of Visit: 3/21/2022    Certification Dates:   22 through 22    Recommended Frequency & Duration:  2 times/week for up to 8 weeks        Diagnosis:   1. Chronic pain syndrome        Chief Complaints:   Chief Complaint   Patient presents with   • Pain   • Dec ROM   • Dec Strength   • Decreased recreational/play activity   • Difficulty Walking   •  Difficulty Performing Work/school Tasks   • Poor Symptom Management       Precautions: fall    Past Medical History:   Past Medical History:   Diagnosis Date   • Celiac disease    • Fractures    • Osteopenia    • Pyoderma gangrenosum    • Ulcerative colitis (CMS/HCC)        Past Surgical History:   Past Surgical History:   Procedure Laterality Date   •  SECTION     • FOOT SURGERY     • HYSTERECTOMY     •  SKIN GRAFT           LEARNING ASSESSMENT    Assessment completed: Yes      Relationship: Patient    Learning Barriers:  Learning barriers: No Barriers    Preferred Language: English     Needed: No    Learning New Concepts: Listening, Demonstration and Pictures/Video    Education Provided: plan of care reviewed with good understanding demonstrated       CO-LEARNER ASSESSMENT:    Completed: No            OBJECTIVE MEASUREMENTS/DATA:    Time In Session:  Start Time: 0800  Stop Time: 0900  Time Calculation (min): 60 min   Assessment - 03/21/22 1310        Assessment    Plan of Care reviewed and patient/family in agreement Yes     System Pathology/Pathophysiology Noted musculoskeletal     Functional Limitations in Following Categories self-care;home management;community/leisure     Problem List: Occupational Therapy pain;ROM decreased;strength decreased;impaired balance;other (see comments)   limitied functional abilities/capacities; poor body mechanics/movement patterns; no established HEP    Rehab Potential/Prognosis: Occupational Therapy adequate, monitor progress closely     Clinical Assessment Patient presents with 9 year history of pain, decreased functional abilities and progressive inactivity.  Today's evaluation indicates impaired balance, decreased upper body ROM/strength, limitied functional capacities and no HEP performance.  She appears appropriate to begin a multi-disciplinary program with OT goals listed below.  Her general goals are more mobility, more flexibility and able to perform tasks/activities easier.  Eventually she would like to trial aquatics becuase it has been suggested to her but open wounds prohibit this at this time.     Planned Services CPT 76150 Therapeutic exercises;CPT 36723 Therapeutic activities;CPT 33331 Self-care/Home management training;CPT 01538 Hot/Cold Packs therapy                General Information - 03/21/22 6776        General Information    Document Type initial  evaluation     Onset of Illness/Injury or Date of Surgery --   2013 hospitalized for the open wounds in B LE's    History of present illness/functional impairment Ms Moreno presents with neuropathy B legs and pyoderma gangrenosum the causes multi ulcers that are painful.  She went on to have multiple surgeries in 2013 that led to a 6 month hospitalization. She has a few active ulcers around her ankle today and are wrapped with a dressing.  Pt also has history celiac disease, hx of R femoral neck fracture in 2020 resulting in R LISA, hx of right shoulder injury . Pt had lost weight and weighed 88 pounds due to the gut issues.  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 She is now able to get her heel down. She is reporting balance issues.  She is clawing her toes as well now and has develpoed some sores on the 2nd toe. Pt has been referred to the chronic pain program for comprehensive PT and OT evaluations.     Patient/Family/Caregiver Comments/Observations Pt arrived from PT eval ambulating with no assistive device.     Existing Precautions/Restrictions fall     Precautions comments open wounds on ankles B                Pain/Vitals - 03/21/22 1139        Pain Assessment    Currently in pain Yes     Preferred Pain Scale Chronic Pain     Pain Side/Orientation bilateral;generalized     Pain: Body location Back;Foot;Shoulder     Pain Level at Best 1     Pain Level at Worst 10     Pain Comments Average pain=3/10 in the past 2 weeks     Nonverbal Indicators of Pain activity pattern change;guarding        Pre Activity Vital Signs    Pulse 94     /80     BP Method Automatic     Patient Position Sitting                OT - 03/21/22 1130        Occupational Therapy Encounter Type Details    Occupational Therapy Chronic Pain     Document Type initial evaluation        OT Frequency and Duration    Frequency of treatment 2 times/week     OT Duration 8 weeks      OT Cert From 03/21/22     OT Cert To 06/04/22     Date OT POC was sent to provider 03/21/22     Signed OT Plan of Care received?  No                Falls Assessment - 03/21/22 1139        Initial Falls Assessment    One or more falls in the last year No                 PLOF:    Prior Level of Function - 03/21/22 1139        OTHER    Previous level of function Patient worked in , enjoyed yoga and dance ans was active with family/friends prior to pain issues               Living Environment    Living Environment - 03/21/22 1139        Living Environment    People in Home spouse     Living Arrangements house     Living Environment Comment multi-story home with accessible bathroom, stairs with handrails     Transportation Concerns car, none               Range of Motion     PROM/AROM - 03/21/22 1100        RIGHT: Upper Extremity AROM Assessment    Shoulder Flexion   160 degrees     Shoulder Abduction   160 degrees        LEFT: Upper Extremity AROM Assessment    Left UE AROM normal WFL        OTHER    Cervical/Lumbar Spine ROM comments Cervical AROM is WFL's t/o               MMT     Manual Muscle Tests - 03/21/22 1139        RIGHT: Upper Extremity Manual Muscle Test Assessment    Shoulder Flexion gross movement (4-/5) good minus     Shoulder Extension gross movement (4/5) good     Shoulder Adduction gross movement (4+/5) good plus     Shoulder Abduction gross movement (4-/5) good minus     Shoulder Internal Rotation gross movement (4/5) good     Shoulder External Rotation gross movement (4-/5) good minus     Elbow Flex gross movement (4+/5) good plus     Elbow Extension gross movement (4+/5) good plus     Forearm Supination gross movement (4+/5) good plus     Forearm Pronation gross movement (4+/5) good plus     Wrist Flexion gross movement (4+/5) good plus     Wrist Extension gross movement (4+/5) good plus        LEFT: Upper Extremity Manual Muscle Test Assessment    Shoulder Flexion gross movement  (4+/5) good plus     Shoulder Extension gross movement (4+/5) good plus     Shoulder Adduction gross movement (4+/5) good plus     Shoulder Abduction gross movement (4+/5) good plus     Shoulder Internal Rotation gross movement (4+/5) good plus     Shoulder External Rotation gross movement (4+/5) good plus     Elbow Flexion gross movement (4+/5) good plus     Elbow Extension gross movement (4+/5) good plus     Forearm Supination gross movement (4+/5) good plus     Forearm Pronation gross movement (4+/5) good plus     Wrist Flexion gross movement (4+/5) good plus     Wrist Extension gross movement (4+/5) good plus               Transfers     Bed Mobility/Transfers - 03/21/22 1139        Transfers    Comment Patient is independdnet with all transfers/changes of position however avoids getting down/up from floor because of foot/ankle pain is she does attempt.               BADL/IADL    BADL/IADL - 03/21/22 1139        BADL Interventions Assessment    Upper Body Dressing Independent     Lower Body Dressing Modified independence     Lower body dressing comments From seated position     Bathing Modified independence     Bathing comments Has renovated bathroom with steam shower with seat and hand bars to enter/exit     Toileting Modified independence     Toileting comments High toilet installed since hip fx     Grooming Independent        IADL/Home Interventions Assessment    Home management/maintenance Moderate assist (50% patient effort)     Home management/maintenance comments Tries to do most activities however does have a cleaning service and her  will do heavier tasks such as carrying groceries after shopping and all outside yard tasks     Meal prep / clean up Independent               Work and School     Work and School Assessment - 03/21/22 1139        Work/School/Leisure Assessment    Job Performance (comments) Stopped working in  in 2013 due to LE wounds and treatment     Leisure / Social  Participation (comments) Enjoyed yoga and her and her  were doing ballroom dance however has not been able to do either regularly due to pain for some time     Exercise Assessment (comments) No formal HEP at this time               [facility]      Functional Assessment - 03/21/22 1159        Hand Dominance    Right Hand  Strength (Level 2) 42#     Left Hand  Strength (Level 2) 44#        Weight Capacity (in lbs)    Lift: Floor to Waist (max effort) Tested to 12.5# max effort     Push: Dynamic - 10 ft Light grocery cart max reported     Pull: Dynamic - 10 ft Light grocery cart max reported     Carry: Front at Waist 50 ft Tested to 12.5# max effort        Flexibility/Positional    Kneel - 1 min Unable     Devon - deep static - 1 min Unable        Static Work    Static Work: Sitting  WFL's reported/demonstrated     Static Work: Standing   min periods max reported        Ambulation    Ambulation 20-30 min periods max reported        Able to perform functional movements:    Ability to Move Into and Out of Kneel, Bend, and Squat) Guarded movement patterns t/o evaluation and guarding of right hip/LE during functional testing                   GOALS:     Goals     • OT [facility]       Goals Time Frame Result Comment/Progress   Establish basic HEP of mostly stretching for better pain control  4-6 wks     Increase R UE AROM to WFL's for ease of ADL performance  4-6 wks     Improve body mechanics to 70% or better while in therapy for improved pain control  4-6 wks     Increase bilat UE strength by 1/2 muscle grade t/o for participation in more leisure/social activities  4-6 wks     Independent use of appropriate modalities, use of good body mechanics, use of good pacing and use of HEP performance to keep worse pain ratings <10/10  6-8 wks     Improve ability to move into and out of all positions including up/down from floor for ease of pet care   6-8 wks     Independent with full HEP and able to  transition to community fitness/yoga program if desired  6-8 wks     Improve lifting/carrying abilities to 15-20# in all planes without fear of falling for improved HHA and leisure activity performance  6-8 wks                     TREATMENT PLAN:            This 66 y.o. year old female presents to OT with above stated diagnosis. Occupational Therapy evaluation reveals pain, ROM decreased, strength decreased, impaired balance, other (see comments) (limitied functional abilities/capacities; poor body mechanics/movement patterns; no established HEP) resulting in self-care, home management, community/leisure limitations. Mary Moreno will benefit from skilled OT services to address limitation, work towards rehab and patient goals and maximize PLOF of chosen ADLs.     Planned Services: The patient’s treatment will include CPT 25980 Therapeutic exercises, CPT 11457 Therapeutic activities, CPT 34583 Self-care/Home management training, CPT 36368 Hot/Cold Packs therapy, .

## 2022-03-21 NOTE — Clinical Note
[address] [place] [address]  [phone]      Dear DR. Francois,      Thank you for this referral. Please review the attached notes and plan of care for your approval.  Please contact our department with any questions.     Sincerely,     Yamilka Garcia, PT      Referring Provider: By co-signing this Plan of Care (POC) either electronically or physically you agree to the following:    I have reviewed the the Plan of Care established by the therapist within this document and certify that the services are skilled and medically necessary. I have reviewed the plan and recommend that these services continue to meet the goals stated in this document.       EXTERNAL PROVIDER FAXING BACK:    PHYSICIAN SIGNATURE: __________________________________     DATE: ___________________  TIME: _____________    IMPORTANT:  If returning this Plan of Care by fax, please fax back ONLY the signature page.   _________________________________________________________________________      [facility] Fax: [phone]        PT EVALUATION FOR OUTPATIENT THERAPY    Patient: Mary Moreno    MRN: [id number]  : 1955 66 y.o.   Referring Physician: Case Francois MD  Date of Visit: 3/21/2022      Certification Dates:  22 through 22  6-8 weeks planned unless her ankle wounds clear up and she get in the pool then we may extend time      Recommended Frequency & Duration:  2 times/week for up to 3 months     Diagnosis:   1. Chronic pain syndrome        Chief Complaints:   Chief Complaint   Patient presents with   • Pain       Precautions: fall    Past Medical History:   Past Medical History:   Diagnosis Date   • Celiac disease    • Fractures    • Osteopenia    • Pyoderma gangrenosum    • Ulcerative colitis (CMS/HCC)        Past Surgical History:   Past Surgical History:   Procedure Laterality Date   •  SECTION     • FOOT SURGERY     • HYSTERECTOMY     • SKIN GRAFT           LEARNING ASSESSMENT    Assessment  completed:  Yes    Learner name:  Amisha Moreno    Relationship: Patient    Learning Barriers:  Learning barriers: No Barriers    Preferred Language: English     Needed: No    Learning New Concepts: Listening, Demonstration and Pictures/Video            CO-LEARNER ASSESSMENT:    Completed: No            OBJECTIVE MEASUREMENTS/DATA:    Time In Session:  Start Time: 0715  Stop Time: 0800  Time Calculation (min): 45 min   Assessment and Plan - 03/21/22 0727        Assessment    Plan of Care reviewed and patient/family in agreement Yes     System Pathology/Pathophysiology Noted musculoskeletal;neuromuscular;integumentary     Functional Limitations in Following Categories (PT Eval) community/leisure;home management     Rehab Potential/Prognosis adequate, monitor progress closely;re-evaluate goals as necessary     Problem List pain;impaired balance;decreased ROM;decreased strength;impaired sensory feedback     Clinical Assessment Ms Moreno presents with neuropathy B legs and pyoderma gangrenosum the causes multi ulcers that are painful.  She went on to have multiple surgeries in 2013 that led to a 6 month hospitalization. She has a few active ulcers around her ankle today and are wrapped with a dressing.  Pt also has history celiac disease, hx of R femoral neck fracture in 2020 resulting in R LISA . Pt had lost weight and weighed 88 pounds due to the gut issues.  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 SHe is now able to get her heel down. She is reporting balance issues.  She is clawing her toes as well now and has develpoed some sores on the 2nd toe. Pt has been referred to the chronic pain program for comprehensive PT and OT evaluations. Pt demonstrated minimal DF B as well as limited PF inversion and eversion. There are some strength deficits as well in the LE and core. Hip extension limited B with + gait dysfunction and balance deficits.   Formal FGA and 6 min walk test to be assessed in future visits. Pt is approprate for PT to work on strength and mobility/balance and gait. Pt would like to do aquatics but has open wounds at this time. If her woulds close we can add aquatics pending release from MD.     Plan and Recommendations 6-8 weeks 2x/week. We will start with land therapy and add pool if wounds close. and possible add more appointments.     Planned Services CPT 65585 Aquatic therapy/exercises;CPT 65075 Manual therapy;CPT 84820 Neuromuscular Reeducation;CPT 17123 Therapeutic exercises;CPT 81673 Therapeutic Massage;CPT 30416 Therapeutic activities;CPT 30027 Hot/Cold Packs therapy                General Information - 03/21/22 5827        Session Details    Document Type initial evaluation     Mode of Treatment individual therapy;physical therapy     Patient/Family/Caregiver Comments/Observations Pt arrived 15 min late for appointment     OP Specialty --   chronic pain       General Information    Onset of Illness/Injury or Date of Surgery --   2013 hospitalized for the open wounds in [facility]'s    Referring Physician Case Francois MD     History of present illness/functional impairment Ms Moreno presents with neuropathy B legs and pyoderma gangrenosum the causes multi ulcers that are painful.  She went on to have multiple surgeries in 2013 that led to a 6 month hospitalization. She has a few active ulcers around her ankle today and are wrapped with a dressing.  Pt also has history celiac disease, hx of R femoral neck fracture in 2020 resulting in R LISA . Pt had lost weight and weighed 88 pounds due to the gut issues.  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 SHe is now able to get her heel down. She is reporting balance issues.  She is clawing her toes as well now and has develpoed some sores on the 2nd toe. Pt has been referred to the chronic pain program for comprehensive PT and  OT evaluations.     Existing Precautions/Restrictions fall     Precautions comments open wounds on ankles B                Pain/Vitals - 03/21/22 0727        Pain Assessment    Currently in pain Yes     Preferred Pain Scale Chronic Pain     Pain Side/Orientation bilateral;generalized     Pain: Body location Back;Foot;Shoulder     Pain Level at Best 1     Pain Level at Worst 10     Pain Comments average pain 3/10 in past 2 weeks     Nonverbal Indicators of Pain activity pattern change;guarding        Pre Activity Vital Signs    Pulse 94     /80     BP Location Left upper arm     BP Method Automatic     Patient Position Sitting                  PT - 03/21/22 0727        Physical Therapy    Physical Therapy Chronic Pain        PT Plan    Frequency of treatment 2 times/week     PT Duration 3 months     PT Custom Frequency and Duration 6-8 weeks planned unless her ankle wounds clear up and she get in the pool then we may extend time     PT Cert From 03/21/22     PT Cert To 06/19/22     Date PT POC was sent to provider 03/21/22     Signed PT Plan of Care received?  No                   Living Environment    Living Environment - 03/21/22 0727        Living Environment    People in Home spouse     Living Arrangements house     Living Environment Comment multi story home with accessible bathroom     Transportation Concerns car, none               PLOF:    Prior Level of Function - 03/21/22 0727        OTHER    Previous level of function Pt worked in , she enjoys yoga and dance. Pt notes she tries to continue with some yoga. Pt notes she was active with family and friends prior to pain issues               Outcome Measures     Special Tests - 03/21/22 0727        Outcome measures tracking    Outcome measure used: FGA, LEFS and 6 min walk test to be assessed               Sensory Tests    Sensory Testing - 03/21/22 0727        Sensory Assessment (Somatosensory)    Sensory Assessment (Somatosensory)  bilateral LE     Bilateral LE Sensory Assessment impaired     Sensory Subjective Reports tingling   particularly with wt bearing              Skin    Skin Assessment - 03/21/22 0727        Skin    Skin Condition Impaired     Skin dressings Pt had sressiongs aroind B ankles. She reports open wounds on bith feet. Did not have patient remove dressings today. She is following with a  wound specialist and has appointment today. There is small open woundss R>L second toe from claw toes               ROM    Range of Motion - 03/21/22 1000        RIGHT: Lower Extremity PROM Assessment    Hip Extension  5 degrees     Ankle Dorsiflexion  0 degrees     Ankle Plantarflexion  20 degrees        LEFT: Lower Extremity PROM Assessment    Hip Extension  5 degrees     Ankle Inversion  0 degrees     Ankle Eversion  10 degrees        RIGHT: Lower Extremity AROM Assessment    Right LE AROM normal WFL   except at the ankles/toes       LEFT: Lower Extremity AROM Assessment    Left LE AROM normal WFL   except ankles and toes              MMT    Manual Muscle Tests - 03/21/22 0727        RIGHT: Lower Extremity Manual Muscle Test Assessment    Right LE MMT comments: 4+/5 grossly except IR 4/5  ankle strength 4/5 grossly        LEFT: Lower Extremity Manual Muscle Test Assessment    Left LE MMT comments: 4+/5 and ankle 4/5               Transfers    Transfers - 03/21/22 0727        Bed Mobility    Bed Mobility bed mobility (all) activities     [place], All Bed Mobility Activities independent     [place], Supine to Sit independent     [place], Sit to Supine independent     Assistive Device none        Sit to Stand Transfer    [place], Sit to Stand Transfer independent        Stand to Sit Transfer    [place], Stand to Sit Transfer independent               Gait and Mobility    Gait and Mobility - 03/21/22 0727        Gait Training    [place], Gait independent     Variable surfaces Flat surface     Assistive Device  none     Distance in Feet 50 feet     Pattern (Gait) step-through     Deviations/Abnormal Patterns (Gait) base of support, wide;bilateral deviations;antalgic;step length decreased     Comment (Gait/Stairs) limited heel strike and push off. Due to lack of ankle DF pt demonstrates decreased hip extension B. She tries to improve stride length with ER at the hips L>R.     6 Minute Walk Test TBA        Stairs Assessment    [place], Stairs modified independence     Assistive Device railing     Handrail Location (Stairs) left side (ascending)     Number of Steps (Stairs) 11     Stair Height 7 inches     Ascending Stairs Technique step-over-step     Descending Stairs Technique step-to-step     Comment leads with L descending stairs, L leg appears weaker ascending the stairs. Pt would prefer to use B railings. She only has single rail at home               Balance/Posture    Balance and Posture - 03/21/22 0728        Balance Assessment    Comment, Balance static single leg stance unable to perform without UE assist. Tandem needed assist to get into position but once there can maintain 20 dec each leg in front        Postural Deviations    Postural Deviations head and neck;shoulder;low back     Head and Neck forward head     Shoulder left shoulder elevation     Low Back deviation;flattened     Comment, Postural Deviations mild scoliosis with trunk shift to the R        FGA     FGA Score (out of 30 total) --   TBA       Posture    Posture postural deviations                  Goals        Patient Stated    •  <enter goal here> (pt-stated)       Know how to properly stretch my foot and ankle           Other    •  Mutually agreed upon pain goal       Mutually agreed upon pain goal: decrease pain in feet to allow increased activity        •  PT chronic pain goals       Short and Long Term Goals Time Frame Result Comment/Progress   FGA , LEFS and 6 min walk test to be assessed and set appropriate goals 4 -5 weeks        Increase hip strength 1/2 mm grade 8 weeks     Single leg stance 5 sec either leg and tandem stance 30 sec with S to get into position 4-5 weeks     Increase hip extension to 8 degrees B and DF to 5 degrees B to improve gait quality 4-5 weeks     No report of falls 8 weeks     Pt I in HEP and understand how to progress 8 weeks                                          TREATMENT PLAN:    Mary Moreno     Neuropathy, open wounds balance issues     Precautions open wounds at ankles/feet          THER ACT  CPT 54093 TOTAL TIME FOR SESSION Not performed           THER EX CPT 16961 TOTAL TIME FOR SESSION Not performed   Supine trunk rotation    Piriformis stretch    Hamstring stretch    Hip flexor stretch    Calf stretch    Toe extension    Pelvic tilts    Alternating leg raises    Straight leg raises    Ball squeezes     S/L hip abduction    clams    90-90's    bridging    squats May need wt under the heels   Bird dog    Eh Crunch    Cardio bike       NEURO RE-ED CPT 10542 TOTAL TIME FOR SESSION Not performed               GAIT TRAINING CPT 29365 TOTAL TIME FOR SESSION Not performed           MANUAL CPT 68863  Massage CPT 12802 TOTAL TIME FOR SESSION Not performed   Ankle and toes ROM as toelerated (pt has open wounds         MODALITIES  Heat /ice 73935 TOTAL TIME FOR SESSION Not performed                               ASSESSMENT:    This 66 y.o. year old female presents to PT with above stated diagnosis. Physical Therapy evaluation reveals pain, impaired balance, decreased ROM, decreased strength, impaired sensory feedback resulting in community/leisure, home management limitations. Mary Moreno will benefit from skilled PT services to address limitation, work towards rehab and patient goals and maximize PLOF of chosen ADLs.     Planned Services: The patient's treatment will include CPT 27990 Aquatic therapy/exercises, CPT 58808 Manual therapy, CPT 03269 Neuromuscular Reeducation, CPT 74659 Therapeutic  exercises, CPT 60268 Therapeutic Massage, CPT 31892 Therapeutic activities, CPT 22497 Hot/Cold Packs therapy, .

## 2022-03-23 ENCOUNTER — RX ONLY (RX ONLY)
Age: 67
End: 2022-03-23

## 2022-03-23 RX ORDER — TRIAMCINOLONE ACETONIDE 1 MG/G
CREAM TOPICAL
Qty: 3 | Refills: 5 | Status: ERX | COMMUNITY
Start: 2022-03-23

## 2022-03-28 ENCOUNTER — APPOINTMENT (OUTPATIENT)
Dept: LAB | Facility: CLINIC | Age: 67
End: 2022-03-28
Attending: SPECIALIST
Payer: MEDICARE

## 2022-03-28 ENCOUNTER — TRANSCRIBE ORDERS (OUTPATIENT)
Dept: LAB | Facility: CLINIC | Age: 67
End: 2022-03-28

## 2022-03-28 ENCOUNTER — HOSPITAL ENCOUNTER (OUTPATIENT)
Dept: OCCUPATIONAL THERAPY | Facility: REHABILITATION | Age: 67
Setting detail: THERAPIES SERIES
Discharge: HOME | End: 2022-03-28
Attending: PHYSICAL MEDICINE & REHABILITATION
Payer: MEDICARE

## 2022-03-28 DIAGNOSIS — E55.9 VITAMIN D DEFICIENCY, UNSPECIFIED: ICD-10-CM

## 2022-03-28 DIAGNOSIS — L88 PYODERMA GANGRENOSUM: Primary | ICD-10-CM

## 2022-03-28 DIAGNOSIS — R20.0 ANESTHESIA OF SKIN: ICD-10-CM

## 2022-03-28 DIAGNOSIS — M81.0 AGE-RELATED OSTEOPOROSIS WITHOUT CURRENT PATHOLOGICAL FRACTURE: ICD-10-CM

## 2022-03-28 DIAGNOSIS — Z51.81 ENCOUNTER FOR THERAPEUTIC DRUG LEVEL MONITORING: ICD-10-CM

## 2022-03-28 DIAGNOSIS — G89.4 CHRONIC PAIN SYNDROME: Primary | ICD-10-CM

## 2022-03-28 DIAGNOSIS — D47.2 MONOCLONAL GAMMOPATHY: ICD-10-CM

## 2022-03-28 DIAGNOSIS — R20.0 ANESTHESIA OF SKIN: Primary | ICD-10-CM

## 2022-03-28 DIAGNOSIS — L88 PYODERMA GANGRENOSUM: ICD-10-CM

## 2022-03-28 LAB
25(OH)D3 SERPL-MCNC: 128 NG/ML (ref 30–100)
ALBUMIN SERPL-MCNC: 4 G/DL (ref 3.4–5)
ALP SERPL-CCNC: 150 IU/L (ref 35–126)
ALT SERPL-CCNC: 32 IU/L (ref 11–54)
ANION GAP SERPL CALC-SCNC: 15 MEQ/L (ref 3–15)
AST SERPL-CCNC: 65 IU/L (ref 15–41)
BASOPHILS # BLD: 0.09 K/UL (ref 0.01–0.1)
BASOPHILS NFR BLD: 1.3 %
BILIRUB SERPL-MCNC: 1 MG/DL (ref 0.3–1.2)
BUN SERPL-MCNC: 10 MG/DL (ref 8–20)
CALCIUM SERPL-MCNC: 9.6 MG/DL (ref 8.9–10.3)
CHLORIDE SERPL-SCNC: 103 MEQ/L (ref 98–109)
CO2 SERPL-SCNC: 21 MEQ/L (ref 22–32)
CREAT SERPL-MCNC: 0.5 MG/DL (ref 0.6–1.1)
CRP SERPL-MCNC: <6 MG/L
DIFFERENTIAL METHOD BLD: ABNORMAL
EOSINOPHIL # BLD: 0.28 K/UL (ref 0.04–0.36)
EOSINOPHIL NFR BLD: 4 %
ERYTHROCYTE [DISTWIDTH] IN BLOOD BY AUTOMATED COUNT: 13.9 % (ref 11.7–14.4)
ERYTHROCYTE [SEDIMENTATION RATE] IN BLOOD BY WESTERGREN METHOD: 22 MM/HR
GFR SERPL CREATININE-BSD FRML MDRD: >60 ML/MIN/1.73M*2
GLUCOSE SERPL-MCNC: 106 MG/DL (ref 70–99)
HCT VFR BLDCO AUTO: 37.9 % (ref 35–45)
HGB BLD-MCNC: 12.3 G/DL (ref 11.8–15.7)
IMM GRANULOCYTES # BLD AUTO: 0.03 K/UL (ref 0–0.08)
IMM GRANULOCYTES NFR BLD AUTO: 0.4 %
LYMPHOCYTES # BLD: 2.57 K/UL (ref 1.2–3.5)
LYMPHOCYTES NFR BLD: 36.8 %
MCH RBC QN AUTO: 35.3 PG (ref 28–33.2)
MCHC RBC AUTO-ENTMCNC: 32.5 G/DL (ref 32.2–35.5)
MCV RBC AUTO: 108.9 FL (ref 83–98)
MONOCYTES # BLD: 0.6 K/UL (ref 0.28–0.8)
MONOCYTES NFR BLD: 8.6 %
NEUTROPHILS # BLD: 3.41 K/UL (ref 1.7–7)
NEUTS SEG NFR BLD: 48.9 %
NRBC BLD-RTO: 0 %
PDW BLD AUTO: 9.9 FL (ref 9.4–12.3)
PLATELET # BLD AUTO: 298 K/UL (ref 150–369)
POTASSIUM SERPL-SCNC: 4.1 MEQ/L (ref 3.6–5.1)
PROT SERPL-MCNC: 6.7 G/DL (ref 6–8.2)
RBC # BLD AUTO: 3.48 M/UL (ref 3.93–5.22)
SODIUM SERPL-SCNC: 139 MEQ/L (ref 136–144)
VIT B12 SERPL-MCNC: 1223 PG/ML (ref 180–914)
WBC # BLD AUTO: 6.98 K/UL (ref 3.8–10.5)

## 2022-03-28 PROCEDURE — 97110 THERAPEUTIC EXERCISES: CPT | Mod: GP

## 2022-03-28 PROCEDURE — 85025 COMPLETE CBC W/AUTO DIFF WBC: CPT

## 2022-03-28 PROCEDURE — 80053 COMPREHEN METABOLIC PANEL: CPT

## 2022-03-28 PROCEDURE — 36415 COLL VENOUS BLD VENIPUNCTURE: CPT

## 2022-03-28 PROCEDURE — 85652 RBC SED RATE AUTOMATED: CPT

## 2022-03-28 PROCEDURE — 82652 VIT D 1 25-DIHYDROXY: CPT

## 2022-03-28 PROCEDURE — 84165 PROTEIN E-PHORESIS SERUM: CPT

## 2022-03-28 PROCEDURE — 86334 IMMUNOFIX E-PHORESIS SERUM: CPT

## 2022-03-28 PROCEDURE — 86140 C-REACTIVE PROTEIN: CPT

## 2022-03-28 PROCEDURE — 84166 PROTEIN E-PHORESIS/URINE/CSF: CPT

## 2022-03-28 PROCEDURE — 86335 IMMUNFIX E-PHORSIS/URINE/CSF: CPT

## 2022-03-28 PROCEDURE — 82607 VITAMIN B-12: CPT

## 2022-03-28 PROCEDURE — 82306 VITAMIN D 25 HYDROXY: CPT

## 2022-03-28 NOTE — PROGRESS NOTES
PT DAILY NOTE FOR OUTPATIENT THERAPY    Patient: Mary Moreno   MRN: [id number]  : 1955 66 y.o.  Referring Physician: Case Francois MD  Date of Visit: 3/28/2022    Certification Dates: 22 through 22    Diagnosis:   1. Chronic pain syndrome        Chief Complaints:  balance, pain , deconditioned    Precautions:   Existing Precautions/Restrictions: fall     TODAY'S VISIT    Time In Session:  Start Time: 08  Stop Time: 900  Time Calculation (min): 55 min   History/Vitals/Pain/Encounter Info - 22 0814        Injury History/Precautions/Daily Required Info    Document Type initial evaluation     Primary Therapist Yamilka SALCEDO     Chief Complaint/Reason for Visit  balance, pain , deconditioned     Onset of Illness/Injury or Date of Surgery --    hospitalized for the open wounds in [facility]    Referring Physician Case Francois MD     Existing Precautions/Restrictions fall     History of present illness/functional impairment Ms Moreno presents with neuropathy B legs and pyoderma gangrenosum the causes multi ulcers that are painful.  She went on to have multiple surgeries in  that led to a 6 month hospitalization. She has a few active ulcers around her ankle today and are wrapped with a dressing.  Pt also has history celiac disease, hx of R femoral neck fracture in  resulting in R LISA, hx of right shoulder injury . Pt had lost weight and weighed 88 pounds due to the gut issues.  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 She is now able to get her heel down. She is reporting balance issues.  She is clawing her toes as well now and has develpoed some sores on the 2nd toe. Pt has been referred to the chronic pain program for comprehensive PT and OT evaluations.     Patient/Family/Caregiver Comments/Observations Pt arrrived 5 min late for treatment session.     Patient reported fall since last visit No         Pain Assessment    Currently in pain Yes     Preferred Pain Scale Chronic Pain     Pain Side/Orientation bilateral;generalized     Pain: Body location Back;Foot;Shoulder     Pain Level at Best 1     Pain Comments no pain in low back or shoulder.B  Feet pain B 3/10  after the bike. Pt feels the Nu step was hard to do     Pain Onset/Duration pt reports at rest she feels her feet are more uncomfortable                Daily Treatment Assessment and Plan - 03/28/22 0814        Daily Treatment Assessment and Plan    Progress toward goals Progressing     Daily Outcome Summary Warmed up with the Nu step. Pt was not sure she liked it. She had some heel pain reported. Instructed in stretching and strengthening program. Tactile and visual cues to performs correctly. Used towel between the ankles to protect her wounds for the sidelying exercises. Will monitor her response to therapy.     Plan and Recommendations monitor progress and how patient toelrated the exercises. Progress as tolerated. Be careful of any shear stresses on the foot/ankles due to continued open wounds                     OBJECTIVE DATA TAKEN TODAY:    None taken    Today's Treatment:    Mary Moreno     Neuropathy, open wounds balance issues     Precautions open wounds at ankles/feet          THER ACT  CPT 12899 TOTAL TIME FOR SESSION Not performed           THER EX CPT 55528 TOTAL TIME FOR SESSION 53-67 Minutes   Single knee to chest 4x10s   Supine trunk rotation 4x10s   Piriformis stretch    Hamstring stretch 3x15s  Active hamstring stretch  No strap   Hip flexor stretch 1 min   Calf stretch    Toe extension    Pelvic tilts 10   Alternating leg raises 15   Straight leg raises    Ball squeezes     S/L hip abduction 10   clams 10   90-90's 10   bridging 15  On bolster   squats May need wt under the heels   Bird dog    Eh Crunch    Cardio Bike  5 min on Nu step       NEURO RE-ED CPT 20302 TOTAL TIME FOR SESSION Not performed               GAIT  TRAINING CPT 39584 TOTAL TIME FOR SESSION Not performed           MANUAL CPT 27747  Massage CPT 52922 TOTAL TIME FOR SESSION Not performed   Ankle and toes ROM as toelerated (pt has open wounds         MODALITIES  Heat /ice 28858 TOTAL TIME FOR SESSION Not performed

## 2022-03-28 NOTE — OP PT TREATMENT LOG
Mary Moreno     Neuropathy, open wounds balance issues     Precautions open wounds at ankles/feet          THER ACT  CPT 78701 TOTAL TIME FOR SESSION Not performed           THER EX CPT 08754 TOTAL TIME FOR SESSION 53-67 Minutes   Single knee to chest 4x10s   Supine trunk rotation 4x10s   Piriformis stretch    Hamstring stretch 3x15s  Active hamstring stretch  No strap   Hip flexor stretch 1 min   Calf stretch    Toe extension    Pelvic tilts 10   Alternating leg raises 15   Straight leg raises    Ball squeezes     S/L hip abduction 10   clams 10   90-90's 10   bridging 15  On bolster   squats May need wt under the heels   Bird dog    Eh Crunch    Cardio Bike  5 min on Nu step       NEURO RE-ED CPT 91015 TOTAL TIME FOR SESSION Not performed               GAIT TRAINING CPT 93662 TOTAL TIME FOR SESSION Not performed           MANUAL CPT 05809  Massage CPT 22269 TOTAL TIME FOR SESSION Not performed   Ankle and toes ROM as toelerated (pt has open wounds         MODALITIES  Heat /ice 26136 TOTAL TIME FOR SESSION Not performed

## 2022-03-29 LAB
ALBUMIN ?TM UR ELPH-MCNC: 4.38 MG/DL
ALBUMIN MFR UR ELPH: 17.5 %
ALBUMIN MFR UR ELPH: 63.5 %
ALBUMIN SERPL ELPH-MCNC: 4.25 G/DL (ref 3.2–4.9)
ALBUMIN/GLOB SERPL: 1.7 {RATIO} (ref 1.1–2.1)
ALPHA1 GLOB MFR SERPL ELPH: 2.4 %
ALPHA1 GLOB MFR UR ELPH: 17 %
ALPHA1 GLOB SERPL ELPH-MCNC: 0.16 G/DL (ref 0.08–0.23)
ALPHA1 GLOB UR ELPH-MCNC: 4.25 MG/DL
ALPHA2 GLOB MFR UR ELPH: 12.4 %
ALPHA2 GLOB MFR UR ELPH: 21.8 %
ALPHA2 GLOB SERPL ELPH-MCNC: 0.83 G/DL (ref 0.45–0.92)
ALPHA2 GLOB UR ELPH-MCNC: 5.45 MG/DL
B-GLOBULIN MFR UR ELPH: 14.2 %
B-GLOBULIN SERPL ELPH-MCNC: 0.69 G/DL (ref 0.5–1.03)
B-GLOBULIN UR ELPH-MCNC: 3.55 MG/DL
BETA1 GLOB MFR UR ELPH: 10.3 %
GAMMA GLOB MFR UR ELPH: 11.4 %
GAMMA GLOB MFR UR ELPH: 29.5 %
GAMMA GLOB SERPL ELPH-MCNC: 0.76 G/DL (ref 0.6–1.6)
GAMMA GLOB UR ELPH-MCNC: 7.38 MG/DL
INTERPRETATION SERPL IFE-IMP: NORMAL
PROT PATTERN SERPL ELPH-IMP: NORMAL
PROT PATTERN UR ELPH-IMP: NORMAL
PROT SERPL-MCNC: 6.7 G/DL (ref 6–8.2)
PROT UR-MCNC: 25 MG/DL

## 2022-03-30 LAB — INTERPRETATION UR IFE-IMP: NORMAL

## 2022-03-31 ENCOUNTER — HOSPITAL ENCOUNTER (OUTPATIENT)
Dept: OCCUPATIONAL THERAPY | Facility: REHABILITATION | Age: 67
Setting detail: THERAPIES SERIES
Discharge: HOME | End: 2022-03-31
Attending: PHYSICAL MEDICINE & REHABILITATION
Payer: MEDICARE

## 2022-03-31 DIAGNOSIS — G89.4 CHRONIC PAIN SYNDROME: Primary | ICD-10-CM

## 2022-03-31 PROCEDURE — 97010 HOT OR COLD PACKS THERAPY: CPT | Mod: GO

## 2022-03-31 PROCEDURE — 97110 THERAPEUTIC EXERCISES: CPT | Mod: GO

## 2022-03-31 PROCEDURE — 97530 THERAPEUTIC ACTIVITIES: CPT | Mod: GO

## 2022-03-31 NOTE — PROGRESS NOTES
OT DAILY NOTE FOR OUTPATIENT THERAPY    Patient: Mary Moreno   MRN: [id number]  : 1955 66 y.o.  Referring Physician: Case Francois MD  Date of Visit: 3/31/2022      Certification Dates: 22 through 22    Diagnosis:   1. Chronic pain syndrome        Chief Complaints:   balance, pain , deconditioned, decreased functional abilities/capacities    Precautions:  Precautions comments: open wounds on ankles B  Existing Precautions/Restrictions: fall    TODAY'S VISIT    Time In Session:  Start Time: 08  Stop Time: 09  Time Calculation (min): 60 min   History/Vitals/Pain/Encounter Info - 22 0820        Injury History/Precautions/Daily Required Info    Document Type daily treatment     Primary Therapist Mata Sellers OTR/L     Chief Complaint/Reason for Visit  balance, pain , deconditioned, decreased functional abilities/capacities     Onset of Illness/Injury or Date of Surgery --    hospitalized for the open wounds in [facility]    Referring Physician Case Francois MD     Existing Precautions/Restrictions fall     Precautions comments open wounds on ankles B     History of present illness/functional impairment Ms Moreno presents with neuropathy B legs and pyoderma gangrenosum the causes multi ulcers that are painful.  She went on to have multiple surgeries in  that led to a 6 month hospitalization. She has a few active ulcers around her ankle today and are wrapped with a dressing.  Pt also has history celiac disease, hx of R femoral neck fracture in  resulting in R LISA, hx of right shoulder injury . Pt had lost weight and weighed 88 pounds due to the gut issues.  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 She is now able to get her heel down. She is reporting balance issues.  She is clawing her toes as well now and has develpoed some sores on the 2nd toe. Pt has been referred to the chronic pain program for  comprehensive PT and OT evaluations.     Patient/Family/Caregiver Comments/Observations Patient felt ok after first PT visit but did have a little more drainage in ankles.  Feeling ok on arrival today with only 1/10 pain noted.     Patient reported fall since last visit No        Pain Assessment    Currently in pain Yes     Preferred Pain Scale Chronic Pain     Pain Side/Orientation bilateral;generalized     Pain: Body location Back;Foot;Shoulder     Pain Level at Best 1     Pain With Activity 2                Daily Treatment Assessment and Plan - 03/31/22 0830        Daily Treatment Assessment and Plan    Progress toward goals Progressing     Daily Outcome Summary Patient arrived feeling fairly good overall with only 1/10 pain which did not increase much t/o session.  She found upper body/right shoulder stretching difficult but was able to complete.  Also acuracy of Biodex activity was less than 15% so needs to be worked on more.  Instruction in thera-cane use at session end helped upper trap region tightness she had so she will look into purchasing one for home use.     Plan and Recommendations Progress OT POC for improved ROM, strength and overall functional abilities as zach                   Today's Treatment:    Exercises Current Session Time   THER ACT  CPT 09593 TOTAL TIME FOR SESSION 8-22 Minutes   Function/body mech: -Biodex limits of stability setting on static mode x 2 sets through with no handrail use (note: <15% accuracy)            THER EX CPT 94861 TOTAL TIME FOR SESSION 23-37 Minutes          STRETCHING    Supine Cane Overhead 6 x:06 second hold    Supine Cane Circles 6 reps each direction      Supine Cane Ext Rotation 6 x:06 second hold    Towel Internal Rot Stretch    Pulleys Shoulder Flex 5 x:08 second hold   Pulleys Shoulder Abd 5 x:08 second hold   Doorway Pec Stretch 5 x:08 second hold   Wall Walk Shoulder Flex    Wall Walk Shoulder Abd    Shoulder ball alphabet on wall  1x each arm through in  standing    Thera-cane Instruction and practice bilat UT's for muscle spasm              STRENGTHENING    Shoulder Blade Squeeze 2 sets of 10 reps    Shoulder Flex    Shoulder Abd    Shoulder Internal Rotation    Shoulder External Rotation    Shoulder Retractions 2 sets of 10 reps with pink t-band   Shoulder Extension 2 sets of 10 reps with pink t-band   No Money 2 sets of 10 reps with pink t-band   TYI            NEURO RE-ED CPT 85506   TOTAL TIME FOR SESSION Not performed        MANUAL CPT 59156 TOTAL TIME FOR SESSION Not performed        MODALITIES  Heat/ice CPT 29065   TOTAL TIME FOR SESSION  Right shoulder pre-stretching  8-22 Minutes   US  CPT 98053     E-stim CPT 85302            Name  diagnosis

## 2022-03-31 NOTE — OP OT TREATMENT LOG
Exercises Current Session Time   THER ACT  CPT 28870 TOTAL TIME FOR SESSION 8-22 Minutes   Function/body mech: -Bi[employer] limits of stability setting on static mode x 2 sets through with no handrail use (note: <15% accuracy)            THER EX CPT 40589 TOTAL TIME FOR SESSION 23-37 Minutes          STRETCHING    Supine Cane Overhead 6 x:06 second hold    Supine Cane Circles 6 reps each direction      Supine Cane Ext Rotation 6 x:06 second hold    Towel Internal Rot Stretch    Pulleys Shoulder Flex 5 x:08 second hold   Pulleys Shoulder Abd 5 x:08 second hold   Doorway Pec Stretch 5 x:08 second hold   Wall Walk Shoulder Flex    Wall Walk Shoulder Abd    Shoulder ball alphabet on wall  1x each arm through in standing    Thera-cane Instruction and practice bilat UT's for muscle spasm              STRENGTHENING    Shoulder Blade Squeeze 2 sets of 10 reps    Shoulder Flex    Shoulder Abd    Shoulder Internal Rotation    Shoulder External Rotation    Shoulder Retractions 2 sets of 10 reps with pink t-band   Shoulder Extension 2 sets of 10 reps with pink t-band   No Money 2 sets of 10 reps with pink t-band   TYI            NEURO RE-ED CPT 05601   TOTAL TIME FOR SESSION Not performed        MANUAL CPT 14617 TOTAL TIME FOR SESSION Not performed        MODALITIES  Heat/ice CPT 47314   TOTAL TIME FOR SESSION  Right shoulder pre-stretching  8-22 Minutes   US  CPT 23274     E-stim CPT 90622            Name  diagnosis

## 2022-04-01 ENCOUNTER — DOCUMENTATION (OUTPATIENT)
Dept: SOCIAL WORK | Facility: REHABILITATION | Age: 67
End: 2022-04-01
Payer: MEDICARE

## 2022-04-01 NOTE — PROGRESS NOTES
Received upset voicemail from patient. Returned call to review current schedule since it appears in system that scheduling conflict was resolved. CM  Left contact information.

## 2022-04-03 LAB
1,25(OH)2D SERPL-MCNC: 80 PG/ML (ref 18–72)
1,25(OH)2D2 SERPL-MCNC: <8 PG/ML
1,25(OH)2D3 SERPL-MCNC: 80 PG/ML

## 2022-04-04 ENCOUNTER — HOSPITAL ENCOUNTER (OUTPATIENT)
Dept: OCCUPATIONAL THERAPY | Facility: REHABILITATION | Age: 67
Setting detail: THERAPIES SERIES
Discharge: HOME | End: 2022-04-04
Attending: PHYSICAL MEDICINE & REHABILITATION
Payer: MEDICARE

## 2022-04-04 DIAGNOSIS — G89.4 CHRONIC PAIN SYNDROME: Primary | ICD-10-CM

## 2022-04-04 PROCEDURE — 97010 HOT OR COLD PACKS THERAPY: CPT | Mod: GO

## 2022-04-04 PROCEDURE — 97530 THERAPEUTIC ACTIVITIES: CPT | Mod: GO

## 2022-04-04 PROCEDURE — 97110 THERAPEUTIC EXERCISES: CPT | Mod: GP

## 2022-04-04 PROCEDURE — 97110 THERAPEUTIC EXERCISES: CPT | Mod: GO

## 2022-04-04 NOTE — OP PT TREATMENT LOG
Mary Moreno     Neuropathy, open wounds balance issues     Precautions open wounds at ankles/feet          THER ACT  CPT 56491 TOTAL TIME FOR SESSION Not performed           THER EX CPT 33775 TOTAL TIME FOR SESSION 53-67 Minutes   Single knee to chest 4x10s   Supine trunk rotation 4x10s   Piriformis stretch    Hamstring stretch 3x15s  In supine    Hip flexor stretch 1 min   Calf stretch    Toe extension    Pelvic tilts 10   Alternating leg raises 15   Straight leg raises    Ball squeezes     S/L hip abduction 15   clams 15   90-90's 15   bridging 15  On green ball   squats 5x  needed small elevation under the heels like towel   Bird dog    Eh Crunch    Cardio Bike  2 min on Nu step seat at 6 and arms  9       NEURO RE-ED CPT 13213 TOTAL TIME FOR SESSION Not performed               GAIT TRAINING CPT 38726 TOTAL TIME FOR SESSION Not performed           MANUAL CPT 77272  Massage CPT 53638 TOTAL TIME FOR SESSION Not performed   Ankle and toes ROM as toleerated (pt has open wounds         MODALITIES  Heat /ice 67551 TOTAL TIME FOR SESSION Not performed

## 2022-04-04 NOTE — PROGRESS NOTES
OT DAILY NOTE FOR OUTPATIENT THERAPY    Patient: Mary Moreno   MRN: [id number]  : 1955 66 y.o.  Referring Physician: Case Francois MD  Date of Visit: 2022      Certification Dates: 22 through 22    Diagnosis:   1. Chronic pain syndrome        Chief Complaints:   balance, pain , deconditioned, decreased functional abilities/capacities    Precautions:  Precautions comments: open wounds on ankles B  Existing Precautions/Restrictions: fall    TODAY'S VISIT    Time In Session:  Start Time: 1200  Stop Time: 1300  Time Calculation (min): 60 min   History/Vitals/Pain/Encounter Info - 22 1150        Injury History/Precautions/Daily Required Info    Document Type daily treatment     Primary Therapist Mata Sellers OTR/L     Chief Complaint/Reason for Visit  balance, pain , deconditioned, decreased functional abilities/capacities     Onset of Illness/Injury or Date of Surgery --    hospitalized for the open wounds in B LE's    Referring Physician Case Francois MD     Existing Precautions/Restrictions fall     Precautions comments open wounds on ankles B     History of present illness/functional impairment Ms Moreno presents with neuropathy B legs and pyoderma gangrenosum the causes multi ulcers that are painful.  She went on to have multiple surgeries in  that led to a 6 month hospitalization. She has a few active ulcers around her ankle today and are wrapped with a dressing.  Pt also has history celiac disease, hx of R femoral neck fracture in  resulting in R LISA, hx of right shoulder injury . Pt had lost weight and weighed 88 pounds due to the gut issues.  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 She is now able to get her heel down. She is reporting balance issues.  She is clawing her toes as well now and has develpoed some sores on the 2nd toe. Pt has been referred to the chronic pain program for  comprehensive PT and OT evaluations.     Patient/Family/Caregiver Comments/Observations Patient felt ok after her first OT session with no change in symptoms noted     Patient reported fall since last visit No        Pain Assessment    Currently in pain Yes     Preferred Pain Scale Chronic Pain     Pain Side/Orientation bilateral;generalized     Pain: Body location Back;Foot;Ankle;Shoulder     Pain Rating (0-10): Pre Activity 2     Pain Rating (0-10): Activity 2                Daily Treatment Assessment and Plan - 04/04/22 1311        Daily Treatment Assessment and Plan    Progress toward goals Progressing     Daily Outcome Summary Patient arrived reporting she felt ok after her first OT session.  No drainage or pain issues in legs noted.  During todays session she had good recall for exercises performed and tolerated new exercises/activities well.  Close supervision for balance on Primus activities needed.  She did not tolerate push activity even at very low weight so we did not do this today.  She feels it stresses her achilles region when tried at even less than 5# resistance.     Plan and Recommendations Progress OT POC for improved ROM, strength and overall functional abilities as zach                   Today's Treatment:    Exercises Current Session Time   THER ACT  CPT 19376 TOTAL TIME FOR SESSION 8-22 Minutes   Function/body mech: -Weekend activity reviewed and discussion about how she felt after her 1st OT visit.    -Primus con/ecc 14# pull x 5 reps and 8.5# side step x 3 reps each direction with good core control and pace t/o activity performance.    Note: close supervision for balance during primus activities.       THER EX CPT 50465 TOTAL TIME FOR SESSION 23-37 Minutes          STRETCHING    Supine Cane Overhead 6 x:06 second hold    Supine Cane Circles 6 reps each direction      Supine Cane Ext Rotation 6 x:06 second hold    Towel Internal Rot Stretch    Pulleys Shoulder Flex 5 x:08 second hold   Pulleys  Shoulder Abd 5 x:08 second hold   Doorway Pec Stretch 5 x:08 second hold   Wall Walk Shoulder Flex    Wall Walk Shoulder Abd             STRENGTHENING    Shoulder ball alphabet on wall  1x each arm through in standing    Supine Shoulder alphabet  1x each arm through with 2# weight    Shoulder Blade Squeeze 2 sets of 10 reps    Shoulder Flex    Shoulder Abd    Shoulder Internal Rotation    Shoulder External Rotation    Shoulder Retractions 2 sets of 10 reps with pink t-band   Shoulder Extension 2 sets of 10 reps with pink t-band   No Money 2 sets of 10 reps with pink t-band   TYI        UBE 4 min at level 1.5 in sitting alternating direction every min    NEURO RE-ED CPT 89692   TOTAL TIME FOR SESSION Not performed        MANUAL CPT 90056 TOTAL TIME FOR SESSION Not performed        MODALITIES  Heat/ice CPT 47487   TOTAL TIME FOR SESSION  Right shoulder pre-stretching  8-22 Minutes   US  CPT 78387     E-stim CPT 07008            Name: Yamilka Moreno  Diagnosis: Chronic Pain

## 2022-04-04 NOTE — OP OT TREATMENT LOG
Exercises Current Session Time   THER ACT  CPT 53141 TOTAL TIME FOR SESSION 8-22 Minutes   Function/body mech: -Weekend activity reviewed and discussion about how she felt after her 1st OT visit.    -Primus con/ecc 14# pull x 5 reps and 8.5# side step x 3 reps each direction with good core control and pace t/o activity performance.    Note: close supervision for balance during primus activities.       THER EX CPT 64908 TOTAL TIME FOR SESSION 23-37 Minutes          STRETCHING    Supine Cane Overhead 6 x:06 second hold    Supine Cane Circles 6 reps each direction      Supine Cane Ext Rotation 6 x:06 second hold    Towel Internal Rot Stretch    Pulleys Shoulder Flex 5 x:08 second hold   Pulleys Shoulder Abd 5 x:08 second hold   Doorway Pec Stretch 5 x:08 second hold   Wall Walk Shoulder Flex    Wall Walk Shoulder Abd             STRENGTHENING    Shoulder ball alphabet on wall  1x each arm through in standing    Supine Shoulder alphabet  1x each arm through with 2# weight    Shoulder Blade Squeeze 2 sets of 10 reps    Shoulder Flex    Shoulder Abd    Shoulder Internal Rotation    Shoulder External Rotation    Shoulder Retractions 2 sets of 10 reps with pink t-band   Shoulder Extension 2 sets of 10 reps with pink t-band   No Money 2 sets of 10 reps with pink t-band   TYI        UBE 4 min at level 1.5 in sitting alternating direction every min    NEURO RE-ED CPT 78329   TOTAL TIME FOR SESSION Not performed        MANUAL CPT 92558 TOTAL TIME FOR SESSION Not performed        MODALITIES  Heat/ice CPT 02014   TOTAL TIME FOR SESSION  Right shoulder pre-stretching  8-22 Minutes   US  CPT 35988     E-stim CPT 54103            Name: Yamilka Moreno  Diagnosis: Chronic Pain

## 2022-04-04 NOTE — OP PT TREATMENT LOG
Mary Moreno     Neuropathy, open wounds balance issues     Precautions open wounds at ankles/feet          THER ACT  CPT 71807 TOTAL TIME FOR SESSION Not performed           THER EX CPT 95339 TOTAL TIME FOR SESSION 38-52 Minutes   Single knee to chest 4x10s   Supine trunk rotation 4x10s   Piriformis stretch    Hamstring stretch 3x15s  In supine    Hip flexor stretch 1 min   Calf stretch    Toe extension    Pelvic tilts 10   Alternating leg raises 15   Straight leg raises    Ball squeezes     S/L hip abduction 15   clams 15   90-90's 15   bridging 15  On green ball   squats 5x  needed small elevation under the heels like towel   Bird dog    Eh Crunch    Cardio Bike  2 min on Nu step seat at 6 and arms  9       NEURO RE-ED CPT 11202 TOTAL TIME FOR SESSION Not performed               GAIT TRAINING CPT 71961 TOTAL TIME FOR SESSION Not performed           MANUAL CPT 45644  Massage CPT 82715 TOTAL TIME FOR SESSION Not performed   Ankle and toes ROM as toleerated (pt has open wounds         MODALITIES  Heat /ice 04719 TOTAL TIME FOR SESSION Not performed

## 2022-04-04 NOTE — PROGRESS NOTES
PT DAILY NOTE FOR OUTPATIENT THERAPY    Patient: Mary Moreno   MRN: [id number]  : 1955 66 y.o.  Referring Physician: Case Francois MD  Date of Visit: 2022    Certification Dates: 22 through 22    Diagnosis:   1. Chronic pain syndrome        Chief Complaints:  balance, pain , deconditioned    Precautions:   Existing Precautions/Restrictions: fall     TODAY'S VISIT    Time In Session:  Start Time: 1115  Stop Time: 1200  Time Calculation (min): 45 min   History/Vitals/Pain/Encounter Info - 22 1115        Injury History/Precautions/Daily Required Info    Document Type initial evaluation     Primary Therapist Yamilka SALCEDO     Chief Complaint/Reason for Visit  balance, pain , deconditioned     Onset of Illness/Injury or Date of Surgery --    hospitalized for the open wounds in B LE's    Referring Physician Case Francois MD     Existing Precautions/Restrictions fall     History of present illness/functional impairment Ms Moreno presents with neuropathy B legs and pyoderma gangrenosum the causes multi ulcers that are painful.  She went on to have multiple surgeries in  that led to a 6 month hospitalization. She has a few active ulcers around her ankle today and are wrapped with a dressing.  Pt also has history celiac disease, hx of R femoral neck fracture in  resulting in R LISA, hx of right shoulder injury . Pt had lost weight and weighed 88 pounds due to the gut issues.  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 She is now able to get her heel down. She is reporting balance issues.  She is clawing her toes as well now and has develpoed some sores on the 2nd toe. Pt has been referred to the chronic pain program for comprehensive PT and OT evaluations.     OP Specialty --   chronic pain    Patient/Family/Caregiver Comments/Observations Pt reports she had increased drainage post last treatment on the  bike. SHe would like to do something different     Patient reported fall since last visit No        Pain Assessment    Currently in pain Yes     Preferred Pain Scale Chronic Pain     Pain Side/Orientation bilateral;generalized     Pain: Body location Back;Foot;Ankle;Shoulder     Pain Rating (0-10): Pre Activity 2     Pain Rating (0-10): Activity --   did alot of errands today               Daily Treatment Assessment and Plan - 04/04/22 1115        Daily Treatment Assessment and Plan    Progress toward goals Progressing     Daily Outcome Summary Pt arrived 15 min late for treatment session. Pt was able to do 2 min on the NuStep. Will monitor her response and watch for increased drainage from her ankle wounds. Added sit to stands. cues for correct performance. Small lift under the heels helped reduce pulling on the posterior ankle. Pt notes she is getting some toe orthosis for the hammer toes. It should come tomorrow. Hamstring streches is best in supine active knee extension.     Plan and Recommendations continue to progress as tolerated. Monitor wound status and adjust as tolerated                     OBJECTIVE DATA TAKEN TODAY:    None taken    Today's Treatment:    Mary Moreno     Neuropathy, open wounds balance issues     Precautions open wounds at ankles/feet          THER ACT  CPT 42148 TOTAL TIME FOR SESSION Not performed           THER EX CPT 25072 TOTAL TIME FOR SESSION 38-52 Minutes   Single knee to chest 4x10s   Supine trunk rotation 4x10s   Piriformis stretch    Hamstring stretch 3x15s  In supine    Hip flexor stretch 1 min   Calf stretch    Toe extension    Pelvic tilts 10   Alternating leg raises 15   Straight leg raises    Ball squeezes     S/L hip abduction 15   clams 15   90-90's 15   bridging 15  On green ball   squats 5x  needed small elevation under the heels like towel   Bird dog    Eh Crunch    Cardio Bike  2 min on Nu step seat at 6 and arms  9       NEURO RE-ED CPT 39971 TOTAL TIME  FOR SESSION Not performed               GAIT TRAINING CPT 52618 TOTAL TIME FOR SESSION Not performed           MANUAL CPT 01460  Massage CPT 53161 TOTAL TIME FOR SESSION Not performed   Ankle and toes ROM as toleerated (pt has open wounds         MODALITIES  Heat /ice 09725 TOTAL TIME FOR SESSION Not performed

## 2022-04-08 ENCOUNTER — HOSPITAL ENCOUNTER (OUTPATIENT)
Dept: OCCUPATIONAL THERAPY | Facility: REHABILITATION | Age: 67
Setting detail: THERAPIES SERIES
Discharge: HOME | End: 2022-04-08
Attending: PHYSICAL MEDICINE & REHABILITATION
Payer: MEDICARE

## 2022-04-08 DIAGNOSIS — G89.4 CHRONIC PAIN SYNDROME: Primary | ICD-10-CM

## 2022-04-08 PROCEDURE — 97110 THERAPEUTIC EXERCISES: CPT | Mod: GO,CO

## 2022-04-08 PROCEDURE — 97010 HOT OR COLD PACKS THERAPY: CPT | Mod: GO,CO

## 2022-04-08 PROCEDURE — 97530 THERAPEUTIC ACTIVITIES: CPT | Mod: GO,CO

## 2022-04-08 PROCEDURE — 97110 THERAPEUTIC EXERCISES: CPT | Mod: GP

## 2022-04-08 NOTE — PROGRESS NOTES
PT DAILY NOTE FOR OUTPATIENT THERAPY    Patient: Mary Moreno   MRN: [id number]  : 1955 66 y.o.  Referring Physician: Case Francois MD  Date of Visit: 2022    Certification Dates: 22 through 22    Diagnosis:   1. Chronic pain syndrome        Chief Complaints:  balance, pain , deconditioned    Precautions:   Existing Precautions/Restrictions: fall     TODAY'S VISIT    Time In Session:  Start Time: 1200  Stop Time: 1300  Time Calculation (min): 60 min   History/Vitals/Pain/Encounter Info - 22 1158        Injury History/Precautions/Daily Required Info    Document Type daily treatment     Primary Therapist Yamilka Garcia MPT     Chief Complaint/Reason for Visit  balance, pain , deconditioned     Onset of Illness/Injury or Date of Surgery --    hospitalized for the open wounds in B 's    Referring Physician Case Francois MD     Existing Precautions/Restrictions fall     History of present illness/functional impairment Ms Moreno presents with neuropathy B legs and pyoderma gangrenosum the causes multi ulcers that are painful.  She went on to have multiple surgeries in  that led to a 6 month hospitalization. She has a few active ulcers around her ankle today and are wrapped with a dressing.  Pt also has history celiac disease, hx of R femoral neck fracture in  resulting in R LISA, hx of right shoulder injury . Pt had lost weight and weighed 88 pounds due to the gut issues.  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 She is now able to get her heel down. She is reporting balance issues.  She is clawing her toes as well now and has develpoed some sores on the 2nd toe. Pt has been referred to the chronic pain program for comprehensive PT and OT evaluations.     OP Specialty --   chronic pain    Patient/Family/Caregiver Comments/Observations Pt reports the push pull was uncomfortable last visit        Pain  Assessment    Currently in pain Yes     Preferred Pain Scale number (Numeric Rating Pain Scale)     Pain Side/Orientation right     Pain: Body location Ankle     Pain Rating (0-10): Pre Activity 2     Pain Rating (0-10): Post Activity 2                Daily Treatment Assessment and Plan - 04/08/22 1158        Daily Treatment Assessment and Plan    Progress toward goals Progressing     Daily Outcome Summary Cues needed for correct performance of exercises. Added knee flexion with green ball and increased time on the Nustep in limited range. Increased some reps of the exercises with good tolerance. Sit to stands working well with towel roll under heels.     Plan and Recommendations continue to progress her exercise program as tolerated. Add step ups/lateral step ups and tandem stance                     OBJECTIVE DATA TAKEN TODAY:    None taken    Today's Treatment:    Mary Moreno     Neuropathy, open wounds balance issues     Precautions open wounds at ankles/feet          THER ACT  CPT 55403 TOTAL TIME FOR SESSION 0-7 Minutes   Toe guards B feet  Observed her new toe guards on B feet       THER EX CPT 23231 TOTAL TIME FOR SESSION 53-67 Minutes   Single knee to chest 4x10s   Supine trunk rotation 4x10s   Piriformis stretch    Hamstring stretch 3x15s  In supine    Hip flexor stretch 1 min   Calf stretch    Toe extension    Pelvic tilts 10   Alternating leg raises 20 table top position   Straight leg raises    Hamstring curls on green ball 2x10   S/L hip abduction 15   clams 15   90-90's 15   bridging 20  On green ball   squats 10  needed small elevation under the heels like towel   Bird dog    Eh Crunch    Cardio Bike  4 1.2 min     Nu step seat at 6 and arms  9       NEURO RE-ED CPT 27311 TOTAL TIME FOR SESSION Not performed   Step up with tap add   Lateral step ups with hip abduction add   Tandem stance add   GAIT TRAINING CPT 96322 TOTAL TIME FOR SESSION Not performed           MANUAL CPT 53099  Massage  CPT 08317 TOTAL TIME FOR SESSION Not performed   Ankle and toes ROM as toleerated (pt has open wounds         MODALITIES  Heat /ice 45284 TOTAL TIME FOR SESSION Not performed

## 2022-04-08 NOTE — OP PT TREATMENT LOG
Mary Moreno     Neuropathy, open wounds balance issues     Precautions open wounds at ankles/feet          THER ACT  CPT 87249 TOTAL TIME FOR SESSION 0-7 Minutes   Toe guards B feet  Observed her new toe guards on B feet       THER EX CPT 87965 TOTAL TIME FOR SESSION 53-67 Minutes   Single knee to chest 4x10s   Supine trunk rotation 4x10s   Piriformis stretch    Hamstring stretch 3x15s  In supine    Hip flexor stretch 1 min   Calf stretch    Toe extension    Pelvic tilts 10   Alternating leg raises 20 table top position   Straight leg raises    Hamstring curls on green ball 2x10   S/L hip abduction 15   clams 15   90-90's 15   bridging 20  On green ball   squats 10  needed small elevation under the heels like towel   Bird dog    Eh Crunch    Cardio Bike  4 1.2 min     Nu step seat at 6 and arms  9       NEURO RE-ED CPT 03890 TOTAL TIME FOR SESSION Not performed   Step up with tap add   Lateral step ups with hip abduction add   Tandem stance add   GAIT TRAINING CPT 28375 TOTAL TIME FOR SESSION Not performed           MANUAL CPT 11201  Massage CPT 40261 TOTAL TIME FOR SESSION Not performed   Ankle and toes ROM as toleerated (pt has open wounds         MODALITIES  Heat /ice 29777 TOTAL TIME FOR SESSION Not performed

## 2022-04-08 NOTE — PROGRESS NOTES
OT DAILY NOTE FOR OUTPATIENT THERAPY    Patient: Mary Moreno   MRN: [id number]  : 1955 66 y.o.  Referring Physician: Case Francois MD  Date of Visit: 2022      Certification Dates: 22 through 22    Diagnosis:   1. Chronic pain syndrome        Chief Complaints:   balance, pain , deconditioned, decreased functional abilities/capacities    Precautions:  Existing Precautions/Restrictions: fall    TODAY'S VISIT    Time In Session:  Start Time: 1110  Stop Time: 1200  Time Calculation (min): 50 min   History/Vitals/Pain/Encounter Info - 22 1110        Injury History/Precautions/Daily Required Info    Document Type daily treatment     Primary Therapist Mata Sellers OTR/L     Chief Complaint/Reason for Visit  balance, pain , deconditioned, decreased functional abilities/capacities     Onset of Illness/Injury or Date of Surgery --    hospitalized for the open wounds in B LE's    Referring Physician Case Francois MD     Existing Precautions/Restrictions fall     History of present illness/functional impairment Ms Moreno presents with neuropathy B legs and pyoderma gangrenosum the causes multi ulcers that are painful.  She went on to have multiple surgeries in  that led to a 6 month hospitalization. She has a few active ulcers around her ankle today and are wrapped with a dressing.  Pt also has history celiac disease, hx of R femoral neck fracture in  resulting in R LISA, hx of right shoulder injury . Pt had lost weight and weighed 88 pounds due to the gut issues.  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 She is now able to get her heel down. She is reporting balance issues.  She is clawing her toes as well now and has develpoed some sores on the 2nd toe. Pt has been referred to the chronic pain program for comprehensive PT and OT evaluations.     Patient/Family/Caregiver Comments/Observations Pt  arrived 10 minutes late for therapy session.     Patient reported fall since last visit No        Pain Assessment    Currently in pain Yes     Preferred Pain Scale number (Numeric Rating Pain Scale)     Pain Side/Orientation right     Pain: Body location Ankle   achilles tendon    Pain Rating (0-10): Pre Activity 2     Pain Rating (0-10): Activity 2     Pain Rating (0-10): Post Activity 2        Pain Interventions    Intervention  none     Post Intervention Comments none                Daily Treatment Assessment and Plan - 04/08/22 1526        Daily Treatment Assessment and Plan    Progress toward goals Progressing     Daily Outcome Summary Pt reported that she would prefer to not use Primus today.  Her R sandy's tendon was sore for a few days after the last use.  Pt feels competent at home with HHA; did discuss energy conservation techniques with her.  Tolerated all exercises and activities without increased pain or fatigue.     Plan and Recommendations Progress OT POC for improved ROM, strength and overall functional abilities as tolerated.                       Today's Treatment:    Exercises Current Session Time   THER ACT  CPT 78235 TOTAL TIME FOR SESSION 8-22 Minutes   Function/body mech: -HHA review.      - Pt declined practice with HHA.  Pt and therapist discussed energy conservation techniques for all activities.    - Biodex limits of stability setting on static mode x 2 sets through with no handrail use.  Note: 20% accuracy on first trial.  Reviewed with pt how to shift her weight to move dot on screen.  Pt then was able to perform with improved posture and accuracy.  28% accuracy after discussion.     Note: close supervision for balance during activities.       THER EX CPT 36147 TOTAL TIME FOR SESSION 23-37 Minutes          STRETCHING    Supine Cane Overhead 6 x:06 second hold    Supine Cane Circles 6 reps each direction      Supine Cane Ext Rotation 6 x:06 second hold    Towel Internal Rot Stretch     Pulleys Shoulder Flex 5 x:08 second hold   Pulleys Shoulder Abd 5 x:08 second hold   Corner Pec Stretch 5 x:08 second hold   Wall Walk Shoulder Flex    Wall Walk Shoulder Abd             STRENGTHENING    Shoulder ball alphabet on wall  1x each arm through in standing    Supine Shoulder alphabet  1x each arm through with 2# weight    Shoulder Blade Squeeze 2 sets of 10 reps    Shoulder Flex    Shoulder Abd    Shoulder Internal Rotation    Shoulder External Rotation    Shoulder Retractions 2 sets of 10 reps with pink t-band   Shoulder Extension 2 sets of 10 reps with pink t-band   No Money 2 sets of 10 reps with pink t-band   TYI        UBE 4 min at level 1.5 in sitting, alternating direction every min    NEURO RE-ED CPT 84228   TOTAL TIME FOR SESSION Not performed        MANUAL CPT 33906 TOTAL TIME FOR SESSION Not performed        MODALITIES  Heat/ice CPT 24988   TOTAL TIME FOR SESSION  Right shoulder pre-stretching  8-22 Minutes   10 minutes     CPT 72941     E-stim CPT 62599            Name: Yamilka Moreno  Diagnosis: Chronic Pain

## 2022-04-08 NOTE — OP OT TREATMENT LOG
Exercises Current Session Time   THER ACT  CPT 82161 TOTAL TIME FOR SESSION 8-22 Minutes   Function/body mech: -HHA review.      - Pt declined practice with HHA.  Pt and therapist discussed energy conservation techniques for all activities.    - Biodex limits of stability setting on static mode x 2 sets through with no handrail use.  Note: 20% accuracy on first trial.  Reviewed with pt how to shift her weight to move dot on screen.  Pt then was able to perform with improved posture and accuracy.  28% accuracy after discussion.     Note: close supervision for balance during activities.       THER EX CPT 40096 TOTAL TIME FOR SESSION 23-37 Minutes          STRETCHING    Supine Cane Overhead 6 x:06 second hold    Supine Cane Circles 6 reps each direction      Supine Cane Ext Rotation 6 x:06 second hold    Towel Internal Rot Stretch    Pulleys Shoulder Flex 5 x:08 second hold   Pulleys Shoulder Abd 5 x:08 second hold   Corner Pec Stretch 5 x:08 second hold   Wall Walk Shoulder Flex    Wall Walk Shoulder Abd             STRENGTHENING    Shoulder ball alphabet on wall  1x each arm through in standing    Supine Shoulder alphabet  1x each arm through with 2# weight    Shoulder Blade Squeeze 2 sets of 10 reps    Shoulder Flex    Shoulder Abd    Shoulder Internal Rotation    Shoulder External Rotation    Shoulder Retractions 2 sets of 10 reps with pink t-band   Shoulder Extension 2 sets of 10 reps with pink t-band   No Money 2 sets of 10 reps with pink t-band   TYI        UBE 4 min at level 1.5 in sitting, alternating direction every min    NEURO RE-ED CPT 10090   TOTAL TIME FOR SESSION Not performed        MANUAL CPT 91920 TOTAL TIME FOR SESSION Not performed        MODALITIES  Heat/ice CPT 91925   TOTAL TIME FOR SESSION  Right shoulder pre-stretching  8-22 Minutes   10 minutes   US  CPT 09846     E-stim CPT 37267            Name: Yamilka Moreno  Diagnosis: Chronic Pain

## 2022-04-10 ENCOUNTER — RX ONLY (RX ONLY)
Age: 67
End: 2022-04-10

## 2022-04-10 RX ORDER — ADALIMUMAB 40MG/0.8ML
KIT SUBCUTANEOUS
Qty: 2 | Refills: 3 | Status: ERX

## 2022-04-11 ENCOUNTER — HOSPITAL ENCOUNTER (OUTPATIENT)
Dept: OCCUPATIONAL THERAPY | Facility: REHABILITATION | Age: 67
Setting detail: THERAPIES SERIES
Discharge: HOME | End: 2022-04-11
Attending: PHYSICAL MEDICINE & REHABILITATION
Payer: MEDICARE

## 2022-04-11 DIAGNOSIS — G89.4 CHRONIC PAIN SYNDROME: Primary | ICD-10-CM

## 2022-04-11 PROCEDURE — 97110 THERAPEUTIC EXERCISES: CPT | Mod: GP

## 2022-04-11 NOTE — OP PT TREATMENT LOG
Marymanisha Moreno     Neuropathy, open wounds balance issues     Precautions open wounds at ankles/feet          THER ACT  CPT 75406 TOTAL TIME FOR SESSION Not performed   Toe guards B feet  Observed her new toe guards on B feet       THER EX CPT 73530 TOTAL TIME FOR SESSION 53-67 Minutes   Single knee to chest 4x10s   Supine trunk rotation 4x10s   Piriformis stretch    Hamstring stretch 3x15s  In supine    Hip flexor stretch 1 min   Calf stretch    Toe extension    Pelvic tilts 15x5s with large bolster   Alternating leg raises 20x table top position   Straight leg raises    Hamstring curls on green ball 2x10   S/L hip abduction 15x   Clams 15x   90-90's 15x   Bridging 2x10 5s hold with large bolster   Sit<>Stand 10x  Used small towel under heels   Bird dog    Eh Crunch    Cardio Bike  4 1/2 min Not Today  Nu step seat at 6 and arms 9, 4 min       NEURO RE-ED CPT 11514 TOTAL TIME FOR SESSION Not performed   Step up with tap add   Lateral step ups with hip abduction add   Tandem stance add   GAIT TRAINING CPT 16325 TOTAL TIME FOR SESSION Not performed           MANUAL CPT 81374  Massage CPT 68606 TOTAL TIME FOR SESSION Not performed   Ankle and toes ROM as tolerated (pt has open wounds         MODALITIES  Heat /ice 18246 TOTAL TIME FOR SESSION Not performed

## 2022-04-11 NOTE — PROGRESS NOTES
PT DAILY NOTE FOR OUTPATIENT THERAPY    Patient: Mary Moreno   MRN: [id number]  : 1955 66 y.o.  Referring Physician: Case Francois MD  Date of Visit: 2022    Certification Dates: 22 through 22    Diagnosis:   1. Chronic pain syndrome        Chief Complaints:  balance, pain , deconditioned    Precautions:   Precautions comments: Open wounds on ankles B  Existing Precautions/Restrictions: fall     TODAY'S VISIT    Time In Session:      History/Vitals/Pain/Encounter Info - 22 0920        Injury History/Precautions/Daily Required Info    Document Type daily treatment     Primary Therapist Yamilka Garcia MPT     Chief Complaint/Reason for Visit  balance, pain , deconditioned     Onset of Illness/Injury or Date of Surgery --    hospitalized for the open wounds in B LE's    Referring Physician Case Francois MD     Existing Precautions/Restrictions fall     Precautions comments Open wounds on ankles B     History of present illness/functional impairment Ms Moreno presents with neuropathy B legs and pyoderma gangrenosum the causes multi ulcers that are painful.  She went on to have multiple surgeries in  that led to a 6 month hospitalization. She has a few active ulcers around her ankle today and are wrapped with a dressing.  Pt also has history celiac disease, hx of R femoral neck fracture in  resulting in R LISA, hx of right shoulder injury . Pt had lost weight and weighed 88 pounds due to the gut issues.  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 She is now able to get her heel down. She is reporting balance issues.  She is clawing her toes as well now and has develpoed some sores on the 2nd toe. Pt has been referred to the chronic pain program for comprehensive PT and OT evaluations.     OP Specialty --   Chronic Pain    Patient/Family/Caregiver Comments/Observations Pt arrived reporting 2/10 pain in  feet.     Patient reported fall since last visit No        Pain Assessment    Currently in pain Yes     Preferred Pain Scale number (Numeric Rating Pain Scale)     Pain Side/Orientation bilateral     Pain: Body location Ankle     Pain Rating (0-10): Pre Activity 2     Pain Rating (0-10): Activity 2     Pain Rating (0-10): Post Activity 2                Daily Treatment Assessment and Plan - 04/11/22 1016        Daily Treatment Assessment and Plan    Progress toward goals Progressing     Daily Outcome Summary Pt fatigues quickly on Nustep but able to increase reps on most ex's today.  Pt still needs vc's for correct performance on ex's, changed squats to sit<>stands from mat to improve technique.     Plan and Recommendations Cont to progress ex's as pt zach, add step ups and tandem stance next visit.                       Today's Treatment:    Mary Moreno     Neuropathy, open wounds balance issues     Precautions open wounds at ankles/feet          THER ACT  CPT 45130 TOTAL TIME FOR SESSION Not performed   Toe guards B feet  Observed her new toe guards on B feet       THER EX CPT 46818 TOTAL TIME FOR SESSION 53-67 Minutes   Single knee to chest 4x10s   Supine trunk rotation 4x10s   Piriformis stretch    Hamstring stretch 3x15s  In supine    Hip flexor stretch 1 min   Calf stretch    Toe extension    Pelvic tilts 15x5s with large bolster   Alternating leg raises 20x table top position   Straight leg raises    Hamstring curls on green ball 2x10   S/L hip abduction 15x   Clams 15x   90-90's 15x   Bridging 2x10 5s hold with large bolster   Sit<>Stand 10x  Used small towel under heels   Bird dog    Eh Crunch    Cardio Bike  4 1/2 min Not Today  Nu step seat at 6 and arms 9, 4 min       NEURO RE-ED CPT 61214 TOTAL TIME FOR SESSION Not performed   Step up with tap add   Lateral step ups with hip abduction add   Tandem stance add   GAIT TRAINING CPT 60436 TOTAL TIME FOR SESSION Not performed           MANUAL CPT  90347  Massage CPT 05881 TOTAL TIME FOR SESSION Not performed   Ankle and toes ROM as tolerated (pt has open wounds         MODALITIES  Heat /ice 44367 TOTAL TIME FOR SESSION Not performed

## 2022-04-13 ENCOUNTER — HOSPITAL ENCOUNTER (OUTPATIENT)
Dept: PSYCHOLOGY | Facility: CLINIC | Age: 67
Discharge: HOME | End: 2022-04-13
Payer: MEDICARE

## 2022-04-13 ENCOUNTER — APPOINTMENT (OUTPATIENT)
Dept: URBAN - METROPOLITAN AREA CLINIC 200 | Age: 67
Setting detail: DERMATOLOGY
End: 2022-04-19

## 2022-04-13 ENCOUNTER — RX ONLY (RX ONLY)
Age: 67
End: 2022-04-13

## 2022-04-13 ENCOUNTER — HOSPITAL ENCOUNTER (OUTPATIENT)
Dept: OCCUPATIONAL THERAPY | Facility: REHABILITATION | Age: 67
Setting detail: THERAPIES SERIES
Discharge: HOME | End: 2022-04-13
Attending: PHYSICAL MEDICINE & REHABILITATION
Payer: MEDICARE

## 2022-04-13 DIAGNOSIS — G89.4 CHRONIC PAIN SYNDROME: Primary | ICD-10-CM

## 2022-04-13 DIAGNOSIS — Z11.52 ENCOUNTER FOR SCREENING FOR COVID-19: ICD-10-CM

## 2022-04-13 DIAGNOSIS — F45.42 PAIN DISORDER ASSOCIATED WITH PSYCHOLOGICAL FACTORS AND MEDICAL CONDITION: ICD-10-CM

## 2022-04-13 DIAGNOSIS — L08.9 LOCAL INFECTION OF THE SKIN AND SUBCUTANEOUS TISSUE, UNSPECIFIED: ICD-10-CM

## 2022-04-13 DIAGNOSIS — L88 PYODERMA GANGRENOSUM: ICD-10-CM

## 2022-04-13 PROCEDURE — OTHER ORDER TESTS: OTHER

## 2022-04-13 PROCEDURE — OTHER PRESCRIPTION: OTHER

## 2022-04-13 PROCEDURE — 97110 THERAPEUTIC EXERCISES: CPT | Mod: GP,CQ

## 2022-04-13 PROCEDURE — OTHER SCREENING FOR COVID-19: OTHER

## 2022-04-13 PROCEDURE — 90791 PSYCH DIAGNOSTIC EVALUATION: CPT | Performed by: PSYCHOLOGIST

## 2022-04-13 PROCEDURE — OTHER PHOTO-DOCUMENTATION: OTHER

## 2022-04-13 PROCEDURE — 97110 THERAPEUTIC EXERCISES: CPT | Mod: GO,CO

## 2022-04-13 PROCEDURE — 97010 HOT OR COLD PACKS THERAPY: CPT | Mod: GO,CO

## 2022-04-13 PROCEDURE — OTHER PRESCRIPTION MEDICATION MANAGEMENT: OTHER

## 2022-04-13 PROCEDURE — 99213 OFFICE O/P EST LOW 20 MIN: CPT

## 2022-04-13 RX ORDER — MUPIROCIN 20 MG/G
OINTMENT TOPICAL
Qty: 15 | Refills: 0 | Status: ERX | COMMUNITY
Start: 2022-04-13

## 2022-04-13 RX ORDER — CEPHALEXIN 500 MG/1
CAPSULE ORAL
Qty: 14 | Refills: 0 | Status: CANCELLED
Stop reason: CLARIF

## 2022-04-13 RX ORDER — MUPIROCIN 20 MG/G
OINTMENT TOPICAL
Qty: 15 | Refills: 0 | Status: CANCELLED
Stop reason: CLARIF

## 2022-04-13 RX ORDER — CEPHALEXIN 500 MG/1
CAPSULE ORAL
Qty: 14 | Refills: 0 | Status: ERX

## 2022-04-13 ASSESSMENT — LOCATION ZONE DERM
LOCATION ZONE: TRUNK
LOCATION ZONE: LEG

## 2022-04-13 ASSESSMENT — COGNITIVE AND FUNCTIONAL STATUS - GENERAL
SPEECH: REGULAR
LIBIDO: NON-CONTRIBUTORY
APPEARANCE: WELL GROOMED
ORIENTATION: FULLY ORIENTED
RECENT MEMORY: WNL
REMOTE MEMORY: WNL
APPETITE: NO CHANGE
EST. PREMORBID INTELLIGENCE: AVERAGE
PERCEPTUAL FUNCTION: NORMAL
PSYCHOMOTOR FUNCTIONING: DECREASED
SLEEP_WAKE_CYCLE: NO CHANGE
MOOD: FRUSTRATED;MOTIVATED;EUTHYMIC (NORMAL)
AFFECT: FULL RANGE
ATTENTION: WNL
DELUSIONS: NONE OR AGE APPROPRIATE
IMPULSE CONTROL: INTACT
THOUGHT_PROCESS: TANGENTIAL
THOUGHT_CONTENT: APPROPRIATE
EYE_CONTACT: WNL
CONCENTRATION: WNL
INSIGHT: INTACT

## 2022-04-13 ASSESSMENT — LOCATION DETAILED DESCRIPTION DERM
LOCATION DETAILED: LEFT DISTAL PRETIBIAL REGION
LOCATION DETAILED: PERIUMBILICAL SKIN
LOCATION DETAILED: LEFT ANTERIOR LATERAL MALLEOLUS
LOCATION DETAILED: LEFT ANKLE
LOCATION DETAILED: LEFT ACHILLES SKIN

## 2022-04-13 ASSESSMENT — LOCATION SIMPLE DESCRIPTION DERM
LOCATION SIMPLE: ABDOMEN
LOCATION SIMPLE: LEFT ACHILLES SKIN
LOCATION SIMPLE: LEFT ANKLE
LOCATION SIMPLE: LEFT PRETIBIAL REGION

## 2022-04-13 NOTE — PROGRESS NOTES
Outpatient Psychology Initial Intake    Duration:  55 minutes    Mary Moreno, : 1955, a 66 y.o. female, for initial evaluation visit to discuss Pain Management. She was referred to psychology at [facility] by Dr. Case Francois to help with coping with pain. She is participating in comprehensive pain rehab and therapy for deconditioning in the [facility] program. She receives physical and occupational therapies.She reports severe pain in her ankles. The pain is described as stabbing, burning, and intermittent sensation similar to electrical shocks.     • Pain       Ms Moreno presents with neuropathy in both legs and pyoderma gangrenosum the causes multi ulcers that are painful. Currently the ulcers are on both ankles. Both ankles are wrapped. She went on to have multiple surgeries in  that led to a 6 month hospitalization. .  Pt also has history celiac disease, hx of R femoral neck fracture in  resulting in R LISA, hx of right shoulder injury . Pt had lost weight and weighed 88 pounds due to the gut issues.  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 She is now able to get her heel down. She is reporting balance issues.  She is clawing her toes as well now and has develpoed some sores on the 2nd toe. She also reports a history of osteopenia.      She denies prior counseling, psychotherapy, or substance abuse treatment. She denies prior emotional difficulties. She denies current or prior suicidal ideation. She denies anxiety or depression. She does verbalize frustration with pain.     She is  and has 2 adult sons. She has not worked for approximately the last 10 years. She did work in [employer].     HPI:   Chief Complaint   Patient presents with   • Psychotherapy   • Pain            Past Medical History:   Diagnosis Date   • Celiac disease    • Fractures    • Osteopenia    • Pyoderma gangrenosum    • Ulcerative  colitis (CMS/HCC)      Past Surgical History:   Procedure Laterality Date   •  SECTION     • FOOT SURGERY     • HYSTERECTOMY     • SKIN GRAFT       No family history on file.  Social History     Socioeconomic History   • Marital status:    Tobacco Use   • Smoking status: Former Smoker   • Smokeless tobacco: Never Used   Substance and Sexual Activity   • Alcohol use: Yes     Comment: wine with dinner 2-3x week    • Drug use: Never   • Sexual activity: Defer       Current Legal Status:   NA    Number of Arrests:  NA  Previous Mental Health History:   Previous Mental Health Treatment:  N/A  Previous Suicidal Behavior:  N/A  Previous Self-Injurious Behavior:  N/A  Previous Homicidal Behavior:  N/A  Previous Substance Abuse Treatment: N/A    Current Outpatient Medications   Medication Sig Dispense Refill   • adalimumab (HUMIRA) 40 mg/0.8 mL syringe kit Inject 1 pen under the skin. Every 2 weeks on       • aspirin 81 mg enteric coated tablet Take 1 tablet (81 mg total) by mouth 2 (two) times a day. 60 tablet 0   • cyanocobalamin (vitamin B-12) 500 mcg tablet Take 2 tablets (1,000 mcg total) by mouth daily. 60 tablet 0   • desloratadine (CLARINEX REDITAB) 2.5 mg disintegrating tablet Take 2.5 mg by mouth daily.     • docosahexaenoic acid-epa 120-180 mg capsule Take 1 capsule by mouth daily.       • docusate sodium (COLACE) 100 mg capsule Take 1 capsule (100 mg total) by mouth 2 (two) times a day. 60 capsule 0   • DULoxetine (CYMBALTA) 30 mg capsule Take 30 mg by mouth daily.     • folic acid (FOLVITE) 1 mg tablet Take 1 tablet (1 mg total) by mouth daily. 30 tablet 0   • gabapentin (NEURONTIN) 100 mg capsule Take 100 mg by mouth 3 (three) times a day as needed (Neuro pain).       • ibandronate (BONIVA) 150 mg tablet Take 150 mg by mouth every 30 (thirty) days. Take in AM with glass of water prior to food, don't lie down for 30 minutes.       No current facility-administered medications for this  encounter.       Current Evaluation:   Mental Status Exam:  Appearance: Well Groomed  Speech: Regular  Psychomotor Functioning: Decreased  Eye Contact: WNL  Est. Premorbid Intelligence: Average  Orientation: Fully oriented  Attention: WNL  Concentration: WNL  Recent Memory: WNL  Remote Memory: WNL  Thought Content: Appropriate  Thought Process: Tangential  Insight: Intact  Perceptual Function: Normal  Delusions: None or age appropriate  Sleeping: No Change  Appetite: No Change  Libido: Non-Contributory  Affect: Full Range  Mood: Frustrated, Motivated, Euthymic (normal)    Assessments done this visit:  TSK Total: 44 Severe Range for Kinesiophobia    Milian Anxiety Inventory Total: 9, Mild Range endorsed symptoms overlap with steroid side effects (feeling hot,  face flushed)    Milian Depression Inventory Total: 8, Minimal Range      Cutler Suicide Severity Rating Scale:  Done today  1. Within the past month, have you wished you were dead or wished you could go to sleep and not wake up?: No  2. Within the past month, have you actually had any thoughts of killing yourself?: No  6. Have you ever done anything, started to do anything, or prepared to do anything to end your life?: No          Safe-T Assessment:  Not indicated        Risk Assessment:   Suicidal Ideation: Based on Cutler Suicide Screen and current clinical assessment, patient is determined Low Risk.  Self Injurious Behavior:  Not Present  Irritability:  Not Present  Homicidal Behavior: Not Present  Estimate of Risk:  None  If risk identified have suicide precautions been implemented? 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 Additionally, she has received numerous surgeries and medical interventions for this condition. [facility]. On self report inventories and in the clinical interview, she did not endorse features of depression and anxiety. However, her score on the Roosevelt Scale of Kineseiophobia is indicative of strong reluctance / fear of engaging in any activity that might trigger pain or trigger a flare in the pain intensity. There is a high correlation between a high score on this inventory with depression for patients with chronic pain. Recommend time limited course of counseling for bolstering coping with chronic pain and supporting particiaption in rehab therapies.        JAVI [email or web address] @ 2:47 PM

## 2022-04-13 NOTE — PROGRESS NOTES
OT DAILY NOTE FOR OUTPATIENT THERAPY    Patient: Mary Moreno   MRN: [id number]  : 1955 66 y.o.  Referring Physician: Case Francois MD  Date of Visit: 2022      Certification Dates: 22 through 22    Diagnosis:   1. Chronic pain syndrome        Chief Complaints:   balance, pain , deconditioned, decreased functional abilities/capacities    Precautions:  Existing Precautions/Restrictions: fall    TODAY'S VISIT    Time In Session:  Start Time: 915  Stop Time: 1000  Time Calculation (min): 45 min   History/Vitals/Pain/Encounter Info - 22        Injury History/Precautions/Daily Required Info    Document Type daily treatment     Primary Therapist Mata Sellers OTR/L     Chief Complaint/Reason for Visit  balance, pain , deconditioned, decreased functional abilities/capacities     Onset of Illness/Injury or Date of Surgery --    hospitalized for the open wounds in B 's    Referring Physician Case Francois MD     Existing Precautions/Restrictions fall     History of present illness/functional impairment Ms Moreno presents with neuropathy B legs and pyoderma gangrenosum the causes multi ulcers that are painful.  She went on to have multiple surgeries in  that led to a 6 month hospitalization. She has a few active ulcers around her ankle today and are wrapped with a dressing.  Pt also has history celiac disease, hx of R femoral neck fracture in  resulting in R LISA, hx of right shoulder injury . Pt had lost weight and weighed 88 pounds due to the gut issues.  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 She is now able to get her heel down. She is reporting balance issues.  She is clawing her toes as well now and has develpoed some sores on the 2nd toe. Pt has been referred to the chronic pain program for comprehensive PT and OT evaluations.     Patient/Family/Caregiver Comments/Observations Pt  arrived 15  minutes late to session.     Patient reported fall since last visit No        Pain Assessment    Currently in pain Yes     Preferred Pain Scale number (Numeric Rating Pain Scale)     Pain Side/Orientation right     Pain: Body location Ankle     Pain Rating (0-10): Pre Activity 1     Pain Rating (0-10): Activity 1     Pain Rating (0-10): Post Activity 1                Daily Treatment Assessment and Plan - 04/13/22 1223        Daily Treatment Assessment and Plan    Progress toward goals Progressing     Daily Outcome Summary Pt arrived 15 minutes late today.  Started with MHP R shoulder pre-stretching.  Tolerated all exercises and activities without increased pain or fatigue.  Continue to disucss energy conservation techniques for activities in her day.     Plan and Recommendations Progress OT POC for improved ROM, strength and overall functional abilities as tolerated.                       Today's Treatment:    Exercises Current Session Time   THER ACT  CPT 41935 TOTAL TIME FOR SESSION 8-22 Minutes   Function/body mech:     - Biodex limits of stability setting on static mode x 2 sets through with no handrail use.  Note: 19% accuracy, 1.09 min to complete first trial,  2nd trial, 21% accuracy, .58 minute, 3rd trial, 28% accuracy, .50 minute after discussion.     Note: close supervision for balance during activities.       THER EX CPT 06380 TOTAL TIME FOR SESSION 23-37 Minutes          STRETCHING    Supine Cane Overhead 6 x:06 second hold    Supine Cane Circles 6 reps each direction      Supine Cane Ext Rotation 6 x:06 second hold    Towel Internal Rot Stretch    Pulleys Shoulder Flex 5 x:08 second hold   Pulleys Shoulder Abd 5 x:08 second hold   Corner Pec Stretch 5 x:08 second hold   Wall Walk Shoulder Flex    Wall Walk Shoulder Abd             STRENGTHENING    Shoulder ball alphabet on wall  1x R arm through alphabet in standing    Supine Shoulder alphabet  1x each arm through with 2# weight (not today)    Shoulder Blade Squeeze 2 sets of 10 reps    Shoulder Flex    Shoulder Abd    Shoulder Internal Rotation    Shoulder External Rotation    Shoulder Retractions 2 sets of 10 reps with pink t-band   Shoulder Extension 2 sets of 10 reps with pink t-band   No Money 2 sets of 10 reps with pink t-band   TYI        UBE 4 min at level 1.5 in sitting, alternating direction every min (not today)   NEURO RE-ED CPT 67041   TOTAL TIME FOR SESSION Not performed        MANUAL CPT 47775 TOTAL TIME FOR SESSION Not performed        MODALITIES  Heat/ice CPT 52111   TOTAL TIME FOR SESSION  Right shoulder pre-stretching  8-22 Minutes   10 minutes     CPT 91292     E-stim CPT 51311            Name: Yamilka Moreno  Diagnosis: Chronic Pain

## 2022-04-13 NOTE — PROGRESS NOTES
PT DAILY NOTE FOR OUTPATIENT THERAPY    Patient: Mary Moreno   MRN: [id number]  : 1955 66 y.o.  Referring Physician: Case Francois MD  Date of Visit: 2022    Certification Dates: 22 through 22    Diagnosis:   1. Chronic pain syndrome        Chief Complaints:  balance, pain , deconditioned, decreased functional abilities/capacities    Precautions:   Existing Precautions/Restrictions: fall     TODAY'S VISIT    Time In Session:      History/Vitals/Pain/Encounter Info - 22 1037        Injury History/Precautions/Daily Required Info    Document Type daily treatment     Primary Therapist Yamilka Garcia,MPT     Chief Complaint/Reason for Visit  balance, pain , deconditioned, decreased functional abilities/capacities     Referring Physician Case Francois MD     Existing Precautions/Restrictions fall     History of present illness/functional impairment Ms Moreno presents with neuropathy B legs and pyoderma gangrenosum the causes multi ulcers that are painful.  She went on to have multiple surgeries in  that led to a 6 month hospitalization. She has a few active ulcers around her ankle today and are wrapped with a dressing.  Pt also has history celiac disease, hx of R femoral neck fracture in  resulting in R LISA, hx of right shoulder injury . Pt had lost weight and weighed 88 pounds due to the gut issues.  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 She is now able to get her heel down. She is reporting balance issues.  She is clawing her toes as well now and has develpoed some sores on the 2nd toe. Pt has been referred to the chronic pain program for comprehensive PT and OT evaluations.     Patient/Family/Caregiver Comments/Observations Pt reports 2/10 pain today.     Patient reported fall since last visit No        Pain Assessment    Currently in pain Yes     Preferred Pain Scale number (Numeric Rating Pain  Scale)     Pain: Body location Ankle;Back     Pain Rating (0-10): Pre Activity 2     Pain Rating (0-10): Activity 2     Pain Rating (0-10): Post Activity 2                Daily Treatment Assessment and Plan - 04/13/22 1037        Daily Treatment Assessment and Plan    Progress toward goals Progressing     Daily Outcome Summary Tolerated all exercises wll. Pt able to complete all exercises but needs some verbal cueing for good form. Pt aable to end with bike this session.     Plan and Recommendations Cont with therapy 2x/week as per POC.                     OBJECTIVE DATA TAKEN TODAY:    None taken    Today's Treatment:    Mary Moreno     Neuropathy, open wounds balance issues     Precautions open wounds at ankles/feet          THER ACT  CPT 89580 TOTAL TIME FOR SESSION Not performed   Toe guards B feet  Observed her new toe guards on B feet       THER EX CPT 85858 TOTAL TIME FOR SESSION 53-67 Minutes   Single knee to chest 4x10s   Supine trunk rotation 4x10s   Piriformis stretch    Hamstring stretch 3x15s  In supine    Hip flexor stretch 1 min   Calf stretch    Toe extension    Pelvic tilts 15x5s with large bolster   Alternating leg raises 20x table top position   Straight leg raises    Hamstring curls on green ball 2x10   S/L hip abduction 15x   Clams 15x   90-90's 15x   Bridging 2x10 5s hold with large bolster   Sit<>Stand 10x  Used small towel under heels   Bird dog    Eh Crunch    Cardio Bike  4 1/2 min Not Today  Nu step seat at 6 and arms 9, 4 min       NEURO RE-ED CPT 82571 TOTAL TIME FOR SESSION Not performed   Step up with tap add   Lateral step ups with hip abduction add   Tandem stance add   GAIT TRAINING CPT 79048 TOTAL TIME FOR SESSION Not performed           MANUAL CPT 50120  Massage CPT 43014 TOTAL TIME FOR SESSION Not performed   Ankle and toes ROM as tolerated (pt has open wounds         MODALITIES  Heat /ice 87190 TOTAL TIME FOR SESSION Not performed

## 2022-04-13 NOTE — OP PT TREATMENT LOG
Marymanisha Moreno     Neuropathy, open wounds balance issues     Precautions open wounds at ankles/feet          THER ACT  CPT 88108 TOTAL TIME FOR SESSION Not performed   Toe guards B feet  Observed her new toe guards on B feet       THER EX CPT 30282 TOTAL TIME FOR SESSION 53-67 Minutes   Single knee to chest 4x10s   Supine trunk rotation 4x10s   Piriformis stretch    Hamstring stretch 3x15s  In supine    Hip flexor stretch 1 min   Calf stretch    Toe extension    Pelvic tilts 15x5s with large bolster   Alternating leg raises 20x table top position   Straight leg raises    Hamstring curls on green ball 2x10   S/L hip abduction 15x   Clams 15x   90-90's 15x   Bridging 2x10 5s hold with large bolster   Sit<>Stand 10x  Used small towel under heels   Bird dog    Eh Crunch    Cardio Bike  4 1/2 min Not Today  Nu step seat at 6 and arms 9, 4 min       NEURO RE-ED CPT 11105 TOTAL TIME FOR SESSION Not performed   Step up with tap add   Lateral step ups with hip abduction add   Tandem stance add   GAIT TRAINING CPT 97388 TOTAL TIME FOR SESSION Not performed           MANUAL CPT 92351  Massage CPT 62655 TOTAL TIME FOR SESSION Not performed   Ankle and toes ROM as tolerated (pt has open wounds         MODALITIES  Heat /ice 37154 TOTAL TIME FOR SESSION Not performed

## 2022-04-13 NOTE — OP OT TREATMENT LOG
Exercises Current Session Time   THER ACT  CPT 62636 TOTAL TIME FOR SESSION 8-22 Minutes   Function/body mech:     - Biodex limits of stability setting on static mode x 2 sets through with no handrail use.  Note: 19% accuracy, 1.09 min to complete first trial,  2nd trial, 21% accuracy, .58 minute, 3rd trial, 28% accuracy, .50 minute after discussion.     Note: close supervision for balance during activities.       THER EX CPT 57579 TOTAL TIME FOR SESSION 23-37 Minutes          STRETCHING    Supine Cane Overhead 6 x:06 second hold    Supine Cane Circles 6 reps each direction      Supine Cane Ext Rotation 6 x:06 second hold    Towel Internal Rot Stretch    Pulleys Shoulder Flex 5 x:08 second hold   Pulleys Shoulder Abd 5 x:08 second hold   Corner Pec Stretch 5 x:08 second hold   Wall Walk Shoulder Flex    Wall Walk Shoulder Abd             STRENGTHENING    Shoulder ball alphabet on wall  1x R arm through alphabet in standing    Supine Shoulder alphabet  1x each arm through with 2# weight (not today)   Shoulder Blade Squeeze 2 sets of 10 reps    Shoulder Flex    Shoulder Abd    Shoulder Internal Rotation    Shoulder External Rotation    Shoulder Retractions 2 sets of 10 reps with pink t-band   Shoulder Extension 2 sets of 10 reps with pink t-band   No Money 2 sets of 10 reps with pink t-band   TYI        UBE 4 min at level 1.5 in sitting, alternating direction every min (not today)   NEURO RE-ED CPT 74745   TOTAL TIME FOR SESSION Not performed        MANUAL CPT 67822 TOTAL TIME FOR SESSION Not performed        MODALITIES  Heat/ice CPT 41595   TOTAL TIME FOR SESSION  Right shoulder pre-stretching  8-22 Minutes   10 minutes   US  CPT 70268     E-stim CPT 22929            Name: Yamilka Moreno  Diagnosis: Chronic Pain

## 2022-04-20 ENCOUNTER — HOSPITAL ENCOUNTER (OUTPATIENT)
Dept: OCCUPATIONAL THERAPY | Facility: REHABILITATION | Age: 67
Setting detail: THERAPIES SERIES
Discharge: HOME | End: 2022-04-20
Attending: PHYSICAL MEDICINE & REHABILITATION
Payer: MEDICARE

## 2022-04-20 DIAGNOSIS — G89.4 CHRONIC PAIN SYNDROME: Primary | ICD-10-CM

## 2022-04-20 PROCEDURE — 97112 NEUROMUSCULAR REEDUCATION: CPT | Mod: GP

## 2022-04-20 PROCEDURE — 97110 THERAPEUTIC EXERCISES: CPT | Mod: GP

## 2022-04-20 NOTE — PROGRESS NOTES
PT DAILY NOTE FOR OUTPATIENT THERAPY    Patient: Mary Moreno   MRN: [id number]  : 1955 66 y.o.  Referring Physician: Case Francois MD  Date of Visit: 2022    Certification Dates: 22 through 22    Diagnosis:   1. Chronic pain syndrome        Chief Complaints:  balance, pain , deconditioned, decreased functional abilities/capacities    Precautions:   Existing Precautions/Restrictions: fall     TODAY'S VISIT    Time In Session:  Start Time: 1000  Stop Time: 1100  Time Calculation (min): 60 min   History/Vitals/Pain/Encounter Info - 22 1000        Injury History/Precautions/Daily Required Info    Document Type daily treatment     Primary Therapist DENISSE Bernard     Chief Complaint/Reason for Visit  balance, pain , deconditioned, decreased functional abilities/capacities     Onset of Illness/Injury or Date of Surgery --    hospitalized for the open wounds in [facility]    Referring Physician Case Francois MD     Existing Precautions/Restrictions fall     History of present illness/functional impairment Ms Moreno presents with neuropathy B legs and pyoderma gangrenosum the causes multi ulcers that are painful.  She went on to have multiple surgeries in  that led to a 6 month hospitalization. She has a few active ulcers around her ankle today and are wrapped with a dressing.  Pt also has history celiac disease, hx of R femoral neck fracture in  resulting in R LISA, hx of right shoulder injury . Pt had lost weight and weighed 88 pounds due to the gut issues.  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 She is now able to get her heel down. She is reporting balance issues.  She is clawing her toes as well now and has develpoed some sores on the 2nd toe. Pt has been referred to the chronic pain program for comprehensive PT and OT evaluations.     OP Specialty --   chronic pain    Patient/Family/Caregiver  Comments/Observations Pt arrived on time for PT session. She missed her OT session     Patient reported fall since last visit No        Pain Assessment    Currently in pain Yes     Preferred Pain Scale number (Numeric Rating Pain Scale)     Pain Side/Orientation right     Pain: Body location Ankle;Back     Pain Rating (0-10): Pre Activity 2     Pain Rating (0-10): Post Activity 2                Daily Treatment Assessment and Plan - 04/20/22 1000        Daily Treatment Assessment and Plan    Progress toward goals Progressing     Daily Outcome Summary increased reps for the hip exercises. Added step ups on 6 inch and lateral step up on maira 4 inch step. Pt used B UE support to perform. tandem stance performed today. R leg in rear position was easier than L leg in rear. Pt has the spaces on her toes that also make it challenging. She used to rely on the toes to claw to help with her balance.     Plan and Recommendations continue to progress exercises and balance exercises. Try tandem stance and add head movements and arm movements                     OBJECTIVE DATA TAKEN TODAY:    None taken    Today's Treatment:    Mary Moreno     Neuropathy, open wounds balance issues     Precautions open wounds at ankles/feet          THER ACT  CPT 34178 TOTAL TIME FOR SESSION 0-7 Minutes   Toe guards B feet  Observed her new toe guards on B feet   4/20 Pt can practice the single leg stance at her kitchen counter   THER EX CPT 23822 TOTAL TIME FOR SESSION 38-52 Minutes   Single knee to chest 4x10s   Supine trunk rotation 4x10s   Piriformis stretch    Hamstring stretch 3x15s  In supine    Hip flexor stretch 1 min   Calf stretch    Toe extension    Pelvic tilts 15x5s with large bolster   Alternating leg raises 20x table top position   Straight leg raises    Hamstring curls on green ball 2x10   S/L hip abduction 2x10   Clams 2x10   90-90's 2x10   Bridging 2x10 5s hold with large bolster   Sit<>Stand 10x2  Used small towel under  "heels cues to stand all the way up and to keep knees apart   Bird dog    Eh Crunch    Cardio   Nu step seat at 6 and arms 10, 4 min       NEURO RE-ED CPT 51684 TOTAL TIME FOR SESSION 8-22 Minutes   Step ups on 6\" step 10 UE support   lateral step ups on 4 inch step 10 UE support       Step up with tap add   Lateral step ups with hip abduction add   Tandem stance 20 sec with each foot in front x2 UE support to get into positon CG/close S provided for safety  Add arm and head movements as tolerated   GAIT TRAINING CPT 78197 TOTAL TIME FOR SESSION Not performed           MANUAL CPT 03427  Massage CPT 22841 TOTAL TIME FOR SESSION Not performed   Ankle and toes ROM as tolerated (pt has open wounds         MODALITIES  Heat /ice 44416 TOTAL TIME FOR SESSION Not performed                                            "

## 2022-04-20 NOTE — OP PT TREATMENT LOG
"Mary Wrayburn     Neuropathy, open wounds balance issues     Precautions open wounds at ankles/feet          THER ACT  CPT 57449 TOTAL TIME FOR SESSION 0-7 Minutes   Toe guards B feet  Observed her new toe guards on B feet   4/20 Pt can practice the single leg stance at her kitchen counter   THER EX CPT 17286 TOTAL TIME FOR SESSION 38-52 Minutes   Single knee to chest 4x10s   Supine trunk rotation 4x10s   Piriformis stretch    Hamstring stretch 3x15s  In supine    Hip flexor stretch 1 min   Calf stretch    Toe extension    Pelvic tilts 15x5s with large bolster   Alternating leg raises 20x table top position   Straight leg raises    Hamstring curls on green ball 2x10   S/L hip abduction 2x10   Clams 2x10   90-90's 2x10   Bridging 2x10 5s hold with large bolster   Sit<>Stand 10x2  Used small towel under heels cues to stand all the way up and to keep knees apart   Bird dog    Eh Crunch    Cardio   Nu step seat at 6 and arms 10, 4 min       NEURO RE-ED CPT 18146 TOTAL TIME FOR SESSION 8-22 Minutes   Step ups on 6\" step 10 UE support   lateral step ups on 4 inch step 10 UE support       Step up with tap add   Lateral step ups with hip abduction add   Tandem stance 20 sec with each foot in front x2 UE support to get into positon CG/close S provided for safety  Add arm and head movements as tolerated   GAIT TRAINING CPT 71500 TOTAL TIME FOR SESSION Not performed           MANUAL CPT 67317  Massage CPT 16993 TOTAL TIME FOR SESSION Not performed   Ankle and toes ROM as tolerated (pt has open wounds         MODALITIES  Heat /ice 29901 TOTAL TIME FOR SESSION Not performed                         "

## 2022-04-22 ENCOUNTER — HOSPITAL ENCOUNTER (OUTPATIENT)
Dept: OCCUPATIONAL THERAPY | Facility: REHABILITATION | Age: 67
Setting detail: THERAPIES SERIES
Discharge: HOME | End: 2022-04-22
Attending: PHYSICAL MEDICINE & REHABILITATION
Payer: MEDICARE

## 2022-04-22 DIAGNOSIS — G89.4 CHRONIC PAIN SYNDROME: Primary | ICD-10-CM

## 2022-04-22 PROCEDURE — 97530 THERAPEUTIC ACTIVITIES: CPT | Mod: GO

## 2022-04-22 PROCEDURE — 97110 THERAPEUTIC EXERCISES: CPT | Mod: GP

## 2022-04-22 PROCEDURE — 97110 THERAPEUTIC EXERCISES: CPT | Mod: GO

## 2022-04-22 PROCEDURE — 97112 NEUROMUSCULAR REEDUCATION: CPT | Mod: GP

## 2022-04-22 NOTE — OP PT TREATMENT LOG
"Mary Wrayburn     Neuropathy, open wounds balance issues     Precautions open wounds at ankles/feet          THER ACT  CPT 26869 TOTAL TIME FOR SESSION Not performed   Toe guards B feet  Observed her new toe guards on B feet   4/20 Pt can practice the single leg stance at her kitchen counter   THER EX CPT 68134 TOTAL TIME FOR SESSION 38-52 Minutes   Single knee to chest 4x10s   Supine trunk rotation 4x10s   Piriformis stretch 4x10s   Hamstring stretch 3x15s  In supine    Hip flexor stretch 1 min   Calf stretch    Toe extension    Pelvic tilts 15x5s with large bolster   Alternating leg raises 20x table top position   Alternating kick outs 1x10 w/ pelvic tilts   Straight leg raises 1x10   Hamstring curls on green ball 2x10   S/L hip abduction 2x10   Clams 2x10   90-90's 2x10   Bridging 2x10 5s hold with large bolster   Sit<>Stand 10x1  Used small towel under heels cues to stand all the way up and to keep knees apart - deferred second set today, so did 1x10 LAQ instead   Bird dog    Eh Crunch    Cardio Nu step seat at 6 and arms 10, 4 min       NEURO RE-ED CPT 13611 TOTAL TIME FOR SESSION 8-22 Minutes   Step ups on 6\" step 10 UE support - deferred   lateral step ups on 4 inch step 10 UE support - deferred       Step up with tap add   Lateral step ups with hip abduction add   Tandem stance 30 sec with each foot in front x2 and 1x5 head turns UE support to get into positon CG/close S provided for safety - Add arm movements as tolerated   GAIT TRAINING CPT 72230 TOTAL TIME FOR SESSION Not performed           MANUAL CPT 72362  Massage CPT 24597 TOTAL TIME FOR SESSION Not performed   Ankle and toes ROM as tolerated (pt has open wounds         MODALITIES  Heat /ice 49658 TOTAL TIME FOR SESSION Not performed                         "

## 2022-04-22 NOTE — PROGRESS NOTES
PT DAILY NOTE FOR OUTPATIENT THERAPY    Patient: Mary Moreno   MRN: [id number]  : 1955 66 y.o.  Referring Physician: Case Francois MD  Date of Visit: 2022    Certification Dates: 22 through 22    Diagnosis:   1. Chronic pain syndrome        Chief Complaints:  balance, pain , deconditioned, decreased functional abilities/capacities    Precautions:   Precautions comments: Open wounds on ankles B  Existing Precautions/Restrictions: fall     TODAY'S VISIT    Time In Session:  Start Time: 1000  Stop Time: 1055  Time Calculation (min): 55 min   History/Vitals/Pain/Encounter Info - 22 1010        Injury History/Precautions/Daily Required Info    Document Type daily treatment     Primary Therapist DENISSE Bernard     Chief Complaint/Reason for Visit  balance, pain , deconditioned, decreased functional abilities/capacities     Onset of Illness/Injury or Date of Surgery --   2013 hospitalized for the open wounds in [facility]'s    Referring Physician Case Francois MD     Existing Precautions/Restrictions fall     Precautions comments Open wounds on ankles B     History of present illness/functional impairment Ms Moreno presents with neuropathy B legs and pyoderma gangrenosum the causes multi ulcers that are painful.  She went on to have multiple surgeries in  that led to a 6 month hospitalization. She has a few active ulcers around her ankle today and are wrapped with a dressing.  Pt also has history celiac disease, hx of R femoral neck fracture in  resulting in R LISA, hx of right shoulder injury . Pt had lost weight and weighed 88 pounds due to the gut issues.  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 She is now able to get her heel down. She is reporting balance issues.  She is clawing her toes as well now and has develpoed some sores on the 2nd toe. Pt has been referred to the chronic pain program for  comprehensive PT and OT evaluations.     OP Specialty --   chronic pain    Patient/Family/Caregiver Comments/Observations Doing better overall and has been more active at home with projects in the house. About 1-2/10 today     Patient reported fall since last visit No        Pain Assessment    Currently in pain Yes     Preferred Pain Scale Chronic Pain     Pain Side/Orientation bilateral;generalized     Pain: Body location Ankle;Back;Shoulder     Pain Rating (0-10): Pre Activity 2     Pain Rating (0-10): Activity 2     Pain Rating (0-10): Post Activity 1                Daily Treatment Assessment and Plan - 04/22/22 1044        Daily Treatment Assessment and Plan    Progress toward goals Progressing     Daily Outcome Summary Mary did well with all table exercises today, but feet/ankles were aggravated by weight-bearing exercises, so these were held/deferred in favor of adding LAQ, SLR and pelvic tilts w/ kickouts. Was able to add head movements to tandem stance, with mild challenged.     Plan and Recommendations Cont to progress standing and balance exercises as tolerated. Resume step ups and add arm movements to tandem stance                     OBJECTIVE DATA TAKEN TODAY:    None taken    Today's Treatment:    Mary Moreno     Neuropathy, open wounds balance issues     Precautions open wounds at ankles/feet          THER ACT  CPT 64739 TOTAL TIME FOR SESSION Not performed   Toe guards B feet  Observed her new toe guards on B feet   4/20 Pt can practice the single leg stance at her kitchen counter   THER EX CPT 89708 TOTAL TIME FOR SESSION 38-52 Minutes   Single knee to chest 4x10s   Supine trunk rotation 4x10s   Piriformis stretch 4x10s   Hamstring stretch 3x15s  In supine    Hip flexor stretch 1 min   Calf stretch    Toe extension    Pelvic tilts 15x5s with large bolster   Alternating leg raises 20x table top position   Alternating kick outs 1x10 w/ pelvic tilts   Straight leg raises 1x10   Hamstring curls  "on green ball 2x10   S/L hip abduction 2x10   Clams 2x10   90-90's 2x10   Bridging 2x10 5s hold with large bolster   Sit<>Stand 10x1  Used small towel under heels cues to stand all the way up and to keep knees apart - deferred second set today, so did 1x10 LAQ instead   Bird dog    Eh Crunch    Cardio Nu step seat at 6 and arms 10, 4 min       NEURO RE-ED CPT 56128 TOTAL TIME FOR SESSION 8-22 Minutes   Step ups on 6\" step 10 UE support - deferred   lateral step ups on 4 inch step 10 UE support - deferred       Step up with tap add   Lateral step ups with hip abduction add   Tandem stance 30 sec with each foot in front x2 and 1x5 head turns UE support to get into positon CG/close S provided for safety - Add arm movements as tolerated   GAIT TRAINING CPT 52654 TOTAL TIME FOR SESSION Not performed           MANUAL CPT 48124  Massage CPT 04172 TOTAL TIME FOR SESSION Not performed   Ankle and toes ROM as tolerated (pt has open wounds         MODALITIES  Heat /ice 02673 TOTAL TIME FOR SESSION Not performed                                            "

## 2022-04-22 NOTE — OP OT TREATMENT LOG
Exercises Current Session Time   THER ACT  CPT 62647 TOTAL TIME FOR SESSION 8-22 Minutes   [facility] -Activity review since last seen and issues discussed including [place] and getting caught in bad weather.           THER EX CPT 73264 TOTAL TIME FOR SESSION 38-52 Minutes          STRETCHING    Supine Cane Overhead 6 x:06 second hold    Supine Cane Circles 6 reps each direction      Supine Cane Ext Rotation 6 x:06 second hold    Towel Internal Rot Stretch    Pulleys Shoulder Flex 5 x:08 second hold   Pulleys Shoulder Abd 5 x:08 second hold   Corner Pec Stretch 5 x:08 second hold   Wall Walk Shoulder Flex    Wall Walk Shoulder Abd             STRENGTHENING    Shoulder ball alphabet on wall  1x R arm through alphabet in standing    Supine Shoulder alphabet  1x each arm through with 2# weight    Shoulder Blade Squeeze 2 sets of 10 reps    Shoulder Flex 10 reps with orange t-band   Shoulder Abd 10 reps with orange t-band   Shoulder Internal Rotation    Shoulder External Rotation    Shoulder Retractions 2 sets of 10 reps with pink t-band   Shoulder Extension 2 sets of 10 reps with pink t-band   No Money 2 sets of 10 reps with pink t-band   TYI        UBE 6 min at level 1.5 in sitting, alternating direction every min    NEURO RE-ED CPT 40598   TOTAL TIME FOR SESSION Not performed        MANUAL CPT 41256 TOTAL TIME FOR SESSION Not performed        MODALITIES  Heat/ice CPT 09610   TOTAL TIME FOR SESSION  Right shoulder pre-stretching  Not performed    US  CPT 61092     E-stim CPT 78735            Name: Mary Moreno  Diagnosis: Chronic Pain

## 2022-04-22 NOTE — PROGRESS NOTES
OT DAILY NOTE FOR OUTPATIENT THERAPY    Patient: Mary Moreno   MRN: [id number]  : 1955 66 y.o.  Referring Physician: Case Francois MD  Date of Visit: 2022      Certification Dates: 22 through 22    Diagnosis:   1. Chronic pain syndrome        Chief Complaints:   balance, pain , deconditioned, decreased functional abilities/capacities    Precautions:  Existing Precautions/Restrictions: fall    TODAY'S VISIT    Time In Session:  Start Time: 0900  Stop Time: 1000  Time Calculation (min): 60 min   History/Vitals/Pain/Encounter Info - 22 0912        Injury History/Precautions/Daily Required Info    Document Type daily treatment     Primary Therapist Mata Sellers OTR/L     Chief Complaint/Reason for Visit  balance, pain , deconditioned, decreased functional abilities/capacities     Onset of Illness/Injury or Date of Surgery --    hospitalized for the open wounds in B LE's    Referring Physician Case Francois MD     Existing Precautions/Restrictions fall     History of present illness/functional impairment Ms Moreno presents with neuropathy B legs and pyoderma gangrenosum the causes multi ulcers that are painful.  She went on to have multiple surgeries in  that led to a 6 month hospitalization. She has a few active ulcers around her ankle today and are wrapped with a dressing.  Pt also has history celiac disease, hx of R femoral neck fracture in  resulting in R LISA, hx of right shoulder injury . Pt had lost weight and weighed 88 pounds due to the gut issues.  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 She is now able to get her heel down. She is reporting balance issues.  She is clawing her toes as well now and has develpoed some sores on the 2nd toe. Pt has been referred to the chronic pain program for comprehensive PT and OT evaluations.     Patient/Family/Caregiver Comments/Observations Patient  doing well overall arriving with 1-2/10 pain today     Patient reported fall since last visit No        Pain Assessment    Currently in pain Yes     Preferred Pain Scale Chronic Pain     Pain Side/Orientation bilateral;generalized     Pain: Body location Ankle;Back;Shoulder     Pain Rating (0-10): Pre Activity 2     Pain Rating (0-10): Post Activity 1                Daily Treatment Assessment and Plan - 04/22/22 0912        Daily Treatment Assessment and Plan    Progress toward goals Progressing     Daily Outcome Summary Patient arrived reporting she's been doing well, has been active with home contractors and feeling a little better.  During todays session she was able to add some additional upper body t-band exercises without issue.  We did not do balance/agility but did spent time reviewing activity level at home and healing process of LE wounds.  Will add agility/balance back in next visit.     Plan and Recommendations Progress OT POC for improved ROM, strength and overall functional abilities as tolerated.                   Today's Treatment:    Exercises Current Session Time   THER ACT  CPT 51074 TOTAL TIME FOR SESSION 8-22 Minutes   Function/body mech: -Activity review since last seen and issues discussed [place] bad weather.           THER EX CPT 68918 TOTAL TIME FOR SESSION 38-52 Minutes          STRETCHING    Supine Cane Overhead 6 x:06 second hold    Supine Cane Circles 6 reps each direction      Supine Cane Ext Rotation 6 x:06 second hold    Towel Internal Rot Stretch    Pulleys Shoulder Flex 5 x:08 second hold   Pulleys Shoulder Abd 5 x:08 second hold   Corner Pec Stretch 5 x:08 second hold   Wall Walk Shoulder Flex    Wall Walk Shoulder Abd             STRENGTHENING    Shoulder ball alphabet on wall  1x R arm through alphabet in standing    Supine Shoulder alphabet  1x each arm through with 2# weight    Shoulder Blade Squeeze 2 sets of 10 reps     Shoulder Flex 10 reps with orange t-band   Shoulder Abd 10 reps with orange t-band   Shoulder Internal Rotation    Shoulder External Rotation    Shoulder Retractions 2 sets of 10 reps with pink t-band   Shoulder Extension 2 sets of 10 reps with pink t-band   No Money 2 sets of 10 reps with pink t-band   TYI        UBE 6 min at level 1.5 in sitting, alternating direction every min    NEURO RE-ED CPT 73896   TOTAL TIME FOR SESSION Not performed        MANUAL CPT 67648 TOTAL TIME FOR SESSION Not performed        MODALITIES  Heat/ice CPT 75067   TOTAL TIME FOR SESSION  Right shoulder pre-stretching  Not performed    US  CPT 04024     E-stim CPT 24516            Name: Mary Moreno  Diagnosis: Chronic Pain

## 2022-05-03 ENCOUNTER — HOSPITAL ENCOUNTER (OUTPATIENT)
Dept: OCCUPATIONAL THERAPY | Facility: REHABILITATION | Age: 67
Setting detail: THERAPIES SERIES
Discharge: HOME | End: 2022-05-03
Attending: PHYSICAL MEDICINE & REHABILITATION
Payer: MEDICARE

## 2022-05-03 DIAGNOSIS — G89.4 CHRONIC PAIN SYNDROME: Primary | ICD-10-CM

## 2022-05-03 PROCEDURE — 97110 THERAPEUTIC EXERCISES: CPT | Mod: GO

## 2022-05-03 PROCEDURE — 97110 THERAPEUTIC EXERCISES: CPT | Mod: GP

## 2022-05-03 NOTE — PROGRESS NOTES
OT DAILY NOTE FOR OUTPATIENT THERAPY    Patient: Mary Moreno   MRN: [id number]  : 1955 66 y.o.  Referring Physician: Case Francois MD  Date of Visit: 5/3/2022      Certification Dates: 22 through 22    Diagnosis:   1. Chronic pain syndrome        Chief Complaints:   balance, pain , deconditioned, decreased functional abilities/capacities    Precautions:  Existing Precautions/Restrictions: fall    TODAY'S VISIT    Time In Session:  Start Time: 0810  Stop Time: 900  Time Calculation (min): 50 min   History/Vitals/Pain/Encounter Info - 22 0831        Injury History/Precautions/Daily Required Info    Document Type daily treatment     Primary Therapist Mata Sellers OTR/L     Chief Complaint/Reason for Visit  balance, pain , deconditioned, decreased functional abilities/capacities     Onset of Illness/Injury or Date of Surgery --    hospitalized for the open wounds in B LE's    Referring Physician Case Francois MD     Existing Precautions/Restrictions fall     History of present illness/functional impairment Ms Moreno presents with neuropathy B legs and pyoderma gangrenosum the causes multi ulcers that are painful.  She went on to have multiple surgeries in  that led to a 6 month hospitalization. She has a few active ulcers around her ankle today and are wrapped with a dressing.  Pt also has history celiac disease, hx of R femoral neck fracture in  resulting in R LISA, hx of right shoulder injury . Pt had lost weight and weighed 88 pounds due to the gut issues.  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 She is now able to get her heel down. She is reporting balance issues.  She is clawing her toes as well now and has develpoed some sores on the 2nd toe. Pt has been referred to the chronic pain program for comprehensive PT and OT evaluations.     Patient/Family/Caregiver Comments/Observations Patient  reports her dogs pulled her quickly and her left knee has been sore since.  No other change since last visit.     Patient reported fall since last visit No        Pain Assessment    Currently in pain Yes     Preferred Pain Scale number (Numeric Rating Pain Scale)     Pain Side/Orientation bilateral;generalized     Pain: Body location Other (see comments)   multi-site but especially left knee today    Pain Rating (0-10): Pre Activity 3     Pain Rating (0-10): Activity 3                Daily Treatment Assessment and Plan - 05/03/22 0831        Daily Treatment Assessment and Plan    Progress toward goals Progressing     Daily Outcome Summary Patient arrived reporting her dogs pulled her off balance and her left knee has been sore since but no other change since last visit.  Performed well with improving right shoulder motion and improving core strength and functional abilities by her report.  Arrived a little late so appointment short by 10 min.     Plan and Recommendations Progress OT POC for improved ROM, strength and overall functional abilities as tolerated.                   Today's Treatment:    Exercises Current Session Time   THER ACT  CPT 52083 TOTAL TIME FOR SESSION 0-7 Minutes   Function/body mech: -Review of how she got caught off balance when walking dogs and how she might prevent this in the future            THER EX CPT 21646 TOTAL TIME FOR SESSION 38-52 Minutes          STRETCHING    Supine Cane Overhead 6 x:06 second hold     Supine Cane Circles 6 reps each direction      Supine Cane Ext Rotation 6 x:06 second hold    Towel Internal Rot Stretch    Pulleys Shoulder Flex 5 x:08 second hold   Pulleys Shoulder Abd 5 x:08 second hold   Corner Pec Stretch 5 x:08 second hold   Wall Walk Shoulder Flex    Wall Walk Shoulder Abd             STRENGTHENING    Shoulder ball alphabet on wall  1x R arm through alphabet in standing    Supine Shoulder alphabet  1x each arm through with 2# weight    Shoulder Blade  Squeeze 2 sets of 10 reps    Shoulder Flex 10 reps with orange t-band   Shoulder Abd 10 reps with orange t-band   Shoulder Internal Rotation    Shoulder External Rotation    Shoulder Retractions 2 sets of 10 reps with pink t-band   Shoulder Extension 2 sets of 10 reps with pink t-band   No Money 2 sets of 10 reps with pink t-band   TYI        UBE 6 min at level 1.5 in sitting, alternating direction every min    NEURO RE-ED CPT 58957   TOTAL TIME FOR SESSION Not performed        MANUAL CPT 55499 TOTAL TIME FOR SESSION Not performed        MODALITIES  Heat/ice CPT 79835   TOTAL TIME FOR SESSION  Right shoulder pre-stretching  Not performed    US  CPT 78195     E-stim CPT 28858            Name: Mary Moreno  Diagnosis: Chronic Pain

## 2022-05-03 NOTE — PROGRESS NOTES
PT DAILY NOTE FOR OUTPATIENT THERAPY    Patient: Mary Moreno   MRN: [id number]  : 1955 66 y.o.  Referring Physician: Case Francois MD  Date of Visit: 5/3/2022    Certification Dates: 22 through 22    Diagnosis:   1. Chronic pain syndrome        Chief Complaints:  balance, pain , deconditioned, decreased functional abilities/capacities    Precautions:   Precautions comments: Open wounds on ankles B  Existing Precautions/Restrictions: fall     TODAY'S VISIT    Time In Session:  Start Time: 0900  Stop Time: 1000  Time Calculation (min): 60 min   History/Vitals/Pain/Encounter Info - 22 0858        Injury History/Precautions/Daily Required Info    Document Type daily treatment     Chief Complaint/Reason for Visit  balance, pain , deconditioned, decreased functional abilities/capacities     Referring Physician Case Francois MD     Existing Precautions/Restrictions fall     Precautions comments Open wounds on ankles B     History of present illness/functional impairment Ms Moreno presents with neuropathy B legs and pyoderma gangrenosum the causes multi ulcers that are painful.  She went on to have multiple surgeries in  that led to a 6 month hospitalization. She has a few active ulcers around her ankle today and are wrapped with a dressing.  Pt also has history celiac disease, hx of R femoral neck fracture in  resulting in R LISA, hx of right shoulder injury . Pt had lost weight and weighed 88 pounds due to the gut issues.  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 She is now able to get her heel down. She is reporting balance issues.  She is clawing her toes as well now and has develpoed some sores on the 2nd toe. Pt has been referred to the chronic pain program for comprehensive PT and OT evaluations.     Patient/Family/Caregiver Comments/Observations Pt states that her L knee is hurting as her dogs pulled  her quickly after last visit when she was walking them and fell on her L knee. Her wounds opened up in the feet after the last when going on the nustep. Will stay away from DF.     Patient reported fall since last visit Yes   Fell when walking her 2 dogs and they sped up and she fell on knee    Recommended plan to address falls Will work on balance exercises. Held today due to L knee pain in standing.        Pain Assessment    Currently in pain Yes     Preferred Pain Scale number (Numeric Rating Pain Scale)     Pain Side/Orientation left     Pain: Body location Knee     Pain Rating (0-10): Pre Activity 3                Daily Treatment Assessment and Plan - 05/03/22 0858        Daily Treatment Assessment and Plan    Progress toward goals Progressing     Daily Outcome Summary Held on cardio machines today as her wounds opened up after using the nu-step last visit. Will hold on prolonged ankle DF positions today as it affects her foot wounds. She had a fall when dogs pulled her over the weekend thus falling on L knee. Held on balance and standing exercises due to inc in L knee pain. She tolerated all mat level ther-ex well, had some instability with dynamic lower abdominal tasks. Required cuing to stay on task with exercises as she would fall asleep at times.     Plan and Recommendations Cont to progress balance tasks in static and dynamic.                     OBJECTIVE DATA TAKEN TODAY:    None taken    Today's Treatment:    Mary Moreno     Neuropathy, open wounds balance issues     Precautions open wounds at ankles/feet          THER ACT  CPT 67431 TOTAL TIME FOR SESSION Not performed   Toe guards B feet  Observed her new toe guards on B feet   4/20 Pt can practice the single leg stance at her kitchen counter   THER EX CPT 21537 TOTAL TIME FOR SESSION 38-52 Minutes   Single knee to chest 4x10s   Supine trunk rotation 4x10s   Piriformis stretch 4x10s   Hamstring stretch 3x15s  In supine    Hip flexor stretch 1  "min   Calf stretch    Toe extension    Pelvic tilts 15x5s with large bolster   Alternating leg raises 20x table top position   Alternating kick outs 2x10 w/ pelvic tilts   Straight leg raises 2x10   Hamstring curls on green ball 2x10   S/L hip abduction 2x10    Clams 2x10    90-90's 2x10   Bridging 2x10 5s with green SB   Sit<>Stand 10x1  Used small towel under heels cues to stand all the way up and to keep knees apart - deferred second set today, so did 1x10 LAQ instead - SKIP   Bird dog    Eh Crunch    Cardio Nu step seat at 6 and arms 10, 4 min - HOLD d/t ankle DF opening her wounds in feet       NEURO RE-ED CPT 23766 TOTAL TIME FOR SESSION Not performed   Step ups on 6\" step 10 UE support - deferred   lateral step ups on 4 inch step 10 UE support - deferred       Step up with tap add   Lateral step ups with hip abduction add   Tandem stance 30 sec with each foot in front x2 and 1x5 head turns UE support to get into positon CG/close S provided for safety - Add arm movements as tolerated - SKIP   GAIT TRAINING CPT 17884 TOTAL TIME FOR SESSION Not performed           MANUAL CPT 40364  Massage CPT 70274 TOTAL TIME FOR SESSION Not performed   Ankle and toes ROM as tolerated (pt has open wounds         MODALITIES  Heat /ice 24577 TOTAL TIME FOR SESSION Not performed                                            "

## 2022-05-03 NOTE — OP PT TREATMENT LOG
"Mary Moreno     Neuropathy, open wounds balance issues     Precautions open wounds at ankles/feet          THER ACT  CPT 81385 TOTAL TIME FOR SESSION Not performed   Toe guards B feet  Observed her new toe guards on B feet   4/20 Pt can practice the single leg stance at her kitchen counter   THER EX CPT 86223 TOTAL TIME FOR SESSION 53-67 Minutes   Single knee to chest 4x10s   Supine trunk rotation 4x10s   Piriformis stretch 4x10s   Hamstring stretch 3x15s  In supine    Hip flexor stretch 1 min   Calf stretch    Toe extension    Pelvic tilts 15x5s with large bolster   Alternating leg raises 20x table top position   Alternating kick outs 2x10 w/ pelvic tilts   Straight leg raises 2x10   Hamstring curls on green ball 2x10   S/L hip abduction 2x10    Clams 2x10    90-90's 2x10   Bridging 2x10 5s with green SB   Sit<>Stand 10x1  Used small towel under heels cues to stand all the way up and to keep knees apart - deferred second set today, so did 1x10 LAQ instead - SKIP   Bird dog    Eh Crunch    Cardio Nu step seat at 6 and arms 10, 4 min - HOLD d/t ankle DF opening her wounds in feet       NEURO RE-ED CPT 91631 TOTAL TIME FOR SESSION Not performed   Step ups on 6\" step 10 UE support - deferred   lateral step ups on 4 inch step 10 UE support - deferred       Step up with tap add   Lateral step ups with hip abduction add   Tandem stance 30 sec with each foot in front x2 and 1x5 head turns UE support to get into positon CG/close S provided for safety - Add arm movements as tolerated - SKIP   GAIT TRAINING CPT 71292 TOTAL TIME FOR SESSION Not performed           MANUAL CPT 80359  Massage CPT 05868 TOTAL TIME FOR SESSION Not performed   Ankle and toes ROM as tolerated (pt has open wounds         MODALITIES  Heat /ice 41570 TOTAL TIME FOR SESSION Not performed                         "

## 2022-05-03 NOTE — OP OT TREATMENT LOG
Exercises Current Session Time   THER ACT  CPT 66520 TOTAL TIME FOR SESSION 0-7 Minutes   Function/body mech: -Review of how she got caught off balance when walking dogs and how she might prevent this in the future            THER EX CPT 86657 TOTAL TIME FOR SESSION 38-52 Minutes          STRETCHING    Supine Cane Overhead 6 x:06 second hold     Supine Cane Circles 6 reps each direction      Supine Cane Ext Rotation 6 x:06 second hold    Towel Internal Rot Stretch    Pulleys Shoulder Flex 5 x:08 second hold   Pulleys Shoulder Abd 5 x:08 second hold   Corner Pec Stretch 5 x:08 second hold   Wall Walk Shoulder Flex    Wall Walk Shoulder Abd             STRENGTHENING    Shoulder ball alphabet on wall  1x R arm through alphabet in standing    Supine Shoulder alphabet  1x each arm through with 2# weight    Shoulder Blade Squeeze 2 sets of 10 reps    Shoulder Flex 10 reps with orange t-band   Shoulder Abd 10 reps with orange t-band   Shoulder Internal Rotation    Shoulder External Rotation    Shoulder Retractions 2 sets of 10 reps with pink t-band   Shoulder Extension 2 sets of 10 reps with pink t-band   No Money 2 sets of 10 reps with pink t-band   TYI        UBE 6 min at level 1.5 in sitting, alternating direction every min    NEURO RE-ED CPT 86810   TOTAL TIME FOR SESSION Not performed        MANUAL CPT 75031 TOTAL TIME FOR SESSION Not performed        MODALITIES  Heat/ice CPT 74592   TOTAL TIME FOR SESSION  Right shoulder pre-stretching  Not performed    US  CPT 45524     E-stim CPT 29679            Name: Mary Moreno  Diagnosis: Chronic Pain

## 2022-05-12 ENCOUNTER — APPOINTMENT (OUTPATIENT)
Dept: URBAN - METROPOLITAN AREA CLINIC 200 | Age: 67
Setting detail: DERMATOLOGY
End: 2022-05-16

## 2022-05-12 ENCOUNTER — RX ONLY (RX ONLY)
Age: 67
End: 2022-05-12

## 2022-05-12 ENCOUNTER — HOSPITAL ENCOUNTER (OUTPATIENT)
Dept: OCCUPATIONAL THERAPY | Facility: REHABILITATION | Age: 67
Setting detail: THERAPIES SERIES
Discharge: HOME | End: 2022-05-12
Attending: PHYSICAL MEDICINE & REHABILITATION
Payer: MEDICARE

## 2022-05-12 ENCOUNTER — HOSPITAL ENCOUNTER (OUTPATIENT)
Dept: PSYCHOLOGY | Facility: CLINIC | Age: 67
Discharge: HOME | End: 2022-05-12
Payer: MEDICARE

## 2022-05-12 DIAGNOSIS — L08.9 LOCAL INFECTION OF THE SKIN AND SUBCUTANEOUS TISSUE, UNSPECIFIED: ICD-10-CM

## 2022-05-12 DIAGNOSIS — F45.42 PAIN DISORDER ASSOCIATED WITH PSYCHOLOGICAL FACTORS AND MEDICAL CONDITION: ICD-10-CM

## 2022-05-12 DIAGNOSIS — G89.4 CHRONIC PAIN SYNDROME: Primary | ICD-10-CM

## 2022-05-12 DIAGNOSIS — L88 PYODERMA GANGRENOSUM: ICD-10-CM

## 2022-05-12 DIAGNOSIS — Z11.52 ENCOUNTER FOR SCREENING FOR COVID-19: ICD-10-CM

## 2022-05-12 PROCEDURE — 90837 PSYTX W PT 60 MINUTES: CPT | Performed by: PSYCHOLOGIST

## 2022-05-12 PROCEDURE — OTHER PHOTO-DOCUMENTATION: OTHER

## 2022-05-12 PROCEDURE — OTHER SCREENING FOR COVID-19: OTHER

## 2022-05-12 PROCEDURE — 97530 THERAPEUTIC ACTIVITIES: CPT | Mod: GP

## 2022-05-12 PROCEDURE — 97110 THERAPEUTIC EXERCISES: CPT | Mod: GP

## 2022-05-12 PROCEDURE — OTHER OBSERVATION: OTHER

## 2022-05-12 PROCEDURE — 99213 OFFICE O/P EST LOW 20 MIN: CPT

## 2022-05-12 PROCEDURE — OTHER PRESCRIPTION MEDICATION MANAGEMENT: OTHER

## 2022-05-12 PROCEDURE — OTHER ORDER TESTS: OTHER

## 2022-05-12 PROCEDURE — OTHER OBSERVATION AND MEASURE: OTHER

## 2022-05-12 RX ORDER — FLUCONAZOLE 150 MG/1
TABLET ORAL
Qty: 2 | Refills: 1 | Status: ERX | COMMUNITY
Start: 2022-05-12

## 2022-05-12 ASSESSMENT — LOCATION DETAILED DESCRIPTION DERM
LOCATION DETAILED: LEFT ACHILLES SKIN
LOCATION DETAILED: LEFT ANKLE
LOCATION DETAILED: PERIUMBILICAL SKIN

## 2022-05-12 ASSESSMENT — LOCATION ZONE DERM
LOCATION ZONE: LEG
LOCATION ZONE: TRUNK

## 2022-05-12 ASSESSMENT — LOCATION SIMPLE DESCRIPTION DERM
LOCATION SIMPLE: ABDOMEN
LOCATION SIMPLE: LEFT ANKLE
LOCATION SIMPLE: LEFT ACHILLES SKIN

## 2022-05-12 NOTE — PROGRESS NOTES
"  PT PROGRESS NOTE FOR OUTPATIENT THERAPY    Patient: Mary Moreno MRN: [id number]  : 1955 66 y.o.  Referring Physician: Case Francois MD  Date of Visit: 2022      Certification Dates: 22 through 22    Recommended Frequency & Duration:  2 times/week for up to 3 months   planned 6-8 weeks of therapy      Diagnosis:   1. Chronic pain syndrome        Chief Complaints:  Chief Complaint   Patient presents with   • Pain   • Difficulty Walking   • Dec ROM   • Dec Strength       Precautions:   Existing Precautions/Restrictions: fall     TODAY'S VISIT:    Time In Session:  Start Time: 900  Stop Time: 1000  Time Calculation (min): 60 min   General Information - 22        Session Details    Document Type progress note     Mode of Treatment individual therapy;physical therapy     Patient/Family/Caregiver Comments/Observations Pt reports the L knee is feeling fine now. SHe had some pain in it previous appointment post dog pulling her and fell on her knee. Pt reports the feet are \"not good\". She feels this is due to this past Monday she went to the [employer] and did alot of walking. She used a wheelchair for part of the time. She reports the seat was really saggging and the foot plates were angles in a strange way. She started using the coban and found it helpful. Pt also reports she had some abdominal issues yesterday.  She has an appointment with the skin MD today. Pt reports she feels that the therapy is helping.     OP Specialty --   chronic pain       General Information    Onset of Illness/Injury or Date of Surgery --   2013 hospitalized for the open wounds in [facility]'s    Referring Physician Case Francois MD     History of present illness/functional impairment Ms Moreno presents with neuropathy B legs and pyoderma gangrenosum the causes multi ulcers that are painful.  She went on to have multiple surgeries in  that led to a 6 month hospitalization. She has a few " active ulcers around her ankle today and are wrapped with a dressing.  Pt also has history celiac disease, hx of R femoral neck fracture in 2020 resulting in R LISA, hx of right shoulder injury . Pt had lost weight and weighed 88 pounds due to the gut issues.  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 She is now able to get her heel down. She is reporting balance issues.  She is clawing her toes as well now and has develpoed some sores on the 2nd toe. Pt has been referred to the chronic pain program for comprehensive PT and OT evaluations.     Existing Precautions/Restrictions fall                Daily Falls Screen - 05/12/22 0901        Daily Falls Assessment    Patient reported fall since last visit No                Pain/Vitals - 05/12/22 0901        Pain Assessment    Currently in pain Yes     Preferred Pain Scale number (Numeric Rating Pain Scale)     Pain Side/Orientation left     Pain: Body location Foot;Back     Pain Level at Best 1     Pain Level at Worst 7     Pain Comments average 3/10     Nonverbal Indicators of Pain activity pattern change                PT - 05/12/22 0901        Physical Therapy    Physical Therapy Chronic Pain        PT Plan    Frequency of treatment 2 times/week     PT Duration 3 months     PT Custom Frequency and Duration planned 6-8 weeks of therapy     PT Cert From 03/21/22     PT Cert To 06/19/22     Date PT POC was sent to provider 03/21/22     Signed PT Plan of Care received?  Yes                Assessment and Plan - 05/12/22 0901        Assessment    Clinical Assessment pt reassessed today 5/12/22. Pt scored 47/80 in the LEFS and scored 14/30 on the FGA.  Pt did have a fall about 2 weeks ago when the dogs pulled her over.  She typically is not using an assistive device for walking. She may benefit from using a cane. Pt's strength is grossly 4+-5/5 except at the ankles and she also continues to have some ROM  limitations in the ankles due to the chronic wounds. Pt does report that some of the wounds are looking better, She had B feet wrapped with coban and dressings so they have not been observed. Pt is scheduled for 2 more visits. She does feel she has made some improvements and would like to be able to come alittle longer if possible.     Plan and Recommendations OT will be ressessing her next week. We can decide if it will be appropriate to extend her a few more visits and transition to HEP. When her wounds are healed she may benefit from more therapy to imporve her mobility in the ankles and gait.     Planned Services CPT 17908 Therapeutic activities;CPT 74990 Therapeutic exercises;CPT 65160 Gait training;CPT 60813 Neuromuscular Reeducation;CPT 54081 Hot/Cold Packs therapy;CPT 82879 Electrical stimulation UNATTENDED                 OBJECTIVE MEASUREMENTS/DATA:    ROM    Range of Motion - 05/12/22 0900        RIGHT: Lower Extremity PROM Assessment    Hip Extension  6 degrees     Ankle Dorsiflexion  0 degrees     Ankle Plantarflexion  20 degrees        LEFT: Lower Extremity PROM Assessment    Hip Extension  6 degrees     Ankle Inversion  0 degrees     Ankle Eversion  10 degrees        RIGHT: Lower Extremity AROM Assessment    Right LE AROM normal WFL        LEFT: Lower Extremity AROM Assessment    Left LE AROM normal WFL               MMT    Manual Muscle Tests - 05/12/22 0901        RIGHT: Lower Extremity Manual Muscle Test Assessment    Right LE MMT comments: 4+/5 grossly except IR 4/5 and ankle strength 4/5        LEFT: Lower Extremity Manual Muscle Test Assessment    Left LE MMT comments: 4+/5 - 5/5               Balance/Posture    Balance and Posture - 05/12/22 0901        Objective Outcome Measures    FGA Score (out of 30 total) 14               Gait and Mobility    Gait and Mobility - 05/12/22 0901        Gait Training    [place], Gait independent     Variable surfaces Flat surface     Assistive Device none      Distance in Feet 100 feet     Pattern (Gait) step-through     Deviations/Abnormal Patterns (Gait) antalgic;step length decreased;bilateral deviations     Comment (Gait/Stairs) Pt continues to limit her DF so her step length is shortened Bilaterally. pt notes she is trying to work on increasing the step length.        Stairs Assessment    [place], Stairs modified independence     Assistive Device railing     Handrail Location (Stairs) both sides     Number of Steps (Stairs) 11     Ascending Stairs Technique step-over-step     Descending Stairs Technique step-to-step     Comment Pt will face R railing when descending the stairs. This will limit the DF needed               Outcome Measures    PT Outcome Measures - 05/12/22 0901        Subjective Outcome Measures    LEFS 47/80                 Today's Treatment::    Education provided:  Yes: See treatment log for details of education provided    Mary Moreno     Neuropathy, open wounds balance issues     Precautions open wounds at ankles/feet          THER ACT  CPT 64759 TOTAL TIME FOR SESSION 8-22 Minutes   Reassessment 5/12 New strength and ROM measurements , Pain picture completed, LUCIANO completed, reviewed results with patient LEFS completed and FGA   education HEP pictures given to patient and reviewed. Pt verbalized understanding   THER EX CPT 71469 TOTAL TIME FOR SESSION 38-52 Minutes   Single knee to chest 4x10s   Supine trunk rotation 4x10s   Piriformis stretch 4x10s   Hamstring stretch 3x15s  In supine    Hip flexor stretch 1 min   Calf stretch    Toe extension    Pelvic tilts 15x5s with large bolster   Alternating leg raises 20x table top position   Alternating kick outs 2x10 w/ pelvic tilts   Straight leg raises 2x10 not today   Hamstring curls on green ball 2x10 not today   S/L hip abduction 2x10    Clams 2x10    90-90's 2x10   Bridging 2x10   Sit<>Stand 10x1  Used small towel under heels cues to stand all the way up and to keep knees apart -  "deferred second set today, so did 1x10 LAQ instead - SKIP   Bird dog    Eh Crunch    Cardio Nu step seat at 6 and arms 10, 4 min - HOLD d/t ankle DF opening her wounds in feet       NEURO RE-ED CPT 42040 TOTAL TIME FOR SESSION Not performed   Step ups on 6\" step 10 UE support - deferred   lateral step ups on 4 inch step 10 UE support - deferred       Step up with tap add   Lateral step ups with hip abduction add   Tandem stance 30 sec with each foot in front x2 and 1x5 head turns UE support to get into positon CG/close S provided for safety - Add arm movements as tolerated - SKIP   GAIT TRAINING CPT 47584 TOTAL TIME FOR SESSION Not performed           MANUAL CPT 51243  Massage CPT 60104 TOTAL TIME FOR SESSION Not performed   Ankle and toes ROM as tolerated (pt has open wounds         MODALITIES  Heat /ice 97392 TOTAL TIME FOR SESSION Not performed                            Goals Addressed                 This Visit's Progress    • PT chronic pain goals        Short and Long Term Goals Time Frame Result Comment/Progress   FGA , LEFS and 6 min walk test to be assessed and set appropriate goals 4 -5 weeks Partially met LEFS completed 47/80  5/12     Increase hip strength 1/2 mm grade 8 weeks ongoing    Single leg stance 5 sec either leg and tandem stance 30 sec with S to get into position 4-5 weeks ongoing    Increase hip extension to 8 degrees B and DF to 5 degrees B to improve gait quality 4-5 weeks ongoing    No report of falls 8 weeks ongoing Pt had a fall    Pt I in HEP and understand how to progress 8 weeks ongoing                                                   "

## 2022-05-12 NOTE — PROGRESS NOTES
Outpatient Psychology Progress Note    Duration: 55 minutes  Mary Moreno : 1955, a 66 y.o. female, for follow up visit.    Reason:  to discuss Pain Management. She was referred to psychology at [facility] by Dr. Case Francois to help with coping with pain. She is participating in comprehensive pain rehab and therapy for deconditioning in the [facility] program. She receives physical and occupational therapies.She reports severe pain in her ankles. The pain is described as stabbing, burning, and intermittent sensation similar to electrical shocks.        HPI:   Chief Complaint   Patient presents with   • Psychotherapy   • Pain       Current Evaluation:     Mental Status Evaluation:       Comments:  No change    Additional Assessments done this visit:     [place] Suicide Severity Rating Scale:  Not indicated            Safe-T Assessment:  Not indicated        Session Summary:   Affect is neutral, full range and congruent with mood. Mood has features of anxiety. Thought process is tangential. Her narrative focused on situational stressors involving her sons.      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

## 2022-05-12 NOTE — OP PT TREATMENT LOG
"Mary Moreno     Neuropathy, open wounds balance issues     Precautions open wounds at ankles/feet          THER ACT  CPT 45523 TOTAL TIME FOR SESSION 8-22 Minutes   Reassessment 5/12 New strength and ROM measurements , Pain picture completed, LUCIANO completed, reviewed results with patient LEFS completed and FGA   education HEP pictures given to patient and reviewed. Pt verbalized understanding   THER EX CPT 06229 TOTAL TIME FOR SESSION 38-52 Minutes   Single knee to chest 4x10s   Supine trunk rotation 4x10s   Piriformis stretch 4x10s   Hamstring stretch 3x15s  In supine    Hip flexor stretch 1 min   Calf stretch    Toe extension    Pelvic tilts 15x5s with large bolster   Alternating leg raises 20x table top position   Alternating kick outs 2x10 w/ pelvic tilts   Straight leg raises 2x10 not today   Hamstring curls on green ball 2x10 not today   S/L hip abduction 2x10    Clams 2x10    90-90's 2x10   Bridging 2x10   Sit<>Stand 10x1  Used small towel under heels cues to stand all the way up and to keep knees apart - deferred second set today, so did 1x10 LAQ instead - SKIP   Bird dog    Eh Crunch    Cardio Nu step seat at 6 and arms 10, 4 min - HOLD d/t ankle DF opening her wounds in feet       NEURO RE-ED CPT 94865 TOTAL TIME FOR SESSION Not performed   Step ups on 6\" step 10 UE support - deferred   lateral step ups on 4 inch step 10 UE support - deferred       Step up with tap add   Lateral step ups with hip abduction add   Tandem stance 30 sec with each foot in front x2 and 1x5 head turns UE support to get into positon CG/close S provided for safety - Add arm movements as tolerated - SKIP   GAIT TRAINING CPT 29987 TOTAL TIME FOR SESSION Not performed           MANUAL CPT 51486  Massage CPT 81974 TOTAL TIME FOR SESSION Not performed   Ankle and toes ROM as tolerated (pt has open wounds         MODALITIES  Heat /ice 11390 TOTAL TIME FOR SESSION Not performed                         "

## 2022-05-18 ENCOUNTER — RX ONLY (RX ONLY)
Age: 67
End: 2022-05-18

## 2022-05-18 RX ORDER — CIPROFLOXACIN 500 MG/1
TABLET, FILM COATED ORAL
Qty: 14 | Refills: 0 | Status: ERX | COMMUNITY
Start: 2022-05-18

## 2022-05-19 ENCOUNTER — HOSPITAL ENCOUNTER (OUTPATIENT)
Dept: OCCUPATIONAL THERAPY | Facility: REHABILITATION | Age: 67
Setting detail: THERAPIES SERIES
Discharge: HOME | End: 2022-05-19
Attending: PHYSICAL MEDICINE & REHABILITATION
Payer: MEDICARE

## 2022-05-19 DIAGNOSIS — G89.4 CHRONIC PAIN SYNDROME: Primary | ICD-10-CM

## 2022-05-19 PROCEDURE — 97530 THERAPEUTIC ACTIVITIES: CPT | Mod: GO

## 2022-05-19 PROCEDURE — 97110 THERAPEUTIC EXERCISES: CPT | Mod: GP,59

## 2022-05-19 PROCEDURE — 97150 GROUP THERAPEUTIC PROCEDURES: CPT | Mod: GP,59

## 2022-05-19 NOTE — PROGRESS NOTES
PT DAILY NOTE FOR OUTPATIENT THERAPY    Patient: Mary Moreno MRN: [id number]  : 1955 66 y.o.  Referring Physician: Case Francois MD  Date of Visit: 2022    Certification Dates: 22 through 22    Diagnosis:   1. Chronic pain syndrome        Chief Complaints:  balance, pain , deconditioned, decreased functional abilities/capacities    Precautions:   Existing Precautions/Restrictions: fall     TODAY'S VISIT    Time In Session:  Start Time: 0900  Stop Time: 1000  Time Calculation (min): 60 min   History/Vitals/Pain/Encounter Info - 22 0910        Injury History/Precautions/Daily Required Info    Document Type daily treatment     Primary Therapist Yamilka SALCEDO     Chief Complaint/Reason for Visit  balance, pain , deconditioned, decreased functional abilities/capacities     Onset of Illness/Injury or Date of Surgery --   2013 hospialized with wounds    Referring Physician Case Francois MD     Existing Precautions/Restrictions fall     History of present illness/functional impairment Ms Moreno presents with neuropathy B legs and pyoderma gangrenosum the causes multi ulcers that are painful.  She went on to have multiple surgeries in  that led to a 6 month hospitalization. She has a few active ulcers around her ankle today and are wrapped with a dressing.  Pt also has history celiac disease, hx of R femoral neck fracture in  resulting in R LISA, hx of right shoulder injury . Pt had lost weight and weighed 88 pounds due to the gut issues.  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 She is now able to get her heel down. She is reporting balance issues.  She is clawing her toes as well now and has develpoed some sores on the 2nd toe. Pt has been referred to the chronic pain program for comprehensive PT and OT evaluations.     OP Specialty --   chronic pain    Patient/Family/Caregiver Comments/Observations Pt  reports she has found the achilles wound is infected     Patient reported fall since last visit No        Pain Assessment    Currently in pain Yes     Preferred Pain Scale Chronic Pain     Pain Side/Orientation left     Pain: Body location Foot;Back     Pain Rating (0-10): Pre Activity 2     Pain Rating (0-10): Activity 3     Pain Rating (0-10): Post Activity 2     Pain Comments dull ache in the back and shoulder achy as well   Pain pill helped reduce pain this morning                Daily Treatment Assessment and Plan - 05/19/22 0910        Daily Treatment Assessment and Plan    Progress toward goals Progressing     Daily Outcome Summary Pt was DC from OT today. She is reporting an ifected wound at the achilles now. Pt continues to have B ankles wrapped.  She performed her exercises with cues for which one to do and correct form. Pts care was interupted by several phone calls today due to the death of a friend. Did not get done everything we wanted     Plan and Recommendations Pt  will continue with the PT for a few more appointments  Resume full program next vsiit  Then transition to HEP                     OBJECTIVE DATA TAKEN TODAY:    None taken    Today's Treatment:    Mary Moreno     Neuropathy, open wounds balance issues     Precautions open wounds at ankles/feet          THER ACT  CPT 50564 TOTAL TIME FOR SESSION Not performed   Reassessment 5/12 New strength and ROM measurements , Pain picture completed, LUCIANO completed, reviewed results with patient LEFS completed and FGA   education HEP pictures given to patient and reviewed. Pt verbalized understanding   THER EX CPT 23445 TOTAL TIME FOR SESSION 53-67 Minutes   Single knee to chest 4x10s group   Supine trunk rotation 4x10s group   Piriformis stretch 4x10s group   Hamstring stretch 3x15s  In supine  Active hamstring stretch group   Hip flexor stretch 1 min group   Calf stretch    Toe extension    Pelvic tilts 15x5s  group   Alternating leg raises 20x  "table top position group       Straight leg raises 2x10    Hamstring curls on green ball 2x10    S/L hip abduction 2x10    Clams 2x10    90-90's 2x10   Bridging 2x10 with bolster   Sit<>Stand 10x1  Used small towel under heels cues to stand all the way up and to keep knees apart - deferred second set today, so did 1x10 LAQ instead - SKIP   Bird dog    Eh Crunch    Cardio Nu step seat at 6 and arms 10, 4 min - HOLD d/t ankle DF opening her wounds in feet        NEURO RE-ED CPT 82368 TOTAL TIME FOR SESSION Not performed   Step ups on 6\" step 10 UE support - deferred   lateral step ups on 4 inch step 10 UE support - deferred       Step up with tap add   Lateral step ups with hip abduction add   Tandem stance 30 sec with each foot in front x2 and 1x5 head turns UE support to get into positon CG/close S provided for safety - Add arm movements as tolerated - SKIP   GAIT TRAINING CPT 78879 TOTAL TIME FOR SESSION Not performed           MANUAL CPT 91869  Massage CPT 17404 TOTAL TIME FOR SESSION Not performed   Ankle and toes ROM as tolerated (pt has open wounds         MODALITIES  Heat /ice 34536 TOTAL TIME FOR SESSION Not performed                                            "

## 2022-05-19 NOTE — ADDENDUM NOTE
Encounter addended by: Abdiel Bright, OT on: 5/19/2022 1:21 PM   Actions taken: Episode resolved, Patient Goal completed

## 2022-05-19 NOTE — OP PT TREATMENT LOG
"Mary Moreno     Neuropathy, open wounds balance issues     Precautions open wounds at ankles/feet          THER ACT  CPT 78364 TOTAL TIME FOR SESSION Not performed   Reassessment 5/12 New strength and ROM measurements , Pain picture completed, LUCIANO completed, reviewed results with patient LEFS completed and FGA   education HEP pictures given to patient and reviewed. Pt verbalized understanding   THER EX CPT 07326 TOTAL TIME FOR SESSION 53-67 Minutes   Single knee to chest 4x10s group   Supine trunk rotation 4x10s group   Piriformis stretch 4x10s group   Hamstring stretch 3x15s  In supine  Active hamstring stretch group   Hip flexor stretch 1 min group   Calf stretch    Toe extension    Pelvic tilts 15x5s  group   Alternating leg raises 20x table top position group       Straight leg raises 2x10    Hamstring curls on green ball 2x10    S/L hip abduction 2x10    Clams 2x10    90-90's 2x10   Bridging 2x10 with bolster   Sit<>Stand 10x1  Used small towel under heels cues to stand all the way up and to keep knees apart - deferred second set today, so did 1x10 LAQ instead - SKIP   Bird dog    Eh Crunch    Cardio Nu step seat at 6 and arms 10, 4 min - HOLD d/t ankle DF opening her wounds in feet        NEURO RE-ED CPT 74118 TOTAL TIME FOR SESSION Not performed   Step ups on 6\" step 10 UE support - deferred   lateral step ups on 4 inch step 10 UE support - deferred       Step up with tap add   Lateral step ups with hip abduction add   Tandem stance 30 sec with each foot in front x2 and 1x5 head turns UE support to get into positon CG/close S provided for safety - Add arm movements as tolerated - SKIP   GAIT TRAINING CPT 81379 TOTAL TIME FOR SESSION Not performed           MANUAL CPT 18004  Massage CPT 47143 TOTAL TIME FOR SESSION Not performed   Ankle and toes ROM as tolerated (pt has open wounds         MODALITIES  Heat /ice 15703 TOTAL TIME FOR SESSION Not performed                         "

## 2022-05-19 NOTE — OP PT TREATMENT LOG
"Mary Moreno     Neuropathy, open wounds balance issues     Precautions open wounds at ankles/feet          THER ACT  CPT 61285 TOTAL TIME FOR SESSION 8-22 Minutes   Reassessment 5/12 New strength and ROM measurements , Pain picture completed, LUCIANO completed, reviewed results with patient LEFS completed and FGA   education HEP pictures given to patient and reviewed. Pt verbalized understanding   THER EX CPT 18146 TOTAL TIME FOR SESSION 53-67 Minutes   Single knee to chest 4x10s group   Supine trunk rotation 4x10s group   Piriformis stretch 4x10s group   Hamstring stretch 3x15s  In supine  Active hamstring stretch group   Hip flexor stretch 1 min group   Calf stretch    Toe extension    Pelvic tilts 15x5s  group   Alternating leg raises 20x table top position group       Straight leg raises 2x10    Hamstring curls on green ball 2x10    S/L hip abduction 2x10    Clams 2x10    90-90's 2x10   Bridging 2x10 with bolster   Sit<>Stand 10x1  Used small towel under heels cues to stand all the way up and to keep knees apart - deferred second set today, so did 1x10 LAQ instead - SKIP   Bird dog    Eh Crunch    Cardio Nu step seat at 6 and arms 10, 4 min - HOLD d/t ankle DF opening her wounds in feet        NEURO RE-ED CPT 87400 TOTAL TIME FOR SESSION Not performed   Step ups on 6\" step 10 UE support - deferred   lateral step ups on 4 inch step 10 UE support - deferred       Step up with tap add   Lateral step ups with hip abduction add   Tandem stance 30 sec with each foot in front x2 and 1x5 head turns UE support to get into positon CG/close S provided for safety - Add arm movements as tolerated - SKIP   GAIT TRAINING CPT 71720 TOTAL TIME FOR SESSION Not performed           MANUAL CPT 13747  Massage CPT 74298 TOTAL TIME FOR SESSION Not performed   Ankle and toes ROM as tolerated (pt has open wounds         MODALITIES  Heat /ice 92743 TOTAL TIME FOR SESSION Not performed                         "

## 2022-05-19 NOTE — OP OT TREATMENT LOG
Today's Treatment:     Exercises Current Session Time   THER ACT  CPT 08140 TOTAL TIME FOR SESSION 40 Minutes   Function/body mech: Patient came late seen for 40 min for discharge assessment  Refer to note. HEP program handout issued                THER EX CPT 65559 TOTAL TIME FOR SESSION           STRETCHING     Supine Cane Overhead 6 x:06 second hold     Supine Cane Circles 6 reps each direction       Supine Cane Ext Rotation 6 x:06 second hold    Towel Internal Rot Stretch     Pulleys Shoulder Flex 5 x:08 second hold   Pulleys Shoulder Abd 5 x:08 second hold   Corner Pec Stretch 5 x:08 second hold   Wall Walk Shoulder Flex     Wall Walk Shoulder Abd                STRENGTHENING     Shoulder ball alphabet on wall  1x R arm through alphabet in standing    Supine Shoulder alphabet  1x each arm through with 2# weight    Shoulder Blade Squeeze 2 sets of 10 reps    Shoulder Flex 10 reps with orange t-band   Shoulder Abd 10 reps with orange t-band   Shoulder Internal Rotation     Shoulder External Rotation     Shoulder Retractions 2 sets of 10 reps with pink t-band   Shoulder Extension 2 sets of 10 reps with pink t-band   No Money 2 sets of 10 reps with pink t-band   TYI           UBE 6 min at level 1.5 in sitting, alternating direction every min    NEURO RE-ED CPT 83688    TOTAL TIME FOR SESSION Not performed           MANUAL CPT 23244 TOTAL TIME FOR SESSION Not performed           MODALITIES  Heat/ice CPT 16880    TOTAL TIME FOR SESSION  Right shoulder pre-stretching  Not performed    US  CPT 80196       E-stim CPT 31571                  Name: Mary Moreno  Diagnosis: Chronic Pain

## 2022-05-19 NOTE — PROGRESS NOTES
OT DISCHARGE NOTE FOR OUTPATIENT THERAPY    Patient: Mary Moreno   MRN: [id number]  : 1955 66 y.o.  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     Existing Precautions/Restrictions fall                  Pain/Vitals - 22 1249        Pain Assessment    Currently in pain Yes     Preferred Pain Scale number (Numeric Rating Pain Scale)     Pain Rating (0-10): Pre Activity 2     Pain Rating (0-10): Activity 2     Pain Rating (0-10): Post Activity 2        Pain Interventions    Intervention  Monitored     Post Intervention Comments no change                OT - 22 1249        Occupational Therapy Encounter Type Details    Document Type discharge evaluation                   OBJECTIVE MEASUREMENTS/DATA:    Range of Motion     PROM/AROM - 22 0800        RIGHT: Upper Extremity PROM Assessment    Shoulder Flexion  --   WFL       RIGHT: Upper Extremity AROM Assessment    Shoulder Flexion   --   WFL    Shoulder Abduction   165 degrees        LEFT: Upper Extremity AROM Assessment    Left UE AROM normal WFL               MMT     Manual Muscle Tests - 22 0829        RIGHT: Upper Extremity Manual Muscle Test Assessment    Right UE MMT comments: Able to tolerate and demonstrated  3# - 4 # free weight throughout AROM        LEFT: Upper Extremity Manual Muscle Test Assessment    Left UE MMT comments: Able to tolerate and demonstrated   3# - 4 # free weight throughout AROM               BADL/IADL    BADL/IADL - 05/19/22 0829        BADL Interventions Assessment    Upper Body Dressing Independent     Lower Body Dressing Modified independence     Lower body dressing comments Sitting for skinny pair of jeans but otherwise standing     Bathing Modified independence     Toileting Modified independence     Grooming Independent        IADL/Home Interventions Assessment    Home management/maintenance Minimum assist (75% patient effort)     Home management/maintenance comments Patient reports carrying stack of clothes from laundry room to upstairs, carry lightweight groceries to and from car to inside her house and cook.She reports  using her roller bag while travelling and did not take her WC for her travel.     Meal prep / clean up Independent               [facility] FCE     FCE Assessment - 05/19/22 0856        Hand Dominance    Right Hand  Strength (Level 2) 42#     Left Hand  Strength (Level 2) 47 #        Weight Capacity (in lbs)    Lift: Floor to Waist (max effort) Demonstrated 17 #     Push: Dynamic - 10 ft Demonstrated 15 #     Pull: Dynamic - 10 ft Able to manage grocery cart , lifting bags out and placement in the car trunk     Carry: Front at Waist 50 ft Demonstrated 15-17 # box lift and carry     Carry: Right - 50 ft Demonstrated 12 -15 # tool box lift and carry     Carry: Left - 50 ft Demonstrated 12 -15 # tool box lift and carry        Flexibility/Positional    Kneel - 1 min NT     [place] - deep static - 1 min NT        Static Work    Static Work: Sitting  Reports WFL     Static Work: Standing  Reports WFL        Ambulation    Ambulation NT                 Today's Treatment:     Education provided:  Yes: See treatment log for details of education provided      Today's Treatment:     Exercises Current Session Time   THER ACT  CPT 63467 TOTAL TIME FOR SESSION 40 Minutes   Function/body mech: Patient came late seen for 40 min for  discharge assessment  Refer to note. HEP program handout issued                THER EX CPT 09135 TOTAL TIME FOR SESSION           STRETCHING     Supine Cane Overhead 6 x:06 second hold     Supine Cane Circles 6 reps each direction       Supine Cane Ext Rotation 6 x:06 second hold    Towel Internal Rot Stretch     Pulleys Shoulder Flex 5 x:08 second hold   Pulleys Shoulder Abd 5 x:08 second hold   Corner Pec Stretch 5 x:08 second hold   Wall Walk Shoulder Flex     Wall Walk Shoulder Abd                STRENGTHENING     Shoulder ball alphabet on wall  1x R arm through alphabet in standing    Supine Shoulder alphabet  1x each arm through with 2# weight    Shoulder Blade Squeeze 2 sets of 10 reps    Shoulder Flex 10 reps with orange t-band   Shoulder Abd 10 reps with orange t-band   Shoulder Internal Rotation     Shoulder External Rotation     Shoulder Retractions 2 sets of 10 reps with pink t-band   Shoulder Extension 2 sets of 10 reps with pink t-band   No Money 2 sets of 10 reps with pink t-band   TYI           UBE 6 min at level 1.5 in sitting, alternating direction every min    NEURO RE-ED CPT 50659    TOTAL TIME FOR SESSION Not performed           MANUAL CPT 80958 TOTAL TIME FOR SESSION Not performed           MODALITIES  Heat/ice CPT 54658    TOTAL TIME FOR SESSION  Right shoulder pre-stretching  Not performed    US  CPT 00731       E-stim CPT 80179                  Name: Mary Moreno  Diagnosis: Chronic Pain                          Goals Addressed                 This Visit's Progress    • OT [facility]         Goals Time Frame Result Comment/Progress   Establish basic HEP of mostly stretching for better pain control  4-6 wks     Increase R UE AROM to WFL's for ease of ADL performance  4-6 wks Met     Improve body mechanics to 70% or better while in therapy for improved pain control  4-6 wks Met Reports that she watches her safety   Increase bilat UE strength by 1/2 muscle grade t/o for participation in  more leisure/social activities  4-6 wks Met  Able to demonstrate using 3-4 #  free weight for all  UE ranges    Independent use of appropriate modalities, use of good body mechanics, use of good pacing and use of HEP performance to keep worse pain ratings <10/10  6-8 wks Met  Reports worst pain rating at 2/10    Improve ability to move into and out of all positions including up/down from floor for ease of pet care   6-8 wks NT Reports to do I with pet care (16 #) (25 # )   Independent with full HEP and able to transition to community fitness/yoga program if desired  6-8 wks Partially met  Issued HEP handouts    Improve lifting/carrying abilities to 15-20# in all planes without fear of falling for improved HHA and leisure activity performance  6-8 wks Met  Reports to do more with HHA/M  Lifting/ carrying her clothes,shopping bag from cart to car                     Patient discharged from active OT treatment in rehab works. Met most of her goals.

## 2022-05-25 ENCOUNTER — HOSPITAL ENCOUNTER (OUTPATIENT)
Dept: OCCUPATIONAL THERAPY | Facility: REHABILITATION | Age: 67
Setting detail: THERAPIES SERIES
Discharge: HOME | End: 2022-05-25
Attending: PHYSICAL MEDICINE & REHABILITATION
Payer: MEDICARE

## 2022-05-25 DIAGNOSIS — G89.4 CHRONIC PAIN SYNDROME: Primary | ICD-10-CM

## 2022-05-25 PROCEDURE — 97110 THERAPEUTIC EXERCISES: CPT | Mod: GP,CQ

## 2022-05-25 NOTE — PROGRESS NOTES
PT DAILY NOTE FOR OUTPATIENT THERAPY    Patient: Mary Moreno MRN: [id number]  : 1955 66 y.o.  Referring Physician: Case Francois MD  Date of Visit: 2022    Certification Dates: 22 through 22    Diagnosis:   1. Chronic pain syndrome        Chief Complaints:  balance, pain , deconditioned, decreased functional abilities/capacities    Precautions:   Existing Precautions/Restrictions: fall     TODAY'S VISIT    Time In Session:      History/Vitals/Pain/Encounter Info - 22 0923        Injury History/Precautions/Daily Required Info    Document Type daily treatment     Primary Therapist Yamilka Jose MPT     Chief Complaint/Reason for Visit  balance, pain , deconditioned, decreased functional abilities/capacities     Onset of Illness/Injury or Date of Surgery --    hospialized with wounds    Referring Physician Case Francois MD     Existing Precautions/Restrictions fall     History of present illness/functional impairment Ms Moreno presents with neuropathy B legs and pyoderma gangrenosum the causes multi ulcers that are painful.  She went on to have multiple surgeries in  that led to a 6 month hospitalization. She has a few active ulcers around her ankle today and are wrapped with a dressing.  Pt also has history celiac disease, hx of R femoral neck fracture in  resulting in R LISA, hx of right shoulder injury . Pt had lost weight and weighed 88 pounds due to the gut issues.  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 She is now able to get her heel down. She is reporting balance issues.  She is clawing her toes as well now and has develpoed some sores on the 2nd toe. Pt has been referred to the chronic pain program for comprehensive PT and OT evaluations.     Patient/Family/Caregiver Comments/Observations Pt running late for appointment. Pt reports 4-5/10 heel pain.     Patient reported fall since last  visit No        Pain Assessment    Currently in pain Yes     Preferred Pain Scale number (Numeric Rating Pain Scale)     Pain: Body location Foot;Back     Pain Rating (0-10): Pre Activity 4     Pain Rating (0-10): Activity 5     Pain Rating (0-10): Post Activity 4                Daily Treatment Assessment and Plan - 05/25/22 0923        Daily Treatment Assessment and Plan    Progress toward goals Progressing     Daily Outcome Summary Tolerated all exercises well. Pt saw wound doctor and she needs to be on an antibiotic. Pt progressing TE's within tolerance.     Plan and Recommendations Cont with P.T. 2x/week for 4 weeks.                     OBJECTIVE DATA TAKEN TODAY:    None taken    Today's Treatment:    Mary Moreno     Neuropathy, open wounds balance issues     Precautions open wounds at ankles/feet          THER ACT  CPT 62521 TOTAL TIME FOR SESSION Not performed   Reassessment 5/12 New strength and ROM measurements , Pain picture completed, LUCIANO completed, reviewed results with patient LEFS completed and FGA   education HEP pictures given to patient and reviewed. Pt verbalized understanding   THER EX CPT 73650 TOTAL TIME FOR SESSION 53-67 Minutes   Single knee to chest 4x10s   Supine trunk rotation 4x10s   Piriformis stretch 4x10s    Hamstring stretch 3x15s  In supine  Active hamstring stretch group   Hip flexor stretch 1 min    Calf stretch    Toe extension    Pelvic tilts 15x5s     Alternating leg raises 20x table top position        Straight leg raises 2x10    Hamstring curls on green ball 2x10 not today   S/L hip abduction 2x10    Clams 2x10    90-90's 2x10   Bridging 2x10 with bolster   Sit<>Stand 10x1  Used small towel under heels cues to stand all the way up and to keep knees apart - deferred second set today, so did 1x10 LAQ instead - SKIP   Bird dog    Eh Crunch    Cardio Nu step seat at 6 and arms 10, 4 min - HOLD d/t ankle DF opening her wounds in feet        NEURO RE-ED CPT 84460 TOTAL TIME  "FOR SESSION Not performed   Step ups on 6\" step 10 UE support - deferred   lateral step ups on 4 inch step 10 UE support - deferred       Step up with tap add   Lateral step ups with hip abduction add   Tandem stance 30 sec with each foot in front x2 and 1x5 head turns UE support to get into positon CG/close S provided for safety - Add arm movements as tolerated - SKIP   GAIT TRAINING CPT 16850 TOTAL TIME FOR SESSION Not performed           MANUAL CPT 31998  Massage CPT 95982 TOTAL TIME FOR SESSION Not performed   Ankle and toes ROM as tolerated (pt has open wounds         MODALITIES  Heat /ice 63822 TOTAL TIME FOR SESSION Not performed                                            "

## 2022-05-25 NOTE — OP PT TREATMENT LOG
"Mary Moreno     Neuropathy, open wounds balance issues     Precautions open wounds at ankles/feet          THER ACT  CPT 16731 TOTAL TIME FOR SESSION Not performed   Reassessment 5/12 New strength and ROM measurements , Pain picture completed, LUCIANO completed, reviewed results with patient LEFS completed and FGA   education HEP pictures given to patient and reviewed. Pt verbalized understanding   THER EX CPT 17168 TOTAL TIME FOR SESSION 53-67 Minutes   Single knee to chest 4x10s   Supine trunk rotation 4x10s   Piriformis stretch 4x10s    Hamstring stretch 3x15s  In supine  Active hamstring stretch group   Hip flexor stretch 1 min    Calf stretch    Toe extension    Pelvic tilts 15x5s     Alternating leg raises 20x table top position        Straight leg raises 2x10    Hamstring curls on green ball 2x10 not today   S/L hip abduction 2x10    Clams 2x10    90-90's 2x10   Bridging 2x10 with bolster   Sit<>Stand 10x1  Used small towel under heels cues to stand all the way up and to keep knees apart - deferred second set today, so did 1x10 LAQ instead - SKIP   Bird dog    Eh Crunch    Cardio Nu step seat at 6 and arms 10, 4 min - HOLD d/t ankle DF opening her wounds in feet        NEURO RE-ED CPT 60263 TOTAL TIME FOR SESSION Not performed   Step ups on 6\" step 10 UE support - deferred   lateral step ups on 4 inch step 10 UE support - deferred       Step up with tap add   Lateral step ups with hip abduction add   Tandem stance 30 sec with each foot in front x2 and 1x5 head turns UE support to get into positon CG/close S provided for safety - Add arm movements as tolerated - SKIP   GAIT TRAINING CPT 92522 TOTAL TIME FOR SESSION Not performed           MANUAL CPT 68694  Massage CPT 90024 TOTAL TIME FOR SESSION Not performed   Ankle and toes ROM as tolerated (pt has open wounds         MODALITIES  Heat /ice 76747 TOTAL TIME FOR SESSION Not performed                         "

## 2022-05-26 ENCOUNTER — HOSPITAL ENCOUNTER (OUTPATIENT)
Dept: PSYCHOLOGY | Facility: CLINIC | Age: 67
Discharge: HOME | End: 2022-05-26
Payer: MEDICARE

## 2022-05-26 DIAGNOSIS — F45.42 PAIN DISORDER ASSOCIATED WITH PSYCHOLOGICAL FACTORS AND MEDICAL CONDITION: ICD-10-CM

## 2022-05-26 PROCEDURE — 90837 PSYTX W PT 60 MINUTES: CPT | Performed by: PSYCHOLOGIST

## 2022-05-26 ASSESSMENT — COGNITIVE AND FUNCTIONAL STATUS - GENERAL
EYE_CONTACT: WNL
REMOTE MEMORY: WNL
AFFECT: FULL RANGE
RECENT MEMORY: WNL
APPETITE: NO CHANGE
ORIENTATION: FULLY ORIENTED
ATTENTION: WNL
PERCEPTUAL FUNCTION: NORMAL
THOUGHT_PROCESS: TANGENTIAL
LIBIDO: NON-CONTRIBUTORY
PSYCHOMOTOR FUNCTIONING: DECREASED
THOUGHT_CONTENT: APPROPRIATE
IMPULSE CONTROL: INTACT
APPEARANCE: WELL GROOMED
SPEECH: REGULAR
EST. PREMORBID INTELLIGENCE: AVERAGE
DELUSIONS: NONE OR AGE APPROPRIATE
INSIGHT: INTACT
CONCENTRATION: WNL
SLEEP_WAKE_CYCLE: NO CHANGE
MOOD: FRUSTRATED;MOTIVATED;EUTHYMIC (NORMAL)

## 2022-05-26 NOTE — PROGRESS NOTES
Outpatient Psychology Progress Note    Duration: 55 minutes  Mary Moreno : 1955, a 66 y.o. female, for follow up visit.  Reason:  to discuss Pain Management. She was referred to psychology at [facility] by Dr. Case Francois to help with coping with pain. She is participating in comprehensive pain rehab and therapy for deconditioning in the [facility] program. She receives physical and occupational therapies.She reports severe pain in her ankles. The pain is described as stabbing, burning, and intermittent sensation similar to electrical shocks.     HPI:   Chief Complaint   Patient presents with   • Psychotherapy   • Pain       Current Evaluation:     Mental Status Evaluation:  Appearance: Well Groomed  Speech: Regular  Psychomotor Functioning: Decreased  Eye Contact: WNL  Est. Premorbid Intelligence: Average  Orientation: Fully oriented  Attention: WNL  Concentration: WNL  Recent Memory: WNL  Remote Memory: WNL  Thought Content: Appropriate  Thought Process: Tangential  Insight: Intact  Perceptual Function: Normal  Delusions: None or age appropriate  Sleeping: No Change  Appetite: No Change  Libido: Non-Contributory  Affect: Full Range  Mood: Frustrated, Motivated, Euthymic (normal)    Comments:  No change    Additional Assessments done this visit:     Hanson Suicide Severity Rating Scale:  Not indicated            Safe-T Assessment:  Not indicated        Session Summary:   Affect is bright and congruent with context. Affect was full range during the session. Discussed her emotions associated with her condition and strategies for finding pleasure despite the pain.     Goals Addressed                    This Visit's Progress       Patient Stated    •  Improve coping skills (pt-stated)   Improving          Interventions  Acceptance & Adjustment  Anxiety Reduction Techniques  Insight Development  Monitoring of Symptoms  Pain Management    Psychoeducation provided on:  Other: coping with chronic pain      Recommendations:      Individual Therapy  1 hour1 - 2 times monthly      Visit Diagnosis:   No diagnosis found.    Diagnostic Impression:        Psychological condition is generally improving       Marianna Ren PSY.D @ 12:33 PM

## 2022-05-31 ENCOUNTER — HOSPITAL ENCOUNTER (OUTPATIENT)
Dept: OCCUPATIONAL THERAPY | Facility: REHABILITATION | Age: 67
Setting detail: THERAPIES SERIES
Discharge: HOME | End: 2022-05-31
Attending: PHYSICAL MEDICINE & REHABILITATION
Payer: MEDICARE

## 2022-05-31 DIAGNOSIS — G89.4 CHRONIC PAIN SYNDROME: Primary | ICD-10-CM

## 2022-05-31 PROCEDURE — 97110 THERAPEUTIC EXERCISES: CPT | Mod: GP

## 2022-06-02 ENCOUNTER — APPOINTMENT (OUTPATIENT)
Dept: URBAN - METROPOLITAN AREA CLINIC 200 | Age: 67
Setting detail: DERMATOLOGY
End: 2022-06-13

## 2022-06-02 ENCOUNTER — HOSPITAL ENCOUNTER (OUTPATIENT)
Dept: OCCUPATIONAL THERAPY | Facility: REHABILITATION | Age: 67
Setting detail: THERAPIES SERIES
Discharge: HOME | End: 2022-06-02
Attending: PHYSICAL MEDICINE & REHABILITATION
Payer: MEDICARE

## 2022-06-02 DIAGNOSIS — G89.4 CHRONIC PAIN SYNDROME: Primary | ICD-10-CM

## 2022-06-02 DIAGNOSIS — T07XXXA ABRASION OR FRICTION BURN OF OTHER, MULTIPLE, AND UNSPECIFIED SITES, WITHOUT MENTION OF INFECTION: ICD-10-CM

## 2022-06-02 DIAGNOSIS — Z11.52 ENCOUNTER FOR SCREENING FOR COVID-19: ICD-10-CM

## 2022-06-02 DIAGNOSIS — L98.0 PYOGENIC GRANULOMA: ICD-10-CM

## 2022-06-02 DIAGNOSIS — L98.8 OTHER SPECIFIED DISORDERS OF THE SKIN AND SUBCUTANEOUS TISSUE: ICD-10-CM

## 2022-06-02 PROBLEM — S80.811A ABRASION, RIGHT LOWER LEG, INITIAL ENCOUNTER: Status: ACTIVE | Noted: 2022-06-02

## 2022-06-02 PROCEDURE — OTHER SCREENING FOR COVID-19: OTHER

## 2022-06-02 PROCEDURE — OTHER BOTOX (U OR CC): OTHER

## 2022-06-02 PROCEDURE — OTHER OBSERVATION AND MEASURE: OTHER

## 2022-06-02 PROCEDURE — 99213 OFFICE O/P EST LOW 20 MIN: CPT

## 2022-06-02 PROCEDURE — OTHER PHOTO-DOCUMENTATION: OTHER

## 2022-06-02 PROCEDURE — 97110 THERAPEUTIC EXERCISES: CPT | Mod: GP

## 2022-06-02 PROCEDURE — OTHER OBSERVATION: OTHER

## 2022-06-02 PROCEDURE — OTHER PRESCRIPTION MEDICATION MANAGEMENT: OTHER

## 2022-06-02 PROCEDURE — OTHER BOTOX (U OR CC) ADDITIVE: OTHER

## 2022-06-02 ASSESSMENT — LOCATION SIMPLE DESCRIPTION DERM
LOCATION SIMPLE: LEFT ANKLE
LOCATION SIMPLE: LEFT ACHILLES SKIN
LOCATION SIMPLE: LEFT EYEBROW
LOCATION SIMPLE: RIGHT ACHILLES SKIN
LOCATION SIMPLE: GLABELLA
LOCATION SIMPLE: ABDOMEN
LOCATION SIMPLE: RIGHT TEMPLE
LOCATION SIMPLE: RIGHT EYEBROW
LOCATION SIMPLE: LEFT PRETIBIAL REGION
LOCATION SIMPLE: LEFT TEMPLE

## 2022-06-02 ASSESSMENT — LOCATION DETAILED DESCRIPTION DERM
LOCATION DETAILED: RIGHT MEDIAL EYEBROW
LOCATION DETAILED: LEFT CENTRAL EYEBROW
LOCATION DETAILED: EPIGASTRIC SKIN
LOCATION DETAILED: RIGHT ACHILLES SKIN
LOCATION DETAILED: LEFT ANKLE
LOCATION DETAILED: LEFT INFERIOR TEMPLE
LOCATION DETAILED: GLABELLA
LOCATION DETAILED: LEFT ACHILLES SKIN
LOCATION DETAILED: LEFT MEDIAL DISTAL PRETIBIAL REGION
LOCATION DETAILED: RIGHT INFERIOR TEMPLE

## 2022-06-02 ASSESSMENT — LOCATION ZONE DERM
LOCATION ZONE: LEG
LOCATION ZONE: FACE
LOCATION ZONE: TRUNK

## 2022-06-02 NOTE — PROGRESS NOTES
PT DAILY NOTE FOR OUTPATIENT THERAPY    Patient: Mary Moreno MRN: [id number]  : 1955 66 y.o.  Referring Physician: Case Francois MD  Date of Visit: 2022    Certification Dates: 22 through 22    Diagnosis:   1. Chronic pain syndrome        Chief Complaints:  balance, pain , deconditioned, decreased functional abilities/capacities    Precautions:   Precautions comments: Open wounds on ankles B  Existing Precautions/Restrictions: fall     TODAY'S VISIT    Time In Session:      History/Vitals/Pain/Encounter Info - 22 0819        Injury History/Precautions/Daily Required Info    Document Type daily treatment     Primary Therapist Yamilka Garcia MPT     Chief Complaint/Reason for Visit  balance, pain , deconditioned, decreased functional abilities/capacities     Onset of Illness/Injury or Date of Surgery --   2013 hospialized with wounds    Referring Physician Case Francois MD     Existing Precautions/Restrictions fall     Precautions comments Open wounds on ankles B     History of present illness/functional impairment Ms Moreno presents with neuropathy B legs and pyoderma gangrenosum the causes multi ulcers that are painful.  She went on to have multiple surgeries in  that led to a 6 month hospitalization. She has a few active ulcers around her ankle today and are wrapped with a dressing.  Pt also has history celiac disease, hx of R femoral neck fracture in  resulting in R LISA, hx of right shoulder injury . Pt had lost weight and weighed 88 pounds due to the gut issues.  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 She is now able to get her heel down. She is reporting balance issues.  She is clawing her toes as well now and has develpoed some sores on the 2nd toe. Pt has been referred to the chronic pain program for comprehensive PT and OT evaluations.     OP Specialty --   chronic pain     Patient/Family/Caregiver Comments/Observations Pt arrived feeling pretty good, has appt this afternoon for ankle wounds, finished course of antibiotics for foot infection.     Patient reported fall since last visit No        Pain Assessment    Currently in pain No/Denies                Daily Treatment Assessment and Plan - 06/02/22 0819        Daily Treatment Assessment and Plan    Progress toward goals Progressing     Daily Outcome Summary Pt zach session well, no pain today and added tennis ball roll to bottom of foot to HEP.  Still needs some vc's to maintain focus on exercies and correct number of repts.     Plan and Recommendations Cont to progress ex's as pt zach, resume standing balance ex's next visit if wounds heeled.                         Today's Treatment:    Mary Moreno     Neuropathy, open wounds balance issues     Precautions open wounds at ankles/feet          THER ACT  CPT 60417 TOTAL TIME FOR SESSION Not performed   Reassessment 5/12 New strength and ROM measurements , Pain picture completed, LUCIANO completed, reviewed results with patient LEFS completed and FGA   education HEP pictures given to patient and reviewed. Pt verbalized understanding   THER EX CPT 21329 TOTAL TIME FOR SESSION 53-67 Minutes   Single knee to chest 4x10s   Supine trunk rotation 4x10s   Piriformis stretch 4x10s    Hamstring stretch 3x15s  In supine  Active hamstring stretch    Hip flexor stretch 1 min    Calf stretch    Tennis Ball Instructed in tennis ball roll for bottom of foot for home   Pelvic tilts 15x5s     Alternating leg raises 2x10 table top position    Posture Exercise/Stretching Supine Cane Flex/Ext, CW/CCW 10x each  Home   Straight leg raises 2x10    Hamstring curls on green ball 2x10 not today   S/L hip abduction 2x10    Clams 2x10    90-90's 2x10   Bridging 2x10 with bolster   Sit<>Stand 1x10    LAQ 2x10   Bird dog    Eh Crunch    Cardio Nu step seat at 6 and arms 10, 4 min - HOLD d/t ankle DF opening her  "wounds in feet        NEURO RE-ED CPT 26608 TOTAL TIME FOR SESSION Not performed   Step ups on 6\" step 10 UE support - deferred   lateral step ups on 4 inch step 10 UE support - deferred       Step up with tap add   Lateral step ups with hip abduction add   Tandem stance 30 sec with each foot in front x2 and 1x5 head turns UE support to get into positon CG/close S provided for safety - Add arm movements as tolerated - SKIP   GAIT TRAINING CPT 21153 TOTAL TIME FOR SESSION Not performed           MANUAL CPT 58070  Massage CPT 09049 TOTAL TIME FOR SESSION Not performed   Ankle and toes ROM as tolerated (pt has open wounds         MODALITIES  Heat /ice 78742 TOTAL TIME FOR SESSION Not performed                                            "

## 2022-06-02 NOTE — PROCEDURE: PRESCRIPTION MEDICATION MANAGEMENT
Render In Strict Bullet Format?: No
Initiate Treatment: Apply mupirocin to abrasion
Detail Level: Zone

## 2022-06-02 NOTE — PROCEDURE: BOTOX (U OR CC)
Reconstitution Date (Optional): 6/2/2022
Consent: Written consent obtained. Risks include but not limited to lid/brow ptosis, bruising, swelling, diplopia, temporary effect, incomplete chemical denervation.
Post-Care Instructions: Patient instructed to not lie down for 4 hours and limit physical activity for 24 hours.
Measure In Units Or Cc's?: units
Detail Level: Detailed
Dilution (U/0.1 Cc): 4
Show Inventory Tab: Show
Quantity Per Injection Site (Units): 5
Quantity Per Injection Site (Units): 2.5

## 2022-06-02 NOTE — OP PT TREATMENT LOG
"Mary Moreno     Neuropathy, open wounds balance issues     Precautions open wounds at ankles/feet          THER ACT  CPT 58945 TOTAL TIME FOR SESSION Not performed   Reassessment 5/12 New strength and ROM measurements , Pain picture completed, LUCIANO completed, reviewed results with patient LEFS completed and FGA   education HEP pictures given to patient and reviewed. Pt verbalized understanding   THER EX CPT 66054 TOTAL TIME FOR SESSION 53-67 Minutes   Single knee to chest 4x10s   Supine trunk rotation 4x10s   Piriformis stretch 4x10s    Hamstring stretch 3x15s  In supine  Active hamstring stretch    Hip flexor stretch 1 min    Calf stretch    Tennis Ball Instructed in tennis ball roll for bottom of foot for home   Pelvic tilts 15x5s     Alternating leg raises 2x10 table top position    Posture Exercise/Stretching Supine Cane Flex/Ext, CW/CCW 10x each  Home   Straight leg raises 2x10    Hamstring curls on green ball 2x10 not today   S/L hip abduction 2x10    Clams 2x10    90-90's 2x10   Bridging 2x10 with bolster   Sit<>Stand 1x10    LAQ 2x10   Bird dog    Eh Crunch    Cardio Nu step seat at 6 and arms 10, 4 min - HOLD d/t ankle DF opening her wounds in feet        NEURO RE-ED CPT 56791 TOTAL TIME FOR SESSION Not performed   Step ups on 6\" step 10 UE support - deferred   lateral step ups on 4 inch step 10 UE support - deferred       Step up with tap add   Lateral step ups with hip abduction add   Tandem stance 30 sec with each foot in front x2 and 1x5 head turns UE support to get into positon CG/close S provided for safety - Add arm movements as tolerated - SKIP   GAIT TRAINING CPT 75959 TOTAL TIME FOR SESSION Not performed           MANUAL CPT 36932  Massage CPT 26945 TOTAL TIME FOR SESSION Not performed   Ankle and toes ROM as tolerated (pt has open wounds         MODALITIES  Heat /ice 27295 TOTAL TIME FOR SESSION Not performed                         "

## 2022-06-13 ENCOUNTER — HOSPITAL ENCOUNTER (OUTPATIENT)
Dept: OCCUPATIONAL THERAPY | Facility: REHABILITATION | Age: 67
Setting detail: THERAPIES SERIES
Discharge: HOME | End: 2022-06-13
Attending: PHYSICAL MEDICINE & REHABILITATION
Payer: MEDICARE

## 2022-06-13 DIAGNOSIS — G89.4 CHRONIC PAIN SYNDROME: Primary | ICD-10-CM

## 2022-06-13 PROCEDURE — 97110 THERAPEUTIC EXERCISES: CPT | Mod: GP

## 2022-06-13 NOTE — PROGRESS NOTES
PT DAILY NOTE FOR OUTPATIENT THERAPY    Patient: Mary Moreno MRN: [id number]  : 1955 66 y.o.  Referring Physician: Case Francois MD  Date of Visit: 2022    Certification Dates: 22 through 22    Diagnosis:   1. Chronic pain syndrome        Chief Complaints:  balance, pain , deconditioned, decreased functional abilities/capacities    Precautions:   Existing Precautions/Restrictions: fall     TODAY'S VISIT    Time In Session:  Start Time: 1015  Stop Time: 1100  Time Calculation (min): 45 min   History/Vitals/Pain/Encounter Info - 22 1025        Injury History/Precautions/Daily Required Info    Document Type daily treatment     Primary Therapist Yamilka SALCEDO     Chief Complaint/Reason for Visit  balance, pain , deconditioned, decreased functional abilities/capacities     Onset of Illness/Injury or Date of Surgery --    hospialized with wounds    Referring Physician Case Francois MD     Existing Precautions/Restrictions fall     History of present illness/functional impairment Ms Moreno presents with neuropathy B legs and pyoderma gangrenosum the causes multi ulcers that are painful.  She went on to have multiple surgeries in  that led to a 6 month hospitalization. She has a few active ulcers around her ankle today and are wrapped with a dressing.  Pt also has history celiac disease, hx of R femoral neck fracture in  resulting in R LISA, hx of right shoulder injury . Pt had lost weight and weighed 88 pounds due to the gut issues.  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 She is now able to get her heel down. She is reporting balance issues.  She is clawing her toes as well now and has develpoed some sores on the 2nd toe. Pt has been referred to the chronic pain program for comprehensive PT and OT evaluations.     OP Specialty --   chronic pain    Patient/Family/Caregiver Comments/Observations Pt  arrived 15 min late for treatment session     Patient reported fall since last visit No        Pain Assessment    Currently in pain Yes     Pain: Body location Foot   toes from the neuropathy    Pain Rating (0-10): Pre Activity 1     Pain Rating (0-10): Post Activity 1                Daily Treatment Assessment and Plan - 06/13/22 1025        Daily Treatment Assessment and Plan    Progress toward goals Progressing     Daily Outcome Summary Pt having neuropathy in her toes. Pt has much difficulty following her exercises due to being distracted. Some cues on proper form given. We were not able to complete all of the exercises due to patient arriving late. Pt is more active on the weekends and traveling and on her feet more. She continues to have the ankles wrapped for protection. Did not perform the neuro re ed today also due to time constraints     Plan and Recommendations reviewed with patient that her next appointment is her DC visit. Issue final exercise program                     OBJECTIVE DATA TAKEN TODAY:    None taken    Today's Treatment:    Mary Moreno     Neuropathy, open wounds balance issues     Precautions open wounds at ankles/feet          THER ACT  CPT 48614 TOTAL TIME FOR SESSION Not performed   Reassessment 5/12 New strength and ROM measurements , Pain picture completed, LUCIANO completed, reviewed results with patient LEFS completed and FGA   education HEP pictures given to patient and reviewed. Pt verbalized understanding   THER EX CPT 77402 TOTAL TIME FOR SESSION 38-52 Minutes   Single knee to chest 4x10s   Supine trunk rotation 4x10s   Piriformis stretch 4x10s    Hamstring stretch 3x15s  In supine  Active hamstring stretch    Hip flexor stretch 1 min    Calf stretch    Tennis Ball Instructed in tennis ball roll for bottom of foot for home   Pelvic tilts 15x5s  Not today    Alternating leg raises 2x10 table top position    Posture Exercise/Stretching Supine Cane Flex/Ext, CW/CCW 10x each  At  "Home   Straight leg raises 2x10  Not today   Hamstring curls on green ball 2x10 not today   S/L hip abduction 2x10    Clams 2x10    90-90's 2x10   Bridging 2x10    Sit<>Stand 1x10  Didn't get to today   LAQ 2x10 not today   Bird dog    Eh Crunch    Cardio Nu step seat at 6 and arms 10, 4 min - HOLD d/t ankle DF opening her wounds in feet        NEURO RE-ED CPT 63216 TOTAL TIME FOR SESSION Not performed   Step ups on 6\" step 10 UE support - deferred   lateral step ups on 4 inch step 10 UE support - deferred       Step up with tap add   Lateral step ups with hip abduction add   Tandem stance 30 sec with each foot in front x2 and 1x5 head turns UE support to get into positon CG/close S provided for safety - Add arm movements as tolerated - SKIP   GAIT TRAINING CPT 72187 TOTAL TIME FOR SESSION Not performed           MANUAL CPT 76247  Massage CPT 49915 TOTAL TIME FOR SESSION Not performed   Ankle and toes ROM as tolerated (pt has open wounds         MODALITIES  Heat /ice 16889 TOTAL TIME FOR SESSION Not performed                                            "

## 2022-06-13 NOTE — OP PT TREATMENT LOG
"Mary Moreno     Neuropathy, open wounds balance issues     Precautions open wounds at ankles/feet          THER ACT  CPT 08095 TOTAL TIME FOR SESSION Not performed   Reassessment 5/12 New strength and ROM measurements , Pain picture completed, LUCIANO completed, reviewed results with patient LEFS completed and FGA   education HEP pictures given to patient and reviewed. Pt verbalized understanding   THER EX CPT 14013 TOTAL TIME FOR SESSION 38-52 Minutes   Single knee to chest 4x10s   Supine trunk rotation 4x10s   Piriformis stretch 4x10s    Hamstring stretch 3x15s  In supine  Active hamstring stretch    Hip flexor stretch 1 min    Calf stretch    Tennis Ball Instructed in tennis ball roll for bottom of foot for home   Pelvic tilts 15x5s  Not today    Alternating leg raises 2x10 table top position    Posture Exercise/Stretching Supine Cane Flex/Ext, CW/CCW 10x each  At Home   Straight leg raises 2x10  Not today   Hamstring curls on green ball 2x10 not today   S/L hip abduction 2x10    Clams 2x10    90-90's 2x10   Bridging 2x10    Sit<>Stand 1x10  Didn't get to today   LAQ 2x10 not today   Bird dog    Eh Crunch    Cardio Nu step seat at 6 and arms 10, 4 min - HOLD d/t ankle DF opening her wounds in feet        NEURO RE-ED CPT 02962 TOTAL TIME FOR SESSION Not performed   Step ups on 6\" step 10 UE support - deferred   lateral step ups on 4 inch step 10 UE support - deferred       Step up with tap add   Lateral step ups with hip abduction add   Tandem stance 30 sec with each foot in front x2 and 1x5 head turns UE support to get into positon CG/close S provided for safety - Add arm movements as tolerated - SKIP   GAIT TRAINING CPT 29813 TOTAL TIME FOR SESSION Not performed           MANUAL CPT 36397  Massage CPT 10229 TOTAL TIME FOR SESSION Not performed   Ankle and toes ROM as tolerated (pt has open wounds         MODALITIES  Heat /ice 05819 TOTAL TIME FOR SESSION Not performed                         "

## 2022-06-15 ENCOUNTER — HOSPITAL ENCOUNTER (OUTPATIENT)
Dept: OCCUPATIONAL THERAPY | Facility: REHABILITATION | Age: 67
Setting detail: THERAPIES SERIES
Discharge: HOME | End: 2022-06-15
Attending: PHYSICAL MEDICINE & REHABILITATION
Payer: MEDICARE

## 2022-06-15 DIAGNOSIS — G89.4 CHRONIC PAIN SYNDROME: Primary | ICD-10-CM

## 2022-06-15 PROCEDURE — 97530 THERAPEUTIC ACTIVITIES: CPT | Mod: GP

## 2022-06-15 NOTE — OP PT TREATMENT LOG
"Mary Moreno     Neuropathy, open wounds balance issues     Precautions open wounds at ankles/feet          THER ACT  CPT 53120 TOTAL TIME FOR SESSION 53-67 Minutes   Reassessment 6/15 New strength and ROM measurements , Pain picture completed, LUCIANO completed, reviewed results with patient LEFS completed and FGA   education HEP pictures given to patient and reviewed.Pictures for pool exercises also given to patient and reviewed. Pt did return demonstrate the UE and LE exercises. Pt verbalized understanding of her HEP   THER EX CPT 08854 TOTAL TIME FOR SESSION Not performed   Single knee to chest 4x10s   Supine trunk rotation 4x10s   Piriformis stretch 4x10s    Hamstring stretch 3x15s  In supine  Active hamstring stretch    Hip flexor stretch 1 min    Calf stretch    Tennis Ball Instructed in tennis ball roll for bottom of foot for home   Pelvic tilts 15x5s  Not today    Alternating leg raises 2x10 table top position    Posture Exercise/Stretching Supine Cane Flex/Ext, CW/CCW 10x each  At Home   Straight leg raises 2x10  Not today   Hamstring curls on green ball 2x10 not today   S/L hip abduction 2x10    Clams 2x10    90-90's 2x10   Bridging 2x10    Sit<>Stand 1x10  Didn't get to today   LAQ 2x10 not today   Bird dog    Eh Crunch    Cardio Nu step seat at 6 and arms 10, 4 min - HOLD d/t ankle DF opening her wounds in feet        NEURO RE-ED CPT 86094 TOTAL TIME FOR SESSION Not performed   Step ups on 6\" step 10 UE support - deferred   lateral step ups on 4 inch step 10 UE support - deferred       Step up with tap add   Lateral step ups with hip abduction add   Tandem stance 30 sec with each foot in front x2 and 1x5 head turns UE support to get into positon CG/close S provided for safety - Add arm movements as tolerated - SKIP   GAIT TRAINING CPT 34634 TOTAL TIME FOR SESSION Not performed           MANUAL CPT 88614  Massage CPT 62811 TOTAL TIME FOR SESSION Not performed   Ankle and toes ROM as tolerated (pt has " open wounds         MODALITIES  Heat /ice 80041 TOTAL TIME FOR SESSION Not performed

## 2022-06-15 NOTE — OP PT TREATMENT LOG
"Mary Moreno     Neuropathy, open wounds balance issues     Precautions open wounds at ankles/feet          THER ACT  CPT 18266 TOTAL TIME FOR SESSION Not performed   Reassessment 6/15 New strength and ROM measurements , Pain picture completed, LUCIANO completed, reviewed results with patient LEFS completed and FGA   education HEP pictures given to patient and reviewed. Pt verbalized understanding   THER EX CPT 45077 TOTAL TIME FOR SESSION 38-52 Minutes   Single knee to chest 4x10s   Supine trunk rotation 4x10s   Piriformis stretch 4x10s    Hamstring stretch 3x15s  In supine  Active hamstring stretch    Hip flexor stretch 1 min    Calf stretch    Tennis Ball Instructed in tennis ball roll for bottom of foot for home   Pelvic tilts 15x5s  Not today    Alternating leg raises 2x10 table top position    Posture Exercise/Stretching Supine Cane Flex/Ext, CW/CCW 10x each  At Home   Straight leg raises 2x10  Not today   Hamstring curls on green ball 2x10 not today   S/L hip abduction 2x10    Clams 2x10    90-90's 2x10   Bridging 2x10    Sit<>Stand 1x10  Didn't get to today   LAQ 2x10 not today   Bird dog    Eh Crunch    Cardio Nu step seat at 6 and arms 10, 4 min - HOLD d/t ankle DF opening her wounds in feet        NEURO RE-ED CPT 12556 TOTAL TIME FOR SESSION Not performed   Step ups on 6\" step 10 UE support - deferred   lateral step ups on 4 inch step 10 UE support - deferred       Step up with tap add   Lateral step ups with hip abduction add   Tandem stance 30 sec with each foot in front x2 and 1x5 head turns UE support to get into positon CG/close S provided for safety - Add arm movements as tolerated - SKIP   GAIT TRAINING CPT 19341 TOTAL TIME FOR SESSION Not performed           MANUAL CPT 38090  Massage CPT 68937 TOTAL TIME FOR SESSION Not performed   Ankle and toes ROM as tolerated (pt has open wounds         MODALITIES  Heat /ice 89722 TOTAL TIME FOR SESSION Not performed                         "

## 2022-06-15 NOTE — PROGRESS NOTES
PT DISCHARGE NOTE FOR OUTPATIENT THERAPY    Patient: Mary Moreno MRN: [id number]  : 1955 66 y.o.  Referring Physician: Case Francois MD  Date of Visit: 6/15/2022      Certification Dates: 22 through 22    Total Visit Count: 16    Chief Complaints:  Chief Complaint   Patient presents with   • Difficulty Walking   • Pain       Precautions:  Existing Precautions/Restrictions: fall     TODAY'S VISIT:    Time In Session:  Start Time: 0810  Stop Time: 09  Time Calculation (min): 50 min   General Information - 06/15/22 0816        Session Details    Document Type discharge evaluation     Mode of Treatment individual therapy;physical therapy     Patient/Family/Caregiver Comments/Observations Pt arrived 10 min late for session.     OP Specialty --   chronic pain       General Information    Onset of Illness/Injury or Date of Surgery --    hospitalization for wounds    Referring Physician Case Francois MD     History of present illness/functional impairment Ms Moreno presents with neuropathy B legs and pyoderma gangrenosum the causes multi ulcers that are painful.  She went on to have multiple surgeries in  that led to a 6 month hospitalization. She has a few active ulcers around her ankle today and are wrapped with a dressing.  Pt also has history celiac disease, hx of R femoral neck fracture in  resulting in R LISA, hx of right shoulder injury . Pt had lost weight and weighed 88 pounds due to the gut issues.  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 She is now able to get her heel down. She is reporting balance issues.  She is clawing her toes as well now and has develpoed some sores on the 2nd toe. Pt has been referred to the chronic pain program for comprehensive PT and OT evaluations.     Existing Precautions/Restrictions fall                Daily Falls Screen - 06/15/22 0816        Daily Falls Assessment     Patient reported fall since last visit No                Pain/Vitals - 06/15/22 0816        Pain Assessment    Currently in pain Yes     Preferred Pain Scale number (Numeric Rating Pain Scale)     Pain Side/Orientation bilateral;left     Pain: Body location Foot;Hip;Back;Shoulder     Pain Level at Best 2     Pain Level at Worst 4     Pain Comments average pain rating 2/10     Pain Onset/Duration pt reports numbness in toes B with stabbing and burning pain at the ankles B                PT - 06/15/22 0816        Physical Therapy    Physical Therapy Chronic Pain        PT Plan    Frequency of treatment 2 times/week     PT Duration 3 months     PT Cert From 03/21/22     PT Cert To 06/19/22     Date PT POC was sent to provider 03/21/22     Signed PT Plan of Care received?  Yes                Assessment and Plan - 06/15/22 1850        Assessment    Clinical Assessment Pt reassessed for DC today 6/15. She is I with her HEP at this time and she was instructed in some water exercises she can also do. Pt has swimming pool at home. She reports the MD gave her permission to use the pool. Pictures provided for patient. Ankle ROM did make some mild improvements in ROM which has helped increase her stride for gait. L ankle is more limited than the R. Pt reports wounds on feet/ankles are healing and is continuing to be seen by a wound care specialist. Mary has improved some of her LE strength. The LEFS score has improved to 55/84 and the FGA also improved to 17/30. Pt reports she is doing more and has even walked through the airport vs getting a wheelchair as she has done in the past. She is pleased with her progress and will be DC to HEP at this time.     Plan and Recommendations DC form PT to HEP                 OBJECTIVE MEASUREMENTS/DATA:    ROM    Range of Motion - 06/15/22 1800        RIGHT: Lower Extremity PROM Assessment    Hip Extension  8 degrees     Ankle Dorsiflexion  3 degrees     Ankle Plantarflexion  24 degrees         LEFT: Lower Extremity PROM Assessment    Hip Extension  6 degrees     Ankle Inversion  0 degrees     Ankle Eversion  20 degrees               MMT    Manual Muscle Tests - 06/15/22 1850        RIGHT: Lower Extremity Manual Muscle Test Assessment    Right LE MMT comments: IR 4/5 andkle strength 4/5     Hip Extension gross movement (4-/5) good minus        LEFT: Lower Extremity Manual Muscle Test Assessment    Left LE MMT comments: 4+/5-/5/5     Hip Extension gross movement (4-/5) good minus               Skin    Skin Assessment - 06/15/22 0816        Skin    Skin dressings Pt had dressiongs around B ankles. She reports open wounds on both feet. Did not have patient remove dressings today. She is following with a  wound specialist.               Palpation    Palpation - 06/15/22 1850        Palpation    LE Palpation  tightness B ankles and toes               Balance/Posture    Balance and Posture - 06/15/22 1850        Balance Assessment    Comment, Balance static single leg stance 3 sec L leg and 10 sec R leg  tandem stance 25 sec once obtains balance. Needs to have Hand held assisit to get into position               Gait and Mobility    Gait and Mobility - 06/15/22 1850        Stairs Assessment    Ascending Stairs Technique step-to-step;step-over-step     Descending Stairs Technique step-to-step               Outcome Measures    PT Outcome Measures - 06/15/22 1850        Objective Outcome Measures    FGA Score (out of 30 total) 17        Subjective Outcome Measures    LEFS LEFS 55/84                 ROM and MMT        Some values may be hidden. Unless noted otherwise, only the newest values recorded on each date are displayed.         PT UE ROM Measurements 3/21/22 5/12/22 5/19/22 6/15/22   AROM: Left UE Gross Movement WFL  WFL    PROM: Right Shoulder Flexion     WFL    AROM: Right Shoulder Flexion 160 degrees    WFL    AROM: Right Shoulder  degrees  165 degrees       PT LE ROM Measurements 3/21/22  5/12/22 5/19/22 6/15/22   AROM: Right LE Gross Movement WFL  except at the ankles/toes WFL     AROM: Left LE Gross Movement WFL  except ankles and toes WFL     PROM: Right Hip Extension 5 degrees 6 degrees  8 degrees   PROM: Left Hip Extension 5 degrees 6 degrees  6 degrees   PROM: Right Ankle DF 0 degrees 0 degrees  3 degrees   PROM: Right Ankle PF 20 degrees 20 degrees  24 degrees   PROM: Left Ankle Inversion 0 degrees 0 degrees  0 degrees   PROM: Left Ankle Eversion 10 degrees 10 degrees  20 degrees      PT Cervical/Lumbar/Other ROM Measurements 3/21/22 5/12/22 5/19/22 6/15/22   No data to display.      Hand ROM Measurements 3/21/22 5/12/22 5/19/22 6/15/22   No data to display.      PT UE MMT Measurements 3/21/22 5/12/22 5/19/22 6/15/22   Right Shoulder Flexion (4-/5) good minus      Left Shoulder Flexion (4+/5) good plus      Right Shoulder Extension (4/5) good      Left Shoulder Extension (4+/5) good plus      Right Shoulder ADD (4+/5) good plus      Left Shoulder ADD (4+/5) good plus      Right Shoulder ABD (4-/5) good minus      Left Shoulder ABD (4+/5) good plus      Right Shoulder IR (4/5) good      Left Shoulder IR (4+/5) good plus      Right Shoulder ER (4-/5) good minus      Left Shoulder ER (4+/5) good plus      Right Elbow Flexion (4+/5) good plus      Left Elbow Flexion (4+/5) good plus      Right Elbow Extension (4+/5) good plus      Left Elbow Extension (4+/5) good plus      Right Forearm Supination (4+/5) good plus      Left Forearm Supination (4+/5) good plus      Right Forearm Pronation (4+/5) good plus      Left Forearm Pronation (4+/5) good plus      Right Wrist Flexion (4+/5) good plus      Left Wrist Flexion (4+/5) good plus      Right Wrist Extension (4+/5) good plus      Left Wrist Extension (4+/5) good plus         PT LE MMT 3/21/22 5/12/22 5/19/22 6/15/22   Right Hip Extension    (4-/5) good minus   Left Hip Extension    (4-/5) good minus           Outcome Measures    PT OBJECTIVE Outcome  "Measures 3/21/22 5/12/22 6/15/22   6 Minute Walk Test TBA     FGA   TBA 14 17      PT SUBJECTIVE Outcome Measures 3/21/22 5/12/22 6/15/22   LEFS  47/80 LEFS 55/84   Other FGA, LEFS and 6 min walk test to be assessed               Today's Treatment:    Education provided:  Yes: See treatment log for details of education provided    Mary Moreno     Neuropathy, open wounds balance issues     Precautions open wounds at ankles/feet          THER ACT  CPT 01577 TOTAL TIME FOR SESSION 53-67 Minutes   Reassessment 6/15 New strength and ROM measurements , Pain picture completed, LUCIANO completed, reviewed results with patient LEFS completed and FGA   education HEP pictures given to patient and reviewed.Pictures for pool exercises also given to patient and reviewed. Pt did return demonstrate the UE and LE exercises. Pt verbalized understanding of her HEP   THER EX CPT 24073 TOTAL TIME FOR SESSION Not performed   Single knee to chest 4x10s   Supine trunk rotation 4x10s   Piriformis stretch 4x10s    Hamstring stretch 3x15s  In supine  Active hamstring stretch    Hip flexor stretch 1 min    Calf stretch    Tennis Ball Instructed in tennis ball roll for bottom of foot for home   Pelvic tilts 15x5s  Not today    Alternating leg raises 2x10 table top position    Posture Exercise/Stretching Supine Cane Flex/Ext, CW/CCW 10x each  At Home   Straight leg raises 2x10  Not today   Hamstring curls on green ball 2x10 not today   S/L hip abduction 2x10    Clams 2x10    90-90's 2x10   Bridging 2x10    Sit<>Stand 1x10  Didn't get to today   LAQ 2x10 not today   Bird dog    Eh Crunch    Cardio Nu step seat at 6 and arms 10, 4 min - HOLD d/t ankle DF opening her wounds in feet        NEURO RE-ED CPT 66981 TOTAL TIME FOR SESSION Not performed   Step ups on 6\" step 10 UE support - deferred   lateral step ups on 4 inch step 10 UE support - deferred       Step up with tap add   Lateral step ups with hip abduction add   Tandem stance 30 sec " with each foot in front x2 and 1x5 head turns UE support to get into positon CG/close S provided for safety - Add arm movements as tolerated - SKIP   GAIT TRAINING CPT 58458 TOTAL TIME FOR SESSION Not performed           MANUAL CPT 41750  Massage CPT 70502 TOTAL TIME FOR SESSION Not performed   Ankle and toes ROM as tolerated (pt has open wounds         MODALITIES  Heat /ice 79651 TOTAL TIME FOR SESSION Not performed                            Goals Addressed                    This Visit's Progress       Patient Stated    •  COMPLETED: <enter goal here> (pt-stated)   On track      Know how to properly stretch my foot and ankle  Pt is I in her HEP. She can do very mild movements in the ankles due to wounds.           Other    •  COMPLETED: Mutually agreed upon pain goal   On track      Mutually agreed upon pain goal: decrease pain in feet to allow increased activity        •  PT chronic pain goals         Short and Long Term Goals Time Frame Result Comment/Progress   FGA , LEFS and 6 min walk test to be assessed and set appropriate goals 4 -5 weeks Partially met LEFS completed 47/80  5/12     Increase hip strength 1/2 mm grade 8 weeks ongoing    Single leg stance 5 sec either leg and tandem stance 30 sec with S to get into position 4-5 weeks ongoing    Increase hip extension to 8 degrees B and DF to 5 degrees B to improve gait quality 4-5 weeks ongoing    No report of falls 8 weeks met Pt had a fall    Pt I in HEP and understand how to progress 8 weeks met

## 2022-06-22 ENCOUNTER — APPOINTMENT (OUTPATIENT)
Dept: URBAN - METROPOLITAN AREA CLINIC 200 | Age: 67
Setting detail: DERMATOLOGY
End: 2022-06-27

## 2022-06-22 DIAGNOSIS — L98.0 PYOGENIC GRANULOMA: ICD-10-CM

## 2022-06-22 DIAGNOSIS — Z11.52 ENCOUNTER FOR SCREENING FOR COVID-19: ICD-10-CM

## 2022-06-22 PROCEDURE — OTHER SCREENING FOR COVID-19: OTHER

## 2022-06-22 PROCEDURE — OTHER RECOMMENDATIONS: OTHER

## 2022-06-22 PROCEDURE — OTHER PHOTO-DOCUMENTATION: OTHER

## 2022-06-22 PROCEDURE — 99212 OFFICE O/P EST SF 10 MIN: CPT

## 2022-06-22 ASSESSMENT — LOCATION DETAILED DESCRIPTION DERM
LOCATION DETAILED: LEFT ANKLE
LOCATION DETAILED: EPIGASTRIC SKIN
LOCATION DETAILED: RIGHT ANKLE
LOCATION DETAILED: LEFT ACHILLES SKIN

## 2022-06-22 ASSESSMENT — LOCATION SIMPLE DESCRIPTION DERM
LOCATION SIMPLE: LEFT ACHILLES SKIN
LOCATION SIMPLE: RIGHT ANKLE
LOCATION SIMPLE: LEFT ANKLE
LOCATION SIMPLE: ABDOMEN

## 2022-06-22 ASSESSMENT — LOCATION ZONE DERM
LOCATION ZONE: LEG
LOCATION ZONE: TRUNK

## 2022-06-22 NOTE — PROCEDURE: RECOMMENDATIONS
Render Risk Assessment In Note?: no
Detail Level: Zone
Recommendation Preamble: The following recommendations were made during the visit:
Recommendations (Free Text): Wounds healing well posterior ankle still open but granulating well

## 2022-06-29 NOTE — PROCEDURE: OBSERVATION
Detail Level: Detailed
Pt c/o pain/numbness from left shoulder down to fingers x 4 days. Unable to bend pointer finger. Hx RA. Was admitted to [facility] earlier this month for PNA.  Has not had her humira in 3 weeks
Size Of Lesion: 4 x 2
Size Of Lesion: 1 x 1
Size Of Lesion: 0.5 x 0.5
Size Of Lesion: 3 x 2
Size Of Lesion: 3 x 1
Size Of Lesion: 4 by 4

## 2022-07-12 ENCOUNTER — APPOINTMENT (OUTPATIENT)
Dept: URBAN - METROPOLITAN AREA CLINIC 203 | Age: 67
Setting detail: DERMATOLOGY
End: 2022-07-20

## 2022-07-12 DIAGNOSIS — Z11.52 ENCOUNTER FOR SCREENING FOR COVID-19: ICD-10-CM

## 2022-07-12 DIAGNOSIS — L23.7 ALLERGIC CONTACT DERMATITIS DUE TO PLANTS, EXCEPT FOOD: ICD-10-CM

## 2022-07-12 DIAGNOSIS — L88 PYODERMA GANGRENOSUM: ICD-10-CM

## 2022-07-12 PROBLEM — D23.62 OTHER BENIGN NEOPLASM OF SKIN OF LEFT UPPER LIMB, INCLUDING SHOULDER: Status: ACTIVE | Noted: 2022-07-12

## 2022-07-12 PROCEDURE — OTHER REASSURANCE: OTHER

## 2022-07-12 PROCEDURE — OTHER SCREENING FOR COVID-19: OTHER

## 2022-07-12 PROCEDURE — OTHER PRESCRIPTION MEDICATION MANAGEMENT: OTHER

## 2022-07-12 PROCEDURE — OTHER RECOMMENDATIONS: OTHER

## 2022-07-12 PROCEDURE — OTHER PHOTO-DOCUMENTATION: OTHER

## 2022-07-12 PROCEDURE — 99213 OFFICE O/P EST LOW 20 MIN: CPT

## 2022-07-12 ASSESSMENT — LOCATION ZONE DERM
LOCATION ZONE: TRUNK
LOCATION ZONE: LEG
LOCATION ZONE: NECK

## 2022-07-12 ASSESSMENT — LOCATION DETAILED DESCRIPTION DERM
LOCATION DETAILED: LEFT ACHILLES SKIN
LOCATION DETAILED: EPIGASTRIC SKIN
LOCATION DETAILED: RIGHT INFERIOR ANTERIOR NECK
LOCATION DETAILED: LEFT ANKLE
LOCATION DETAILED: RIGHT ANKLE

## 2022-07-12 ASSESSMENT — LOCATION SIMPLE DESCRIPTION DERM
LOCATION SIMPLE: ABDOMEN
LOCATION SIMPLE: LEFT ACHILLES SKIN
LOCATION SIMPLE: LEFT ANKLE
LOCATION SIMPLE: RIGHT ANTERIOR NECK
LOCATION SIMPLE: RIGHT ANKLE

## 2022-07-14 ENCOUNTER — HOSPITAL ENCOUNTER (OUTPATIENT)
Dept: PSYCHOLOGY | Facility: CLINIC | Age: 67
Discharge: HOME | End: 2022-07-14
Payer: MEDICARE

## 2022-07-14 DIAGNOSIS — F45.42 PAIN DISORDER ASSOCIATED WITH PSYCHOLOGICAL FACTORS AND MEDICAL CONDITION: ICD-10-CM

## 2022-07-14 PROCEDURE — 90837 PSYTX W PT 60 MINUTES: CPT | Performed by: PSYCHOLOGIST

## 2022-07-14 ASSESSMENT — COGNITIVE AND FUNCTIONAL STATUS - GENERAL
ORIENTATION: FULLY ORIENTED
THOUGHT_CONTENT: APPROPRIATE
PSYCHOMOTOR FUNCTIONING: DECREASED
SPEECH: REGULAR
AFFECT: FULL RANGE
EYE_CONTACT: WNL
DELUSIONS: NONE OR AGE APPROPRIATE
THOUGHT_PROCESS: TANGENTIAL
EST. PREMORBID INTELLIGENCE: AVERAGE
PERCEPTUAL FUNCTION: NORMAL
APPEARANCE: WELL GROOMED
RECENT MEMORY: WNL
INSIGHT: INTACT
REMOTE MEMORY: WNL
CONCENTRATION: WNL
LIBIDO: NON-CONTRIBUTORY
IMPULSE CONTROL: INTACT
MOOD: FRUSTRATED;MOTIVATED;EUTHYMIC (NORMAL)
APPETITE: NO CHANGE
SLEEP_WAKE_CYCLE: NO CHANGE
ATTENTION: WNL

## 2022-07-14 NOTE — PROGRESS NOTES
Outpatient Psychology Progress Note    Duration: 55 minutes  Mary Moreno : 1955, a 66 y.o. female, for follow up visit.    Reason:  to discuss Pain Management. She was referred to psychology at [facility] by Dr. Case Francois to help with coping with pain. She is participating in comprehensive pain rehab and therapy for deconditioning in the [facility]HAB[facility] program. She receives physical and occupational therapies.She reports severe pain in her ankles. The pain is described as stabbing, burning, and intermittent sensation similar to electrical shocks.     HPI:   Chief Complaint   Patient presents with   • Psychotherapy   • Pain       Current Evaluation:     Mental Status Evaluation:  Appearance: Well Groomed  Speech: Regular  Psychomotor Functioning: Decreased  Eye Contact: WNL  Est. Premorbid Intelligence: Average  Orientation: Fully oriented  Attention: WNL  Concentration: WNL  Recent Memory: WNL  Remote Memory: WNL  Thought Content: Appropriate  Thought Process: Tangential  Insight: Intact  Perceptual Function: Normal  Delusions: None or age appropriate  Sleeping: No Change  Appetite: No Change  Libido: Non-Contributory  Affect: Full Range  Mood: Frustrated, Motivated, Euthymic (normal)    Comments:      Additional Assessments done this visit:     [place] Suicide Severity Rating Scale:  Not indicated              Safe-T Assessment:  Not indicated        Session Summary:   Affect is full range, context appropriate, and congruent with mood. Mood has features of anxiety, frustration, and irritability. Speech is rapid with mild verbosity. Narrative reflected impact of situational stressors on her mood and perception of pain. Discused strategies for coping with stressors such as current politics by doing something meaningful (get involved).       Goals Addressed                    This Visit's Progress       Patient Stated    •  Improve coping skills (pt-stated)   Improving           Interventions  Acceptance & Adjustment  Anxiety Reduction Techniques  Insight Development  Monitoring of Symptoms  Pain Management  Psychoeducation    Psychoeducation provided on:  Other: pain management     Recommendations:      Individual Therapy  1 hourmonthly      Visit Diagnosis:     1. 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

## 2022-07-29 ENCOUNTER — APPOINTMENT (OUTPATIENT)
Dept: URBAN - METROPOLITAN AREA CLINIC 203 | Age: 67
Setting detail: DERMATOLOGY
End: 2022-07-30

## 2022-07-29 DIAGNOSIS — L98.8 OTHER SPECIFIED DISORDERS OF THE SKIN AND SUBCUTANEOUS TISSUE: ICD-10-CM

## 2022-07-29 DIAGNOSIS — Z11.52 ENCOUNTER FOR SCREENING FOR COVID-19: ICD-10-CM

## 2022-07-29 DIAGNOSIS — L88 PYODERMA GANGRENOSUM: ICD-10-CM

## 2022-07-29 PROCEDURE — OTHER BOTOX (U OR CC) ADDITIVE: OTHER

## 2022-07-29 PROCEDURE — OTHER BOTOX (U OR CC): OTHER

## 2022-07-29 PROCEDURE — OTHER PHOTO-DOCUMENTATION: OTHER

## 2022-07-29 PROCEDURE — OTHER SCREENING FOR COVID-19: OTHER

## 2022-07-29 PROCEDURE — OTHER RECOMMENDATIONS: OTHER

## 2022-07-29 PROCEDURE — OTHER PRESCRIPTION MEDICATION MANAGEMENT: OTHER

## 2022-07-29 PROCEDURE — 99213 OFFICE O/P EST LOW 20 MIN: CPT

## 2022-07-29 ASSESSMENT — LOCATION SIMPLE DESCRIPTION DERM
LOCATION SIMPLE: LEFT ANKLE
LOCATION SIMPLE: ABDOMEN
LOCATION SIMPLE: RIGHT CHEEK
LOCATION SIMPLE: LEFT ACHILLES SKIN
LOCATION SIMPLE: RIGHT ANKLE
LOCATION SIMPLE: LEFT TEMPLE
LOCATION SIMPLE: RIGHT TEMPLE
LOCATION SIMPLE: LEFT CHEEK

## 2022-07-29 ASSESSMENT — LOCATION DETAILED DESCRIPTION DERM
LOCATION DETAILED: RIGHT MID TEMPLE
LOCATION DETAILED: LEFT ACHILLES SKIN
LOCATION DETAILED: RIGHT SUPERIOR LATERAL MALAR CHEEK
LOCATION DETAILED: LEFT SUPERIOR CENTRAL MALAR CHEEK
LOCATION DETAILED: RIGHT ANKLE
LOCATION DETAILED: LEFT INFERIOR TEMPLE
LOCATION DETAILED: EPIGASTRIC SKIN
LOCATION DETAILED: LEFT ANKLE

## 2022-07-29 ASSESSMENT — LOCATION ZONE DERM
LOCATION ZONE: TRUNK
LOCATION ZONE: LEG
LOCATION ZONE: FACE

## 2022-07-29 NOTE — PROCEDURE: BOTOX (U OR CC)
Post-Care Instructions: Patient instructed to not lie down for 4 hours and limit physical activity for 24 hours.
Detail Level: Detailed
Dilution (U/0.1 Cc): 4
Show Inventory Tab: Show
Consent: Written consent obtained. Risks include but not limited to lid/brow ptosis, bruising, swelling, diplopia, temporary effect, incomplete chemical denervation.
Measure In Units Or Cc's?: units
Quantity Per Injection Site (Units): 2

## 2022-08-16 ENCOUNTER — APPOINTMENT (OUTPATIENT)
Dept: URBAN - METROPOLITAN AREA CLINIC 203 | Age: 67
Setting detail: DERMATOLOGY
End: 2022-08-17

## 2022-08-16 DIAGNOSIS — L88 PYODERMA GANGRENOSUM: ICD-10-CM

## 2022-08-16 DIAGNOSIS — Z11.52 ENCOUNTER FOR SCREENING FOR COVID-19: ICD-10-CM

## 2022-08-16 PROCEDURE — OTHER PRESCRIPTION MEDICATION MANAGEMENT: OTHER

## 2022-08-16 PROCEDURE — 99213 OFFICE O/P EST LOW 20 MIN: CPT

## 2022-08-16 PROCEDURE — OTHER PHOTO-DOCUMENTATION: OTHER

## 2022-08-16 PROCEDURE — OTHER RECOMMENDATIONS: OTHER

## 2022-08-16 PROCEDURE — OTHER SCREENING FOR COVID-19: OTHER

## 2022-08-16 ASSESSMENT — LOCATION ZONE DERM
LOCATION ZONE: LEG
LOCATION ZONE: TRUNK

## 2022-08-16 ASSESSMENT — LOCATION DETAILED DESCRIPTION DERM
LOCATION DETAILED: RIGHT ANKLE
LOCATION DETAILED: LEFT ANKLE
LOCATION DETAILED: LEFT ACHILLES SKIN
LOCATION DETAILED: EPIGASTRIC SKIN

## 2022-08-16 ASSESSMENT — LOCATION SIMPLE DESCRIPTION DERM
LOCATION SIMPLE: RIGHT ANKLE
LOCATION SIMPLE: ABDOMEN
LOCATION SIMPLE: LEFT ANKLE
LOCATION SIMPLE: LEFT ACHILLES SKIN

## 2022-08-16 NOTE — PROCEDURE: RECOMMENDATIONS
Render Risk Assessment In Note?: no
Detail Level: Zone
Recommendation Preamble: The following recommendations were made during the visit:
Recommendations (Free Text): Wounds on 8/16 right and left ankle still open but granulating well

## 2022-08-16 NOTE — PROCEDURE: PRESCRIPTION MEDICATION MANAGEMENT
Detail Level: Zone
Plan: Left and right ankle on 8/16 healing. Redness and left ankle slightly swollen
Modify Regimen: CereVe SA cream and amlactin
Render In Strict Bullet Format?: No

## 2022-08-18 ENCOUNTER — HOSPITAL ENCOUNTER (OUTPATIENT)
Dept: PSYCHOLOGY | Facility: CLINIC | Age: 67
Discharge: HOME | End: 2022-08-18
Payer: MEDICARE

## 2022-08-18 DIAGNOSIS — F45.42 PAIN DISORDER ASSOCIATED WITH PSYCHOLOGICAL FACTORS AND MEDICAL CONDITION: ICD-10-CM

## 2022-08-18 PROCEDURE — 90837 PSYTX W PT 60 MINUTES: CPT | Performed by: PSYCHOLOGIST

## 2022-08-18 ASSESSMENT — COGNITIVE AND FUNCTIONAL STATUS - GENERAL
THOUGHT_CONTENT: APPROPRIATE
DELUSIONS: NONE OR AGE APPROPRIATE
CONCENTRATION: WNL
REMOTE MEMORY: WNL
EST. PREMORBID INTELLIGENCE: AVERAGE
EYE_CONTACT: WNL
AFFECT: FULL RANGE
MOOD: MOTIVATED;EUTHYMIC (NORMAL)
INSIGHT: INTACT
APPETITE: NO CHANGE
ATTENTION: WNL
SLEEP_WAKE_CYCLE: NO CHANGE
PSYCHOMOTOR FUNCTIONING: DECREASED
SPEECH: REGULAR
LIBIDO: NON-CONTRIBUTORY
RECENT MEMORY: WNL
PERCEPTUAL FUNCTION: NORMAL
APPEARANCE: WELL GROOMED
THOUGHT_PROCESS: TANGENTIAL
ORIENTATION: FULLY ORIENTED
IMPULSE CONTROL: INTACT

## 2022-08-18 NOTE — PROGRESS NOTES
Outpatient Psychology Progress Note    Duration: 55 minutes  Mary Moreno : 1955, a 67 y.o. female, for follow up visit.  Reason:  to discuss Pain Management. She was referred to psychology at [facility] by Dr. Case Francois to help with coping with pain. She is participating in comprehensive pain rehab and therapy for deconditioning in the [facility]AB[facility] program. She receives physical and occupational therapies.She reports severe pain in her ankles. The pain is described as stabbing, burning, and intermittent sensation similar to electrical shocks.     HPI:   Chief Complaint   Patient presents with   • Psychotherapy   • Pain       Current Evaluation:     Mental Status Evaluation:  Appearance: Well Groomed  Speech: Regular  Psychomotor Functioning: Decreased  Eye Contact: WNL  Est. Premorbid Intelligence: Average  Orientation: Fully oriented  Attention: WNL  Concentration: WNL  Recent Memory: WNL  Remote Memory: WNL  Thought Content: Appropriate  Thought Process: Tangential  Insight: Intact  Perceptual Function: Normal  Delusions: None or age appropriate  Sleeping: No Change  Appetite: No Change  Libido: Non-Contributory  Affect: Full Range  Mood: Motivated, Euthymic (normal)    Comments:      Additional Assessments done this visit:     [place] Suicide Severity Rating Scale:  Not indicated              Safe-T Assessment:  Not indicated        Session Summary:   Affect is full range. Mood is euthymic. There are no obervable or reported features of depression or anxiety.She is normalizing her life and seeing improvement in her leg wounds. Her outlook is positive. She completed the Physical Therapy and is not receiving any rehab therapies at this time. Her dermatologist decreased the frequency of her visits due to her progress. At this time discontinuation of psychology is appropriate. She was invited to call this office for follow up as needed.      Goals Addressed                    This Visit's Progress        Patient Stated    •  Improve coping skills (pt-stated)   Improving          Interventions  Acceptance & Adjustment  Anxiety Reduction Techniques  Insight Development  Monitoring of Symptoms  Pain Management       Recommendations:      No Further sessions; Patient pending discharge        Visit Diagnosis:     1. 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

## 2022-08-29 ENCOUNTER — APPOINTMENT (OUTPATIENT)
Dept: URBAN - METROPOLITAN AREA CLINIC 203 | Age: 67
Setting detail: DERMATOLOGY
End: 2022-09-01

## 2022-08-29 DIAGNOSIS — Z11.52 ENCOUNTER FOR SCREENING FOR COVID-19: ICD-10-CM

## 2022-08-29 DIAGNOSIS — T07XXXA ABRASION OR FRICTION BURN OF OTHER, MULTIPLE, AND UNSPECIFIED SITES, WITHOUT MENTION OF INFECTION: ICD-10-CM

## 2022-08-29 PROBLEM — S80.811A ABRASION, RIGHT LOWER LEG, INITIAL ENCOUNTER: Status: ACTIVE | Noted: 2022-08-29

## 2022-08-29 PROCEDURE — OTHER SCREENING FOR COVID-19: OTHER

## 2022-08-29 PROCEDURE — 97597 DBRDMT OPN WND 1ST 20 CM/<: CPT

## 2022-08-29 PROCEDURE — OTHER DEBRIDEMENT OF OPEN WOUND: OTHER

## 2022-08-29 PROCEDURE — OTHER WOUND DRESSING, CARE INSTRUCTIONS AND ORDERS: OTHER

## 2022-08-29 PROCEDURE — OTHER PHOTO-DOCUMENTATION: OTHER

## 2022-08-29 ASSESSMENT — LOCATION SIMPLE DESCRIPTION DERM
LOCATION SIMPLE: ABDOMEN
LOCATION SIMPLE: RIGHT CALF

## 2022-08-29 ASSESSMENT — LOCATION ZONE DERM
LOCATION ZONE: LEG
LOCATION ZONE: TRUNK

## 2022-08-29 ASSESSMENT — LOCATION DETAILED DESCRIPTION DERM
LOCATION DETAILED: EPIGASTRIC SKIN
LOCATION DETAILED: RIGHT DISTAL CALF
LOCATION DETAILED: RIGHT PROXIMAL CALF

## 2022-08-29 NOTE — PROCEDURE: WOUND DRESSING, CARE INSTRUCTIONS AND ORDERS
Unna Boot With Cast Padding Text: An UNNA boot compression wrap has been placed from the base of the toes to the base of the knee (about 2-3 fingers breadth below the fold of the knee). Cast padding has been placed as the outer layer. A 4 inch wide cohesive wrap (Coban) has been placed on top of the UNNA boot as the outer layer. Monitor for tingling, pain, discoloration, or numbness to foot or toes. If any of the above symptoms occur, first elevate the legs and reassess in 30-60 minutes. If discomfort continues, remove the compression wrap and call the wound care center or your home health nurse. Do not keep a compression wrap on for more than 7 days.
Walking Boot Text: Implement use of walking boot any time you are walking or transferring. Limit walking as much as possible.
Total Contact Cast Text: Total contact cast by TCC-EZ applied. Do not get cast wet. Contact wound center if there is a foul odor or becomes uncomfortable due to feeling tight or swelling. Do not use objects down inside of cast to scratch. Wear black walking boot at all times as cast will crack if pressure is placed on it. Please limit activities to transfers and very light ambulation.
Renasys 120 Text: Apply Renasys NPWT at 120mmHg continuous suction.
20-30 Mmhg Knee High Compression Stockings Text: Wear 20-30 mmHg knee high compression stockings daily. They can be taken off while sleeping but must but need to be replaced after getting out of bed. Ensure adequate compression is maintained; garments often require replacing after 3-6 months of wear. Follow the 's instructions regarding lotions, washing and drying.
Repositioning Text: Turn and reposition every 1-2 hours while awake. Limit side lying to 30 degree tilt. Limit elevation of the head of the bed to 30 degrees.
Viscous 2% Lidocaine Text: Apply to wound bed as needed for pain. WCC only.
Renasys 140 Text: Apply Renasys NPWT at 140mmHg continuous suction.
Kci 125 Text: Apply KCI NPWT at 125mmHg continuous suction.
Injectable 1% Lidocaine Text: Inject to wound bed prior to procedure as needed for pain. WCC only.
Coccyx Cushion Text: Use Coccyx Cushion while sitting. Avoid direct pressure on the wound. Do not use Donut cushion.
Gel Overlay Mattress Text: Offload with gel overlay mattress or specialty bedding.
Mesalt Text: Apply to the wound base, ensuring good contact with the entire area. Mesalt should not touch the periwound and must be changed at least every 24 hours.
20-30 Mmhg Thigh High Compression Stockings Text: Wear 20-30 mmHg thigh high compression stockings daily. They can be taken off while sleeping but must but need to be replaced after getting out of bed. Ensure adequate compression is maintained; garments often require replacing after 3-6 months of wear. Follow the 's instructions regarding lotions, washing and drying.
30-40 Mmhg Knee High Compression Stockings Text: Wear 30-40 mmHg knee high compression stockings daily. They can be taken off while sleeping but must but need to be replaced after getting out of bed. Ensure adequate compression is maintained; garments often require replacing after 3-6 months of wear. Follow the 's instructions regarding lotions, washing and drying.
Kci 150 Text: Apply KCI NPWT at 150mmHg continuous suction.
Add Different Orders For Other Wounds: No
Lucho 80 Text: Apply LUCHO NPWT at 80mmHg continuous suction.
Hold Npwt Text: Negative pressure wound therapy is on hold starting today. Please bring supplies to your next appointment in case wound vac therapy needs to be re-initiated.
Detail Level: Simple
40-40 Mmhg Thigh High Compression Stockings Text: Wear 30-40 mmHg thigh high compression stockings daily. They can be taken off while sleeping but must but need to be replaced after getting out of bed. Ensure adequate compression is maintained; garments often require replacing after 3-6 months of wear. Follow the 's instructions regarding lotions, washing and drying.
Gus Wheelchair Cushion Text: Avoid direct pressure on the wound. Do not use donut devices. Shift position in chair every 15 minutes.
Keep Dressing Clean And Dry Text: No tub baths or soaking. Remove dressing only if gets soiled or become wet. May use plastic bag, transparent film, Aquagaurd, cast protector, vac drape, etc to keep the dressing dry. For lower and upper extremity wounds, may purchase a reusable cast protector. When wrapping leg or foot with plastic covering during bathing take precautions to not slip/fall (ex: shower chair). Recommend bathing prior to dressing changes.
Dc Npwt Text: Negative pressure wound therapy has been discontinued. Call and return the device to the .
Do Not Use Compression Text: Do NOT place any compression, including ACE wrap, Coban, tightly wrapped rolled gauze.
Regranex Text: Keep refrigerated at all times
Compression Garments Text: Continue to cleanse with normal saline, blot dry and keep covered for at least one week to strengthen the skin. After one week, you may shower and cleanse area with mild soap and water. Do not scrub newly healed areas.
Plain Packing Text: Pack wound with plain packing extending to wound base.
Abdominal Binder/Girdle Text: Please wear abdominal binder/girdle at all times to improve wound healing.
Alginate Packing Text: Pack wound with alginate packing, extending packing to the base of the wound.
Cleanse Wound With Normal Saline Text: Prior to dressing change, wash hands and remove old dressing. Using gloved hands, cleanse wound and periwound with normal saline or wound cleanser with sponge gauze. Do not use tissues or cotton balls. Do not scrub or use excessive force. Pat dry using gauze sponges.
Order Npwt Text: We will order a negative pressure wound therapy device to assist in healing your wound. This process can take 1-2 weeks, and its use is dependent upon your wound progress.
Non-Weight Bearing Text: Keep weight off affected area at all times.
Santyl Ointment Text: Apply a nickel-thick layer to wound bed.
Iodoform Packing Text: Pack wound with iodoform packing, extending packing to the base of the wound.
Cleanse Wound With Anisept Text: Prior to dressing change, wash hands and remove old dressing. Using gloved hands, cleanse wound and periwound with Anisept with sponge gauze. Do not use tissues or cotton balls. Do not scrub or use excessive force. Pat dry using gauze sponges.
Increase Protein Intake By 30 Grams Text: Increase Protein intake to promote wound healing. Goal is 30 extra grams of protein daily. May supplement with protein shakes or bars.
Diabetic Shoe With Orthotics Text: Wear custom diabetic shoe with orthotics any time you are walking. Limit walking as much as possible.
Cleanse Wound With Dakins Text: Prior to dressing change, wash hands and remove old dressing. Using gloved hands, cleanse the wound and periwound with Dakins using gauze sponges. Allow the Dakins soaked gauze to soak on the wound for 10 minutes. Do not use tissues or cotton balls. Do not scrub or use excessive force. Pat dry using gauze sponges.
Topical Ointment/Moisturizing Cream Text: Apply topical ointment/moisturizing cream to skin surrounding the wound. Do not apply lotion to wound bed. Do not use ointment/cream prior to placement of dressing or the dressing will not stay in place.
Mesalt Packing Text: Pack wound with strips of Mesalt, ensuring that packing is placed to the bottom of the wound and does not touch the periwound.
Antifungal Powder Text: Apply Antifungal powder to the periwound.
Increase Protein Intake By 60 Grams Text: Increase Protein intake to promote wound healing. Goal is 60 extra grams of protein daily. May supplement with protein shakes or bars.
Referral For Custom Orthotics Text: A referral for custom orthotics has been ordered for your neuropathic wound. A representative from the orthotics office will be contacting you.
Custom Orthotics Text: Wear custom orthotics any time you are walking. Limit walking as much as possible.
Silver Alginate Packing Text: Pack wound with silver alginate packing, extending packing to the base of the wound.
Skin Substitute Text: We have placed a skin substitute product on your wound today. The skin substitute is covered with a silicone adherent dressing and secured with steri strips. Please do NOT remove the silicone dressing or any dressing held in place by the steri strips. Please do NOT wash the wound with saline. The outer dressing can be removed and changed to control drainage.
Maintain Glucose Control Text: Please keep blood sugars well controlled and less than 150. Please continue to follow up with your prescribing provider if you are unable to control your glucose.
Barrier Paste Text: Apply barrier paste to periwound.
Darco Heel Wedge Text: Implement use of Darco heel wedge any time you are walking. Limit walking as much as possible.
Barrier Ointment Text: Apply thin film of Barrier ointment to protect skin surrounding the wound. Do not get ointment in the wound bed.
Dalia Seal Text: Apply Dalia seal to the periwound PRN.
Darco Ortho Wedge Text: Implement use of Darco ortho wedge any time you are walking. Limit walking as much as possible.
Elevation Text: Elevate the legs above the heart and exercise legs to reduce swelling.
Exercise Text: Calf pump exercises to reduce edema.
Admit To Home Health Care Text: Please admit patient to home health for wound care and continue until discharged. I am the provider seeing the patient today. I certify that based on my findings the following services are medically necessary home health services: Skilled Nursing Care. I certify that this patient is under my care and that I have had a face to face encounter with the patient today. I certify that my clinical findings support that the patient is homebound.
Tubular Wrap Text: Wrap tubular gauze over fingers/toes, securing dressing inside.
Multi-Podus Heel Protector Text: Implement use of Multi-Podus - heel protector boot any time you are walking or transferring. Limit walking as much as possible.
Hydrocolloid Text: Apply hydrocolloid to periwound.
Continue Home Health Care Text: I am the provider seeing the patient today. I certify that based on my findings the following services are medically necessary home health services: Skilled Nursing Care. I certify that this patient is under my care and that I have had a face to face encounter with the patient today. I certify that my clinical findings support that the patient is homebound.
Tape Text: Secure dressings with tape.
Stretch Net Text: Secure dressings with stretch net.
Endoform Text: Apply to the base of the wound. Moisten with SNS prior to application if wound bed is dry or if previous Endoform dressing dried out between dressing changes.
Pegasssist Text: Use PegAssist inside offloading shoes.
Pneumatic Compression Text: Pneumatic compression pumps can be used with compression wraps in place. Use pneumatic compression pumps for 3 hours divided throughout the day.
Mu Periwound Erythema Text: Mu periwound erythema with skin marker.
Posey Heel Gaurd Text: Implement use of Posey Heel Gaurd while lying to prevent pressure.
Bordered Foam Dressing Text: Apply over primary dressing.
No-Sting Text: Apply No-Sting skin barrier to periwound skin prior to applying bandages or tape.
Tubular Bandage Text: Wear Spandagrip tubular stockings over all wound dressings, from the base of toes up to the base of the knee. Stockings should be worn at all times. They can be taken off while sleeping but must but need to be replaced after getting out of bed. Stockings may be re-used. Hand wash and line dry as needed for soiling. If wraps cause discomfort, tingling, pain, discoloration, or numbness, first elevate the legs and reassess in 30-60 minutes. If discomfort continues, remove stocking and call the wound care center.
Knee Scooter Text: Use knee scooter when ambulating.
Unna Boot Text: An UNNA boot compression wrap has been placed from the base of the toes to the base of the knee (about 2-3 fingers breadth below the fold of the knee).  A 4 inch wide cohesive wrap (Coban) has been placed on top of the UNNA boot as the outer layer. Monitor for tingling, pain, discoloration, or numbness to foot or toes. If any of the above symptoms occur, first elevate the legs and reassess in 30-60 minutes. If discomfort continues, remove the compression wrap and call the wound care center or your home health nurse. Do not keep a compression wrap on for more than 7 days.
Compression Wrap Text: A compression wrap has been placed from the base of the toes to the base of the knee. Monitor for tingling, pain, discoloration, or numbness to foot or toes. If any of the above symptoms occur, first elevate the legs and reassess in 30-60 minutes. If discomfort continues, remove the compression wrap and call the wound care center. Do not keep a compression wrap on for more than 7 days.
Triamcinalone Ointment Text: Apply triamcinolone ointment to the periwound as needed.
Black Foam Text: Pack wound with black foam.

## 2022-08-29 NOTE — PROCEDURE: DEBRIDEMENT OF OPEN WOUND
Detail Level: Detailed
Procedure: Debridement of wound tissue less than 20sq cm
Was Chemical Cautery Performed: No
Size Of Wound In Cm2 (Optional): 0
Consent was obtained from the patient. The risks, benefits and alternatives to therapy were discussed in detail. Specifically, the risks of infection, scarring, bleeding, prolonged wound healing, nerve injury, and allergy to anesthesia were addressed. Alternatives to debridement, such as aggressive wound care, were also discussed.  Prior to the procedure, the treatment site was clearly identified and confirmed by the patient. All components of Universal Protocol/PAUSE Rule completed.
Post-Care Instructions: I reviewed with the patient in detail post-care instructions. Patient is to keep the area dry for 48 hours, and not to engage in any swimming until the area is healed. Should the patient develop any fevers, chills, bleeding, severe pain patient will contact the office immediately.

## 2022-09-19 ENCOUNTER — APPOINTMENT (OUTPATIENT)
Dept: URBAN - METROPOLITAN AREA CLINIC 203 | Age: 67
Setting detail: DERMATOLOGY
End: 2022-10-01

## 2022-09-19 DIAGNOSIS — Z11.52 ENCOUNTER FOR SCREENING FOR COVID-19: ICD-10-CM

## 2022-09-19 DIAGNOSIS — L88 PYODERMA GANGRENOSUM: ICD-10-CM

## 2022-09-19 DIAGNOSIS — L30.9 DERMATITIS, UNSPECIFIED: ICD-10-CM

## 2022-09-19 PROCEDURE — OTHER SCREENING FOR COVID-19: OTHER

## 2022-09-19 PROCEDURE — 11102 TANGNTL BX SKIN SINGLE LES: CPT

## 2022-09-19 PROCEDURE — OTHER PHOTO-DOCUMENTATION: OTHER

## 2022-09-19 PROCEDURE — 99212 OFFICE O/P EST SF 10 MIN: CPT | Mod: 25

## 2022-09-19 PROCEDURE — OTHER BIOPSY BY SHAVE METHOD: OTHER

## 2022-09-19 ASSESSMENT — LOCATION SIMPLE DESCRIPTION DERM
LOCATION SIMPLE: RIGHT THIGH
LOCATION SIMPLE: RIGHT ANKLE
LOCATION SIMPLE: ABDOMEN
LOCATION SIMPLE: LEFT ANKLE

## 2022-09-19 ASSESSMENT — LOCATION ZONE DERM
LOCATION ZONE: TRUNK
LOCATION ZONE: LEG

## 2022-09-19 ASSESSMENT — LOCATION DETAILED DESCRIPTION DERM
LOCATION DETAILED: RIGHT ANKLE
LOCATION DETAILED: LEFT ANKLE
LOCATION DETAILED: RIGHT ANTERIOR LATERAL PROXIMAL THIGH
LOCATION DETAILED: EPIGASTRIC SKIN

## 2022-09-28 ENCOUNTER — RX ONLY (RX ONLY)
Age: 67
End: 2022-09-28

## 2022-09-28 RX ORDER — METHYLPREDNISOLONE 4 MG/1
TABLET ORAL
Qty: 1 | Refills: 0 | Status: ERX | COMMUNITY
Start: 2022-09-28

## 2022-10-02 ENCOUNTER — RX ONLY (RX ONLY)
Age: 67
End: 2022-10-02

## 2022-10-02 RX ORDER — VALACYCLOVIR 1 G/1
TABLET, FILM COATED ORAL
Qty: 21 | Refills: 0 | Status: ERX | COMMUNITY
Start: 2022-10-02

## 2022-10-03 ENCOUNTER — RX ONLY (RX ONLY)
Age: 67
End: 2022-10-03

## 2022-10-03 RX ORDER — CLOBETASOL PROPIONATE 0.5 MG/G
CREAM TOPICAL
Qty: 180 | Refills: 3 | Status: ERX

## 2022-10-12 ENCOUNTER — APPOINTMENT (OUTPATIENT)
Dept: URBAN - METROPOLITAN AREA CLINIC 203 | Age: 67
Setting detail: DERMATOLOGY
End: 2022-10-12

## 2022-10-12 DIAGNOSIS — B02.9 ZOSTER WITHOUT COMPLICATIONS: ICD-10-CM

## 2022-10-12 DIAGNOSIS — Z11.52 ENCOUNTER FOR SCREENING FOR COVID-19: ICD-10-CM

## 2022-10-12 DIAGNOSIS — L98429 CHRONIC ULCER OF OTHER SPECIFIED SITES: ICD-10-CM

## 2022-10-12 DIAGNOSIS — L98419 CHRONIC ULCER OF OTHER SPECIFIED SITES: ICD-10-CM

## 2022-10-12 DIAGNOSIS — L88 PYODERMA GANGRENOSUM: ICD-10-CM

## 2022-10-12 DIAGNOSIS — L20.84 INTRINSIC (ALLERGIC) ECZEMA: ICD-10-CM

## 2022-10-12 PROBLEM — L97.319 NON-PRESSURE CHRONIC ULCER OF RIGHT ANKLE WITH UNSPECIFIED SEVERITY: Status: ACTIVE | Noted: 2022-10-12

## 2022-10-12 PROCEDURE — OTHER PHOTO-DOCUMENTATION: OTHER

## 2022-10-12 PROCEDURE — OTHER OTHER: OTHER

## 2022-10-12 PROCEDURE — OTHER SCREENING FOR COVID-19: OTHER

## 2022-10-12 PROCEDURE — OTHER PRESCRIPTION MEDICATION MANAGEMENT: OTHER

## 2022-10-12 PROCEDURE — 99214 OFFICE O/P EST MOD 30 MIN: CPT

## 2022-10-12 ASSESSMENT — LOCATION DETAILED DESCRIPTION DERM
LOCATION DETAILED: RIGHT ANKLE
LOCATION DETAILED: RIGHT ANTERIOR DISTAL THIGH
LOCATION DETAILED: LEFT ANKLE
LOCATION DETAILED: EPIGASTRIC SKIN
LOCATION DETAILED: LEFT LATERAL UPPER BACK

## 2022-10-12 ASSESSMENT — LOCATION SIMPLE DESCRIPTION DERM
LOCATION SIMPLE: RIGHT THIGH
LOCATION SIMPLE: LEFT UPPER BACK
LOCATION SIMPLE: LEFT ANKLE
LOCATION SIMPLE: RIGHT ANKLE
LOCATION SIMPLE: ABDOMEN

## 2022-10-12 ASSESSMENT — LOCATION ZONE DERM
LOCATION ZONE: TRUNK
LOCATION ZONE: LEG

## 2022-10-12 NOTE — PROCEDURE: PRESCRIPTION MEDICATION MANAGEMENT
Discontinue Regimen: Triamcinolone
Render In Strict Bullet Format?: No
Detail Level: Zone
Continue Regimen: Clobetasol
Continue Regimen: Mupirocin or sliver spray

## 2022-10-12 NOTE — PROCEDURE: OTHER
Other (Free Text): Improving
Note Text (......Xxx Chief Complaint.): This diagnosis correlates with the
Detail Level: Zone
Render Risk Assessment In Note?: no

## 2022-10-25 ENCOUNTER — APPOINTMENT (OUTPATIENT)
Dept: URBAN - METROPOLITAN AREA CLINIC 203 | Age: 67
Setting detail: DERMATOLOGY
End: 2022-10-26

## 2022-10-25 DIAGNOSIS — B02.9 ZOSTER WITHOUT COMPLICATIONS: ICD-10-CM

## 2022-10-25 DIAGNOSIS — B02.29 OTHER POSTHERPETIC NERVOUS SYSTEM INVOLVEMENT: ICD-10-CM

## 2022-10-25 DIAGNOSIS — Z11.52 ENCOUNTER FOR SCREENING FOR COVID-19: ICD-10-CM

## 2022-10-25 PROCEDURE — OTHER OTHER: OTHER

## 2022-10-25 PROCEDURE — 99213 OFFICE O/P EST LOW 20 MIN: CPT

## 2022-10-25 PROCEDURE — OTHER PRESCRIPTION MEDICATION MANAGEMENT: OTHER

## 2022-10-25 PROCEDURE — OTHER PRESCRIPTION: OTHER

## 2022-10-25 PROCEDURE — OTHER MIPS QUALITY: OTHER

## 2022-10-25 PROCEDURE — OTHER SCREENING FOR COVID-19: OTHER

## 2022-10-25 RX ORDER — LIDOCAINE 50 MG/G
5% PATCH CUTANEOUS
Qty: 30 | Refills: 0 | Status: ERX | COMMUNITY
Start: 2022-10-25

## 2022-10-25 ASSESSMENT — LOCATION DETAILED DESCRIPTION DERM
LOCATION DETAILED: LEFT ANKLE
LOCATION DETAILED: LEFT LATERAL BREAST 3-4:00 REGION
LOCATION DETAILED: EPIGASTRIC SKIN
LOCATION DETAILED: LEFT LATERAL UPPER BACK

## 2022-10-25 ASSESSMENT — LOCATION SIMPLE DESCRIPTION DERM
LOCATION SIMPLE: LEFT UPPER BACK
LOCATION SIMPLE: ABDOMEN
LOCATION SIMPLE: LEFT ANKLE
LOCATION SIMPLE: LEFT BREAST

## 2022-10-25 ASSESSMENT — SEVERITY ASSESSMENT: SEVERITY: MILD

## 2022-10-25 ASSESSMENT — LOCATION ZONE DERM
LOCATION ZONE: TRUNK
LOCATION ZONE: LEG

## 2022-10-25 NOTE — PROCEDURE: PRESCRIPTION MEDICATION MANAGEMENT
Initiate Treatment: Lidoderm patch
Continue Regimen: Gabapentin
Plan: Apply patch every 12 hours to affected areas during the day. Blistering on left side have scabbed. Applied hypafix and gauze to avoid pressure\\n\\nContinue taking gabapentin: two at once in the am and one at night for pain. If pt needed, increase to 2tab po in the am and two tab po qhs.
Detail Level: Zone
Render In Strict Bullet Format?: No
Plan: Continue taking gabapentin. Increase from 3 tab to 4 tab po (2tab po am and 2tab po qhs).

## 2022-10-25 NOTE — PROCEDURE: OTHER
Other (Free Text): Improving: blisters are scabbing
Note Text (......Xxx Chief Complaint.): This diagnosis correlates with the
Detail Level: Zone
Render Risk Assessment In Note?: no

## 2022-11-02 ENCOUNTER — APPOINTMENT (OUTPATIENT)
Dept: LAB | Facility: CLINIC | Age: 67
End: 2022-11-02
Attending: INTERNAL MEDICINE
Payer: MEDICARE

## 2022-11-02 ENCOUNTER — TRANSCRIBE ORDERS (OUTPATIENT)
Dept: LAB | Facility: CLINIC | Age: 67
End: 2022-11-02

## 2022-11-02 DIAGNOSIS — E55.9 VITAMIN D DEFICIENCY, UNSPECIFIED: ICD-10-CM

## 2022-11-02 DIAGNOSIS — M81.0 AGE-RELATED OSTEOPOROSIS WITHOUT CURRENT PATHOLOGICAL FRACTURE: ICD-10-CM

## 2022-11-02 DIAGNOSIS — Z51.81 ENCOUNTER FOR THERAPEUTIC DRUG LEVEL MONITORING: ICD-10-CM

## 2022-11-02 DIAGNOSIS — M81.0 AGE-RELATED OSTEOPOROSIS WITHOUT CURRENT PATHOLOGICAL FRACTURE: Primary | ICD-10-CM

## 2022-11-02 LAB
25(OH)D3 SERPL-MCNC: 101 NG/ML (ref 30–100)
ALBUMIN SERPL-MCNC: 4 G/DL (ref 3.4–5)
ALP SERPL-CCNC: 126 IU/L (ref 35–126)
ALT SERPL-CCNC: 61 IU/L (ref 11–54)
ANION GAP SERPL CALC-SCNC: 11 MEQ/L (ref 3–15)
AST SERPL-CCNC: 86 IU/L (ref 15–41)
BILIRUB SERPL-MCNC: 0.6 MG/DL (ref 0.3–1.2)
BUN SERPL-MCNC: 8 MG/DL (ref 8–20)
CALCIUM SERPL-MCNC: 9.8 MG/DL (ref 8.9–10.3)
CHLORIDE SERPL-SCNC: 93 MEQ/L (ref 98–109)
CO2 SERPL-SCNC: 29 MEQ/L (ref 22–32)
CREAT SERPL-MCNC: 0.7 MG/DL (ref 0.6–1.1)
GFR SERPL CREATININE-BSD FRML MDRD: >60 ML/MIN/1.73M*2
GLUCOSE SERPL-MCNC: 96 MG/DL (ref 70–99)
POTASSIUM SERPL-SCNC: 3.4 MEQ/L (ref 3.6–5.1)
PROT SERPL-MCNC: 7 G/DL (ref 6–8.2)
SODIUM SERPL-SCNC: 133 MEQ/L (ref 136–144)

## 2022-11-02 PROCEDURE — 36415 COLL VENOUS BLD VENIPUNCTURE: CPT

## 2022-11-02 PROCEDURE — 82306 VITAMIN D 25 HYDROXY: CPT

## 2022-11-02 PROCEDURE — 85025 COMPLETE CBC W/AUTO DIFF WBC: CPT

## 2022-11-02 PROCEDURE — 80053 COMPREHEN METABOLIC PANEL: CPT

## 2022-11-03 LAB
BASOPHILS # BLD: 0.07 K/UL (ref 0.01–0.1)
BASOPHILS NFR BLD: 0.9 %
BURR CELLS BLD QL SMEAR: ABNORMAL
DIFFERENTIAL METHOD BLD: ABNORMAL
EOSINOPHIL # BLD: 0.2 K/UL (ref 0.04–0.36)
EOSINOPHIL NFR BLD: 2.6 %
ERYTHROCYTE [DISTWIDTH] IN BLOOD BY AUTOMATED COUNT: 12.8 % (ref 11.7–14.4)
HCT VFR BLDCO AUTO: 41.4 % (ref 35–45)
HGB BLD-MCNC: 13.9 G/DL (ref 11.8–15.7)
HYPOCHROMIA BLD QL SMEAR: ABNORMAL
IMM GRANULOCYTES # BLD AUTO: 0.05 K/UL (ref 0–0.08)
IMM GRANULOCYTES NFR BLD AUTO: 0.6 %
LYMPHOCYTES # BLD: 2.32 K/UL (ref 1.2–3.5)
LYMPHOCYTES NFR BLD: 30.1 %
MACROCYTES BLD QL SMEAR: ABNORMAL
MCH RBC QN AUTO: 37.2 PG (ref 28–33.2)
MCHC RBC AUTO-ENTMCNC: 33.6 G/DL (ref 32.2–35.5)
MCV RBC AUTO: 110.7 FL (ref 83–98)
MONOCYTES # BLD: 0.99 K/UL (ref 0.28–0.8)
MONOCYTES NFR BLD: 12.9 %
NEUTROPHILS # BLD: 4.07 K/UL (ref 1.7–7)
NEUTS SEG NFR BLD: 52.9 %
NRBC BLD-RTO: 0 %
PDW BLD AUTO: 10.2 FL (ref 9.4–12.3)
PLAT MORPH BLD: NORMAL
PLATELET # BLD AUTO: 317 K/UL (ref 150–369)
PLATELET # BLD EST: ABNORMAL 10*3/UL
POLYCHROMASIA BLD QL SMEAR: ABNORMAL
RBC # BLD AUTO: 3.74 M/UL (ref 3.93–5.22)
STOMATOCYTES BLD QL SMEAR: ABNORMAL
WBC # BLD AUTO: 7.7 K/UL (ref 3.8–10.5)

## 2022-12-15 ENCOUNTER — APPOINTMENT (OUTPATIENT)
Dept: URBAN - METROPOLITAN AREA CLINIC 203 | Age: 67
Setting detail: DERMATOLOGY
End: 2022-12-19

## 2022-12-15 DIAGNOSIS — B02.9 ZOSTER WITHOUT COMPLICATIONS: ICD-10-CM

## 2022-12-15 DIAGNOSIS — L20.84 INTRINSIC (ALLERGIC) ECZEMA: ICD-10-CM

## 2022-12-15 DIAGNOSIS — L98.8 OTHER SPECIFIED DISORDERS OF THE SKIN AND SUBCUTANEOUS TISSUE: ICD-10-CM

## 2022-12-15 DIAGNOSIS — G90.09 OTHER IDIOPATHIC PERIPHERAL AUTONOMIC NEUROPATHY: ICD-10-CM

## 2022-12-15 DIAGNOSIS — Z11.52 ENCOUNTER FOR SCREENING FOR COVID-19: ICD-10-CM

## 2022-12-15 PROCEDURE — OTHER OTHER: OTHER

## 2022-12-15 PROCEDURE — OTHER PRESCRIPTION: OTHER

## 2022-12-15 PROCEDURE — OTHER REFERRAL: OTHER

## 2022-12-15 PROCEDURE — OTHER BOTOX (U OR CC) ADDITIVE: OTHER

## 2022-12-15 PROCEDURE — OTHER BOTOX (U OR CC): OTHER

## 2022-12-15 PROCEDURE — OTHER SCREENING FOR COVID-19: OTHER

## 2022-12-15 PROCEDURE — 99214 OFFICE O/P EST MOD 30 MIN: CPT

## 2022-12-15 PROCEDURE — OTHER PRESCRIPTION MEDICATION MANAGEMENT: OTHER

## 2022-12-15 RX ORDER — TRIAMCINOLONE ACETONIDE 1 MG/G
CREAM TOPICAL BID
Qty: 454 | Refills: 4 | Status: ERX | COMMUNITY
Start: 2022-12-15

## 2022-12-15 RX ORDER — GABAPENTIN 300 MG/1
CAPSULE ORAL
Qty: 270 | Refills: 3 | Status: ERX | COMMUNITY
Start: 2022-12-15

## 2022-12-15 ASSESSMENT — LOCATION DETAILED DESCRIPTION DERM
LOCATION DETAILED: GLABELLA
LOCATION DETAILED: LOWER STERNUM
LOCATION DETAILED: RIGHT INFERIOR MEDIAL FOREHEAD
LOCATION DETAILED: LEFT INFERIOR MEDIAL FOREHEAD
LOCATION DETAILED: LEFT MID TEMPLE
LOCATION DETAILED: RIGHT INFERIOR MEDIAL UPPER BACK
LOCATION DETAILED: LEFT LATERAL BREAST 3-4:00 REGION
LOCATION DETAILED: RIGHT MID TEMPLE
LOCATION DETAILED: RIGHT PROXIMAL PRETIBIAL REGION
LOCATION DETAILED: LEFT PROXIMAL PRETIBIAL REGION
LOCATION DETAILED: LEFT LATERAL UPPER BACK
LOCATION DETAILED: EPIGASTRIC SKIN

## 2022-12-15 ASSESSMENT — LOCATION SIMPLE DESCRIPTION DERM
LOCATION SIMPLE: LEFT PRETIBIAL REGION
LOCATION SIMPLE: RIGHT UPPER BACK
LOCATION SIMPLE: LEFT BREAST
LOCATION SIMPLE: LEFT UPPER BACK
LOCATION SIMPLE: LEFT TEMPLE
LOCATION SIMPLE: RIGHT FOREHEAD
LOCATION SIMPLE: GLABELLA
LOCATION SIMPLE: LEFT FOREHEAD
LOCATION SIMPLE: RIGHT PRETIBIAL REGION
LOCATION SIMPLE: RIGHT TEMPLE
LOCATION SIMPLE: ABDOMEN
LOCATION SIMPLE: CHEST

## 2022-12-15 ASSESSMENT — LOCATION ZONE DERM
LOCATION ZONE: TRUNK
LOCATION ZONE: FACE
LOCATION ZONE: LEG

## 2022-12-15 ASSESSMENT — SEVERITY ASSESSMENT 2020: SEVERITY 2020: MODERATE

## 2022-12-15 NOTE — PROCEDURE: BOTOX (U OR CC)
Dilution (U/0.1 Cc): 4
Post-Care Instructions: Patient instructed to not lie down for 4 hours and limit physical activity for 24 hours.
Detail Level: Detailed
Show Inventory Tab: Show
Measure In Units Or Cc's?: units
Consent: Written consent obtained. Risks include but not limited to lid/brow ptosis, bruising, swelling, diplopia, temporary effect, incomplete chemical denervation.
Quantity Per Injection Site (Units): 2

## 2022-12-15 NOTE — PROCEDURE: PRESCRIPTION MEDICATION MANAGEMENT
Continue Regimen: Gabapentin, Lidoderm patch
Plan: Apply patch every 12 hours to affected areas during the day. Blistering on left side have scabbed. Applied hypafix and gauze to avoid pressure\\n\\nContinue taking gabapentin: two at once in the am and one at night for pain. If pt needed, increase to 2tab po in the am and two tab po qhs.
Detail Level: Zone
Render In Strict Bullet Format?: No
Initiate Treatment: Gabapentin

## 2022-12-15 NOTE — PROCEDURE: OTHER
Other (Free Text): Pale scars no active lesions. No evidence of post hermetic neuralgia
Note Text (......Xxx Chief Complaint.): This diagnosis correlates with the
Detail Level: Zone
Render Risk Assessment In Note?: no

## 2022-12-15 NOTE — HPI: SECONDARY COMPLAINT
How Severe Is This Condition?: moderate
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 She has been diagnosed with MGUS and celiac disease. I do not believe she has had an extensive work up to evaluate the etiology of her neuropathy.  She is being treated from her pain by the comprehensive pain center at [facility]. I have prescribed her gabapentin as well.

## 2023-01-19 ENCOUNTER — APPOINTMENT (OUTPATIENT)
Dept: URBAN - METROPOLITAN AREA CLINIC 203 | Age: 68
Setting detail: DERMATOLOGY
End: 2023-01-22

## 2023-01-19 DIAGNOSIS — L20.84 INTRINSIC (ALLERGIC) ECZEMA: ICD-10-CM

## 2023-01-19 DIAGNOSIS — L88 PYODERMA GANGRENOSUM: ICD-10-CM

## 2023-01-19 PROBLEM — L30.9 DERMATITIS, UNSPECIFIED: Status: ACTIVE | Noted: 2023-01-19

## 2023-01-19 PROCEDURE — 99213 OFFICE O/P EST LOW 20 MIN: CPT

## 2023-01-19 PROCEDURE — OTHER PHOTO-DOCUMENTATION: OTHER

## 2023-01-19 PROCEDURE — OTHER REASSURANCE: OTHER

## 2023-01-19 PROCEDURE — OTHER PRESCRIPTION: OTHER

## 2023-01-19 PROCEDURE — OTHER PRESCRIPTION MEDICATION MANAGEMENT: OTHER

## 2023-01-19 RX ORDER — TACROLIMUS 1 MG/G
OINTMENT TOPICAL
Qty: 30 | Refills: 1 | Status: ERX | COMMUNITY
Start: 2023-01-19

## 2023-01-19 ASSESSMENT — LOCATION SIMPLE DESCRIPTION DERM
LOCATION SIMPLE: RIGHT ANKLE
LOCATION SIMPLE: LEFT ANKLE

## 2023-01-19 ASSESSMENT — LOCATION DETAILED DESCRIPTION DERM
LOCATION DETAILED: LEFT ANKLE
LOCATION DETAILED: RIGHT ANKLE

## 2023-01-19 ASSESSMENT — LOCATION ZONE DERM: LOCATION ZONE: LEG

## 2023-01-19 NOTE — PROCEDURE: PRESCRIPTION MEDICATION MANAGEMENT
Discontinue Regimen: Topical steroids for time being due to skin thinning
Render In Strict Bullet Format?: No
Detail Level: Zone
Discontinue Regimen: Any steroid topicals
Initiate Treatment: Tacrolimus - put in refrigerator\\n\\nApply a pea size dot to avoid irritation/burning to affected area on left leg. Then can increase to qd if tolerated. Then follow up with provider via email after 2 weeks

## 2023-02-16 ENCOUNTER — APPOINTMENT (OUTPATIENT)
Dept: URBAN - METROPOLITAN AREA CLINIC 203 | Age: 68
Setting detail: DERMATOLOGY
End: 2023-02-20

## 2023-02-16 DIAGNOSIS — K90.0 CELIAC DISEASE: ICD-10-CM

## 2023-02-16 DIAGNOSIS — L88 PYODERMA GANGRENOSUM: ICD-10-CM

## 2023-02-16 DIAGNOSIS — L85.3 XEROSIS CUTIS: ICD-10-CM

## 2023-02-16 PROBLEM — G57.93 UNSPECIFIED MONONEUROPATHY OF BILATERAL LOWER LIMBS: Status: ACTIVE | Noted: 2023-02-16

## 2023-02-16 PROCEDURE — OTHER COUNSELING: OTHER

## 2023-02-16 PROCEDURE — 99214 OFFICE O/P EST MOD 30 MIN: CPT

## 2023-02-16 PROCEDURE — OTHER REFERRAL: OTHER

## 2023-02-16 PROCEDURE — OTHER PHOTO-DOCUMENTATION: OTHER

## 2023-02-16 PROCEDURE — OTHER OTHER: OTHER

## 2023-02-16 PROCEDURE — OTHER PRESCRIPTION MEDICATION MANAGEMENT: OTHER

## 2023-02-16 ASSESSMENT — LOCATION DETAILED DESCRIPTION DERM
LOCATION DETAILED: RIGHT ANKLE
LOCATION DETAILED: LEFT ANKLE
LOCATION DETAILED: LEFT DISTAL PRETIBIAL REGION

## 2023-02-16 ASSESSMENT — LOCATION SIMPLE DESCRIPTION DERM
LOCATION SIMPLE: RIGHT ANKLE
LOCATION SIMPLE: LEFT PRETIBIAL REGION
LOCATION SIMPLE: LEFT ANKLE

## 2023-02-16 ASSESSMENT — LOCATION ZONE DERM: LOCATION ZONE: LEG

## 2023-02-16 NOTE — PROCEDURE: PRESCRIPTION MEDICATION MANAGEMENT
Discontinue Regimen: Topical steroids for time being due to skin thinning.  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
Render In Strict Bullet Format?: No
Detail Level: Zone
Continue Regimen: tacrolimus 0.1% bid

## 2023-02-16 NOTE — PROCEDURE: OTHER
Note Text (......Xxx Chief Complaint.): This diagnosis correlates with the
Render Risk Assessment In Note?: no
Detail Level: Zone
Other (Free Text): continue hydrocolloid dressing
Other (Free Text): appointment with Dr. Mcleod in June.  I recommended she call for a earlier cancellation.  Also continuing with Dr. Mccall for chronic pain

## 2023-02-16 NOTE — PROCEDURE: COUNSELING
Detail Level: Detailed
Patient Specific Counseling (Will Not Stick From Patient To Patient): Get a second opinion with Dr. Sherwood

## 2023-03-01 ENCOUNTER — RX ONLY (RX ONLY)
Age: 68
End: 2023-03-01

## 2023-03-01 RX ORDER — BIMATOPROST 0.3 MG/ML
SOLUTION/ DROPS OPHTHALMIC
Qty: 5 | Refills: 5 | Status: ERX | COMMUNITY
Start: 2023-03-01

## 2023-04-12 ENCOUNTER — APPOINTMENT (OUTPATIENT)
Dept: URBAN - METROPOLITAN AREA CLINIC 203 | Age: 68
Setting detail: DERMATOLOGY
End: 2023-04-13

## 2023-04-12 DIAGNOSIS — L08.9 LOCAL INFECTION OF THE SKIN AND SUBCUTANEOUS TISSUE, UNSPECIFIED: ICD-10-CM

## 2023-04-12 DIAGNOSIS — I87.2 VENOUS INSUFFICIENCY (CHRONIC) (PERIPHERAL): ICD-10-CM

## 2023-04-12 DIAGNOSIS — L85.3 XEROSIS CUTIS: ICD-10-CM

## 2023-04-12 DIAGNOSIS — L98.8 OTHER SPECIFIED DISORDERS OF THE SKIN AND SUBCUTANEOUS TISSUE: ICD-10-CM

## 2023-04-12 PROCEDURE — OTHER BOTOX (U OR CC) ADDITIVE: OTHER

## 2023-04-12 PROCEDURE — OTHER ORDER TESTS: OTHER

## 2023-04-12 PROCEDURE — OTHER PRESCRIPTION MEDICATION MANAGEMENT: OTHER

## 2023-04-12 PROCEDURE — OTHER COUNSELING: OTHER

## 2023-04-12 PROCEDURE — OTHER BOTOX (U OR CC): OTHER

## 2023-04-12 PROCEDURE — OTHER PRESCRIPTION: OTHER

## 2023-04-12 PROCEDURE — 99213 OFFICE O/P EST LOW 20 MIN: CPT

## 2023-04-12 RX ORDER — TIMOLOL MALEATE 5 MG/ML
SOLUTION/ DROPS OPHTHALMIC BID
Qty: 15 | Refills: 3 | Status: ERX | COMMUNITY
Start: 2023-04-12

## 2023-04-12 ASSESSMENT — LOCATION SIMPLE DESCRIPTION DERM
LOCATION SIMPLE: GLABELLA
LOCATION SIMPLE: RIGHT FOREHEAD
LOCATION SIMPLE: RIGHT CHEEK
LOCATION SIMPLE: LEFT FOREHEAD
LOCATION SIMPLE: RIGHT ANKLE
LOCATION SIMPLE: LEFT ANKLE
LOCATION SIMPLE: LEFT PLANTAR SURFACE
LOCATION SIMPLE: RIGHT PLANTAR SURFACE
LOCATION SIMPLE: LEFT FOOT
LOCATION SIMPLE: LEFT CHEEK

## 2023-04-12 ASSESSMENT — LOCATION DETAILED DESCRIPTION DERM
LOCATION DETAILED: LEFT DORSAL FOOT
LOCATION DETAILED: GLABELLA
LOCATION DETAILED: RIGHT SUPERIOR LATERAL MALAR CHEEK
LOCATION DETAILED: LEFT MEDIAL PLANTAR HEEL
LOCATION DETAILED: RIGHT ANKLE
LOCATION DETAILED: LEFT ANKLE
LOCATION DETAILED: RIGHT INFERIOR MEDIAL FOREHEAD
LOCATION DETAILED: RIGHT LATERAL PLANTAR MIDFOOT
LOCATION DETAILED: LEFT SUPERIOR LATERAL MALAR CHEEK
LOCATION DETAILED: LEFT INFERIOR MEDIAL FOREHEAD

## 2023-04-12 ASSESSMENT — LOCATION ZONE DERM
LOCATION ZONE: FEET
LOCATION ZONE: LEG
LOCATION ZONE: FACE

## 2023-04-12 NOTE — PROCEDURE: ORDER TESTS
Bill For Surgical Tray: no
Lab Facility: 0
Expected Date Of Service: 04/12/2023
Billing Type: Third-Party Bill
Performing Laboratory: -[id number]

## 2023-04-12 NOTE — PROCEDURE: BOTOX (U OR CC)
Measure In Units Or Cc's?: units
Consent: Written consent obtained. Risks include but not limited to lid/brow ptosis, bruising, swelling, diplopia, temporary effect, incomplete chemical denervation.
Show Inventory Tab: Show
Dilution (U/0.1 Cc): 4
Post-Care Instructions: Patient instructed to not lie down for 4 hours and limit physical activity for 24 hours.
Detail Level: Detailed
Quantity Per Injection Site (Units): 2

## 2023-04-12 NOTE — PROCEDURE: PRESCRIPTION MEDICATION MANAGEMENT
Plan: Recommended 40%  urea cream
Render In Strict Bullet Format?: No
Detail Level: Zone
Plan: Discontinue use of clobetasol and initiate drops twice daily to affected ulcer

## 2023-05-03 ENCOUNTER — RX ONLY (RX ONLY)
Age: 68
End: 2023-05-03

## 2023-05-03 ENCOUNTER — APPOINTMENT (OUTPATIENT)
Dept: URBAN - METROPOLITAN AREA CLINIC 203 | Age: 68
Setting detail: DERMATOLOGY
End: 2023-05-07

## 2023-05-03 DIAGNOSIS — I87.2 VENOUS INSUFFICIENCY (CHRONIC) (PERIPHERAL): ICD-10-CM

## 2023-05-03 PROCEDURE — OTHER PRESCRIPTION MEDICATION MANAGEMENT: OTHER

## 2023-05-03 PROCEDURE — 99214 OFFICE O/P EST MOD 30 MIN: CPT

## 2023-05-03 PROCEDURE — OTHER CARE COORDINATION: OTHER

## 2023-05-03 RX ORDER — CIPROFLOXACIN 500 MG/1
TABLET, FILM COATED ORAL BID
Qty: 14 | Refills: 0 | Status: CANCELLED
Stop reason: CLARIF

## 2023-05-03 RX ORDER — CIPROFLOXACIN 500 MG/1
TABLET, FILM COATED ORAL BID
Qty: 14 | Refills: 0 | Status: ERX | COMMUNITY
Start: 2023-05-03

## 2023-05-03 RX ORDER — CIPROFLOXACIN 500 MG/1
TABLET, FILM COATED ORAL BID
Qty: 14 | Refills: 0 | Status: ERX

## 2023-05-03 ASSESSMENT — PAIN INTENSITY VAS: HOW INTENSE IS YOUR PAIN 0 BEING NO PAIN, 10 BEING THE MOST SEVERE PAIN POSSIBLE?: 8/10 PAIN

## 2023-05-03 ASSESSMENT — LOCATION SIMPLE DESCRIPTION DERM
LOCATION SIMPLE: LEFT FOOT
LOCATION SIMPLE: RIGHT PRETIBIAL REGION
LOCATION SIMPLE: RIGHT ANKLE

## 2023-05-03 ASSESSMENT — LOCATION DETAILED DESCRIPTION DERM
LOCATION DETAILED: LEFT DORSAL FOOT
LOCATION DETAILED: RIGHT DISTAL PRETIBIAL REGION
LOCATION DETAILED: RIGHT ANKLE

## 2023-05-03 ASSESSMENT — LOCATION ZONE DERM
LOCATION ZONE: LEG
LOCATION ZONE: FEET

## 2023-05-03 ASSESSMENT — SEVERITY ASSESSMENT: SEVERITY: MODERATE TO SEVERE

## 2023-05-03 NOTE — PROCEDURE: CARE COORDINATION
Consultants Discussion Details: He will attempt to maximize compression while avoiding skin tearing.  He will determine if she should get a venous ultrasound to see if repair of insufficiency would help prevent recurrent ulceration.  If he agrees that this would be helpful, she has been referred to Dr. Márquez and Dr. Gifford at the [facility].  Additionally he will attempt to measure nutritional factors which may be impacting healing. She is also seeing a new gatroeneterologist today (Dr. Puente [facility]) to manage her celiac disease which can clearly lead to inadequate absorption of nutrients if not well controlled. Consultants Discussion Details: He will attempt to maximize compression while avoiding skin tearing.  He will determine if she should get a venous ultrasound to see if repair of insufficiency would help prevent recurrent ulceration.  If he agrees that this would be helpful, she has been referred to Dr. Márquez and Dr. Gifford at the [facility].  Additionally he will attempt to measure nutritional factors which may be impacting healing. She is also seeing a new gatroeneterologist today (Dr. Puente [facility]) to manage her celiac disease which can clearly lead to inadequate absorption of nutrients if not well controlled.

## 2023-05-03 NOTE — PROCEDURE: PRESCRIPTION MEDICATION MANAGEMENT
Plan: Stop urea topical cream. Only apply vasaline and can use collagen powder to central open portion of wound. . Initiate cipro 500mg bid po. Pt can take 2 100mg tab of gabapentin for increased pain po qhs if needed. \\n\\nReferred to wound care dr. Mak: Faxed lab jessie 4/16/23 slip
Render In Strict Bullet Format?: No
Initiate Treatment: Cipro 500mg po bid for possible pseudomonas infection  vs colonization.  patient is experiencing increased pain and redness as well as swelling
Detail Level: Zone

## 2023-05-17 ENCOUNTER — APPOINTMENT (OUTPATIENT)
Dept: URBAN - METROPOLITAN AREA CLINIC 203 | Age: 68
Setting detail: DERMATOLOGY
End: 2023-05-18

## 2023-05-17 DIAGNOSIS — G90.09 OTHER IDIOPATHIC PERIPHERAL AUTONOMIC NEUROPATHY: ICD-10-CM

## 2023-05-17 DIAGNOSIS — I83019 VARICOSE VEINS OF LOWER EXTREMITIES WITH ULCER: ICD-10-CM

## 2023-05-17 DIAGNOSIS — I83009 VARICOSE VEINS OF LOWER EXTREMITIES WITH ULCER: ICD-10-CM

## 2023-05-17 DIAGNOSIS — I83029 VARICOSE VEINS OF LOWER EXTREMITIES WITH ULCER: ICD-10-CM

## 2023-05-17 DIAGNOSIS — Z41.9 ENCOUNTER FOR PROCEDURE FOR PURPOSES OTHER THAN REMEDYING HEALTH STATE, UNSPECIFIED: ICD-10-CM

## 2023-05-17 PROBLEM — L97.319 NON-PRESSURE CHRONIC ULCER OF RIGHT ANKLE WITH UNSPECIFIED SEVERITY: Status: ACTIVE | Noted: 2023-05-17

## 2023-05-17 PROBLEM — L97.329 NON-PRESSURE CHRONIC ULCER OF LEFT ANKLE WITH UNSPECIFIED SEVERITY: Status: ACTIVE | Noted: 2023-05-17

## 2023-05-17 PROCEDURE — OTHER REFERRAL: OTHER

## 2023-05-17 PROCEDURE — 99214 OFFICE O/P EST MOD 30 MIN: CPT

## 2023-05-17 PROCEDURE — OTHER PRESCRIPTION MEDICATION MANAGEMENT: OTHER

## 2023-05-17 ASSESSMENT — LOCATION DETAILED DESCRIPTION DERM
LOCATION DETAILED: RIGHT CENTRAL BUCCAL CHEEK
LOCATION DETAILED: LEFT ANKLE
LOCATION DETAILED: LEFT CENTRAL BUCCAL CHEEK
LOCATION DETAILED: RIGHT ANKLE

## 2023-05-17 ASSESSMENT — LOCATION SIMPLE DESCRIPTION DERM
LOCATION SIMPLE: LEFT CHEEK
LOCATION SIMPLE: RIGHT ANKLE
LOCATION SIMPLE: LEFT ANKLE
LOCATION SIMPLE: RIGHT CHEEK

## 2023-05-17 ASSESSMENT — LOCATION ZONE DERM
LOCATION ZONE: FACE
LOCATION ZONE: LEG

## 2023-05-17 NOTE — PROCEDURE: PRESCRIPTION MEDICATION MANAGEMENT
Detail Level: Zone
Render In Strict Bullet Format?: No
Plan: [facility] Recommended dr rodriguez, dr lucas and dr turcios
Plan: Continue wound care wound clinic. Photo documentation is in the chart from todays visit

## 2023-05-23 ENCOUNTER — APPOINTMENT (OUTPATIENT)
Dept: LAB | Facility: CLINIC | Age: 68
End: 2023-05-23
Attending: INTERNAL MEDICINE
Payer: MEDICARE

## 2023-05-23 ENCOUNTER — TRANSCRIBE ORDERS (OUTPATIENT)
Dept: LAB | Facility: CLINIC | Age: 68
End: 2023-05-23

## 2023-05-23 DIAGNOSIS — M81.0 AGE-RELATED OSTEOPOROSIS WITHOUT CURRENT PATHOLOGICAL FRACTURE: ICD-10-CM

## 2023-05-23 DIAGNOSIS — Z51.81 ENCOUNTER FOR THERAPEUTIC DRUG LEVEL MONITORING: ICD-10-CM

## 2023-05-23 DIAGNOSIS — M81.0 AGE-RELATED OSTEOPOROSIS WITHOUT CURRENT PATHOLOGICAL FRACTURE: Primary | ICD-10-CM

## 2023-05-23 DIAGNOSIS — E55.9 VITAMIN D DEFICIENCY, UNSPECIFIED: ICD-10-CM

## 2023-05-23 LAB
25(OH)D3 SERPL-MCNC: 115 NG/ML (ref 30–100)
ALBUMIN SERPL-MCNC: 3.2 G/DL (ref 3.4–5)
ALP SERPL-CCNC: 179 IU/L (ref 35–126)
ALT SERPL-CCNC: 33 IU/L (ref 11–54)
ANION GAP SERPL CALC-SCNC: 13 MEQ/L (ref 3–15)
AST SERPL-CCNC: 120 IU/L (ref 15–41)
BASOPHILS # BLD: 0.1 K/UL (ref 0.01–0.1)
BASOPHILS NFR BLD: 1.6 %
BILIRUB SERPL-MCNC: 1 MG/DL (ref 0.3–1.2)
BUN SERPL-MCNC: 8 MG/DL (ref 8–20)
CALCIUM SERPL-MCNC: 9.5 MG/DL (ref 8.9–10.3)
CHLORIDE SERPL-SCNC: 92 MEQ/L (ref 98–109)
CO2 SERPL-SCNC: 30 MEQ/L (ref 22–32)
CREAT SERPL-MCNC: 0.7 MG/DL (ref 0.6–1.1)
DIFFERENTIAL METHOD BLD: ABNORMAL
EOSINOPHIL # BLD: 0.18 K/UL (ref 0.04–0.36)
EOSINOPHIL NFR BLD: 3 %
ERYTHROCYTE [DISTWIDTH] IN BLOOD BY AUTOMATED COUNT: 12.8 % (ref 11.7–14.4)
GFR SERPL CREATININE-BSD FRML MDRD: >60 ML/MIN/1.73M*2
GLUCOSE SERPL-MCNC: 108 MG/DL (ref 70–99)
HCT VFR BLDCO AUTO: 39.4 % (ref 35–45)
HGB BLD-MCNC: 13 G/DL (ref 11.8–15.7)
IMM GRANULOCYTES # BLD AUTO: 0.02 K/UL (ref 0–0.08)
IMM GRANULOCYTES NFR BLD AUTO: 0.3 %
LYMPHOCYTES # BLD: 1.37 K/UL (ref 1.2–3.5)
LYMPHOCYTES NFR BLD: 22.5 %
MCH RBC QN AUTO: 35.7 PG (ref 28–33.2)
MCHC RBC AUTO-ENTMCNC: 33 G/DL (ref 32.2–35.5)
MCV RBC AUTO: 108.2 FL (ref 83–98)
MONOCYTES # BLD: 0.57 K/UL (ref 0.28–0.8)
MONOCYTES NFR BLD: 9.3 %
NEUTROPHILS # BLD: 3.86 K/UL (ref 1.7–7)
NEUTS SEG NFR BLD: 63.3 %
NRBC BLD-RTO: 0 %
PDW BLD AUTO: 10.4 FL (ref 9.4–12.3)
PLATELET # BLD AUTO: 383 K/UL (ref 150–369)
POTASSIUM SERPL-SCNC: 3.9 MEQ/L (ref 3.6–5.1)
PROT SERPL-MCNC: 5.6 G/DL (ref 6–8.2)
RBC # BLD AUTO: 3.64 M/UL (ref 3.93–5.22)
SODIUM SERPL-SCNC: 135 MEQ/L (ref 136–144)
WBC # BLD AUTO: 6.1 K/UL (ref 3.8–10.5)

## 2023-05-23 PROCEDURE — 36415 COLL VENOUS BLD VENIPUNCTURE: CPT

## 2023-05-23 PROCEDURE — 85025 COMPLETE CBC W/AUTO DIFF WBC: CPT

## 2023-05-23 PROCEDURE — 82306 VITAMIN D 25 HYDROXY: CPT

## 2023-05-23 PROCEDURE — 80053 COMPREHEN METABOLIC PANEL: CPT

## 2023-06-08 ENCOUNTER — APPOINTMENT (OUTPATIENT)
Dept: URBAN - METROPOLITAN AREA CLINIC 203 | Age: 68
Setting detail: DERMATOLOGY
End: 2023-06-11

## 2023-06-08 DIAGNOSIS — L88 PYODERMA GANGRENOSUM: ICD-10-CM

## 2023-06-08 PROCEDURE — 99213 OFFICE O/P EST LOW 20 MIN: CPT

## 2023-06-08 PROCEDURE — OTHER PRESCRIPTION: OTHER

## 2023-06-08 PROCEDURE — OTHER ORDER TESTS: OTHER

## 2023-06-08 PROCEDURE — OTHER PRESCRIPTION MEDICATION MANAGEMENT: OTHER

## 2023-06-08 RX ORDER — PREDNISONE 50 MG/1
TABLET ORAL
Qty: 21 | Refills: 0 | Status: ERX | COMMUNITY
Start: 2023-06-08

## 2023-06-08 ASSESSMENT — LOCATION DETAILED DESCRIPTION DERM
LOCATION DETAILED: LEFT DISTAL PRETIBIAL REGION
LOCATION DETAILED: RIGHT DISTAL PRETIBIAL REGION

## 2023-06-08 ASSESSMENT — LOCATION ZONE DERM: LOCATION ZONE: LEG

## 2023-06-08 ASSESSMENT — SEVERITY ASSESSMENT: SEVERITY: MODERATE

## 2023-06-08 ASSESSMENT — LOCATION SIMPLE DESCRIPTION DERM
LOCATION SIMPLE: LEFT PRETIBIAL REGION
LOCATION SIMPLE: RIGHT PRETIBIAL REGION

## 2023-06-08 NOTE — PROCEDURE: PRESCRIPTION MEDICATION MANAGEMENT
Initiate Treatment: Prednisone 50mg , 1 tab twice daily for 1 week then 1 tab daily for one week then follow up with 
Render In Strict Bullet Format?: No
Detail Level: Zone

## 2023-06-08 NOTE — PROCEDURE: ORDER TESTS
Lab Facility: 0
Bill For Surgical Tray: no
Billing Type: Third-Party Bill
Expected Date Of Service: 06/08/2023
Performing Laboratory: -[id number]

## 2023-06-11 ENCOUNTER — RX ONLY (RX ONLY)
Age: 68
End: 2023-06-11

## 2023-06-11 RX ORDER — PREDNISONE 10 MG/1
TABLET ORAL
Qty: 21 | Refills: 0 | Status: ERX | COMMUNITY
Start: 2023-06-11

## 2023-06-23 ENCOUNTER — APPOINTMENT (OUTPATIENT)
Dept: URBAN - METROPOLITAN AREA CLINIC 203 | Age: 68
Setting detail: DERMATOLOGY
End: 2023-06-29

## 2023-06-23 DIAGNOSIS — I83009 VARICOSE VEINS OF LOWER EXTREMITIES WITH ULCER: ICD-10-CM

## 2023-06-23 DIAGNOSIS — I83019 VARICOSE VEINS OF LOWER EXTREMITIES WITH ULCER: ICD-10-CM

## 2023-06-23 DIAGNOSIS — L88 PYODERMA GANGRENOSUM: ICD-10-CM

## 2023-06-23 DIAGNOSIS — I83029 VARICOSE VEINS OF LOWER EXTREMITIES WITH ULCER: ICD-10-CM

## 2023-06-23 PROBLEM — L97.829 NON-PRESSURE CHRONIC ULCER OF OTHER PART OF LEFT LOWER LEG WITH UNSPECIFIED SEVERITY: Status: ACTIVE | Noted: 2023-06-23

## 2023-06-23 PROCEDURE — 99214 OFFICE O/P EST MOD 30 MIN: CPT

## 2023-06-23 PROCEDURE — OTHER PRESCRIPTION: OTHER

## 2023-06-23 PROCEDURE — OTHER PRESCRIPTION MEDICATION MANAGEMENT: OTHER

## 2023-06-23 RX ORDER — DIOSMIN COMPLEX NO.1 630 MG
TABLET ORAL
Qty: 30 | Refills: 0 | Status: ERX | COMMUNITY
Start: 2023-06-23

## 2023-06-23 RX ORDER — PREDNISONE 5 MG/1
TABLET ORAL
Qty: 50 | Refills: 0 | Status: ERX | COMMUNITY
Start: 2023-06-23

## 2023-06-23 ASSESSMENT — LOCATION DETAILED DESCRIPTION DERM
LOCATION DETAILED: RIGHT DISTAL PRETIBIAL REGION
LOCATION DETAILED: LEFT DISTAL PRETIBIAL REGION

## 2023-06-23 ASSESSMENT — LOCATION ZONE DERM: LOCATION ZONE: LEG

## 2023-06-23 ASSESSMENT — LOCATION SIMPLE DESCRIPTION DERM
LOCATION SIMPLE: LEFT PRETIBIAL REGION
LOCATION SIMPLE: RIGHT PRETIBIAL REGION

## 2023-06-23 NOTE — PROCEDURE: PRESCRIPTION MEDICATION MANAGEMENT
Render In Strict Bullet Format?: No
Initiate Treatment: prednisone 5 mg tablet \\nQuantity: 50.0 Tablet\\nSig: Take as directed
Detail Level: Zone
Initiate Treatment: Vasculera 630 mg tablet \\nQuantity: 30.0 Tablet\\nSig: 1 tab po qd

## 2023-07-12 ENCOUNTER — APPOINTMENT (OUTPATIENT)
Dept: URBAN - METROPOLITAN AREA CLINIC 203 | Age: 68
Setting detail: DERMATOLOGY
End: 2023-07-16

## 2023-07-12 ENCOUNTER — RX ONLY (RX ONLY)
Age: 68
End: 2023-07-12

## 2023-07-12 DIAGNOSIS — L88 PYODERMA GANGRENOSUM: ICD-10-CM

## 2023-07-12 DIAGNOSIS — I83009 VARICOSE VEINS OF LOWER EXTREMITIES WITH ULCER: ICD-10-CM

## 2023-07-12 DIAGNOSIS — I83029 VARICOSE VEINS OF LOWER EXTREMITIES WITH ULCER: ICD-10-CM

## 2023-07-12 DIAGNOSIS — I83019 VARICOSE VEINS OF LOWER EXTREMITIES WITH ULCER: ICD-10-CM

## 2023-07-12 PROBLEM — L98.499 NON-PRESSURE CHRONIC ULCER OF SKIN OF OTHER SITES WITH UNSPECIFIED SEVERITY: Status: ACTIVE | Noted: 2023-07-12

## 2023-07-12 PROCEDURE — OTHER PRESCRIPTION MEDICATION MANAGEMENT: OTHER

## 2023-07-12 PROCEDURE — 99214 OFFICE O/P EST MOD 30 MIN: CPT

## 2023-07-12 RX ORDER — ADALIMUMAB 4 MG/ML
KIT INJECTION
Qty: 3 | Refills: 0 | Status: ERX | COMMUNITY
Start: 2023-07-12

## 2023-07-12 ASSESSMENT — LOCATION DETAILED DESCRIPTION DERM: LOCATION DETAILED: RIGHT DISTAL PRETIBIAL REGION

## 2023-07-12 ASSESSMENT — LOCATION ZONE DERM: LOCATION ZONE: LEG

## 2023-07-12 ASSESSMENT — LOCATION SIMPLE DESCRIPTION DERM: LOCATION SIMPLE: RIGHT PRETIBIAL REGION

## 2023-07-12 NOTE — PROCEDURE: PRESCRIPTION MEDICATION MANAGEMENT
Plan: Pt is finishing the rest of prednisone taper. States wound care told her to use telfa.
Render In Strict Bullet Format?: No
Detail Level: Zone
Continue Regimen: Vasculera 630 mg 1 tab po qd

## 2023-07-26 ENCOUNTER — APPOINTMENT (OUTPATIENT)
Dept: URBAN - METROPOLITAN AREA CLINIC 203 | Age: 68
Setting detail: DERMATOLOGY
End: 2023-07-28

## 2023-07-26 DIAGNOSIS — L88 PYODERMA GANGRENOSUM: ICD-10-CM

## 2023-07-26 DIAGNOSIS — L08.9 LOCAL INFECTION OF THE SKIN AND SUBCUTANEOUS TISSUE, UNSPECIFIED: ICD-10-CM

## 2023-07-26 PROCEDURE — 99213 OFFICE O/P EST LOW 20 MIN: CPT

## 2023-07-26 PROCEDURE — OTHER PRESCRIPTION: OTHER

## 2023-07-26 PROCEDURE — OTHER PRESCRIPTION MEDICATION MANAGEMENT: OTHER

## 2023-07-26 PROCEDURE — OTHER ORDER TESTS: OTHER

## 2023-07-26 RX ORDER — ADALIMUMAB 40MG/0.8ML
KIT SUBCUTANEOUS
Qty: 3 | Refills: 1 | Status: ERX | COMMUNITY
Start: 2023-07-26

## 2023-07-26 ASSESSMENT — LOCATION SIMPLE DESCRIPTION DERM
LOCATION SIMPLE: RIGHT PRETIBIAL REGION
LOCATION SIMPLE: RIGHT ANKLE

## 2023-07-26 ASSESSMENT — LOCATION DETAILED DESCRIPTION DERM
LOCATION DETAILED: RIGHT DISTAL PRETIBIAL REGION
LOCATION DETAILED: RIGHT ANKLE

## 2023-07-26 ASSESSMENT — LOCATION ZONE DERM: LOCATION ZONE: LEG

## 2023-07-26 NOTE — PROCEDURE: PRESCRIPTION MEDICATION MANAGEMENT
Plan: Pt is finishing the rest of prednisone taper and starting humira on following day. \\nHumira Pen 40 mg/0.8 mL subcutaneous kit Q14 days\\n1 pen injector subq q14 days\\nF/u 2 weeks\\n\\nPt is getting liposuction- needs to speak to plastic surgeon before doing the procedure/healing.
Render In Strict Bullet Format?: No
Initiate Treatment: Humira maintance dose
Detail Level: Zone

## 2023-07-26 NOTE — PROCEDURE: ORDER TESTS
Bill For Surgical Tray: no
Billing Type: Third-Party Bill
Performing Laboratory: -[id number]
Expected Date Of Service: 07/26/2023
Lab Facility: 0

## 2023-07-31 ENCOUNTER — RX ONLY (RX ONLY)
Age: 68
End: 2023-07-31

## 2023-07-31 RX ORDER — AZITHROMYCIN DIHYDRATE 250 MG/1
TABLET, FILM COATED ORAL
Qty: 1 | Refills: 0 | Status: CANCELLED | COMMUNITY
Start: 2023-07-31

## 2023-07-31 RX ORDER — CIPROFLOXACIN 500 MG/1
TABLET, FILM COATED ORAL BID
Qty: 14 | Refills: 0 | Status: ERX

## 2023-08-03 ENCOUNTER — RX ONLY (RX ONLY)
Age: 68
End: 2023-08-03

## 2023-08-03 RX ORDER — DIOSMIN COMPLEX NO.1 630 MG
TABLET ORAL
Qty: 30 | Refills: 5 | Status: ERX

## 2023-08-17 ENCOUNTER — APPOINTMENT (OUTPATIENT)
Dept: URBAN - METROPOLITAN AREA CLINIC 203 | Age: 68
Setting detail: DERMATOLOGY
End: 2023-08-21

## 2023-08-17 DIAGNOSIS — L88 PYODERMA GANGRENOSUM: ICD-10-CM

## 2023-08-17 DIAGNOSIS — L08.9 LOCAL INFECTION OF THE SKIN AND SUBCUTANEOUS TISSUE, UNSPECIFIED: ICD-10-CM

## 2023-08-17 DIAGNOSIS — T07XXXA ABRASION OR FRICTION BURN OF OTHER, MULTIPLE, AND UNSPECIFIED SITES, WITHOUT MENTION OF INFECTION: ICD-10-CM

## 2023-08-17 PROBLEM — S80.811A ABRASION, RIGHT LOWER LEG, INITIAL ENCOUNTER: Status: ACTIVE | Noted: 2023-08-17

## 2023-08-17 PROCEDURE — 99213 OFFICE O/P EST LOW 20 MIN: CPT

## 2023-08-17 PROCEDURE — OTHER PHOTO-DOCUMENTATION: OTHER

## 2023-08-17 PROCEDURE — OTHER PRESCRIPTION MEDICATION MANAGEMENT: OTHER

## 2023-08-17 PROCEDURE — OTHER ORDER TESTS: OTHER

## 2023-08-17 ASSESSMENT — LOCATION ZONE DERM: LOCATION ZONE: LEG

## 2023-08-17 ASSESSMENT — LOCATION SIMPLE DESCRIPTION DERM
LOCATION SIMPLE: LEFT ANKLE
LOCATION SIMPLE: RIGHT PRETIBIAL REGION
LOCATION SIMPLE: LEFT ACHILLES SKIN

## 2023-08-17 ASSESSMENT — SEVERITY ASSESSMENT: SEVERITY: MODERATE TO SEVERE

## 2023-08-17 ASSESSMENT — LOCATION DETAILED DESCRIPTION DERM
LOCATION DETAILED: LEFT POSTERIOR ANKLE
LOCATION DETAILED: RIGHT PROXIMAL PRETIBIAL REGION
LOCATION DETAILED: LEFT ACHILLES SKIN

## 2023-08-17 NOTE — PROCEDURE: PRESCRIPTION MEDICATION MANAGEMENT
Initiate Treatment: Neosporin
Detail Level: Zone
Render In Strict Bullet Format?: No
Continue Regimen: Humira

## 2023-08-17 NOTE — PROCEDURE: ORDER TESTS
Billing Type: Third-Party Bill
Performing Laboratory: -[id number]
Expected Date Of Service: 08/17/2023
Bill For Surgical Tray: no

## 2023-08-31 ENCOUNTER — APPOINTMENT (OUTPATIENT)
Dept: URBAN - METROPOLITAN AREA CLINIC 203 | Age: 68
Setting detail: DERMATOLOGY
End: 2023-09-04

## 2023-08-31 DIAGNOSIS — L88 PYODERMA GANGRENOSUM: ICD-10-CM

## 2023-08-31 PROBLEM — L30.9 DERMATITIS, UNSPECIFIED: Status: ACTIVE | Noted: 2023-08-31

## 2023-08-31 PROCEDURE — OTHER OBSERVATION: OTHER

## 2023-08-31 PROCEDURE — OTHER PHOTO-DOCUMENTATION: OTHER

## 2023-08-31 PROCEDURE — OTHER PRESCRIPTION MEDICATION MANAGEMENT: OTHER

## 2023-08-31 PROCEDURE — OTHER OBSERVATION AND MEASURE: OTHER

## 2023-08-31 PROCEDURE — 99213 OFFICE O/P EST LOW 20 MIN: CPT

## 2023-08-31 ASSESSMENT — LOCATION SIMPLE DESCRIPTION DERM
LOCATION SIMPLE: LEFT PRETIBIAL REGION
LOCATION SIMPLE: RIGHT ANKLE
LOCATION SIMPLE: LEFT ANKLE
LOCATION SIMPLE: RIGHT PRETIBIAL REGION

## 2023-08-31 ASSESSMENT — LOCATION DETAILED DESCRIPTION DERM
LOCATION DETAILED: RIGHT DISTAL PRETIBIAL REGION
LOCATION DETAILED: RIGHT ANKLE
LOCATION DETAILED: RIGHT MEDIAL DISTAL PRETIBIAL REGION
LOCATION DETAILED: LEFT POSTERIOR ANKLE
LOCATION DETAILED: LEFT MEDIAL DISTAL PRETIBIAL REGION

## 2023-08-31 ASSESSMENT — LOCATION ZONE DERM: LOCATION ZONE: LEG

## 2023-08-31 NOTE — PROCEDURE: PRESCRIPTION MEDICATION MANAGEMENT
Initiate Treatment: See  at [facility] wound clinic. and Dr. Vinson at [place] at [place] Initiate Treatment: See  at [facility] wound clinic. and Dr. Vinson at [place] at [place]

## 2023-09-01 ENCOUNTER — APPOINTMENT (OUTPATIENT)
Dept: LAB | Facility: CLINIC | Age: 68
End: 2023-09-01
Attending: INTERNAL MEDICINE
Payer: MEDICARE

## 2023-09-01 ENCOUNTER — TRANSCRIBE ORDERS (OUTPATIENT)
Dept: LAB | Facility: CLINIC | Age: 68
End: 2023-09-01

## 2023-09-01 DIAGNOSIS — K90.0 CELIAC DISEASE: ICD-10-CM

## 2023-09-01 DIAGNOSIS — K90.0 CELIAC DISEASE: Primary | ICD-10-CM

## 2023-09-01 LAB
ALBUMIN SERPL-MCNC: 3.7 G/DL (ref 3.5–5.7)
ALP SERPL-CCNC: 347 IU/L (ref 34–125)
ALT SERPL-CCNC: 64 IU/L (ref 7–52)
ANION GAP SERPL CALC-SCNC: 9 MEQ/L (ref 3–15)
AST SERPL-CCNC: 153 IU/L (ref 13–39)
BASOPHILS # BLD: 0.13 K/UL (ref 0.01–0.1)
BASOPHILS NFR BLD: 2 %
BILIRUB SERPL-MCNC: 1 MG/DL (ref 0.3–1.2)
BUN SERPL-MCNC: 10 MG/DL (ref 7–25)
CALCIUM SERPL-MCNC: 9.4 MG/DL (ref 8.6–10.3)
CHLORIDE SERPL-SCNC: 94 MEQ/L (ref 98–107)
CK SERPL-CCNC: 47 U/L (ref 30–223)
CO2 SERPL-SCNC: 32 MEQ/L (ref 21–31)
CREAT SERPL-MCNC: 0.5 MG/DL (ref 0.6–1.2)
DIFFERENTIAL METHOD BLD: ABNORMAL
EOSINOPHIL # BLD: 0.06 K/UL (ref 0.04–0.36)
EOSINOPHIL NFR BLD: 1 %
ERYTHROCYTE [DISTWIDTH] IN BLOOD BY AUTOMATED COUNT: 13.5 % (ref 11.7–14.4)
FOLATE SERPL-MCNC: >20 NG/ML
GFR SERPL CREATININE-BSD FRML MDRD: >60 ML/MIN/1.73M*2
GLUCOSE SERPL-MCNC: 105 MG/DL (ref 70–99)
HCT VFR BLDCO AUTO: 44.2 % (ref 35–45)
HGB BLD-MCNC: 14.5 G/DL (ref 11.8–15.7)
LYMPHOCYTES # BLD: 1.71 K/UL (ref 1.2–3.5)
LYMPHOCYTES NFR BLD: 27 %
MACROCYTES BLD QL SMEAR: ABNORMAL
MAGNESIUM SERPL-MCNC: 1.7 MG/DL (ref 1.8–2.5)
MCH RBC QN AUTO: 38 PG (ref 28–33.2)
MCHC RBC AUTO-ENTMCNC: 32.8 G/DL (ref 32.2–35.5)
MCV RBC AUTO: 115.7 FL (ref 83–98)
MONOCYTES # BLD: 0.44 K/UL (ref 0.28–0.8)
MONOCYTES NFR BLD: 7 %
NEUTS BAND # BLD: 0.06 K/UL (ref 0–0.53)
NEUTS BAND # BLD: 3.94 K/UL (ref 1.7–7)
NEUTS BAND NFR BLD: 1 %
NEUTS SEG NFR BLD: 62 %
PDW BLD AUTO: 10.4 FL (ref 9.4–12.3)
PLAT MORPH BLD: NORMAL
PLATELET # BLD AUTO: 281 K/UL (ref 150–369)
PLATELET # BLD EST: ABNORMAL 10*3/UL
POTASSIUM SERPL-SCNC: 3.9 MEQ/L (ref 3.5–5.1)
PROT SERPL-MCNC: 6.3 G/DL (ref 6–8.2)
RBC # BLD AUTO: 3.82 M/UL (ref 3.93–5.22)
SODIUM SERPL-SCNC: 135 MEQ/L (ref 136–145)
VIT B12 SERPL-MCNC: 1311 PG/ML (ref 180–914)
WBC # BLD AUTO: 6.35 K/UL (ref 3.8–10.5)

## 2023-09-01 PROCEDURE — 85025 COMPLETE CBC W/AUTO DIFF WBC: CPT

## 2023-09-01 PROCEDURE — 36415 COLL VENOUS BLD VENIPUNCTURE: CPT

## 2023-09-01 PROCEDURE — 83735 ASSAY OF MAGNESIUM: CPT

## 2023-09-01 PROCEDURE — 82977 ASSAY OF GGT: CPT | Mod: GA

## 2023-09-01 PROCEDURE — 80053 COMPREHEN METABOLIC PANEL: CPT

## 2023-09-01 PROCEDURE — 86258 DGP ANTIBODY EACH IG CLASS: CPT

## 2023-09-01 PROCEDURE — 82550 ASSAY OF CK (CPK): CPT

## 2023-09-01 PROCEDURE — 86364 TISS TRNSGLTMNASE EA IG CLAS: CPT

## 2023-09-01 PROCEDURE — 83921 ORGANIC ACID SINGLE QUANT: CPT

## 2023-09-01 PROCEDURE — 84630 ASSAY OF ZINC: CPT

## 2023-09-01 PROCEDURE — 82607 VITAMIN B-12: CPT

## 2023-09-01 PROCEDURE — 82746 ASSAY OF FOLIC ACID SERUM: CPT

## 2023-09-02 LAB
GGT SERPL-CCNC: 1245 IU/L (ref 9–64)
PATH REV BLD -IMP: NORMAL

## 2023-09-05 LAB
GLIADIN PEPTIDE IGA SER IA-ACNC: 104 ELIA U/ML
GLIADIN PEPTIDE IGG SER IA-ACNC: 78 ELIA U/ML
TTG IGA SER IA-ACNC: 18 ELIA U/ML
TTG IGG SER IA-ACNC: <0.6 ELIA U/ML

## 2023-09-06 LAB — METHYLMALONATE SERPL-SCNC: 177 NMOL/L (ref 87–318)

## 2023-09-12 ENCOUNTER — APPOINTMENT (OUTPATIENT)
Dept: URBAN - METROPOLITAN AREA CLINIC 203 | Age: 68
Setting detail: DERMATOLOGY
End: 2023-09-15

## 2023-09-12 DIAGNOSIS — I87.2 VENOUS INSUFFICIENCY (CHRONIC) (PERIPHERAL): ICD-10-CM

## 2023-09-12 DIAGNOSIS — L88 PYODERMA GANGRENOSUM: ICD-10-CM

## 2023-09-12 PROCEDURE — OTHER OBSERVATION AND MEASURE: OTHER

## 2023-09-12 PROCEDURE — OTHER OBSERVATION: OTHER

## 2023-09-12 PROCEDURE — 99213 OFFICE O/P EST LOW 20 MIN: CPT

## 2023-09-12 PROCEDURE — OTHER PRESCRIPTION MEDICATION MANAGEMENT: OTHER

## 2023-09-12 PROCEDURE — OTHER RECOMMENDATIONS: OTHER

## 2023-09-12 ASSESSMENT — LOCATION DETAILED DESCRIPTION DERM
LOCATION DETAILED: RIGHT ANKLE
LOCATION DETAILED: LEFT MEDIAL DISTAL PRETIBIAL REGION
LOCATION DETAILED: RIGHT DISTAL PRETIBIAL REGION
LOCATION DETAILED: RIGHT MEDIAL DISTAL PRETIBIAL REGION
LOCATION DETAILED: LEFT POSTERIOR ANKLE

## 2023-09-12 ASSESSMENT — LOCATION ZONE DERM: LOCATION ZONE: LEG

## 2023-09-12 NOTE — PROCEDURE: RECOMMENDATIONS
Recommendations (Free Text): Dr. Walters identitfied that previous venous studies were not performed correctly and should be repeated at [facility] Recommendations (Free Text): Dr. Walters identitfied that previous venous studies were not performed correctly and should be repeated at [facility]

## 2023-09-12 NOTE — PROCEDURE: PRESCRIPTION MEDICATION MANAGEMENT
Continue Regimen: Humira\\n\\nClobetasol as directed
Render In Strict Bullet Format?: No
Detail Level: Zone
Plan: If not resolving, refer to Dr. Rossi [facility]

## 2023-09-14 LAB — ZINC RBC-MCNC: 13.15 MG/L (ref 9–14.7)

## 2023-09-28 ENCOUNTER — APPOINTMENT (OUTPATIENT)
Dept: URBAN - METROPOLITAN AREA CLINIC 203 | Age: 68
Setting detail: DERMATOLOGY
End: 2023-09-28

## 2023-09-28 DIAGNOSIS — L88 PYODERMA GANGRENOSUM: ICD-10-CM

## 2023-09-28 PROCEDURE — 99213 OFFICE O/P EST LOW 20 MIN: CPT

## 2023-09-28 PROCEDURE — OTHER PRESCRIPTION MEDICATION MANAGEMENT: OTHER

## 2023-09-28 PROCEDURE — OTHER PHOTO-DOCUMENTATION: OTHER

## 2023-09-28 ASSESSMENT — LOCATION DETAILED DESCRIPTION DERM
LOCATION DETAILED: LEFT MEDIAL DISTAL PRETIBIAL REGION
LOCATION DETAILED: RIGHT MEDIAL DISTAL PRETIBIAL REGION
LOCATION DETAILED: LEFT ANKLE
LOCATION DETAILED: RIGHT ANKLE
LOCATION DETAILED: LEFT POSTERIOR ANKLE
LOCATION DETAILED: RIGHT DORSAL FOOT

## 2023-09-28 ASSESSMENT — LOCATION SIMPLE DESCRIPTION DERM
LOCATION SIMPLE: LEFT ANKLE
LOCATION SIMPLE: LEFT PRETIBIAL REGION
LOCATION SIMPLE: RIGHT FOOT
LOCATION SIMPLE: RIGHT ANKLE
LOCATION SIMPLE: RIGHT PRETIBIAL REGION

## 2023-09-28 ASSESSMENT — LOCATION ZONE DERM
LOCATION ZONE: LEG
LOCATION ZONE: FEET

## 2023-09-28 NOTE — PROCEDURE: PRESCRIPTION MEDICATION MANAGEMENT
Continue Regimen: Humira\\n\\nClobetasol as directed
Render In Strict Bullet Format?: No
Detail Level: Zone
Plan: If not resolving, refer to [place] [facility]\\nWill send Medrol pack if Clobetasol doesn’t work

## 2023-10-02 ENCOUNTER — APPOINTMENT (OUTPATIENT)
Dept: LAB | Facility: CLINIC | Age: 68
End: 2023-10-02
Attending: INTERNAL MEDICINE
Payer: MEDICARE

## 2023-10-02 ENCOUNTER — TRANSCRIBE ORDERS (OUTPATIENT)
Dept: LAB | Facility: CLINIC | Age: 68
End: 2023-10-02

## 2023-10-02 ENCOUNTER — TRANSCRIBE ORDERS (OUTPATIENT)
Dept: SCHEDULING | Age: 68
End: 2023-10-02

## 2023-10-02 DIAGNOSIS — R94.5 ABNORMAL RESULTS OF LIVER FUNCTION STUDIES: Primary | ICD-10-CM

## 2023-10-02 DIAGNOSIS — R94.5 ABNORMAL RESULTS OF LIVER FUNCTION STUDIES: ICD-10-CM

## 2023-10-02 PROCEDURE — 86381 MITOCHONDRIAL ANTIBODY EACH: CPT

## 2023-10-02 PROCEDURE — 36415 COLL VENOUS BLD VENIPUNCTURE: CPT

## 2023-10-02 PROCEDURE — 80053 COMPREHEN METABOLIC PANEL: CPT

## 2023-10-03 LAB
ALBUMIN SERPL-MCNC: 3.6 G/DL (ref 3.5–5.7)
ALP SERPL-CCNC: 482 IU/L (ref 34–125)
ALT SERPL-CCNC: 56 IU/L (ref 7–52)
ANION GAP SERPL CALC-SCNC: 6 MEQ/L (ref 3–15)
AST SERPL-CCNC: 158 IU/L (ref 13–39)
BILIRUB SERPL-MCNC: 0.8 MG/DL (ref 0.3–1.2)
BUN SERPL-MCNC: 7 MG/DL (ref 7–25)
CALCIUM SERPL-MCNC: 8.9 MG/DL (ref 8.6–10.3)
CHLORIDE SERPL-SCNC: 96 MEQ/L (ref 98–107)
CO2 SERPL-SCNC: 31 MEQ/L (ref 21–31)
CREAT SERPL-MCNC: 0.4 MG/DL (ref 0.6–1.2)
GFR SERPL CREATININE-BSD FRML MDRD: >60 ML/MIN/1.73M*2
GLUCOSE SERPL-MCNC: 104 MG/DL (ref 70–99)
POTASSIUM SERPL-SCNC: 3.6 MEQ/L (ref 3.5–5.1)
PROT SERPL-MCNC: 6.3 G/DL (ref 6–8.2)
SODIUM SERPL-SCNC: 133 MEQ/L (ref 136–145)

## 2023-10-04 ENCOUNTER — RX ONLY (RX ONLY)
Age: 68
End: 2023-10-04

## 2023-10-04 LAB — MITOCHONDRIA AB SER QL IF: NEGATIVE

## 2023-10-04 RX ORDER — DIOSMIN COMPLEX NO.1 630 MG
TABLET ORAL
Qty: 60 | Refills: 5 | Status: ERX

## 2023-10-06 ENCOUNTER — TRANSCRIBE ORDERS (OUTPATIENT)
Dept: SCHEDULING | Age: 68
End: 2023-10-06

## 2023-10-06 DIAGNOSIS — M79.643 PAIN IN UNSPECIFIED HAND: Primary | ICD-10-CM

## 2023-10-09 ENCOUNTER — APPOINTMENT (OUTPATIENT)
Dept: URBAN - METROPOLITAN AREA CLINIC 203 | Age: 68
Setting detail: DERMATOLOGY
End: 2023-10-10

## 2023-10-09 ENCOUNTER — RX ONLY (RX ONLY)
Age: 68
End: 2023-10-09

## 2023-10-09 DIAGNOSIS — L88 PYODERMA GANGRENOSUM: ICD-10-CM

## 2023-10-09 PROCEDURE — OTHER PRESCRIPTION MEDICATION MANAGEMENT: OTHER

## 2023-10-09 PROCEDURE — 99213 OFFICE O/P EST LOW 20 MIN: CPT

## 2023-10-09 PROCEDURE — OTHER PHOTO-DOCUMENTATION: OTHER

## 2023-10-09 RX ORDER — DIOSMIN COMPLEX NO.1 630 MG
TABLET ORAL
Qty: 60 | Refills: 5 | Status: ERX

## 2023-10-09 ASSESSMENT — LOCATION DETAILED DESCRIPTION DERM
LOCATION DETAILED: RIGHT MEDIAL DISTAL PRETIBIAL REGION
LOCATION DETAILED: LEFT MEDIAL DISTAL PRETIBIAL REGION
LOCATION DETAILED: RIGHT ANKLE
LOCATION DETAILED: LEFT POSTERIOR ANKLE
LOCATION DETAILED: LEFT ANKLE
LOCATION DETAILED: RIGHT DORSAL FOOT

## 2023-10-09 ASSESSMENT — LOCATION ZONE DERM
LOCATION ZONE: LEG
LOCATION ZONE: FEET

## 2023-10-09 ASSESSMENT — LOCATION SIMPLE DESCRIPTION DERM
LOCATION SIMPLE: LEFT PRETIBIAL REGION
LOCATION SIMPLE: LEFT ANKLE
LOCATION SIMPLE: RIGHT PRETIBIAL REGION
LOCATION SIMPLE: RIGHT ANKLE
LOCATION SIMPLE: RIGHT FOOT

## 2023-10-09 NOTE — PROCEDURE: PRESCRIPTION MEDICATION MANAGEMENT
Continue Regimen: Humira\\n\\nClobetasol as directed
Render In Strict Bullet Format?: No
Detail Level: Zone
Plan: Tasked kathy to resend fax to dr. Dougherty \\n\\nPending remicade

## 2023-10-10 ENCOUNTER — HOSPITAL ENCOUNTER (OUTPATIENT)
Dept: RADIOLOGY | Facility: CLINIC | Age: 68
Discharge: HOME | End: 2023-10-10
Attending: INTERNAL MEDICINE
Payer: MEDICARE

## 2023-10-10 DIAGNOSIS — R94.5 ABNORMAL RESULTS OF LIVER FUNCTION STUDIES: ICD-10-CM

## 2023-10-10 RX ORDER — GADOBUTROL 604.72 MG/ML
0.1 INJECTION INTRAVENOUS ONCE
Status: COMPLETED | OUTPATIENT
Start: 2023-10-10 | End: 2023-10-10

## 2023-10-10 RX ADMIN — GADOBUTROL 5 ML: 604.72 INJECTION INTRAVENOUS at 15:16

## 2023-10-31 ENCOUNTER — APPOINTMENT (OUTPATIENT)
Dept: URBAN - METROPOLITAN AREA CLINIC 203 | Age: 68
Setting detail: DERMATOLOGY
End: 2023-10-31

## 2023-10-31 DIAGNOSIS — L98.8 OTHER SPECIFIED DISORDERS OF THE SKIN AND SUBCUTANEOUS TISSUE: ICD-10-CM

## 2023-10-31 DIAGNOSIS — L88 PYODERMA GANGRENOSUM: ICD-10-CM

## 2023-10-31 PROCEDURE — OTHER PRESCRIPTION MEDICATION MANAGEMENT: OTHER

## 2023-10-31 PROCEDURE — OTHER PHOTO-DOCUMENTATION: OTHER

## 2023-10-31 PROCEDURE — 99213 OFFICE O/P EST LOW 20 MIN: CPT

## 2023-10-31 PROCEDURE — OTHER BOTOX (U OR CC): OTHER

## 2023-10-31 PROCEDURE — OTHER OBSERVATION: OTHER

## 2023-10-31 PROCEDURE — OTHER OBSERVATION AND MEASURE: OTHER

## 2023-10-31 PROCEDURE — OTHER BOTOX (U OR CC) ADDITIVE: OTHER

## 2023-10-31 ASSESSMENT — LOCATION SIMPLE DESCRIPTION DERM
LOCATION SIMPLE: RIGHT ANKLE
LOCATION SIMPLE: RIGHT CHEEK
LOCATION SIMPLE: GLABELLA
LOCATION SIMPLE: LEFT FOREHEAD
LOCATION SIMPLE: LEFT ANKLE
LOCATION SIMPLE: RIGHT FOREHEAD
LOCATION SIMPLE: LEFT CHEEK
LOCATION SIMPLE: LEFT ACHILLES SKIN

## 2023-10-31 ASSESSMENT — LOCATION DETAILED DESCRIPTION DERM
LOCATION DETAILED: RIGHT SUPERIOR LATERAL MALAR CHEEK
LOCATION DETAILED: LEFT INFERIOR MEDIAL FOREHEAD
LOCATION DETAILED: RIGHT ANTERIOR MEDIAL MALLEOLUS
LOCATION DETAILED: LEFT ANTERIOR MEDIAL MALLEOLUS
LOCATION DETAILED: LEFT ACHILLES SKIN
LOCATION DETAILED: GLABELLA
LOCATION DETAILED: RIGHT ANTERIOR LATERAL MALLEOLUS
LOCATION DETAILED: LEFT SUPERIOR LATERAL MALAR CHEEK
LOCATION DETAILED: RIGHT ANKLE
LOCATION DETAILED: RIGHT INFERIOR MEDIAL FOREHEAD

## 2023-10-31 ASSESSMENT — LOCATION ZONE DERM
LOCATION ZONE: FACE
LOCATION ZONE: LEG

## 2023-10-31 ASSESSMENT — PAIN INTENSITY VAS: HOW INTENSE IS YOUR PAIN 0 BEING NO PAIN, 10 BEING THE MOST SEVERE PAIN POSSIBLE?: 7/10 PAIN

## 2023-10-31 ASSESSMENT — SEVERITY ASSESSMENT: SEVERITY: MODERATE TO SEVERE

## 2023-10-31 NOTE — PROCEDURE: OBSERVATION
Detail Level: Detailed
Size Of Lesion: 4x4 cm
Size Of Lesion: 4x2
Size Of Lesion: 7 x 2
Size Of Lesion: 1 x 1
Size Of Lesion: 1 x 0.5
Size Of Lesion: 1x1
Size Of Lesion: 2x1

## 2023-11-15 ENCOUNTER — TELEPHONE (OUTPATIENT)
Dept: GASTROENTEROLOGY | Facility: CLINIC | Age: 68
End: 2023-11-15
Payer: OTHER GOVERNMENT

## 2023-11-15 NOTE — TELEPHONE ENCOUNTER
Direct Access Pre-procedural Note     Patient: Mary Moreno  : 1955    Referring provider:  Dr. Childs  Date of last visit: 23 OV    Diagnosis: Abnormal abdominal imaging [R93.3]; elevated LFTs    10/10/23 MRI/MRCP  Intra and extra hepatic bile duct dilation without choledocholithiasis or  measurable obstructing lesion identified.  The presence of combined pancreatic  duct dilation raises the concern for an occult ampullary/periampullary lesion  which should be considered the diagnosis of exclusion.  Endoscopic  assessment/EUS should be considered for further evaluation.     Scattered subcentimeter pancreatic cystic lesions within the body and tail,  likely a small branch duct IPMN's.     Hydropic gallbladder with layering stones/sludge.  Low posterior cystic duct  insertion.     Moderate hepatic steatosis.    Procedure: EUS-FNA    Is the patient on anticoagulation?  no    Does the patient require cardiac clearance?  no    Is the patient diabetic? no    Timin-4 weeks

## 2023-11-17 ENCOUNTER — TELEPHONE (OUTPATIENT)
Dept: GASTROENTEROLOGY | Facility: CLINIC | Age: 68
End: 2023-11-17

## 2023-11-17 ENCOUNTER — APPOINTMENT (OUTPATIENT)
Dept: URBAN - METROPOLITAN AREA CLINIC 203 | Age: 68
Setting detail: DERMATOLOGY
End: 2023-11-21

## 2023-11-17 DIAGNOSIS — L88 PYODERMA GANGRENOSUM: ICD-10-CM

## 2023-11-17 PROCEDURE — OTHER OTHER: OTHER

## 2023-11-17 PROCEDURE — OTHER ORDER TESTS: OTHER

## 2023-11-17 PROCEDURE — 99213 OFFICE O/P EST LOW 20 MIN: CPT

## 2023-11-17 PROCEDURE — OTHER OBSERVATION AND MEASURE: OTHER

## 2023-11-17 PROCEDURE — OTHER PHOTO-DOCUMENTATION: OTHER

## 2023-11-17 PROCEDURE — OTHER OBSERVATION: OTHER

## 2023-11-17 PROCEDURE — OTHER PRESCRIPTION MEDICATION MANAGEMENT: OTHER

## 2023-11-17 ASSESSMENT — LOCATION ZONE DERM: LOCATION ZONE: LEG

## 2023-11-17 ASSESSMENT — LOCATION DETAILED DESCRIPTION DERM
LOCATION DETAILED: LEFT ANTERIOR MEDIAL MALLEOLUS
LOCATION DETAILED: RIGHT ANKLE
LOCATION DETAILED: LEFT ACHILLES SKIN

## 2023-11-17 ASSESSMENT — LOCATION SIMPLE DESCRIPTION DERM
LOCATION SIMPLE: LEFT ACHILLES SKIN
LOCATION SIMPLE: LEFT ANKLE
LOCATION SIMPLE: RIGHT ANKLE

## 2023-11-17 NOTE — PROCEDURE: ORDER TESTS
Expected Date Of Service: 11/17/2023
Performing Laboratory: -[id number]
Billing Type: Third-Party Bill
Bill For Surgical Tray: no

## 2023-11-17 NOTE — PROCEDURE: OTHER
Render Risk Assessment In Note?: no
Note Text (......Xxx Chief Complaint.): This diagnosis correlates with the
Detail Level: Zone
Other (Free Text): Patient is starting remicade infusions

## 2023-11-17 NOTE — PROCEDURE: OBSERVATION
Detail Level: Detailed
Size Of Lesion: 4 cm
Size Of Lesion: 1 cm
Size Of Lesion: 5 x6 cm
Size Of Lesion: 1.5 cm
Size Of Lesion: 1

## 2023-11-17 NOTE — TELEPHONE ENCOUNTER
I called and spoke with the pt and she is now scheduled for 12/*05/2023 at 8:30am. Pt was verbally given all prep instructions and they will be sent via usps because the pt does not access her email. TY

## 2023-11-25 PROBLEM — R93.3 GASTROINTESTINAL TRACT IMAGING ABNORMALITY: Status: ACTIVE | Noted: 2023-11-25

## 2023-11-28 ENCOUNTER — RX ONLY (RX ONLY)
Age: 68
End: 2023-11-28

## 2023-11-28 RX ORDER — CIPROFLOXACIN 500 MG/1
TABLET, FILM COATED ORAL BID
Qty: 14 | Refills: 0 | Status: ERX

## 2023-12-04 ENCOUNTER — APPOINTMENT (OUTPATIENT)
Dept: URBAN - METROPOLITAN AREA CLINIC 203 | Age: 68
Setting detail: DERMATOLOGY
End: 2023-12-12

## 2023-12-04 ENCOUNTER — ANESTHESIA EVENT (OUTPATIENT)
Dept: ENDOSCOPY | Facility: HOSPITAL | Age: 68
End: 2023-12-04
Payer: MEDICARE

## 2023-12-04 DIAGNOSIS — L88 PYODERMA GANGRENOSUM: ICD-10-CM

## 2023-12-04 DIAGNOSIS — D485 NEOPLASM OF UNCERTAIN BEHAVIOR OF SKIN: ICD-10-CM

## 2023-12-04 DIAGNOSIS — L11.1 TRANSIENT ACANTHOLYTIC DERMATOSIS [GROVER]: ICD-10-CM

## 2023-12-04 DIAGNOSIS — L85.3 XEROSIS CUTIS: ICD-10-CM

## 2023-12-04 PROBLEM — D48.5 NEOPLASM OF UNCERTAIN BEHAVIOR OF SKIN: Status: ACTIVE | Noted: 2023-12-04

## 2023-12-04 PROCEDURE — 99213 OFFICE O/P EST LOW 20 MIN: CPT

## 2023-12-04 PROCEDURE — OTHER OBSERVATION AND MEASURE: OTHER

## 2023-12-04 PROCEDURE — OTHER OBSERVATION: OTHER

## 2023-12-04 PROCEDURE — OTHER PRESCRIPTION MEDICATION MANAGEMENT: OTHER

## 2023-12-04 PROCEDURE — OTHER OTHER: OTHER

## 2023-12-04 ASSESSMENT — LOCATION SIMPLE DESCRIPTION DERM
LOCATION SIMPLE: LEFT FOREARM
LOCATION SIMPLE: RIGHT CALF
LOCATION SIMPLE: UPPER BACK
LOCATION SIMPLE: RIGHT POSTERIOR UPPER ARM
LOCATION SIMPLE: RIGHT PRETIBIAL REGION
LOCATION SIMPLE: RIGHT FOREARM
LOCATION SIMPLE: ABDOMEN
LOCATION SIMPLE: LEFT PRETIBIAL REGION
LOCATION SIMPLE: LEFT POSTERIOR UPPER ARM
LOCATION SIMPLE: RIGHT ACHILLES SKIN

## 2023-12-04 ASSESSMENT — LOCATION DETAILED DESCRIPTION DERM
LOCATION DETAILED: LEFT PROXIMAL PRETIBIAL REGION
LOCATION DETAILED: RIGHT PROXIMAL POSTERIOR UPPER ARM
LOCATION DETAILED: RIGHT PROXIMAL CALF
LOCATION DETAILED: RIGHT PROXIMAL DORSAL FOREARM
LOCATION DETAILED: RIGHT DISTAL PRETIBIAL REGION
LOCATION DETAILED: LEFT PROXIMAL MEDIAL POSTERIOR UPPER ARM
LOCATION DETAILED: RIGHT ACHILLES SKIN
LOCATION DETAILED: LEFT PROXIMAL DORSAL FOREARM
LOCATION DETAILED: EPIGASTRIC SKIN
LOCATION DETAILED: INFERIOR THORACIC SPINE

## 2023-12-04 ASSESSMENT — LOCATION ZONE DERM
LOCATION ZONE: ARM
LOCATION ZONE: LEG
LOCATION ZONE: TRUNK

## 2023-12-04 NOTE — PROCEDURE: OBSERVATION
Detail Level: Detailed
Size Of Lesion: 4.5x2.5 cm
Size Of Lesion: 6x2cm, 2 x 2 cm
Size Of Lesion: 1x1.5 cm

## 2023-12-04 NOTE — ANESTHESIA PREPROCEDURE EVALUATION
Relevant Problems   GASTROINTESTINAL   (+) Ulcerative colitis (CMS/HCC)      URINARY SYSTEM   (+) Acute hypokalemia     Patient Active Problem List   Diagnosis   • Displaced fracture of right femoral neck (CMS/HCC)   • Acute hypokalemia   • Pyoderma gangrenosum   • Ulcerative colitis (CMS/HCC)   • Malnutrition (CMS/HCC)   • Celiac disease   • Chronic pain   • Gastrointestinal tract imaging abnormality       No current facility-administered medications for this encounter.       Prior to Admission medications    Medication Sig Start Date End Date Taking? Authorizing Provider   adalimumab (HUMIRA) 40 mg/0.8 mL syringe kit Inject 1 pen under the skin. Every 2 weeks on Thursdays     Barbara Cho MD   budesonide EC (ENTOCORT EC) 3 mg 24 hr capsule Take 6 mg by mouth every morning.    Francisco Cho MD   desloratadine (CLARINEX REDITAB) 2.5 mg disintegrating tablet Take 2.5 mg by mouth daily.    Barbara Cho MD   DULoxetine (CYMBALTA) 30 mg capsule Take 30 mg by mouth daily.    Barbara Cho MD   gabapentin (NEURONTIN) 100 mg capsule Take 100 mg by mouth 3 (three) times a day as needed (Neuro pain).      Barbara Cho MD   HYDROmorphone (DILAUDID) 4 mg tablet take 1 tablet by mouth every 4 hours if needed 9/25/23   Francisco Cho MD   Lactobacillus acidophilus 10 billion cell capsule Take 1 capsule by mouth daily.    Francisco Cho MD       CBC Results       09/01/23 05/23/23 11/02/22     1340 1138 1343    WBC 6.35 6.10 7.70    RBC 3.82 3.64 3.74    HGB 14.5 13.0 13.9    HCT 44.2 39.4 41.4    .7 108.2 110.7    MCH 38.0 35.7 37.2    MCHC 32.8 33.0 33.6     383 317          BMP Results       10/02/23 09/01/23 05/23/23     1419 1340 1138     135 135    K 3.6 3.9 3.9    Cl 96 94 92    CO2 31 32 30    Glucose 104 105 108    BUN 7 10 8    Creatinine 0.4 0.5 0.7    Calcium 8.9 9.4 9.5    Anion Gap 6 9 13    EGFR >60.0 >60.0 >60.0          No  "results found for: \"HCGPREGUR\", \"PREGSERUM\", \"HCG\", \"HCGQUANT\"          No orders to display        Anesthesia ROS/MED HX    Anesthesia History    Previous anesthetics  No history of anesthetic complications  Neuro/Psych    Chronic neuropathic pain  GI/Hepatic   inflammatory bowel disease  Endo/Other  Body Habitus: Normal       Past Surgical History:   Procedure Laterality Date   •  SECTION     • FOOT SURGERY     • HYSTERECTOMY     • SKIN GRAFT         Physical Exam    Airway   Mallampati: II   TM distance: >3 FB   Neck ROM: full  Cardiovascular - normal   Rhythm: regular   Rate: normalPulmonary - normal   clear to auscultation  Dental - normal        Anesthesia Plan    Plan: MAC    Technique: MAC     Lines and Monitors: PIV     Airway: natural airway / supplemental oxygen     instructed to abstain from smoking on day of procedure    Patient did not smoke on day of surgery   2 ASA  Blood Products:     Use of Blood Products Discussed: Yes     Consented to blood products  Anesthetic plan and risks discussed with: patient  Induction:    intravenous   Postop Plan:   Patient Disposition: phase II then home   Pain Management: IV analgesics  Comments:    Plan: Risks and benefits of MAC and general anesthesia discussed with patient/guardian. Risks discussed including but not limited to intraoperative awareness, sore throat, damage to lips/teeth, possible aspiration and need for intubation and possible conversion to general anesthesia emergently. All questions answered and patient willing to proceed.     "

## 2023-12-04 NOTE — PROCEDURE: PRESCRIPTION MEDICATION MANAGEMENT
Initiate Treatment: Clobetasol
Render In Strict Bullet Format?: No
Detail Level: Zone
Initiate Treatment: Amlactin

## 2023-12-04 NOTE — PROCEDURE: OTHER
Other (Free Text): Ercp scheduled for growthbiliary duct
Render Risk Assessment In Note?: no
Note Text (......Xxx Chief Complaint.): This diagnosis correlates with the
Detail Level: Zone

## 2023-12-05 ENCOUNTER — HOSPITAL ENCOUNTER (OUTPATIENT)
Facility: HOSPITAL | Age: 68
Discharge: HOME | End: 2023-12-05
Attending: INTERNAL MEDICINE | Admitting: INTERNAL MEDICINE
Payer: MEDICARE

## 2023-12-05 ENCOUNTER — ANESTHESIA (OUTPATIENT)
Dept: ENDOSCOPY | Facility: HOSPITAL | Age: 68
End: 2023-12-05
Payer: MEDICARE

## 2023-12-05 ENCOUNTER — RX ONLY (RX ONLY)
Age: 68
End: 2023-12-05

## 2023-12-05 VITALS
DIASTOLIC BLOOD PRESSURE: 60 MMHG | TEMPERATURE: 96.4 F | HEIGHT: 67 IN | WEIGHT: 102 LBS | HEART RATE: 85 BPM | BODY MASS INDEX: 16.01 KG/M2 | RESPIRATION RATE: 18 BRPM | OXYGEN SATURATION: 100 % | SYSTOLIC BLOOD PRESSURE: 110 MMHG

## 2023-12-05 DIAGNOSIS — R93.3 GASTROINTESTINAL TRACT IMAGING ABNORMALITY: ICD-10-CM

## 2023-12-05 PROCEDURE — 71000001 HC PACU PHASE 1 INITIAL 30MIN: Performed by: INTERNAL MEDICINE

## 2023-12-05 PROCEDURE — 200200 PR NO CHARGE: Performed by: INTERNAL MEDICINE

## 2023-12-05 PROCEDURE — 71000011 HC PACU PHASE 1 EA ADDL MIN: Performed by: INTERNAL MEDICINE

## 2023-12-05 PROCEDURE — 75000046 HC EG FLEX ENDO US: Performed by: INTERNAL MEDICINE

## 2023-12-05 PROCEDURE — 0DB58ZX EXCISION OF ESOPHAGUS, VIA NATURAL OR ARTIFICIAL OPENING ENDOSCOPIC, DIAGNOSTIC: ICD-10-PCS | Performed by: INTERNAL MEDICINE

## 2023-12-05 PROCEDURE — 25800000 HC PHARMACY IV SOLUTIONS

## 2023-12-05 PROCEDURE — 25000000 HC PHARMACY GENERAL

## 2023-12-05 PROCEDURE — 88305 TISSUE EXAM BY PATHOLOGIST: CPT | Performed by: INTERNAL MEDICINE

## 2023-12-05 PROCEDURE — 63600000 HC DRUGS/DETAIL CODE: Mod: JZ

## 2023-12-05 PROCEDURE — 37000002 HC ANESTHESIA MAC: Performed by: INTERNAL MEDICINE

## 2023-12-05 PROCEDURE — 43259 EGD US EXAM DUODENUM/JEJUNUM: CPT | Performed by: INTERNAL MEDICINE

## 2023-12-05 PROCEDURE — 75000060 HC EGD BIOPSY: Performed by: INTERNAL MEDICINE

## 2023-12-05 PROCEDURE — 27200000 HC STERILE SUPPLY: Performed by: INTERNAL MEDICINE

## 2023-12-05 RX ORDER — CLOBETASOL PROPIONATE 0.5 MG/G
CREAM TOPICAL
Qty: 180 | Refills: 3 | Status: ERX

## 2023-12-05 RX ORDER — ONDANSETRON HYDROCHLORIDE 2 MG/ML
INJECTION, SOLUTION INTRAVENOUS AS NEEDED
Status: DISCONTINUED | OUTPATIENT
Start: 2023-12-05 | End: 2023-12-05 | Stop reason: SURG

## 2023-12-05 RX ORDER — DEXTROSE 50 % IN WATER (D50W) INTRAVENOUS SYRINGE
25 AS NEEDED
Status: CANCELLED | OUTPATIENT
Start: 2023-12-05

## 2023-12-05 RX ORDER — PROPOFOL 10 MG/ML
INJECTION, EMULSION INTRAVENOUS CONTINUOUS PRN
Status: DISCONTINUED | OUTPATIENT
Start: 2023-12-05 | End: 2023-12-05 | Stop reason: SURG

## 2023-12-05 RX ORDER — FENTANYL CITRATE 50 UG/ML
INJECTION, SOLUTION INTRAMUSCULAR; INTRAVENOUS AS NEEDED
Status: DISCONTINUED | OUTPATIENT
Start: 2023-12-05 | End: 2023-12-05 | Stop reason: SURG

## 2023-12-05 RX ORDER — LIDOCAINE HYDROCHLORIDE 10 MG/ML
INJECTION, SOLUTION EPIDURAL; INFILTRATION; INTRACAUDAL; PERINEURAL AS NEEDED
Status: DISCONTINUED | OUTPATIENT
Start: 2023-12-05 | End: 2023-12-05 | Stop reason: SURG

## 2023-12-05 RX ORDER — SODIUM CHLORIDE 9 MG/ML
INJECTION, SOLUTION INTRAVENOUS CONTINUOUS
Status: CANCELLED | OUTPATIENT
Start: 2023-12-05 | End: 2023-12-12

## 2023-12-05 RX ORDER — DEXTROSE 40 %
15-30 GEL (GRAM) ORAL AS NEEDED
Status: CANCELLED | OUTPATIENT
Start: 2023-12-05

## 2023-12-05 RX ORDER — SODIUM CHLORIDE 9 MG/ML
INJECTION, SOLUTION INTRAVENOUS CONTINUOUS PRN
Status: DISCONTINUED | OUTPATIENT
Start: 2023-12-05 | End: 2023-12-05 | Stop reason: SURG

## 2023-12-05 RX ORDER — CIPROFLOXACIN 500 MG/1
TABLET, FILM COATED ORAL BID
Qty: 14 | Refills: 0 | Status: ERX

## 2023-12-05 RX ORDER — PHENYLEPHRINE HCL IN 0.9% NACL 1 MG/10 ML
SYRINGE (ML) INTRAVENOUS AS NEEDED
Status: DISCONTINUED | OUTPATIENT
Start: 2023-12-05 | End: 2023-12-05 | Stop reason: SURG

## 2023-12-05 RX ORDER — IBUPROFEN 200 MG
16-32 TABLET ORAL AS NEEDED
Status: CANCELLED | OUTPATIENT
Start: 2023-12-05

## 2023-12-05 RX ORDER — PROPOFOL 200MG/20ML
SYRINGE (ML) INTRAVENOUS AS NEEDED
Status: DISCONTINUED | OUTPATIENT
Start: 2023-12-05 | End: 2023-12-05 | Stop reason: SURG

## 2023-12-05 RX ADMIN — PROPOFOL 40 MG: 10 INJECTION, EMULSION INTRAVENOUS at 09:33

## 2023-12-05 RX ADMIN — LIDOCAINE HYDROCHLORIDE 5 ML: 10 INJECTION, SOLUTION EPIDURAL; INFILTRATION; INTRACAUDAL; PERINEURAL at 09:13

## 2023-12-05 RX ADMIN — PROPOFOL 40 MG: 10 INJECTION, EMULSION INTRAVENOUS at 09:20

## 2023-12-05 RX ADMIN — FENTANYL CITRATE 50 MCG: 50 INJECTION, SOLUTION INTRAMUSCULAR; INTRAVENOUS at 09:32

## 2023-12-05 RX ADMIN — SODIUM CHLORIDE: 9 INJECTION, SOLUTION INTRAVENOUS at 09:09

## 2023-12-05 RX ADMIN — PROPOFOL 125 MCG/KG/MIN: 10 INJECTION, EMULSION INTRAVENOUS at 09:16

## 2023-12-05 RX ADMIN — PROPOFOL 60 MG: 10 INJECTION, EMULSION INTRAVENOUS at 09:13

## 2023-12-05 RX ADMIN — Medication 100 MCG: at 09:37

## 2023-12-05 RX ADMIN — PROPOFOL 40 MG: 10 INJECTION, EMULSION INTRAVENOUS at 09:15

## 2023-12-05 RX ADMIN — ONDANSETRON HYDROCHLORIDE 4 MG: 2 SOLUTION INTRAMUSCULAR; INTRAVENOUS at 09:41

## 2023-12-05 NOTE — H&P
Interventional Gastroenterology  MD Rossy Ledezma, PA-C   [facility], [id number] [place] [address]  [phone]                                                       Pre Procedural H&P    Date: 2023  Patient: Mary Moreno  : 1955    Procedure: EUS    Allergies:   Allergies   Allergen Reactions   • Gluten      Celiac   • Sulfa (Sulfonamide Antibiotics)      Blisters        Current Medications:   No current facility-administered medications on file prior to encounter.     Current Outpatient Medications on File Prior to Encounter   Medication Sig Dispense Refill   • adalimumab (HUMIRA) 40 mg/0.8 mL syringe kit Inject 1 pen under the skin. Every 2 weeks on       • budesonide EC (ENTOCORT EC) 3 mg 24 hr capsule Take 6 mg by mouth every morning.     • desloratadine (CLARINEX REDITAB) 2.5 mg disintegrating tablet Take 2.5 mg by mouth daily.     • DULoxetine (CYMBALTA) 30 mg capsule Take 30 mg by mouth daily.     • gabapentin (NEURONTIN) 100 mg capsule Take 100 mg by mouth 3 (three) times a day as needed (Neuro pain).       • HYDROmorphone (DILAUDID) 4 mg tablet take 1 tablet by mouth every 4 hours if needed     • Lactobacillus acidophilus 10 billion cell capsule Take 1 capsule by mouth daily.        History:   Past Medical History:   Diagnosis Date   • Celiac disease    • Fractures    • Osteopenia    • Pyoderma gangrenosum    • Ulcerative colitis (CMS/HCC)       Past Surgical History:   Procedure Laterality Date   •  SECTION     • FOOT SURGERY     • HYSTERECTOMY     • SKIN GRAFT        History reviewed. No pertinent family history.   Social History     Socioeconomic History   • Marital status:      Spouse name: Not on file   • Number of children: Not on file   • Years of education: Not on file   • Highest education level: Not on file   Occupational History   • Not on file   Tobacco Use   • Smoking status: Former  "  • Smokeless tobacco: Never   Substance and Sexual Activity   • Alcohol use: Yes     Comment: wine with dinner 2-3x week    • Drug use: Never   • Sexual activity: Defer   Other Topics Concern   • Not on file   Social History Narrative   • Not on file     Social Determinants of Health     Financial Resource Strain: Not on file   Food Insecurity: Not on file   Transportation Needs: Not on file   Physical Activity: Not on file   Stress: Not on file   Social Connections: Not on file   Intimate Partner Violence: Not on file   Housing Stability: Not on file      Physical Exam:   VITAL SIGNS:   Visit Vitals  /78   Pulse (!) 101   Temp 36.4 °C (97.5 °F) (Temporal)   Resp 16   Ht 1.702 m (5' 7\")   Wt 46.3 kg (102 lb)   LMP  (LMP Unknown)   SpO2 97%   BMI 15.98 kg/m²      CONSTITUTIONAL: well developed, nourished, no distress   PSYCH: Alert and oriented x 3, appropriate mood and affect   SKIN: Warm and dry, good turgor, no rashes   HENT: Lips without lesions, good dentition, oropharynx clear   EYES: conjunctiva normal and sclera clear, without icterus   MUSCULOSKELETAL: moves all 4 extremities with normal ROM in bed   PULMONARY: effort normal   CARDIOVASCULAR:normal rate and regular rhythm   ABDOMINAL: soft, non-distended, non-tender, no hepatosplenomegaly, no masses   RECTAL: Deferred     Impression:   Patient is an appropriate candidate to undergo above stated procedure.    Plan:   1. The risks, benefits, complications and alternatives of above stated procedure were discussed with the patient, including but not limited to risks of bleeding, infection and/or damage to surrounding anatomy and I have answered all of the patient's questions. The patient wishes to proceed.   2. Consent has been obtained.     Sarath Melendrez MD  "

## 2023-12-05 NOTE — ANESTHESIOLOGIST PRE-PROCEDURE ATTESTATION
Pre-Procedure Patient Identification:  I am the Primary Anesthesiologist and have identified the patient on 12/05/23 at 8:32 AM.   I have confirmed the procedure(s) will be performed by the following surgeon/proceduralist Sarath Melendrez MD.

## 2023-12-05 NOTE — OP NOTE
_______________________________________________________________________________  Patient Name: Mary Moreno        Procedure Date: 12/5/2023 8:33 AM  MRN: [id number]                     Account Number: [id number]  YOB: 1955              Age: 68  Gender: Female                        Note Status: Finalized  Attending MD: SHAHLA WRIGHT MD,  _______________________________________________________________________________  Procedure:             Upper EUS  Indications:           Dilated bile duct and pancreatic duct  Providers:             SHAHLA WRIGHT MD (Doctor)  Referring MD:          ИРИНА STEINER DO, MAURA M. BARR, DO~LUMA  Requesting Provider:  Medicines:             See the Anesthesia note for documentation of the  administered medications  Complications:         No immediate complications.  _______________________________________________________________________________  Procedure:             After obtaining informed consent, the endoscope was  passed under direct vision. Throughout the procedure,  the patient's blood pressure, pulse, and oxygen  saturations were monitored continuously. The Linear  Endosonoscope was introduced through the mouth, and  advanced to the second part of duodenum.  Findings:  The proximal, middle, and distal esophagus demonstrate an inlet patch  only at 15 cm. No other abnormalities are seen in the esophagus. On  retroflexion the cardia is unremarkable. The fundus body and antrum are  unremarkable. The pylorus is patent. In the first and second portions of  the duodenum there is a nodularity that would be consistent with poorly  controlled celiac disease. The patient has had previous biopsies that  suggest she has continued poorly controlled celiac disease. Additional  biopsies are not taken at this time.  Sonographically the celiac is identified and followed to the pancreatic  body and tail. There is no peripancreatic lymphadenopathy. The main  pancreatic  duct can be followed through the pancreas without evidence  for significant dilated sidebranches. The maximum diameter of the  pancreatic duct is 4 mm in the region of the head. It tapers  appropriately at the ampulla. The ampulla itself appears normal. The  background pancreatic parenchyma demonstrates a mild lobularity.  Hyperechoic strands are seen. No calcifications, cysts, or masses are  identified. The head of the pancreas is a similar texture to the body  and tail. The common bile duct is 11 mm and tapers at the ampulla. There  is a suggestion of a small amount of debris with very fine echogenicity  to the bile duct lumen. No discrete stone is identified. No masses seen  in the bile duct. No masses seen in the head of the pancreas. The  duodenal wall is mildly irregular but otherwise unremarkable.  Impression:            Findings suggestive of poorly controlled celiac  disease in the duodenum. Biopsies previously obtained  There is no evidence for mass at the ampulla although  the inflammatory appearance to the duodenum may create  a relative papillary stenosis. The patient's liver  enzymes do not suggest that an urgent papillotomy is  required and given the inflammation there is a high  risk for bleeding or other iatrogenic complication.  Recommendation:        Observe clinical course with LFTs being monitored  Repeat MRI in 6 months  Consider repeat upper endoscopy in 3 months with  biopsies given the persistent inflammation and the  concern for secondary complications to celiac disease  including lymphoma and/or dysplasia.  Procedure Code(s):     --- Professional ---  70774, Esophagogastroduodenoscopy, flexible,  transoral; with endoscopic ultrasound examination,  including the esophagus, stomach, and either the  duodenum or a surgically altered stomach where the  jejunum is examined distal to the anastomosis  Diagnosis Code(s):     --- Professional ---  R93.3, Abnormal findings on diagnostic imaging  of  other parts of digestive tract  CPT copyright 2021 American Medical Association. All rights reserved.  The codes documented in this report are preliminary and upon  review may  be revised to meet current compliance requirements.  Olu Wright MD  ________________  SHAHLA WRIGHT MD  12/5/2023 6:50:04 PM  This report has been signed electronically.  Number of Addenda: 0  Note Initiated On: 12/5/2023 8:33 AM

## 2023-12-05 NOTE — ANESTHESIA POSTPROCEDURE EVALUATION
Patient: Mary Moreno    Procedure Summary     Date: 12/05/23 Room / Location: [facility] (E) / [facility]    Anesthesia Start: 0909 Anesthesia Stop: 0948    Procedures:       HI EDG US EXAM SURGICAL ALTER STOM DUODENUM/JEJUNUM [98274 (CPT®)] (Esophagus)      UPPER GASTROINTESTINAL ENDOSCOPY WITH BIOPSY (Esophagus) Diagnosis:       Gastrointestinal tract imaging abnormality      (Gastrointestinal tract imaging abnormality [R93.3])    Providers: Sarath Melendrez MD Responsible Provider: Ángel Villarreal MD    Anesthesia Type: MAC ASA Status: 2          Anesthesia Type: MAC  PACU Vitals  12/5/2023 0944 - 12/5/2023 1021      12/5/2023  0946 12/5/2023  1006          BP: 99/57 110/60      Temp: 35.8 °C (96.4 °F) --      Pulse: 83 85      Resp: 18 18      SpO2: 100 % --              Anesthesia Post Evaluation    Pain management: adequate  Patient location during evaluation: PACU  Patient participation: complete - patient participated  Level of consciousness: awake and alert  Cardiovascular status: acceptable  Airway Patency: adequate  Respiratory status: acceptable  Hydration status: acceptable  Anesthetic complications: no

## 2023-12-06 ENCOUNTER — TELEPHONE (OUTPATIENT)
Dept: GASTROENTEROLOGY | Facility: CLINIC | Age: 68
End: 2023-12-06
Payer: OTHER GOVERNMENT

## 2023-12-06 LAB
CASE RPRT: NORMAL
CLINICAL INFO: NORMAL
PATH REPORT.FINAL DX SPEC: NORMAL
PATH REPORT.GROSS SPEC: NORMAL

## 2023-12-06 NOTE — TELEPHONE ENCOUNTER
Had EGD US on 12/5/23, was informed by Dr. Childs she cannot start her remicade infusion unless Dr. Melendrez gives his permission.      Spoke with Eda at [facility] and faxed request to [phone], and informed per  patient can start her infusion.  12/723

## 2023-12-06 NOTE — RESULT ENCOUNTER NOTE
Please inform the patient that the biopsies demonstrated no evidence for eosinophilic esophagitis or lymphocytic esophagitis.  Please inform her that I felt that the celiac disease was not under full control.  I do want to talk to her in a telemedicine visit in the next 4 to 6 weeks to discuss options in the management of her dilated bile duct and pancreas duct.

## 2023-12-07 NOTE — TELEPHONE ENCOUNTER
Multi calls to patient with phone answering machine message to please call our service to discuss Remicade infusion. Left same message on 's phone answering machine requesting to relay message to patient to call in to service. Will retry this afternoon.

## 2023-12-08 ENCOUNTER — TELEPHONE (OUTPATIENT)
Dept: GASTROENTEROLOGY | Facility: CLINIC | Age: 68
End: 2023-12-08
Payer: OTHER GOVERNMENT

## 2023-12-08 NOTE — TELEPHONE ENCOUNTER
I left phone urgent confidential answering machine message on patient's cell phone that as per Dr. Melendrez, patient can start her(patient's) Remicade. I left message on Mata, patient's  cell phone answering machine requesting to relay message to patient to please listen to her(patient) phone answering machine message.

## 2023-12-08 NOTE — TELEPHONE ENCOUNTER
Patient says she has not received any voicemails, on her number and says no voicemails were left on her husbands number.  But is being told that she was being contacted by [facility]

## 2023-12-08 NOTE — TELEPHONE ENCOUNTER
I was able to contact Bill,  and patient was with him.  I explained that I was calling both yesterday and today and left phone answering machine today informing of Dr. Melendrez's message  including can start Remicade. I read 's messages to patient during present discussion. Patient states will call into our service on Monday 12/11/23  to schedule telemedicine visit in 4 to 6 weeks with Dr. Melendrez. Patient with stated understanding of all noted.  Regarding phone communication. Patient stated having blocked my number because unknown number. I requested patient to answer calls because can be physicians office(s) attempting contact which in turn may cause delay in care. Take care.

## 2023-12-21 ENCOUNTER — APPOINTMENT (OUTPATIENT)
Dept: URBAN - METROPOLITAN AREA CLINIC 203 | Age: 68
Setting detail: DERMATOLOGY
End: 2023-12-21

## 2023-12-21 DIAGNOSIS — L88 PYODERMA GANGRENOSUM: ICD-10-CM

## 2023-12-21 DIAGNOSIS — L11.1 TRANSIENT ACANTHOLYTIC DERMATOSIS [GROVER]: ICD-10-CM

## 2023-12-21 PROCEDURE — OTHER OBSERVATION AND MEASURE: OTHER

## 2023-12-21 PROCEDURE — OTHER OBSERVATION: OTHER

## 2023-12-21 PROCEDURE — OTHER PRESCRIPTION MEDICATION MANAGEMENT: OTHER

## 2023-12-21 PROCEDURE — 99213 OFFICE O/P EST LOW 20 MIN: CPT

## 2023-12-21 ASSESSMENT — LOCATION DETAILED DESCRIPTION DERM
LOCATION DETAILED: RIGHT ANTERIOR LATERAL MALLEOLUS
LOCATION DETAILED: INFERIOR THORACIC SPINE
LOCATION DETAILED: RIGHT DISTAL PRETIBIAL REGION
LOCATION DETAILED: RIGHT ANTERIOR MEDIAL MALLEOLUS
LOCATION DETAILED: LEFT POSTERIOR ANKLE

## 2023-12-21 ASSESSMENT — LOCATION ZONE DERM
LOCATION ZONE: LEG
LOCATION ZONE: TRUNK

## 2023-12-21 ASSESSMENT — LOCATION SIMPLE DESCRIPTION DERM
LOCATION SIMPLE: UPPER BACK
LOCATION SIMPLE: RIGHT PRETIBIAL REGION
LOCATION SIMPLE: LEFT ANKLE
LOCATION SIMPLE: RIGHT ANKLE

## 2023-12-21 NOTE — PROCEDURE: OBSERVATION
Detail Level: Detailed
Size Of Lesion: 2 cm
Size Of Lesion: 3.5cm x 1.8 cm
Size Of Lesion: 4.5 x 2.0
Size Of Lesion: 1.5 x 1 cm

## 2024-01-20 ENCOUNTER — RX ONLY (RX ONLY)
Age: 69
End: 2024-01-20

## 2024-01-20 RX ORDER — CIPROFLOXACIN 500 MG/1
TABLET, FILM COATED ORAL BID
Qty: 14 | Refills: 0 | Status: ERX

## 2024-01-23 ENCOUNTER — RX ONLY (RX ONLY)
Age: 69
End: 2024-01-23

## 2024-01-23 RX ORDER — GABAPENTIN 300 MG/1
CAPSULE ORAL
Qty: 270 | Refills: 0 | Status: ERX | COMMUNITY
Start: 2024-01-23

## 2024-01-24 ENCOUNTER — TELEMEDICINE (OUTPATIENT)
Dept: GASTROENTEROLOGY | Facility: CLINIC | Age: 69
End: 2024-01-24
Payer: MEDICARE

## 2024-01-24 VITALS — BODY MASS INDEX: 15.85 KG/M2 | HEIGHT: 67 IN | WEIGHT: 101 LBS

## 2024-01-24 DIAGNOSIS — L88 PYODERMA GANGRENOSUM: Primary | Chronic | ICD-10-CM

## 2024-01-24 DIAGNOSIS — K83.8 DILATED BILE DUCT: ICD-10-CM

## 2024-01-24 DIAGNOSIS — K90.0 CELIAC DISEASE: ICD-10-CM

## 2024-01-24 PROCEDURE — 99213 OFFICE O/P EST LOW 20 MIN: CPT | Mod: 95 | Performed by: INTERNAL MEDICINE

## 2024-01-24 ASSESSMENT — PAIN SCALES - GENERAL: PAINLEVEL: 4

## 2024-01-24 NOTE — PROGRESS NOTES
Interventional Gastroenterology  MD Rossy Ledezma PA-C   [facility], [id number]  [address] [place] [address]  [phone]                                              Telemedicine Follow Up    Date: 2024  Patient: Mary Moreno  : 1955  Age: 68 y.o. female  PCP:Leeanne Farah DO  Referring physician:     The patient affirms they are currently located in the following state: [place]      Request for Consent:  Audio and Video Encounter   Hello, my name is Sarath Melendrez MD.  Before we proceed, can you please verify your identification by telling me your full name and date of birth?  Can you tell me who is in the room with you?    You and I are about to have a telemedicine check-in or visit because you have requested it.  This is a live video-conference.  I am a real person, speaking to you in real time.  There is no one else with me on the video-conference. I am not recording this conversation and I am asking you not to record it.  This telemedicine visit will be billed to your health insurance or you, if you are self-insured.  You understand you will be responsible for any copayments or coinsurances that apply to your telemedicine visit.  Communication platform used for this encounter:  [employer] Video Visit (Epic Video Client)       Before starting our telemedicine visit, I am required to get your consent for this virtual check-in or visit by telemedicine. Do you consent?     Patient Response to Request for Consent:   Yes    Chief Complaint:   HPI   Mary Moreno is a 68 y.o. female presenting in follow-up.  She had an endoscopic ultrasound performed in 2023 to evaluate a dilated bile duct and pancreatic duct.  At the time during the evaluation an upper endoscopy was also performed which demonstrated significant nodularity of the duodenum that would be consistent with poorly controlled celiac disease.  This is a  longstanding issue for her and previous biopsies have demonstrated the same.  Biopsies taken at the time of this endoscopy also confirmed the same.  She is currently on a strict diet.    The pancreas was surveyed and was essentially unremarkable.  The main duct was 4 mm and tapered appropriately at the ampulla.  The ampulla itself was normal.  There is mild lobularity of the pancreas.  The bile duct was approximately 11 mm and tapered to the ampulla with a suggestion of a small amount of debris with fine echogenicity within the bile duct lumen.  No discrete stone was identified.    The patient currently has pyoderma gangrenosum in the lower extremity and is being treated.  She had pyoderma gangrenosum 11 years ago.  There is no superinfection she is currently receiving ciprofloxacin for that.  She is using Remicade to go into remission is that helped her last time.  She has gained approximately 4 pounds and then lost it.  There is no diarrhea.  She does not tolerate budesonide and is looking for a lower dose budesonide then 9 mg a day.        ASSESSMENT AND PLAN   Dilated bile duct  I am suspicious that the duodenal inflammation related to the poorly controlled celiac disease is probably contributing to her dilated bile duct.  She is currently undergoing therapy for pyoderma gangrenosum.  Her celiac disease is chronically under poor control.  She is currently on a strict celiac diet.  I have suggested that we continue to monitor her and if her LFTs increase we may consider an ERCP to clear any debris that might be accumulating the distal common bile duct.  But the control of her pyoderma gangrenosum and her celiac disease are the more pressing issues.  She is following up with Dr. Childs regarding those issues.          PAST MEDICAL AND SURGICAL HISTORY      Medical History:  Past Medical History:   Diagnosis Date   • Celiac disease    • Fractures    • Osteopenia    • Pyoderma gangrenosum    • Ulcerative colitis  (CMS/[facility])        Surgical History:  Past Surgical History:   Procedure Laterality Date   •  SECTION     • FOOT SURGERY     • HYSTERECTOMY     • SKIN GRAFT         CURRENT MEDICATIONS        Current Outpatient Medications:   •  desloratadine (CLARINEX REDITAB) 2.5 mg disintegrating tablet, Take 2.5 mg by mouth daily., Disp: , Rfl:   •  DULoxetine (CYMBALTA) 30 mg capsule, Take 30 mg by mouth daily., Disp: , Rfl:   •  gabapentin (NEURONTIN) 100 mg capsule, Take 100 mg by mouth 3 (three) times a day as needed (Neuro pain).  , Disp: , Rfl:   •  HYDROmorphone (DILAUDID) 4 mg tablet, take 1 tablet by mouth every 4 hours if needed, Disp: , Rfl:   •  Lactobacillus acidophilus 10 billion cell capsule, Take 1 capsule by mouth daily., Disp: , Rfl:   •  adalimumab (HUMIRA) 40 mg/0.8 mL syringe kit, Inject 1 pen under the skin. Every 2 weeks on  , Disp: , Rfl:   •  budesonide EC (ENTOCORT EC) 3 mg 24 hr capsule, Take 6 mg by mouth every morning., Disp: , Rfl:     ALLERGIES      Gluten and Sulfa (sulfonamide antibiotics)    FAMILY HISTORY      No family history on file.    SOCIAL HISTORY      Social History     Socioeconomic History   • Marital status:      Spouse name: None   • Number of children: None   • Years of education: None   • Highest education level: None   Tobacco Use   • Smoking status: Former   • Smokeless tobacco: Never   Substance and Sexual Activity   • Alcohol use: Yes     Comment: wine with dinner 2-3x week    • Drug use: Never   • Sexual activity: Defer       REVIEW OF SYSTEMS      Review of Systems   Constitutional: Negative for activity change, appetite change, fatigue, fever and unexpected weight change.   HENT: Negative for trouble swallowing and voice change.    Respiratory: Negative for cough and shortness of breath.    Cardiovascular: Negative for chest pain.   Gastrointestinal: Negative for abdominal distention, abdominal pain, anal bleeding, blood in stool, constipation,  diarrhea, nausea and vomiting.   Neurological: Negative for dizziness, weakness and light-headedness.     PHYSICAL EXAMINATION      Because of the Covid-19 pandemic, in order to limit patient contact, I did not see and examine the patient directly.     LABS / IMAGING       Labs:   CBC Results       09/01/23 05/23/23 11/02/22     1340 1138 1343    WBC 6.35 6.10 7.70    RBC 3.82 3.64 3.74    HGB 14.5 13.0 13.9    HCT 44.2 39.4 41.4    .7 108.2 110.7    MCH 38.0 35.7 37.2    MCHC 32.8 33.0 33.6     383 317        CMP Results       10/02/23 09/01/23 05/23/23     1419 1340 1138     135 135    K 3.6 3.9 3.9    Cl 96 94 92    CO2 31 32 30    Glucose 104 105 108    BUN 7 10 8    Creatinine 0.4 0.5 0.7    Calcium 8.9 9.4 9.5    Anion Gap 6 9 13     153 120    ALT 56 64 33    Albumin 3.6 3.7 3.2    EGFR >60.0 >60.0 >60.0        Lab Results   Component Value Date    INR 1.0 08/05/2020      I spent 22 minutes on this date of service performing the following activities: obtaining history, documenting, preparing for visit, obtaining / reviewing records, providing counseling and education and independently reviewing study/studies.    Sarath Melendrez MD  1/24/2024  9:14 PM

## 2024-01-25 ENCOUNTER — APPOINTMENT (OUTPATIENT)
Dept: URBAN - METROPOLITAN AREA CLINIC 203 | Age: 69
Setting detail: DERMATOLOGY
End: 2024-01-31

## 2024-01-25 DIAGNOSIS — L88 PYODERMA GANGRENOSUM: ICD-10-CM

## 2024-01-25 PROCEDURE — OTHER PHOTO-DOCUMENTATION: OTHER

## 2024-01-25 PROCEDURE — 99213 OFFICE O/P EST LOW 20 MIN: CPT

## 2024-01-25 PROCEDURE — OTHER PRESCRIPTION MEDICATION MANAGEMENT: OTHER

## 2024-01-25 ASSESSMENT — SEVERITY ASSESSMENT: SEVERITY: SEVERE

## 2024-01-25 NOTE — PROCEDURE: PRESCRIPTION MEDICATION MANAGEMENT
Render In Strict Bullet Format?: No
Detail Level: Zone
Continue Regimen: ciprofloxacin for pseudomonal infection

## 2024-01-25 NOTE — ASSESSMENT & PLAN NOTE
I am suspicious that the duodenal inflammation related to the poorly controlled celiac disease is probably contributing to her dilated bile duct.  She is currently undergoing therapy for pyoderma gangrenosum.  Her celiac disease is chronically under poor control.  She is currently on a strict celiac diet.  I have suggested that we continue to monitor her and if her LFTs increase we may consider an ERCP to clear any debris that might be accumulating the distal common bile duct.  But the control of her pyoderma gangrenosum and her celiac disease are the more pressing issues.  She is following up with Dr. Childs regarding those issues.

## 2024-02-01 ENCOUNTER — APPOINTMENT (OUTPATIENT)
Dept: LAB | Facility: CLINIC | Age: 69
End: 2024-02-01
Attending: INTERNAL MEDICINE
Payer: COMMERCIAL

## 2024-02-01 ENCOUNTER — TRANSCRIBE ORDERS (OUTPATIENT)
Dept: SCHEDULING | Age: 69
End: 2024-02-01

## 2024-02-01 DIAGNOSIS — Z51.81 ENCOUNTER FOR THERAPEUTIC DRUG LEVEL MONITORING: Primary | ICD-10-CM

## 2024-02-01 DIAGNOSIS — R94.5 ABNORMAL RESULTS OF LIVER FUNCTION STUDIES: ICD-10-CM

## 2024-02-01 DIAGNOSIS — E55.9 VITAMIN D DEFICIENCY, UNSPECIFIED: ICD-10-CM

## 2024-02-01 DIAGNOSIS — Z51.81 ENCOUNTER FOR THERAPEUTIC DRUG LEVEL MONITORING: ICD-10-CM

## 2024-02-01 DIAGNOSIS — Z13.89 ENCOUNTER FOR SCREENING FOR OTHER DISORDER: ICD-10-CM

## 2024-02-01 DIAGNOSIS — R94.5 ABNORMAL RESULTS OF LIVER FUNCTION STUDIES: Primary | ICD-10-CM

## 2024-02-01 DIAGNOSIS — R93.5 ABNORMAL FINDINGS ON DIAGNOSTIC IMAGING OF OTHER ABDOMINAL REGIONS, INCLUDING RETROPERITONEUM: ICD-10-CM

## 2024-02-01 DIAGNOSIS — L88 PYODERMA GANGRENOSUM: ICD-10-CM

## 2024-02-01 LAB
25(OH)D3 SERPL-MCNC: 69 NG/ML (ref 30–100)
ALBUMIN SERPL-MCNC: 3.7 G/DL (ref 3.5–5.7)
ALP SERPL-CCNC: 394 IU/L (ref 34–125)
ALT SERPL-CCNC: 27 IU/L (ref 7–52)
ANION GAP SERPL CALC-SCNC: 9 MEQ/L (ref 3–15)
AST SERPL-CCNC: 63 IU/L (ref 13–39)
BILIRUB SERPL-MCNC: 0.9 MG/DL (ref 0.3–1.2)
BUN SERPL-MCNC: 6 MG/DL (ref 7–25)
CALCIUM SERPL-MCNC: 9.2 MG/DL (ref 8.6–10.3)
CHLORIDE SERPL-SCNC: 96 MEQ/L (ref 98–107)
CO2 SERPL-SCNC: 28 MEQ/L (ref 21–31)
CREAT SERPL-MCNC: 0.5 MG/DL (ref 0.6–1.2)
CRP SERPL-MCNC: 1.6 MG/L
EGFRCR SERPLBLD CKD-EPI 2021: >60 ML/MIN/1.73M*2
ERYTHROCYTE [SEDIMENTATION RATE] IN BLOOD BY WESTERGREN METHOD: 18 MM/HR
GLUCOSE SERPL-MCNC: 92 MG/DL (ref 70–99)
HBV SURFACE AG SER QL: NONREACTIVE
HCV AB SER QL: NONREACTIVE
INR PPP: 1.1
POTASSIUM SERPL-SCNC: 3.3 MEQ/L (ref 3.5–5.1)
PROT SERPL-MCNC: 7 G/DL (ref 6–8.2)
PROTHROMBIN TIME: 13.7 SEC (ref 12.2–14.5)
SODIUM SERPL-SCNC: 133 MEQ/L (ref 136–145)

## 2024-02-01 PROCEDURE — 86015 ACTIN ANTIBODY EACH: CPT

## 2024-02-01 PROCEDURE — 86038 ANTINUCLEAR ANTIBODIES: CPT

## 2024-02-01 PROCEDURE — 85652 RBC SED RATE AUTOMATED: CPT

## 2024-02-01 PROCEDURE — 86803 HEPATITIS C AB TEST: CPT

## 2024-02-01 PROCEDURE — 86140 C-REACTIVE PROTEIN: CPT

## 2024-02-01 PROCEDURE — 80053 COMPREHEN METABOLIC PANEL: CPT

## 2024-02-01 PROCEDURE — 85610 PROTHROMBIN TIME: CPT

## 2024-02-01 PROCEDURE — 86039 ANTINUCLEAR ANTIBODIES (ANA): CPT

## 2024-02-01 PROCEDURE — 87340 HEPATITIS B SURFACE AG IA: CPT | Mod: GA

## 2024-02-01 PROCEDURE — 85025 COMPLETE CBC W/AUTO DIFF WBC: CPT

## 2024-02-01 PROCEDURE — 82306 VITAMIN D 25 HYDROXY: CPT

## 2024-02-01 PROCEDURE — 36415 COLL VENOUS BLD VENIPUNCTURE: CPT

## 2024-02-01 PROCEDURE — 86376 MICROSOMAL ANTIBODY EACH: CPT

## 2024-02-02 LAB
ANA SER QL IA: NEGATIVE
BASOPHILS # BLD: 0.14 K/UL (ref 0.01–0.1)
BASOPHILS NFR BLD: 1.7 %
DIFFERENTIAL METHOD BLD: ABNORMAL
EOSINOPHIL # BLD: 0.39 K/UL (ref 0.04–0.36)
EOSINOPHIL NFR BLD: 4.8 %
ERYTHROCYTE [DISTWIDTH] IN BLOOD BY AUTOMATED COUNT: 14.1 % (ref 11.7–14.4)
HCT VFR BLD AUTO: 36.2 % (ref 35–45)
HGB BLD-MCNC: 11.8 G/DL (ref 11.8–15.7)
HYPOCHROMIA BLD QL SMEAR: ABNORMAL
IMM GRANULOCYTES # BLD AUTO: 0.04 K/UL (ref 0–0.08)
IMM GRANULOCYTES NFR BLD AUTO: 0.5 %
LYMPHOCYTES # BLD: 1.72 K/UL (ref 1.2–3.5)
LYMPHOCYTES NFR BLD: 21 %
MACROCYTES BLD QL SMEAR: ABNORMAL
MCH RBC QN AUTO: 36.1 PG (ref 28–33.2)
MCHC RBC AUTO-ENTMCNC: 32.6 G/DL (ref 32.2–35.5)
MCV RBC AUTO: 110.7 FL (ref 83–98)
MONOCYTES # BLD: 0.69 K/UL (ref 0.28–0.8)
MONOCYTES NFR BLD: 8.4 %
NEUTROPHILS # BLD: 5.23 K/UL (ref 1.7–7)
NEUTS SEG NFR BLD: 63.6 %
NRBC BLD-RTO: 0 %
PDW BLD AUTO: 9.8 FL (ref 9.4–12.3)
PLAT MORPH BLD: NORMAL
PLATELET # BLD AUTO: 278 K/UL (ref 150–369)
PLATELET # BLD EST: ABNORMAL 10*3/UL
POLYCHROMASIA BLD QL SMEAR: ABNORMAL
RBC # BLD AUTO: 3.27 M/UL (ref 3.93–5.22)
STOMATOCYTES BLD QL SMEAR: ABNORMAL
WBC # BLD AUTO: 8.21 K/UL (ref 3.8–10.5)

## 2024-02-05 LAB — SMOOTH MUSCLE AB SER QL IF: NEGATIVE

## 2024-02-06 ENCOUNTER — APPOINTMENT (OUTPATIENT)
Dept: URBAN - METROPOLITAN AREA CLINIC 203 | Age: 69
Setting detail: DERMATOLOGY
End: 2024-02-06

## 2024-02-06 DIAGNOSIS — L88 PYODERMA GANGRENOSUM: ICD-10-CM

## 2024-02-06 DIAGNOSIS — L98.8 OTHER SPECIFIED DISORDERS OF THE SKIN AND SUBCUTANEOUS TISSUE: ICD-10-CM

## 2024-02-06 PROCEDURE — OTHER OBSERVATION: OTHER

## 2024-02-06 PROCEDURE — OTHER BOTOX (U OR CC) ADDITIVE: OTHER

## 2024-02-06 PROCEDURE — OTHER OBSERVATION AND MEASURE: OTHER

## 2024-02-06 PROCEDURE — 99213 OFFICE O/P EST LOW 20 MIN: CPT

## 2024-02-06 PROCEDURE — OTHER PRESCRIPTION MEDICATION MANAGEMENT: OTHER

## 2024-02-06 PROCEDURE — OTHER PHOTO-DOCUMENTATION: OTHER

## 2024-02-06 PROCEDURE — OTHER BOTOX (U OR CC): OTHER

## 2024-02-06 ASSESSMENT — LOCATION ZONE DERM
LOCATION ZONE: FACE
LOCATION ZONE: LEG
LOCATION ZONE: FEET

## 2024-02-06 ASSESSMENT — LOCATION DETAILED DESCRIPTION DERM
LOCATION DETAILED: GLABELLA
LOCATION DETAILED: RIGHT ANTERIOR LATERAL MALLEOLUS
LOCATION DETAILED: RIGHT INFERIOR TEMPLE
LOCATION DETAILED: RIGHT ANKLE
LOCATION DETAILED: RIGHT MEDIAL DORSAL FOOT
LOCATION DETAILED: LEFT ANKLE
LOCATION DETAILED: LEFT INFERIOR TEMPLE
LOCATION DETAILED: RIGHT INFERIOR MEDIAL FOREHEAD
LOCATION DETAILED: LEFT INFERIOR MEDIAL FOREHEAD
LOCATION DETAILED: LEFT ACHILLES SKIN

## 2024-02-06 ASSESSMENT — LOCATION SIMPLE DESCRIPTION DERM
LOCATION SIMPLE: GLABELLA
LOCATION SIMPLE: RIGHT FOOT
LOCATION SIMPLE: RIGHT ANKLE
LOCATION SIMPLE: RIGHT FOREHEAD
LOCATION SIMPLE: LEFT TEMPLE
LOCATION SIMPLE: LEFT ACHILLES SKIN
LOCATION SIMPLE: LEFT ANKLE
LOCATION SIMPLE: RIGHT TEMPLE
LOCATION SIMPLE: LEFT FOREHEAD

## 2024-02-06 NOTE — PROCEDURE: OBSERVATION
Detail Level: Detailed
Size Of Lesion: 4 cm
Size Of Lesion: 2.5 cm
Size Of Lesion: 2 cm
Size Of Lesion: 4.5

## 2024-02-06 NOTE — PROCEDURE: PRESCRIPTION MEDICATION MANAGEMENT
Detail Level: Zone
Continue Regimen: Remicade\\nConsider premedication with steroid.  Will hold off on trying spevigo.
Render In Strict Bullet Format?: No
Discontinue Regimen: Pseudomonas infection seems clear .  WOunds healing well.  Consider culture and cipro treatment if wounds become painful or worsen.
Plan: Patient would like to transition to Dr. Hurst as I am retiring.

## 2024-02-06 NOTE — PROCEDURE: BOTOX (U OR CC)
Detail Level: Detailed
Post-Care Instructions: Patient instructed to not lie down for 4 hours and limit physical activity for 24 hours.
Consent: Written consent obtained. Risks include but not limited to lid/brow ptosis, bruising, swelling, diplopia, temporary effect, incomplete chemical denervation.
Measure In Units Or Cc's?: units
Show Inventory Tab: Show
Dilution (U/0.1 Cc): 4
Quantity Per Injection Site (Units Or Cc): 3
Quantity Per Injection Site (Units Or Cc): 1

## 2024-02-07 LAB — LKM-1 IGG SER IA-ACNC: <=20 U

## 2024-02-12 LAB
ANA PAT SER IF-IMP: ABNORMAL
ANA TITR SER HEP2 SUBST: ABNORMAL {TITER}

## 2024-02-15 ENCOUNTER — RX ONLY (RX ONLY)
Age: 69
End: 2024-02-15

## 2024-02-15 RX ORDER — CIPROFLOXACIN 500 MG/1
TABLET, FILM COATED ORAL BID
Qty: 14 | Refills: 0 | Status: ERX

## 2024-02-28 ENCOUNTER — APPOINTMENT (OUTPATIENT)
Dept: URBAN - METROPOLITAN AREA CLINIC 203 | Age: 69
Setting detail: DERMATOLOGY
End: 2024-02-28

## 2024-02-28 DIAGNOSIS — L88 PYODERMA GANGRENOSUM: ICD-10-CM

## 2024-02-28 DIAGNOSIS — L08.9 LOCAL INFECTION OF THE SKIN AND SUBCUTANEOUS TISSUE, UNSPECIFIED: ICD-10-CM

## 2024-02-28 PROCEDURE — OTHER OBSERVATION AND MEASURE: OTHER

## 2024-02-28 PROCEDURE — OTHER OBSERVATION: OTHER

## 2024-02-28 PROCEDURE — OTHER PHOTO-DOCUMENTATION: OTHER

## 2024-02-28 PROCEDURE — OTHER PRESCRIPTION MEDICATION MANAGEMENT: OTHER

## 2024-02-28 PROCEDURE — 99213 OFFICE O/P EST LOW 20 MIN: CPT

## 2024-02-28 PROCEDURE — OTHER ORDER TESTS: OTHER

## 2024-02-28 ASSESSMENT — LOCATION SIMPLE DESCRIPTION DERM
LOCATION SIMPLE: RIGHT ANKLE
LOCATION SIMPLE: LEFT ANKLE
LOCATION SIMPLE: LEFT ACHILLES SKIN
LOCATION SIMPLE: RIGHT FOOT

## 2024-02-28 ASSESSMENT — LOCATION DETAILED DESCRIPTION DERM
LOCATION DETAILED: RIGHT ANKLE
LOCATION DETAILED: RIGHT ANTERIOR LATERAL MALLEOLUS
LOCATION DETAILED: LEFT ACHILLES SKIN
LOCATION DETAILED: RIGHT MEDIAL DORSAL FOOT
LOCATION DETAILED: LEFT ANKLE

## 2024-02-28 ASSESSMENT — LOCATION ZONE DERM
LOCATION ZONE: LEG
LOCATION ZONE: FEET

## 2024-02-28 NOTE — PROCEDURE: ORDER TESTS
Expected Date Of Service: 02/28/2024
Billing Type: Third-Party Bill
Lab Facility: 0
Performing Laboratory: [id number]
Bill For Surgical Tray: no

## 2024-03-04 ENCOUNTER — TRANSCRIBE ORDERS (OUTPATIENT)
Dept: REGISTRATION | Facility: REHABILITATION | Age: 69
End: 2024-03-04

## 2024-03-04 DIAGNOSIS — I89.0 LYMPHEDEMA, NOT ELSEWHERE CLASSIFIED: ICD-10-CM

## 2024-03-04 DIAGNOSIS — L88 PYODERMA GANGRENOSUM: ICD-10-CM

## 2024-03-07 ENCOUNTER — RX ONLY (RX ONLY)
Age: 69
End: 2024-03-07

## 2024-03-07 RX ORDER — CLINDAMYCIN HYDROCHLORIDE 300 MG/1
CAPSULE ORAL
Qty: 14 | Refills: 0 | Status: ERX | COMMUNITY
Start: 2024-03-07

## 2024-03-14 ENCOUNTER — APPOINTMENT (OUTPATIENT)
Dept: URBAN - METROPOLITAN AREA CLINIC 203 | Age: 69
Setting detail: DERMATOLOGY
End: 2024-03-19

## 2024-03-14 DIAGNOSIS — T50.905 ADVERSE EFFECT OF UNSPECIFIED DRUGS, MEDICAMENTS AND BIOLOGICAL SUBSTANCES: ICD-10-CM

## 2024-03-14 DIAGNOSIS — L88 PYODERMA GANGRENOSUM: ICD-10-CM

## 2024-03-14 DIAGNOSIS — I89.0 LYMPHEDEMA, NOT ELSEWHERE CLASSIFIED: ICD-10-CM

## 2024-03-14 PROBLEM — T50.905A ADVERSE EFFECT OF UNSPECIFIED DRUGS, MEDICAMENTS AND BIOLOGICAL SUBSTANCES, INITIAL ENCOUNTER: Status: ACTIVE | Noted: 2024-03-14

## 2024-03-14 PROCEDURE — OTHER COUNSELING: OTHER

## 2024-03-14 PROCEDURE — OTHER PRESCRIPTION: OTHER

## 2024-03-14 PROCEDURE — OTHER PRESCRIPTION MEDICATION MANAGEMENT: OTHER

## 2024-03-14 PROCEDURE — 99213 OFFICE O/P EST LOW 20 MIN: CPT

## 2024-03-14 ASSESSMENT — LOCATION SIMPLE DESCRIPTION DERM
LOCATION SIMPLE: RIGHT CALF
LOCATION SIMPLE: LEFT POSTERIOR THIGH
LOCATION SIMPLE: RIGHT ANKLE
LOCATION SIMPLE: LEFT POSTERIOR UPPER ARM
LOCATION SIMPLE: LEFT PRETIBIAL REGION
LOCATION SIMPLE: RIGHT POSTERIOR THIGH
LOCATION SIMPLE: RIGHT POSTERIOR UPPER ARM
LOCATION SIMPLE: RIGHT THIGH
LOCATION SIMPLE: UPPER BACK
LOCATION SIMPLE: LEFT UPPER ARM
LOCATION SIMPLE: LEFT FOREARM
LOCATION SIMPLE: RIGHT FOREARM
LOCATION SIMPLE: LEFT THIGH
LOCATION SIMPLE: LEFT ANKLE
LOCATION SIMPLE: RIGHT UPPER ARM
LOCATION SIMPLE: LEFT CALF

## 2024-03-14 ASSESSMENT — LOCATION DETAILED DESCRIPTION DERM
LOCATION DETAILED: LEFT DISTAL POSTERIOR THIGH
LOCATION DETAILED: LEFT POSTERIOR ANKLE
LOCATION DETAILED: RIGHT PROXIMAL POSTERIOR THIGH
LOCATION DETAILED: RIGHT ANTERIOR PROXIMAL THIGH
LOCATION DETAILED: RIGHT ANTERIOR DISTAL UPPER ARM
LOCATION DETAILED: LEFT VENTRAL PROXIMAL FOREARM
LOCATION DETAILED: RIGHT PROXIMAL DORSAL FOREARM
LOCATION DETAILED: LEFT ANTERIOR PROXIMAL UPPER ARM
LOCATION DETAILED: LEFT PROXIMAL RADIAL DORSAL FOREARM
LOCATION DETAILED: LEFT PROXIMAL POSTERIOR UPPER ARM
LOCATION DETAILED: RIGHT VENTRAL DISTAL FOREARM
LOCATION DETAILED: LEFT PROXIMAL CALF
LOCATION DETAILED: LEFT ANTERIOR DISTAL THIGH
LOCATION DETAILED: RIGHT PROXIMAL POSTERIOR UPPER ARM
LOCATION DETAILED: INFERIOR THORACIC SPINE
LOCATION DETAILED: LEFT DISTAL PRETIBIAL REGION
LOCATION DETAILED: RIGHT PROXIMAL CALF
LOCATION DETAILED: RIGHT ANKLE

## 2024-03-14 ASSESSMENT — SEVERITY ASSESSMENT: SEVERITY: MODERATE TO SEVERE

## 2024-03-14 ASSESSMENT — BSA RASH: BSA RASH: 70

## 2024-03-14 ASSESSMENT — LOCATION ZONE DERM
LOCATION ZONE: TRUNK
LOCATION ZONE: ARM
LOCATION ZONE: LEG

## 2024-03-14 ASSESSMENT — PAIN INTENSITY VAS: HOW INTENSE IS YOUR PAIN 0 BEING NO PAIN, 10 BEING THE MOST SEVERE PAIN POSSIBLE?: 5/10 PAIN

## 2024-03-14 NOTE — PROCEDURE: PRESCRIPTION MEDICATION MANAGEMENT
Detail Level: Zone
Continue Regimen: Remicade\\npremedication with steroid. Is not helping. Rash worse from remicade. Consider trying spevigo. Will attempt approval. Spoke to [facility].  They will attempt [place] with our assistance.\\nPatient has failed oral prednisone, humira, stelara,\\nRemicade appears to be effective but patient has had a anthony eruption since beginning treatment.  We have tried to treat this using topical steroids and premedication with po steroid prior to infusion.  Rash is worsening.  Itching is intolerable.
Render In Strict Bullet Format?: No
Discontinue Regimen: Pseudomonas infection just started cipro .  WOunds healing well.  Continue vinegar soaks
Plan: Patient would continue to transition to Dr. Hurst as I am retiring. Will consider Spevigo if approved.
Plan: Recommended getting established at a lymphedema clinic

## 2024-03-19 RX ORDER — TRIAMCINOLONE ACETONIDE 1 MG/G
CREAM TOPICAL BID
Qty: 454 | Refills: 3 | Status: CANCELLED
Stop reason: SDUPTHER

## 2024-03-20 ENCOUNTER — APPOINTMENT (OUTPATIENT)
Dept: URBAN - METROPOLITAN AREA CLINIC 203 | Age: 69
Setting detail: DERMATOLOGY
End: 2024-03-29

## 2024-03-20 DIAGNOSIS — Z79.899 OTHER LONG TERM (CURRENT) DRUG THERAPY: ICD-10-CM

## 2024-03-20 PROCEDURE — OTHER ORDER TESTS: OTHER

## 2024-03-20 ASSESSMENT — LOCATION ZONE DERM: LOCATION ZONE: TRUNK

## 2024-03-20 ASSESSMENT — LOCATION SIMPLE DESCRIPTION DERM: LOCATION SIMPLE: ABDOMEN

## 2024-03-20 ASSESSMENT — LOCATION DETAILED DESCRIPTION DERM: LOCATION DETAILED: EPIGASTRIC SKIN

## 2024-03-20 NOTE — PROCEDURE: ORDER TESTS
Lab Facility: 0
Performing Laboratory: [id number]
Bill For Surgical Tray: no
Expected Date Of Service: 03/20/2024
Billing Type: Third-Party Bill

## 2024-03-26 ENCOUNTER — APPOINTMENT (OUTPATIENT)
Dept: LAB | Facility: CLINIC | Age: 69
End: 2024-03-26
Attending: SPECIALIST
Payer: COMMERCIAL

## 2024-03-26 ENCOUNTER — APPOINTMENT (OUTPATIENT)
Dept: URBAN - METROPOLITAN AREA CLINIC 203 | Age: 69
Setting detail: DERMATOLOGY
End: 2024-03-31

## 2024-03-26 ENCOUNTER — TRANSCRIBE ORDERS (OUTPATIENT)
Dept: REGISTRATION | Facility: CLINIC | Age: 69
End: 2024-03-26

## 2024-03-26 DIAGNOSIS — Z79.899 OTHER LONG TERM (CURRENT) DRUG THERAPY: ICD-10-CM

## 2024-03-26 DIAGNOSIS — L88 PYODERMA GANGRENOSUM: ICD-10-CM

## 2024-03-26 DIAGNOSIS — I89.0 LYMPHEDEMA, NOT ELSEWHERE CLASSIFIED: ICD-10-CM

## 2024-03-26 DIAGNOSIS — T50.905 ADVERSE EFFECT OF UNSPECIFIED DRUGS, MEDICAMENTS AND BIOLOGICAL SUBSTANCES: ICD-10-CM

## 2024-03-26 DIAGNOSIS — Z79.899 OTHER LONG TERM (CURRENT) DRUG THERAPY: Primary | ICD-10-CM

## 2024-03-26 PROBLEM — T50.905A ADVERSE EFFECT OF UNSPECIFIED DRUGS, MEDICAMENTS AND BIOLOGICAL SUBSTANCES, INITIAL ENCOUNTER: Status: ACTIVE | Noted: 2024-03-26

## 2024-03-26 PROCEDURE — OTHER PRESCRIPTION: OTHER

## 2024-03-26 PROCEDURE — OTHER COUNSELING: OTHER

## 2024-03-26 PROCEDURE — 36415 COLL VENOUS BLD VENIPUNCTURE: CPT

## 2024-03-26 PROCEDURE — OTHER CARE COORDINATION: OTHER

## 2024-03-26 PROCEDURE — OTHER PHOTO-DOCUMENTATION: OTHER

## 2024-03-26 PROCEDURE — OTHER PRESCRIPTION MEDICATION MANAGEMENT: OTHER

## 2024-03-26 PROCEDURE — 86480 TB TEST CELL IMMUN MEASURE: CPT

## 2024-03-26 PROCEDURE — 99214 OFFICE O/P EST MOD 30 MIN: CPT

## 2024-03-26 PROCEDURE — OTHER ORDER TESTS: OTHER

## 2024-03-26 RX ORDER — METHYLPREDNISOLONE 4 MG/1
TABLET ORAL
Qty: 1 | Refills: 0 | Status: ERX | COMMUNITY
Start: 2024-03-26

## 2024-03-26 ASSESSMENT — LOCATION DETAILED DESCRIPTION DERM
LOCATION DETAILED: LEFT DORSAL FOOT
LOCATION DETAILED: LEFT ACHILLES SKIN
LOCATION DETAILED: LEFT DISTAL PRETIBIAL REGION
LOCATION DETAILED: RIGHT ANKLE
LOCATION DETAILED: LEFT ANKLE
LOCATION DETAILED: LEFT ANTERIOR PROXIMAL THIGH
LOCATION DETAILED: RIGHT DISTAL PRETIBIAL REGION
LOCATION DETAILED: LEFT ANTERIOR DISTAL THIGH

## 2024-03-26 ASSESSMENT — BSA RASH: BSA RASH: 35

## 2024-03-26 ASSESSMENT — LOCATION ZONE DERM
LOCATION ZONE: FEET
LOCATION ZONE: LEG

## 2024-03-26 ASSESSMENT — LOCATION SIMPLE DESCRIPTION DERM
LOCATION SIMPLE: LEFT ACHILLES SKIN
LOCATION SIMPLE: LEFT THIGH
LOCATION SIMPLE: RIGHT PRETIBIAL REGION
LOCATION SIMPLE: LEFT PRETIBIAL REGION
LOCATION SIMPLE: LEFT ANKLE
LOCATION SIMPLE: LEFT FOOT
LOCATION SIMPLE: RIGHT ANKLE

## 2024-03-26 ASSESSMENT — SEVERITY ASSESSMENT: SEVERITY: MODERATE TO SEVERE

## 2024-03-26 NOTE — PROCEDURE: ORDER TESTS
Bill For Surgical Tray: no
Performing Laboratory: [id number]
Expected Date Of Service: 03/31/2024
Billing Type: Third-Party Bill
Lab Facility: 0

## 2024-03-26 NOTE — PROCEDURE: CARE COORDINATION
Consultants Discussion Details: Plan to start Spevigo ASAP at [facility].  D?C remicade due to rash.  Also spoke with [facility] to ensure spevigo can be administered there.  PAPERWORK SENT TO [facility] along with paper detailing use for pyoderma gangrenosum and a letter supporting off label use for PG.
Who Did You Discuss Managment Or Test Interpretation With?: Consultant
Detail Level: Zone
Consultant's Name: Dr. Perez

## 2024-03-26 NOTE — PROCEDURE: PRESCRIPTION MEDICATION MANAGEMENT
Plan: Recommended getting established at a lymphedema clinic
Render In Strict Bullet Format?: No
Detail Level: Zone
Initiate Treatment: Medrol dose tonja
Plan: quantiferon needed for spevigo initiation
Initiate Treatment: Spevigo with [facility] (see adverse drug reaction note)
Discontinue Regimen: remicade

## 2024-03-28 LAB
M TB IFN-G BLD-IMP: NEGATIVE
MITOGEN-NIL: >10 IU/ML
NIL: 0.04 IU/ML
TB AG-NIL: 0 IU/ML
TB2 AG - NIL: 0 IU/ML

## 2024-04-03 ENCOUNTER — RX ONLY (RX ONLY)
Age: 69
End: 2024-04-03

## 2024-04-03 RX ORDER — PREDNISONE 2.5 MG/1
TABLET ORAL
Qty: 37 | Refills: 1 | Status: ERX | COMMUNITY
Start: 2024-04-02

## 2024-04-30 ENCOUNTER — APPOINTMENT (OUTPATIENT)
Dept: URBAN - METROPOLITAN AREA CLINIC 203 | Age: 69
Setting detail: DERMATOLOGY
End: 2024-05-01

## 2024-04-30 DIAGNOSIS — L82.0 INFLAMED SEBORRHEIC KERATOSIS: ICD-10-CM

## 2024-04-30 DIAGNOSIS — L88 PYODERMA GANGRENOSUM: ICD-10-CM

## 2024-04-30 PROCEDURE — 99213 OFFICE O/P EST LOW 20 MIN: CPT | Mod: 25

## 2024-04-30 PROCEDURE — OTHER LIQUID NITROGEN: OTHER

## 2024-04-30 PROCEDURE — OTHER PHOTO-DOCUMENTATION: OTHER

## 2024-04-30 PROCEDURE — OTHER OBSERVATION AND MEASURE: OTHER

## 2024-04-30 PROCEDURE — OTHER OBSERVATION: OTHER

## 2024-04-30 PROCEDURE — OTHER PRESCRIPTION MEDICATION MANAGEMENT: OTHER

## 2024-04-30 PROCEDURE — OTHER ORDER TESTS: OTHER

## 2024-04-30 PROCEDURE — 17110 DESTRUCT B9 LESION 1-14: CPT

## 2024-04-30 ASSESSMENT — LOCATION DETAILED DESCRIPTION DERM
LOCATION DETAILED: RIGHT ANTERIOR MEDIAL MALLEOLUS
LOCATION DETAILED: LEFT ACHILLES SKIN
LOCATION DETAILED: RIGHT ANKLE
LOCATION DETAILED: LEFT ANTERIOR MEDIAL MALLEOLUS
LOCATION DETAILED: RIGHT SUPRAPUBIC SKIN

## 2024-04-30 ASSESSMENT — LOCATION ZONE DERM
LOCATION ZONE: LEG
LOCATION ZONE: TRUNK

## 2024-04-30 ASSESSMENT — LOCATION SIMPLE DESCRIPTION DERM
LOCATION SIMPLE: LEFT ANKLE
LOCATION SIMPLE: LEFT ACHILLES SKIN
LOCATION SIMPLE: RIGHT ANKLE
LOCATION SIMPLE: GROIN

## 2024-04-30 NOTE — PROCEDURE: ORDER TESTS
Bill For Surgical Tray: no
Billing Type: Third-Party Bill
Lab Facility: 0
Performing Laboratory: [id number]
Expected Date Of Service: 04/30/2024

## 2024-04-30 NOTE — PROCEDURE: PRESCRIPTION MEDICATION MANAGEMENT
Initiate Treatment: Neosporin
Render In Strict Bullet Format?: No
Plan: restart remicade with Dr. Perez.  Will transfer care to Dr. Hurst on May 16
Detail Level: Zone

## 2024-04-30 NOTE — PROCEDURE: OBSERVATION
Detail Level: Detailed
Size Of Lesion: 2 cm x 5 mm
Size Of Lesion: 4.5 cm x 2 cm
Size Of Lesion: 3 cm x 2.5 cm
Size Of Lesion: 2 cm x 0.5 cm

## 2024-04-30 NOTE — PROCEDURE: LIQUID NITROGEN
Detail Level: Detailed
Show Aperture Variable?: Yes
Render Post-Care Instructions In Note?: no
Medical Necessity Information: It is in your best interest to select a reason for this procedure from the list below. All of these items fulfill various CMS LCD requirements except the new and changing color options.
Post-Care Instructions: I reviewed with the patient in detail post-care instructions. Patient is to wear sunprotection, and avoid picking at any of the treated lesions. Pt may apply Vaseline to crusted or scabbing areas.
Consent: The patient's consent was obtained including but not limited to risks of crusting, scabbing, blistering, scarring, darker or lighter pigmentary change, recurrence, incomplete removal and infection.
Medical Necessity Clause: This procedure was medically necessary because the lesions that were treated were:
Spray Paint Text: The liquid nitrogen was applied to the skin utilizing a spray paint frosting technique.

## 2024-05-01 ENCOUNTER — HOSPITAL ENCOUNTER (OUTPATIENT)
Dept: PHYSICAL THERAPY | Facility: CLINIC | Age: 69
Setting detail: THERAPIES SERIES
Discharge: HOME | End: 2024-05-01
Attending: FAMILY MEDICINE
Payer: COMMERCIAL

## 2024-05-01 ENCOUNTER — RX ONLY (RX ONLY)
Age: 69
End: 2024-05-01

## 2024-05-01 DIAGNOSIS — I89.0 LYMPHEDEMA, NOT ELSEWHERE CLASSIFIED: ICD-10-CM

## 2024-05-01 DIAGNOSIS — L88 PYODERMA GANGRENOSUM: ICD-10-CM

## 2024-05-01 PROCEDURE — 97162 PT EVAL MOD COMPLEX 30 MIN: CPT | Mod: GP

## 2024-05-01 RX ORDER — OXYCODONE HYDROCHLORIDE 10 MG/1
10 TABLET ORAL EVERY 4 HOURS PRN
COMMUNITY

## 2024-05-01 RX ORDER — CLOBETASOL PROPIONATE 0.5 MG/G
CREAM TOPICAL
Qty: 180 | Refills: 3 | Status: ERX

## 2024-05-01 NOTE — PROGRESS NOTES
Physical Therapy Evaluation    [facility] Fax: [phone]    PT EVALUATION FOR OUTPATIENT THERAPY    Patient: Mary Moreno    MRN: [id number]  : 1955 68 y.o.     Referring Physician: Leeanne Farah DO  Date of Visit: 2024      Certification Dates:  24 through 24         Recommended Frequency & Duration:  2 times/week for up to 3 months     Diagnosis:   1. Pyoderma gangrenosum    2. Lymphedema, not elsewhere classified        Chief Complaints: No chief complaint on file.      Precautions:    Precautions additional comments: Open wounds on ankles B    Past Medical History:   Past Medical History:   Diagnosis Date    Celiac disease     Fractures     Osteopenia     Pyoderma gangrenosum     Ulcerative colitis (CMS/[facility])        Past Surgical History:   Past Surgical History:   Procedure Laterality Date     SECTION      FOOT SURGERY      HYSTERECTOMY      SKIN GRAFT           LEARNING ASSESSMENT    Assessment completed:  Yes    Learner name:  Mary    Learner: Patient    Learning Barriers:  Learning barriers: No Barriers    Preferred Language: English     Needed: No    Education Provided:   Method: Discussion  Readiness: acceptance  Response: Demonstrated understanding      CO-LEARNER ASSESSMENT:    Completed: No      Welcome letter discussed: Yes Patient provided with Welcome Letter, which includes attendance policy. Provided education regarding cancellation and no-show policy. Education regarding the importance of participation and regular attendance to maximize goal attainment.       OBJECTIVE MEASUREMENTS/DATA:    Time In Session:  Start Time: 1015  Stop Time: 1100  Time Calculation (min): 45 min     General Information - 24 1017          Session Details    Document Type initial evaluation     Mode of Treatment individual therapy        General Information    Onset of Illness/Injury or Date of Surgery --   skin graft to jay feet      Referring Physician Dr. Leeanne Farah     History of present illness/functional impairment Pt with refractory celiac disease that was diagnosed in 2020. Pt with uncontrolled celiac and h/o of pyaderma gangroseum. Pt with sores on jay LE. Pt with h/o of jay feet grafts in 2013. Pt with jay LE lymphedema and recommended for PT. Pt had PT for lymphedema at [facility] 2 years ago. Pt does have compression garments that caused a wound. Pt does not have a pump. 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 Pt does has wraps for wounds not healed.     Patient/Family/Caregiver Comments/Observations chief complaint is the swelling in the legs     Precautions comments Open wounds on ankles B         Services    Do You Speak a Language Other Than English at Home? no        Behavioral Health Related Communication    Suicidal Ideation No                    Pain/Vitals - 05/01/24 1017          Pain Assessment    Currently in pain Yes     Preferred Pain Scale number (Numeric Rating Pain Scale)     Pain Side/Orientation bilateral     Pain: Body location Leg     Pain Rating (0-10): Pre Activity 3     Pain Rating (0-10): Activity 3     Pain Rating (0-10): Post Activity 3     Pain Radiation to leg, left;leg, right        Pain Intervention    Intervention  eval     Post Intervention Comments no increase in pain                    Falls/Food Screening - 05/01/24 1017          Initial Falls Assessment    One or more falls in the last year No        Food Insecurity    Within the past 12 months, you worried that your food would run out before you got the money to buy more. Never true     Within the past 12 months, the food you bought just didn't last and you didn't have money to get more. Never true                    PT - 05/01/24 1017          Physical Therapy    Physical Therapy Specialty Lymphedema Program        PT Plan    Frequency of treatment 2 times/week     PT Duration 3 months     PT  Cert From 05/01/24     PT Cert To 08/01/24     Date PT POC was sent to provider 05/01/24     Signed PT Plan of Care received?  No                       Lymphedema:    Lymphedema Assessment       Row Name 05/01/24 1017       BODY LOCATION    Upper Body / Lower Body / Face and Neck Lower body       LE Skin    Skin Condition Impaired    Fibrosis severe    Scar/Incision bound down    Color pink    Quality fragile;thickened    Edema +2    Wound seepage    LE Wound comments jay ankle wounds       LE/Back/Trunk Palpation    LE Palpation  pos stemmer jay feet below knee edema       Lower Body Outcome Measures    LLIS results/comments  54       LE Volumetrics    LE Volume Measurements B/L LE       Affected LE Measurements    Affected limb laterality Right    MTP 22.5 cm    -8 cm 23 cm    -12 cm 25 cm    0 cm 22 cm    4 cm 20 cm    8 cm 25 cm    12 cm 27 cm    16 cm 26.5 cm    20 cm 25.7 cm    24 cm 27 cm    28 cm 30 cm    32 cm 28 cm    Affected LE Total Volume (ml) 41245.49 ml       Other Affected LE Circumference    Other affected limb laterality Left    MTP 22 cm    -8 cm 23.8 cm    -12 cm 26 cm    0 cm 22 cm    4 cm 21 cm    8 cm 26 cm    12 cm 27 cm    16 cm 25 cm    20 cm 25 cm    24 cm 27.5 cm    28 cm 29.7 cm    32 cm 27 cm    Other Affected LE Total Volume (ml) 11366.13 ml       LE B/L Volume Difference    Which limb is affected more? Equal severity    B/L LE Volume Difference 57.64    Difference % 0.2       RIGHT: Lower Extremity AROM Assessment    Right LE AROM normal WFL       LEFT: Lower Extremity AROM Assessment    Left LE AROM normal WFL       RIGHT: Lower Extremity Manual Muscle Test Assessment    Hip Flexion gross movement (4+/5) good plus    Hip Extension gross movement (4/5) good    Hip Abduction gross movement (4+/5) good plus    Hip Adduction gross movement (4+/5) good plus    Knee Flexion strength (4+/5) good plus    Knee Extension strength (4+/5) good plus    Ankle Dorsiflexion gross movement (4+/5)  good plus    Ankle Plantarflexion gross movement (4+/5) good plus       LEFT: Lower Extremity Manual Muscle Test Assessment    Hip Flexion gross movement (4+/5) good plus    Hip Extension gross movement (4/5) good    Hip Abduction gross movement (4+/5) good plus    Hip Adduction gross movement (4+/5) good plus    Knee Flexion strength (4+/5) good plus    Knee Extension strength (4+/5) good plus    Ankle Dorsiflexion gross movement (4+/5) good plus    Ankle Plantarflexion gross movement (4+/5) good plus       Gait Training    Gait/Stairs Locomotion gait/ambulation independence    [place], Gait independent    Assistive Device none       Stairs Assessment    Assistive Device none       Psychosocial    Psychosocial comments pt crying during session due to frustration       Assessment    Plan of Care reviewed and patient/family in agreement Yes    System Pathology/Pathophysiology Noted integumentary;musculoskeletal;lymphatic    Functional Limitations in Following Categories (PT Eval) community/leisure;home management;self-care    Rehab Potential/Prognosis good, to achieve stated therapy goals    Problem List decreased flexibility;edema;pain;decreased strength;decreased ROM    Clinical Assessment Pt presents with jay LE lymphedema below the knee. Pt with severe uncontrolled celiac with rare pyaderma gangroseum. Pt with open wounds at the ankle jay. Pt with pitting edema and being assessed for possible infection to one wound but only pinkish coloration. Pt aware of signs of infection. Pt will benefit from PT 2x a week 12 weeks for MT,TE,TA and self care. Pt will benefit from education for risk reduction/skin care, MLD, compression day and night time and decongestive exercises.    Plan and Recommendations focus on MLD and discuss different garment options    Planned Services CPT 76457 Manual therapy;CPT 85379 Therapeutic activities;CPT 64853 Self-care/Home management training;CPT 58223 Therapeutic exercises                      Goals          Patient Stated      pt goals (pt-stated)       To get the swelling down especially in my toes since they affect my balance         Other      jay LE lymphedema goals        Short term goals   Short Term Goals Time Frame Result Comment/Progress   Pt will increase LE MMT by >/= 1/2 grade 6 weeks               Pt will report decreased pain at worst 2/10 6weeks       Pt will demonstrate w/ supervision HEP/compression/self MLD techniques 6 weeks       Pt will decrease volume by 2% 6 weeks       Pt will verbalize independently proper skin care  6 weeks                       Long term goals   LongTerm Goals Time Frame Result Comment/Progress                   Pt will report decreased pain at worst 0/10 12 weeks       Pt will demonstrate w/ (i) HEP/compression/self MLD techniques 12 weeks       Pt will decrease volume by 3-5% 12weeks       By therapist touch, pt will demonstrate dec fibrosis as evidenced by softer subcutaneous tissues in identified areas 12 weeks     Pt will improve LLIS score by 5 12weeks                  Mutually agreed upon pain goal       Mutually agreed upon pain goal: 0-1/10                TREATMENT PLAN:    LYMPHEDEMA PT FLOWSHEET  Eval performed and pt verbalized understanding of POC yes  PT Lymph Exercises Current Session Time                        THER ACT  CPT 65339 TOTAL TIME FOR SESSION Not performed   Bed Mobility    Transfer Training    Body Mechanics/Work Training    Patient Education    THER EX  CPT 20167 TOTAL TIME FOR SESSION Not performed   CARDIOVASCULAR    Nu Step    UBE    STRENGTHENING     Supine Ther-Ex    Standing Ther-Ex    Seated Ther-Ex    STRETCHING    Core Stabilization    LE Stretching    UE Stretching    Spinal Stretching    Postural     REPEATED MOVEMENTS                                     GAIT TRAINING  CPT 95430 TOTAL TIME FOR SESSION Not performed   Ambulation     Dynamic Gait    MANUAL   CPT 83854 TOTAL TIME FOR SESSION Not performed    Stretching    Mobilization    Massage- Deep Tissue, Scar, Transverse Friction    Manual Lymph Drainage Reji LE short neck abdominal sequence   Consider pump carepoint   Skin Care- Lotion/Wrapping    Measurement/Fitting Mobiderm below knee velcro  Medi reduction   Skin Stretching/Mobilization for Cording             ASSESSMENT:    This 68 y.o. year old female presents to PT with above stated diagnosis. Physical Therapy evaluation reveals decreased flexibility, edema, pain, decreased strength, decreased ROM resulting in community/leisure, home management, self-care limitations. Mary Moreno will benefit from skilled PT services to address limitation, work towards rehab and patient goals and maximize PLOF of chosen ADLs.     Planned Services: The patient's treatment will include CPT 86994 Manual therapy, CPT 84469 Therapeutic activities, CPT 91690 Self-care/Home management training, CPT 71747 Therapeutic exercises, .     Delphine Richardson, PT

## 2024-05-01 NOTE — LETTER
Dear DR. Farah,    Thank you for this referral. Please review the attached notes and plan of care for your approval.  Please contact our department with any questions.     Sincerely,     Delphine Richardson, PT  [address]  [address]  [facility] [address]  Phone [phone]  Fax  [phone]    By co-signing this Plan of Care (POC) you agree to the following:  I have reviewed the the Plan of Care established by the therapist within this document and certify that the services are skilled and medically necessary. I have reviewed the plan and recommend that these services continue to meet the goals stated in this document.    PHYSICIAN SIGNATURE: __________________________________     DATE: ___________________  TIME: _____________           Physical Therapy Plan of Care 24   Effective from: 2024  Effective to: 2024    Plan ID: [id number]            Participants as of Finalize on 2024    Name Type Comments Contact Info    Leeanne Farah DO PCP - General  [phone]    Delphine Richardson, PT Physical Therapist         Last Progress Notes Note     Author: Delphine Richardson, PT Status: Signed Last edited: 2024 10:00 AM       Physical Therapy Evaluation    [facility] Fax: [phone]    PT EVALUATION FOR OUTPATIENT THERAPY    Patient: Mary Moreno    MRN: [id number]  : 1955 68 y.o.     Referring Physician: Leeanne Farah DO  Date of Visit: 2024      Certification Dates:  24 through 24         Recommended Frequency & Duration:  2 times/week for up to 3 months     Diagnosis:   1. Pyoderma gangrenosum    2. Lymphedema, not elsewhere classified        Chief Complaints: No chief complaint on file.      Precautions:    Precautions additional comments: Open wounds on ankles B    Past Medical History:   Past Medical History:   Diagnosis Date   • Celiac disease    • Fractures    • Osteopenia    • Pyoderma gangrenosum    •  Ulcerative colitis (CMS/HCC)        Past Surgical History:   Past Surgical History:   Procedure Laterality Date   •  SECTION     • FOOT SURGERY     • HYSTERECTOMY     • SKIN GRAFT           LEARNING ASSESSMENT    Assessment completed:  Yes    Learner name:  Mary    Learner: Patient    Learning Barriers:  Learning barriers: No Barriers    Preferred Language: English     Needed: No    Education Provided:   Method: Discussion  Readiness: acceptance  Response: Demonstrated understanding      CO-LEARNER ASSESSMENT:    Completed: No      Welcome letter discussed: Yes Patient provided with Welcome Letter, which includes attendance policy. Provided education regarding cancellation and no-show policy. Education regarding the importance of participation and regular attendance to maximize goal attainment.       OBJECTIVE MEASUREMENTS/DATA:    Time In Session:  Start Time: 1015  Stop Time: 1100  Time Calculation (min): 45 min     General Information - 24 1017          Session Details    Document Type initial evaluation     Mode of Treatment individual therapy        General Information    Onset of Illness/Injury or Date of Surgery --   skin graft to jay feet     Referring Physician Dr. Leeanne Farah     History of present illness/functional impairment Pt with refractory celiac disease that was diagnosed in . Pt with uncontrolled celiac and h/o of pyaderma gangroseum. Pt with sores on jay LE. Pt with h/o of jay feet grafts in . Pt with jay LE lymphedema and recommended for PT. Pt had PT for lymphedema at [facility] office 2 years ago. Pt does have compression garments that caused a wound. Pt does not have a pump. 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 Pt does has wraps for wounds not healed.     Patient/Family/Caregiver Comments/Observations chief complaint is the swelling in the legs     Precautions comments Open wounds on ankles B          Services    Do You Speak a Language Other Than English at Home? no        Behavioral Health Related Communication    Suicidal Ideation No                    Pain/Vitals - 05/01/24 1017          Pain Assessment    Currently in pain Yes     Preferred Pain Scale number (Numeric Rating Pain Scale)     Pain Side/Orientation bilateral     Pain: Body location Leg     Pain Rating (0-10): Pre Activity 3     Pain Rating (0-10): Activity 3     Pain Rating (0-10): Post Activity 3     Pain Radiation to leg, left;leg, right        Pain Intervention    Intervention  eval     Post Intervention Comments no increase in pain                    Falls/Food Screening - 05/01/24 1017          Initial Falls Assessment    One or more falls in the last year No        Food Insecurity    Within the past 12 months, you worried that your food would run out before you got the money to buy more. Never true     Within the past 12 months, the food you bought just didn't last and you didn't have money to get more. Never true                    PT - 05/01/24 1017          Physical Therapy    Physical Therapy Specialty Lymphedema Program        PT Plan    Frequency of treatment 2 times/week     PT Duration 3 months     PT Cert From 05/01/24     PT Cert To 08/01/24     Date PT POC was sent to provider 05/01/24     Signed PT Plan of Care received?  No                       Lymphedema:    Lymphedema Assessment       Row Name 05/01/24 1017       BODY LOCATION    Upper Body / Lower Body / Face and Neck Lower body       LE Skin    Skin Condition Impaired    Fibrosis severe    Scar/Incision bound down    Color pink    Quality fragile;thickened    Edema +2    Wound seepage    LE Wound comments jay ankle wounds       LE/Back/Trunk Palpation    LE Palpation  pos stemmer jay feet below knee edema       Lower Body Outcome Measures    LLIS results/comments  54       LE Volumetrics    LE Volume Measurements B/L LE       Affected LE Measurements    Affected limb  laterality Right    MTP 22.5 cm    -8 cm 23 cm    -12 cm 25 cm    0 cm 22 cm    4 cm 20 cm    8 cm 25 cm    12 cm 27 cm    16 cm 26.5 cm    20 cm 25.7 cm    24 cm 27 cm    28 cm 30 cm    32 cm 28 cm    Affected LE Total Volume (ml) 83057.49 ml       Other Affected LE Circumference    Other affected limb laterality Left    MTP 22 cm    -8 cm 23.8 cm    -12 cm 26 cm    0 cm 22 cm    4 cm 21 cm    8 cm 26 cm    12 cm 27 cm    16 cm 25 cm    20 cm 25 cm    24 cm 27.5 cm    28 cm 29.7 cm    32 cm 27 cm    Other Affected LE Total Volume (ml) 28258.13 ml       LE B/L Volume Difference    Which limb is affected more? Equal severity    B/L LE Volume Difference 57.64    Difference % 0.2       RIGHT: Lower Extremity AROM Assessment    Right LE AROM normal WFL       LEFT: Lower Extremity AROM Assessment    Left LE AROM normal WFL       RIGHT: Lower Extremity Manual Muscle Test Assessment    Hip Flexion gross movement (4+/5) good plus    Hip Extension gross movement (4/5) good    Hip Abduction gross movement (4+/5) good plus    Hip Adduction gross movement (4+/5) good plus    Knee Flexion strength (4+/5) good plus    Knee Extension strength (4+/5) good plus    Ankle Dorsiflexion gross movement (4+/5) good plus    Ankle Plantarflexion gross movement (4+/5) good plus       LEFT: Lower Extremity Manual Muscle Test Assessment    Hip Flexion gross movement (4+/5) good plus    Hip Extension gross movement (4/5) good    Hip Abduction gross movement (4+/5) good plus    Hip Adduction gross movement (4+/5) good plus    Knee Flexion strength (4+/5) good plus    Knee Extension strength (4+/5) good plus    Ankle Dorsiflexion gross movement (4+/5) good plus    Ankle Plantarflexion gross movement (4+/5) good plus       Gait Training    Gait/Stairs Locomotion gait/ambulation independence    [place], Gait independent    Assistive Device none       Stairs Assessment    Assistive Device none       Psychosocial    Psychosocial comments pt  crying during session due to frustration       Assessment    Plan of Care reviewed and patient/family in agreement Yes    System Pathology/Pathophysiology Noted integumentary;musculoskeletal;lymphatic    Functional Limitations in Following Categories (PT Eval) community/leisure;home management;self-care    Rehab Potential/Prognosis good, to achieve stated therapy goals    Problem List decreased flexibility;edema;pain;decreased strength;decreased ROM    Clinical Assessment Pt presents with jay LE lymphedema below the knee. Pt with severe uncontrolled celiac with rare pyaderma gangroseum. Pt with open wounds at the ankle jay. Pt with pitting edema and being assessed for possible infection to one wound but only pinkish coloration. Pt aware of signs of infection. Pt will benefit from PT 2x a week 12 weeks for MT,TE,TA and self care. Pt will benefit from education for risk reduction/skin care, MLD, compression day and night time and decongestive exercises.    Plan and Recommendations focus on MLD and discuss different garment options    Planned Services CPT 86100 Manual therapy;CPT 05728 Therapeutic activities;CPT 00545 Self-care/Home management training;CPT 63935 Therapeutic exercises                     Goals          Patient Stated    •  pt goals (pt-stated)       To get the swelling down especially in my toes since they affect my balance         Other    •  jay LE lymphedema goals        Short term goals   Short Term Goals Time Frame Result Comment/Progress   Pt will increase LE MMT by >/= 1/2 grade 6 weeks               Pt will report decreased pain at worst 2/10 6weeks       Pt will demonstrate w/ supervision HEP/compression/self MLD techniques 6 weeks       Pt will decrease volume by 2% 6 weeks       Pt will verbalize independently proper skin care  6 weeks                       Long term goals   LongTerm Goals Time Frame Result Comment/Progress                   Pt will report decreased pain at worst 0/10 12  weeks       Pt will demonstrate w/ (i) HEP/compression/self MLD techniques 12 weeks       Pt will decrease volume by 3-5% 12weeks       By therapist touch, pt will demonstrate dec fibrosis as evidenced by softer subcutaneous tissues in identified areas 12 weeks     Pt will improve LLIS score by 5 12weeks                •  Mutually agreed upon pain goal       Mutually agreed upon pain goal: 0-1/10                TREATMENT PLAN:    LYMPHEDEMA PT FLOWSHEET  Eval performed and pt verbalized understanding of POC yes  PT Lymph Exercises Current Session Time                        THER ACT  CPT 56198 TOTAL TIME FOR SESSION Not performed   Bed Mobility    Transfer Training    Body Mechanics/Work Training    Patient Education    THER EX  CPT 50201 TOTAL TIME FOR SESSION Not performed   CARDIOVASCULAR    Nu Step    UBE    STRENGTHENING     Supine Ther-Ex    Standing Ther-Ex    Seated Ther-Ex    STRETCHING    Core Stabilization    LE Stretching    UE Stretching    Spinal Stretching    Postural     REPEATED MOVEMENTS                                     GAIT TRAINING  CPT 82891 TOTAL TIME FOR SESSION Not performed   Ambulation     Dynamic Gait    MANUAL   CPT 41544 TOTAL TIME FOR SESSION Not performed   Stretching    Mobilization    Massage- Deep Tissue, Scar, Transverse Friction    Manual Lymph Drainage Reji LE short neck abdominal sequence   Consider pump carepoint   Skin Care- Lotion/Wrapping    Measurement/Fitting Mobiderm below knee velcro  Medi reduction   Skin Stretching/Mobilization for Cording             ASSESSMENT:    This 68 y.o. year old female presents to PT with above stated diagnosis. Physical Therapy evaluation reveals decreased flexibility, edema, pain, decreased strength, decreased ROM resulting in community/leisure, home management, self-care limitations. Mary Moreno will benefit from skilled PT services to address limitation, work towards rehab and patient goals and maximize PLOF of chosen ADLs.      Planned Services: The patient's treatment will include CPT 35268 Manual therapy, CPT 40998 Therapeutic activities, CPT 86906 Self-care/Home management training, CPT 56369 Therapeutic exercises, .     Delphine Richardson, PT                           Current Participants as of 5/1/2024    Name Type Comments Contact Info    Leeanne Farah DO PCP - General  [phone]    Signature pending    Delphine Richardson, PT Physical Therapist      Signature pending

## 2024-05-01 NOTE — OP PT TREATMENT LOG
LYMPHEDEMA PT FLOWSHEET  Eval performed and pt verbalized understanding of POC yes  PT Lymph Exercises Current Session Time                        THER ACT  CPT 86808 TOTAL TIME FOR SESSION Not performed   Bed Mobility    Transfer Training    Body Mechanics/Work Training    Patient Education    THER EX  CPT 65807 TOTAL TIME FOR SESSION Not performed   CARDIOVASCULAR    Nu Step    UBE    STRENGTHENING     Supine Ther-Ex    Standing Ther-Ex    Seated Ther-Ex    STRETCHING    Core Stabilization    LE Stretching    UE Stretching    Spinal Stretching    Postural     REPEATED MOVEMENTS                                     GAIT TRAINING  CPT 31062 TOTAL TIME FOR SESSION Not performed   Ambulation     Dynamic Gait    MANUAL   CPT 75030 TOTAL TIME FOR SESSION Not performed   Stretching    Mobilization    Massage- Deep Tissue, Scar, Transverse Friction    Manual Lymph Drainage Reji LE short neck abdominal sequence   Consider pump carepoint   Skin Care- Lotion/Wrapping    Measurement/Fitting Mobiderm below knee velcro  Medi reduction   Skin Stretching/Mobilization for [place]

## 2024-05-08 ENCOUNTER — APPOINTMENT (OUTPATIENT)
Dept: URBAN - METROPOLITAN AREA CLINIC 203 | Age: 69
Setting detail: DERMATOLOGY
End: 2024-05-08

## 2024-05-08 DIAGNOSIS — L98.8 OTHER SPECIFIED DISORDERS OF THE SKIN AND SUBCUTANEOUS TISSUE: ICD-10-CM

## 2024-05-08 PROCEDURE — OTHER JEUVEAU (U OR CC): OTHER

## 2024-05-08 PROCEDURE — OTHER ADDITIONAL NOTES: OTHER

## 2024-05-08 ASSESSMENT — LOCATION SIMPLE DESCRIPTION DERM
LOCATION SIMPLE: RIGHT EYEBROW
LOCATION SIMPLE: RIGHT TEMPLE
LOCATION SIMPLE: GLABELLA
LOCATION SIMPLE: LEFT EYEBROW
LOCATION SIMPLE: LEFT EYELID

## 2024-05-08 ASSESSMENT — LOCATION ZONE DERM
LOCATION ZONE: EYELID
LOCATION ZONE: FACE

## 2024-05-08 ASSESSMENT — LOCATION DETAILED DESCRIPTION DERM
LOCATION DETAILED: LEFT MEDIAL EYEBROW
LOCATION DETAILED: RIGHT MEDIAL EYEBROW
LOCATION DETAILED: LEFT LATERAL CANTHUS
LOCATION DETAILED: RIGHT INFERIOR TEMPLE
LOCATION DETAILED: GLABELLA

## 2024-05-08 NOTE — PROCEDURE: JEUVEAU (U OR CC)
Dilution (U/0.1 Cc): 4
Post-Care Instructions: Patient instructed to not lie down for 4 hours and limit physical activity for 24 hours.
Detail Level: Detailed
Consent: Written consent obtained. Risks include but not limited to lid/brow ptosis, bruising, swelling, diplopia, temporary effect, incomplete chemical denervation.
Measure In Units Or Cc's?: units
Show Inventory Tab: Show
Quantity Per Injection Site (Units): 2
Quantity Per Injection Site (Units): 1

## 2024-05-08 NOTE — PROCEDURE: ADDITIONAL NOTES
Render Risk Assessment In Note?: no
Additional Notes: Added 4 units for upper lip (sample)
Detail Level: Simple

## 2024-05-13 ENCOUNTER — HOSPITAL ENCOUNTER (OUTPATIENT)
Dept: PHYSICAL THERAPY | Facility: CLINIC | Age: 69
Setting detail: THERAPIES SERIES
Discharge: HOME | End: 2024-05-13
Attending: FAMILY MEDICINE
Payer: COMMERCIAL

## 2024-05-13 DIAGNOSIS — L88 PYODERMA GANGRENOSUM: Primary | ICD-10-CM

## 2024-05-13 DIAGNOSIS — I89.0 LYMPHEDEMA, NOT ELSEWHERE CLASSIFIED: ICD-10-CM

## 2024-05-13 PROCEDURE — 97140 MANUAL THERAPY 1/> REGIONS: CPT | Mod: GP

## 2024-05-13 NOTE — OP PT TREATMENT LOG
LYMPHEDEMA PT FLOWSHEET  Eval performed and pt verbalized understanding of POC   PT Lymph Exercises Current Session Time                        THER ACT  CPT 35630 TOTAL TIME FOR SESSION Not performed   Bed Mobility    Transfer Training    Body Mechanics/Work Training    Patient Education    THER EX  CPT 04384 TOTAL TIME FOR SESSION Not performed   CARDIOVASCULAR    Nu Step    UBE    STRENGTHENING     Supine Ther-Ex    Standing Ther-Ex    Seated Ther-Ex    STRETCHING    Core Stabilization    LE Stretching    UE Stretching    Spinal Stretching    Postural     REPEATED MOVEMENTS                                     GAIT TRAINING  CPT 27310 TOTAL TIME FOR SESSION Not performed   Ambulation     Dynamic Gait    MANUAL   CPT 56683 TOTAL TIME FOR SESSION 53-67 Minutes   Stretching    Mobilization    Massage- Deep Tissue, Scar, Transverse Friction    Manual Lymph Drainage Reji LE short neck abdominal sequence yes  Consider pump carepoint   Skin Care- Lotion/Wrapping    Measurement/Fitting Mobiderm below knee velcro NV  Medi reduction lower leg and juzo liner only NV   Skin Stretching/Mobilization for Cording

## 2024-05-15 ENCOUNTER — HOSPITAL ENCOUNTER (OUTPATIENT)
Dept: PHYSICAL THERAPY | Facility: CLINIC | Age: 69
Setting detail: THERAPIES SERIES
Discharge: HOME | End: 2024-05-15
Attending: FAMILY MEDICINE
Payer: COMMERCIAL

## 2024-05-15 DIAGNOSIS — I89.0 LYMPHEDEMA, NOT ELSEWHERE CLASSIFIED: ICD-10-CM

## 2024-05-15 DIAGNOSIS — L88 PYODERMA GANGRENOSUM: Primary | ICD-10-CM

## 2024-05-15 PROCEDURE — 97140 MANUAL THERAPY 1/> REGIONS: CPT | Mod: GP

## 2024-05-15 NOTE — PROGRESS NOTES
Physical Therapy Visit    PT DAILY NOTE FOR OUTPATIENT THERAPY    Patient: Mary Moreno MRN: [id number]  : 1955 68 y.o.  Referring Physician: Leeanne Farah DO  Date of Visit: 5/15/2024    Certification Dates: 24 through 24    Diagnosis:   1. Pyoderma gangrenosum    2. Lymphedema, not elsewhere classified        Chief Complaints:  lymphedema LE    Precautions:   Precautions comments: Open wounds on ankles B      TODAY'S VISIT    Time In Session:  Start Time: 0700  Stop Time: 0753  Time Calculation (min): 53 min   History/Vitals/Pain/Encounter Info - 05/15/24 0705          Injury History/Precautions/Daily Required Info    Document Type daily treatment     Primary Therapist Delphine Richardson PT, MPT,CLT, CCST     Chief Complaint/Reason for Visit  lymphedema LE     Referring Physician Dr. Leeanne Farah     Precautions comments Open wounds on ankles B     History of present illness/functional impairment Pt with refractory celiac disease that was diagnosed in . Pt with uncontrolled celiac and h/o of pyaderma gangroseum. Pt with sores on jay LE. Pt with h/o of jay feet grafts in . Pt with jay LE lymphedema and recommended for PT. Pt had PT for lymphedema at [facility] 2 years ago. Pt does have compression garments that caused a wound. Pt does not have a pump. 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 Pt does has wraps for wounds not healed.     Patient/Family/Caregiver Comments/Observations Pt reports legs feeling better after the last session but toes still painful     Patient reported fall since last visit No        Pain Assessment    Currently in pain Yes     Pain Side/Orientation bilateral     Pain: Body location Leg     Pain Rating (0-10): Pre Activity 3     Pain Rating (0-10): Activity 3     Pain Rating (0-10): Post Activity 3        Pain Intervention    Intervention  mt     Post Intervention Comments no increase in pain                     Daily Treatment Assessment and Plan - 05/15/24 0705          Daily Treatment Assessment and Plan    Progress toward goals Progressing     Daily Outcome Summary Focused session on reji LE garments. Fitted and ordered through [facility]. Pt with softening of the calf with MLD. Noted improved coloration with MLD     Plan and Recommendations f/u on garment order focus on MLD issue decongestive exercises                         OBJECTIVE DATA TAKEN TODAY:    None taken    Today's Treatment:    LYMPHEDEMA PT FLOWSHEET  Eval performed and pt verbalized understanding of POC   PT Lymph Exercises Current Session Time                        THER ACT  CPT 61875 TOTAL TIME FOR SESSION Not performed   Bed Mobility    Transfer Training    Body Mechanics/Work Training    Patient Education    THER EX  CPT 15580 TOTAL TIME FOR SESSION Not performed   CARDIOVASCULAR    Nu Step    UBE    STRENGTHENING     Supine Ther-Ex    Standing Ther-Ex    Seated Ther-Ex    STRETCHING    Core Stabilization    LE Stretching    UE Stretching    Spinal Stretching    Postural     REPEATED MOVEMENTS                                     GAIT TRAINING  CPT 14783 TOTAL TIME FOR SESSION Not performed   Ambulation     Dynamic Gait    MANUAL   CPT 61327 TOTAL TIME FOR SESSION 53-67 Minutes   Stretching    Mobilization    Massage- Deep Tissue, Scar, Transverse Friction    Manual Lymph Drainage Reji LE short neck abdominal sequence yes  Consider pump carepoint   Skin Care- Lotion/Wrapping    Measurement/Fitting Mobiderm below knee velcro  size I reji  Juzo wrap liner size small x2  Juxta lite interlocking noncustomizable size small reji  Medi reduction lower leg regular standard reji  Yes all ordered above on 5/15/24 with [facility]    Skin Stretching/Mobilization for Cording

## 2024-05-15 NOTE — OP PT TREATMENT LOG
LYMPHEDEMA PT FLOWSHEET  Eval performed and pt verbalized understanding of POC   PT Lymph Exercises Current Session Time                        THER ACT  CPT 86650 TOTAL TIME FOR SESSION Not performed   Bed Mobility    Transfer Training    Body Mechanics/Work Training    Patient Education    THER EX  CPT 61511 TOTAL TIME FOR SESSION Not performed   CARDIOVASCULAR    Nu Step    UBE    STRENGTHENING     Supine Ther-Ex    Standing Ther-Ex    Seated Ther-Ex    STRETCHING    Core Stabilization    LE Stretching    UE Stretching    Spinal Stretching    Postural     REPEATED MOVEMENTS                                     GAIT TRAINING  CPT 72589 TOTAL TIME FOR SESSION Not performed   Ambulation     Dynamic Gait    MANUAL   CPT 36663 TOTAL TIME FOR SESSION 53-67 Minutes   Stretching    Mobilization    Massage- Deep Tissue, Scar, Transverse Friction    Manual Lymph Drainage Reji LE short neck abdominal sequence yes  Consider pump carepoint   Skin Care- Lotion/Wrapping    Measurement/Fitting Mobiderm below knee velcro  size I reji  Juzo wrap liner size small x2  Juxta lite interlocking noncustomizable size small reji  Medi reduction lower leg regular standard reji  Yes all ordered above on 5/15/24 with [facility]    Skin Stretching/Mobilization for Cording

## 2024-05-20 ENCOUNTER — HOSPITAL ENCOUNTER (OUTPATIENT)
Dept: PHYSICAL THERAPY | Facility: CLINIC | Age: 69
Setting detail: THERAPIES SERIES
Discharge: HOME | End: 2024-05-20
Attending: FAMILY MEDICINE
Payer: COMMERCIAL

## 2024-05-20 DIAGNOSIS — I89.0 LYMPHEDEMA, NOT ELSEWHERE CLASSIFIED: Primary | ICD-10-CM

## 2024-05-20 PROCEDURE — 97110 THERAPEUTIC EXERCISES: CPT | Mod: GP

## 2024-05-20 PROCEDURE — 97140 MANUAL THERAPY 1/> REGIONS: CPT | Mod: GP

## 2024-05-20 NOTE — OP PT TREATMENT LOG
LYMPHEDEMA PT FLOWSHEET  Eval performed and pt verbalized understanding of POC   PT Lymph Exercises Current Session Time                        THER ACT  CPT 72447 TOTAL TIME FOR SESSION Not performed   Bed Mobility    Transfer Training    Body Mechanics/Work Training    Patient Education    THER EX  CPT 59413 TOTAL TIME FOR SESSION 8-22 Minutes   HEP LE decongestive exercises yes   Nu Step    UBE    STRENGTHENING     Supine Ther-Ex    Standing Ther-Ex    Seated Ther-Ex    STRETCHING    Core Stabilization    LE Stretching    UE Stretching    Spinal Stretching    Postural     REPEATED MOVEMENTS                                     GAIT TRAINING  CPT 03538 TOTAL TIME FOR SESSION Not performed   Ambulation     Dynamic Gait    MANUAL   CPT 01501 TOTAL TIME FOR SESSION 38-52 Minutes   Stretching    Mobilization    Massage- Deep Tissue, Scar, Transverse Friction    Manual Lymph Drainage Reji LE short neck abdominal sequence yes  Consider pump carepoint   Skin Care- Lotion/Wrapping    Measurement/Fitting Mobiderm below knee velcro  size I reji  Juzo wrap liner size small x2  Juxta lite interlocking noncustomizable size small reji  Medi reduction lower leg regular standard reji  Yes all ordered above on 5/15/24 with [facility]    Skin Stretching/Mobilization for Cording

## 2024-05-20 NOTE — PROGRESS NOTES
Physical Therapy Visit    PT DAILY NOTE FOR OUTPATIENT THERAPY    Patient: Mary Moreno MRN: [id number]  : 1955 68 y.o.  Referring Physician: Leeanne Farah DO  Date of Visit: 2024    Certification Dates: 24 through 24    Diagnosis:   1. Lymphedema, not elsewhere classified        Chief Complaints:  lymphedema LE    Precautions:   Precautions comments: Open wounds on ankles B      TODAY'S VISIT    Time In Session:  Start Time: 1400  Stop Time: 1455  Time Calculation (min): 55 min   History/Vitals/Pain/Encounter Info - 24 1406          Injury History/Precautions/Daily Required Info    Document Type daily treatment     Primary Therapist Delphine Richardson PT, MPT,CLT, CCST     Chief Complaint/Reason for Visit  lymphedema LE     Referring Physician Dr. Leeanne Farah     Precautions comments Open wounds on ankles B     History of present illness/functional impairment Pt with refractory celiac disease that was diagnosed in . Pt with uncontrolled celiac and h/o of pyaderma gangroseum. Pt with sores on jay LE. Pt with h/o of jay feet grafts in . Pt with jay LE lymphedema and recommended for PT. Pt had PT for lymphedema at [facility] 2 years ago. Pt does have compression garments that caused a wound. Pt does not have a pump. 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 Pt does has wraps for wounds not healed.     Patient/Family/Caregiver Comments/Observations Pt reports [facility] is not taking [employer]     Patient reported fall since last visit No        Pain Assessment    Currently in pain Yes     Preferred Pain Scale number (Numeric Rating Pain Scale)     Pain Side/Orientation bilateral     Pain: Body location Leg     Pain Rating (0-10): Pre Activity 3     Pain Rating (0-10): Activity 3     Pain Rating (0-10): Post Activity 3        Pain Intervention    Intervention  mt     Post Intervention Comments no increase in pain                     Daily Treatment Assessment and Plan - 05/20/24 1445          Daily Treatment Assessment and Plan    Progress toward goals Progressing     Daily Outcome Summary Issued decongestive LE exercises. Pt able to demo during sesson. Pt to reach out to insurance to see where DME coverage. Pt with improving skin and coloration after MLD     Plan and Recommendations f/u on garment order to be placed cont with MLD issue instruction self MLD order pump in 2-3 days                         OBJECTIVE DATA TAKEN TODAY:    None taken    Today's Treatment:    LYMPHEDEMA PT FLOWSHEET  Eval performed and pt verbalized understanding of POC   PT Lymph Exercises Current Session Time                        THER ACT  CPT 34874 TOTAL TIME FOR SESSION Not performed   Bed Mobility    Transfer Training    Body Mechanics/Work Training    Patient Education    THER EX  CPT 55535 TOTAL TIME FOR SESSION 8-22 Minutes   HEP LE decongestive exercises yes   Nu Step    UBE    STRENGTHENING     Supine Ther-Ex    Standing Ther-Ex    Seated Ther-Ex    STRETCHING    Core Stabilization    LE Stretching    UE Stretching    Spinal Stretching    Postural     REPEATED MOVEMENTS                                     GAIT TRAINING  CPT 85987 TOTAL TIME FOR SESSION Not performed   Ambulation     Dynamic Gait    MANUAL   CPT 77167 TOTAL TIME FOR SESSION 38-52 Minutes   Stretching    Mobilization    Massage- Deep Tissue, Scar, Transverse Friction    Manual Lymph Drainage Reji LE short neck abdominal sequence yes  Consider pump carepoint   Skin Care- Lotion/Wrapping    Measurement/Fitting Mobiderm below knee velcro  size I reji  Juzo wrap liner size small x2  Juxta lite interlocking noncustomizable size small reji  Medi reduction lower leg regular standard reji  Yes all ordered above on 5/15/24 with [facility]    Skin Stretching/Mobilization for Cording

## 2024-05-23 ENCOUNTER — HOSPITAL ENCOUNTER (OUTPATIENT)
Dept: PHYSICAL THERAPY | Facility: CLINIC | Age: 69
Setting detail: THERAPIES SERIES
Discharge: HOME | End: 2024-05-23
Attending: FAMILY MEDICINE
Payer: COMMERCIAL

## 2024-05-23 DIAGNOSIS — L88 PYODERMA GANGRENOSUM: ICD-10-CM

## 2024-05-23 DIAGNOSIS — I89.0 LYMPHEDEMA, NOT ELSEWHERE CLASSIFIED: Primary | ICD-10-CM

## 2024-05-23 PROCEDURE — 97140 MANUAL THERAPY 1/> REGIONS: CPT | Mod: GP,CQ

## 2024-05-23 NOTE — PROGRESS NOTES
Physical Therapy Visit    PT DAILY NOTE FOR OUTPATIENT THERAPY    Patient: Mary Moreno MRN: [id number]  : 1955 68 y.o.  Referring Physician: Leeanne Farah DO  Date of Visit: 2024    Certification Dates: 24 through 24    Diagnosis:   1. Lymphedema, not elsewhere classified    2. Pyoderma gangrenosum        Chief Complaints:  lymphedema LE    Precautions:   Precautions comments: Open wounds on ankles B      TODAY'S VISIT    Time In Session:  Start Time: 1300  Stop Time: 1400  Time Calculation (min): 60 min   History/Vitals/Pain/Encounter Info - 24 1300          Injury History/Precautions/Daily Required Info    Document Type daily treatment     Primary Therapist Delphine Richardson PT, MPT,CLT, CCST     Chief Complaint/Reason for Visit  lymphedema LE     Referring Physician Dr. Leeanne Farah     Precautions comments Open wounds on ankles B     History of present illness/functional impairment Pt with refractory celiac disease that was diagnosed in . Pt with uncontrolled celiac and h/o of pyaderma gangroseum. Pt with sores on jay LE. Pt with h/o of jay feet grafts in . Pt with jay LE lymphedema and recommended for PT. Pt had PT for lymphedema at [facility] 2 years ago. Pt does have compression garments that caused a wound. Pt does not have a pump. 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 Pt does has wraps for wounds not healed.     Patient/Family/Caregiver Comments/Observations Pt brought in information, [employer], to order garments and pump due to [facility] not taking [employer].     Patient reported fall since last visit No        Pain Assessment    Currently in pain Yes     Preferred Pain Scale number (Numeric Rating Pain Scale)     Pain Side/Orientation bilateral     Pain: Body location Leg     Pain Rating (0-10): Pre Activity 3     Pain Rating (0-10): Activity 3     Pain Rating (0-10): Post Activity 3     Pain Radiation to leg,  right;leg, left        Pain Intervention    Intervention  manual     Post Intervention Comments see assessment                    Daily Treatment Assessment and Plan - 05/23/24 1300          Daily Treatment Assessment and Plan    Progress toward goals Progressing     Daily Outcome Summary Today's session with focus on ordering garments per PT recommendations and pump for B LEs with vendor [employer]. Remainder on session reviewing issueing self MLD short neck and abdominal breathing sequence with mod A cueing given for technique. Pt complint with decongestive Marisol.     Plan and Recommendations Cont per PT with MLD. Follow up about self MLD. Waiting garments and pump with [employer] vendor who accepts [employer]. Pt going to [place] in 2 weeks                         OBJECTIVE DATA TAKEN TODAY:    None taken    Today's Treatment:    LYMPHEDEMA PT FLOWSHEET  Eval performed and pt verbalized understanding of POC   PT Lymph Exercises Current Session Time                        THER ACT  CPT 90078 TOTAL TIME FOR SESSION Not performed   Bed Mobility    Transfer Training    Body Mechanics/Work Training    Patient Education    THER EX  CPT 63701 TOTAL TIME FOR SESSION Not performed   HEP LE decongestive exercises yes   Nu Step    UBE    STRENGTHENING     Supine Ther-Ex    Standing Ther-Ex    Seated Ther-Ex    STRETCHING    Core Stabilization    LE Stretching    UE Stretching    Spinal Stretching    Postural     REPEATED MOVEMENTS                                     GAIT TRAINING  CPT 14661 TOTAL TIME FOR SESSION Not performed   Ambulation     Dynamic Gait    MANUAL   CPT 45191 TOTAL TIME FOR SESSION 53-67 Minutes   Stretching    Mobilization    Massage- Deep Tissue, Scar, Transverse Friction    Manual Lymph Drainage Reji LE short neck abdominal sequence- NT  Consider pump carepoint- pump and garments ordered with [employer]- yes 5/23/24 5/23/24: pt issued and reviewed self MLD handouts short neck and  abdominal breathing sequence- yes   Skin Care- Lotion/Wrapping    Measurement/Fitting Mobiderm below knee velcro  size I jay  Juzo wrap liner size small x2  Juxta lite interlocking noncustomizable size small jay  Medi reduction lower leg regular standard jay  Yes all ordered above on 5/15/24 with [facility]    Skin Stretching/Mobilization for Cording

## 2024-05-23 NOTE — OP PT TREATMENT LOG
LYMPHEDEMA PT FLOWSHEET  Eval performed and pt verbalized understanding of POC   PT Lymph Exercises Current Session Time                        THER ACT  CPT 30458 TOTAL TIME FOR SESSION Not performed   Bed Mobility    Transfer Training    Body Mechanics/Work Training    Patient Education    THER EX  CPT 82315 TOTAL TIME FOR SESSION Not performed   HEP LE decongestive exercises yes   Nu Step    UBE    STRENGTHENING     Supine Ther-Ex    Standing Ther-Ex    Seated Ther-Ex    STRETCHING    Core Stabilization    LE Stretching    UE Stretching    Spinal Stretching    Postural     REPEATED MOVEMENTS                                     GAIT TRAINING  CPT 38594 TOTAL TIME FOR SESSION Not performed   Ambulation     Dynamic Gait    MANUAL   CPT 37419 TOTAL TIME FOR SESSION 53-67 Minutes   Stretching    Mobilization    Massage- Deep Tissue, Scar, Transverse Friction    Manual Lymph Drainage Reji LE short neck abdominal sequence- NT  Consider pump carepoint- pump and garments ordered with [email or web address]- yes 5/23/24 5/23/24: pt issued and reviewed self MLD handouts short neck and abdominal breathing sequence- yes   Skin Care- Lotion/Wrapping    Measurement/Fitting Mobiderm below knee velcro  size I reji  Juzo wrap liner size small x2  Juxta lite interlocking noncustomizable size small reji  Medi reduction lower leg regular standard reji  Yes all ordered above on 5/15/24 with [facility]    Skin Stretching/Mobilization for Cording

## 2024-05-29 ENCOUNTER — HOSPITAL ENCOUNTER (OUTPATIENT)
Dept: PHYSICAL THERAPY | Facility: CLINIC | Age: 69
Setting detail: THERAPIES SERIES
Discharge: HOME | End: 2024-05-29
Attending: FAMILY MEDICINE
Payer: COMMERCIAL

## 2024-05-29 DIAGNOSIS — I89.0 LYMPHEDEMA, NOT ELSEWHERE CLASSIFIED: Primary | ICD-10-CM

## 2024-05-29 PROCEDURE — 97140 MANUAL THERAPY 1/> REGIONS: CPT | Mod: GP

## 2024-05-29 NOTE — PROGRESS NOTES
Physical Therapy Progress Note    PT PROGRESS NOTE FOR OUTPATIENT THERAPY    Patient: Mary Moreno   MRN: [id number]  : 1955 68 y.o.    Referring Physician: Leeanne Farah DO  Date of Visit: 2024    Certification Dates: 24 through 24    Recommended Frequency & Duration:  2 times/week for up to 3 months        Diagnosis:   1. Lymphedema, not elsewhere classified        Chief Complaints:  No chief complaint on file.      Precautions:   Existing Precautions/Restrictions: limb restrictions  Precautions comments: Open wounds on ankles B    TODAY'S VISIT:    Time In Session:  Start Time: 1310  Stop Time: 1405  Time Calculation (min): 55 min   General Information - 24 1355          Session Details    Document Type progress note     Mode of Treatment individual therapy        General Information    Referring Physician Dr. Leeanne Farah     History of present illness/functional impairment Pt with refractory celiac disease that was diagnosed in . Pt with uncontrolled celiac and h/o of pyaderma gangroseum. Pt with sores on jay LE. Pt with h/o of jay feet grafts in . Pt with jay LE lymphedema and recommended for PT. Pt had PT for lymphedema at [facility] 2 years ago. Pt does have compression garments that caused a wound. Pt does not have a pump. 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 Pt does has wraps for wounds not healed.     Patient/Family/Caregiver Comments/Observations Pt reports seeing pain medicine MD     Existing Precautions/Restrictions limb restrictions     Precautions comments Open wounds on ankles B         Services    Do You Speak a Language Other Than English at Home? no        Behavioral Health Related Communication    Suicidal Ideation No                    Daily Falls Screen - 24 1355          Daily Falls Assessment    Patient reported fall since last visit No                    Pain/Vitals - 24  1355          Pain Assessment    Currently in pain Yes     Preferred Pain Scale number (Numeric Rating Pain Scale)     Pain Side/Orientation bilateral     Pain: Body location Leg     Pain Rating (0-10): Pre Activity 3     Pain Rating (0-10): Activity 3     Pain Rating (0-10): Post Activity 3        Pain Intervention    Intervention  mt     Post Intervention Comments no increase in pain                    PT - 05/29/24 1352          Physical Therapy    Physical Therapy Specialty Lymphedema Program        PT Plan    Frequency of treatment 2 times/week     PT Duration 3 months     PT Cert From 05/01/24     PT Cert To 08/01/24     Date PT POC was sent to provider 05/01/24     Signed PT Plan of Care received?  Yes                       OBJECTIVE MEASUREMENTS/DATA:    Lymphedema:    Lymphedema Assessment       Row Name 05/29/24 1300       BODY LOCATION    Upper Body / Lower Body / Face and Neck Lower body       LE Skin    Skin Condition Impaired    Fibrosis severe    Scar/Incision bound down    Color pink    Quality fragile;thickened    Edema +2    Wound seepage    LE Wound comments jay ankle wounds       LE/Back/Trunk Palpation    LE Palpation  pos stemmer jay feet below knee edema       Lower Body Outcome Measures    LLIS results/comments  62       LE Volumetrics    LE Volume Measurements B/L LE       Affected LE Measurements    Affected limb laterality Right    MTP 24 cm    -8 cm 21.5 cm    -12 cm 25 cm    0 cm 23 cm    4 cm 21 cm    8 cm 24 cm    12 cm 26.6 cm    16 cm 28.5 cm    20 cm 27 cm    24 cm 25.5 cm    28 cm 26 cm    32 cm 28 cm    Affected LE Total Volume (ml) 48572.99 ml       Other Affected LE Circumference    Other affected limb laterality Left    MTP 22 cm    -8 cm 22.5 cm    -12 cm 25.5 cm    0 cm 23.5 cm    4 cm 19.6 cm    8 cm 23.5 cm    12 cm 26 cm    16 cm 28 cm    20 cm 25.6 cm    24 cm 25.5 cm    28 cm 28 cm    32 cm 27 cm    Other Affected LE Total Volume (ml) 43474.1 ml       LE B/L Volume  Difference    B/L LE Volume Difference 645.89    Difference % 2.25       RIGHT: Lower Extremity Manual Muscle Test Assessment    Hip Flexion gross movement (4+/5) good plus    Hip Extension gross movement (4+/5) good plus    Hip Abduction gross movement (4+/5) good plus    Hip Adduction gross movement (4+/5) good plus    Knee Flexion strength (4+/5) good plus    Knee Extension strength (4+/5) good plus    Ankle Dorsiflexion gross movement (4+/5) good plus    Ankle Plantarflexion gross movement (4+/5) good plus       LEFT: Lower Extremity Manual Muscle Test Assessment    Hip Flexion gross movement (4+/5) good plus    Hip Extension gross movement (4+/5) good plus    Hip Abduction gross movement (4+/5) good plus    Hip Adduction gross movement (4+/5) good plus    Knee Flexion strength (4+/5) good plus    Knee Extension strength (4+/5) good plus    Ankle Dorsiflexion gross movement (4+/5) good plus    Ankle Plantarflexion gross movement (4+/5) good plus       Assessment    Plan of Care reviewed and patient/family in agreement Yes    System Pathology/Pathophysiology Noted integumentary;lymphatic;musculoskeletal;endocrine/metabolic;genitourinary    Functional Limitations in Following Categories (PT Eval) self-care;home management;community/leisure    Rehab Potential/Prognosis good, to achieve stated therapy goals    Problem List edema;decreased strength;decreased flexibility;decreased ROM    Clinical Assessment Pt making steady progress towards LTGs. Pt awaiting garments through [employer] that should possibly take her insurance. Pt doing decongestive exercise. Min A for self MLD. Ordered pump and awaiting. Pt with less discomfort immediately after MLD. Pt will benefit from garments that will help reduce the papillomaes and discomfort and improve overall fibrosis. Pt to continue PT per POC. Pt more swollen today and increased pinkish coloration affected by increased temperature and heat.    Plan and Recommendations  assess garments when it arrives focus on MLD and self MLD    Planned Services CPT 53652 Manual therapy;CPT 22833 Neuromuscular Reeducation;CPT 27984 Self-care/Home management training;CPT 14179 Therapeutic activities;CPT 11406 Therapeutic exercises                  Lymph Cumulative Data:   Lymphedema          Most Recent Value    5/1/2024 - 5/29/2024 5/1/2024    10:17 5/29/2024    13:00   VOLUMETRICS   Affected limb laterality Right  5/29/2024 Right Right   MTP 24 cm  5/29/2024 22.5 cm 24 cm   -8 cm 21.5 cm  5/29/2024 23 cm 21.5 cm   -12 cm 25 cm  5/29/2024 25 cm 25 cm   0 cm 23 cm  5/29/2024 22 cm 23 cm   4 cm 21 cm  5/29/2024 20 cm 21 cm   8 cm 24 cm  5/29/2024 25 cm 24 cm   12 cm 26.6 cm  5/29/2024 27 cm 26.6 cm   16 cm 28.5 cm  5/29/2024 26.5 cm 28.5 cm   20 cm 27 cm  5/29/2024 25.7 cm 27 cm   24 cm 25.5 cm  5/29/2024 27 cm 25.5 cm   28 cm 26 cm  5/29/2024 30 cm 26 cm   32 cm 28 cm  5/29/2024 28 cm 28 cm   Affected LE Total Volume (ml) 82467.99 ml  5/29/2024 09009.49 ml 76175.99 ml   Other affected limb laterality Left  5/29/2024 Left Left   MTP 22 cm  5/29/2024 22 cm 22 cm   -8 cm 22.5 cm  5/29/2024 23.8 cm 22.5 cm   -12 cm 25.5 cm  5/29/2024 26 cm 25.5 cm   0 cm 23.5 cm  5/29/2024 22 cm 23.5 cm   4 cm 19.6 cm  5/29/2024 21 cm 19.6 cm   8 cm 23.5 cm  5/29/2024 26 cm 23.5 cm   12 cm 26 cm  5/29/2024 27 cm 26 cm   16 cm 28 cm  5/29/2024 25 cm 28 cm   20 cm 25.6 cm  5/29/2024 25 cm 25.6 cm   24 cm 25.5 cm  5/29/2024 27.5 cm 25.5 cm   28 cm 28 cm  5/29/2024 29.7 cm 28 cm   32 cm 27 cm  5/29/2024 27 cm 27 cm   Other Affected LE Total Volume (ml) 06461.1 ml  5/29/2024 83502.13 ml 46354.1 ml   Lymph General Data   Psychosocial comments pt crying during session due to frustration  5/1/2024 pt crying during session due to frustration        ROM and MMT          5/1/2024    10:17 5/29/2024    13:00   PT LE ROM Measurements   AROM: Right LE Gross Movement WFL    AROM: Left LE Gross Movement WFL    PT LE MMT   Right Hip  Flexion (4+/5) good plus (4+/5) good plus   Left Hip Flexion (4+/5) good plus (4+/5) good plus   Right Hip Extension (4/5) good (4+/5) good plus   Left Hip Extension (4/5) good (4+/5) good plus   Right Hip ABD (4+/5) good plus (4+/5) good plus   Left Hip ABD (4+/5) good plus (4+/5) good plus   Right Hip ADD (4+/5) good plus (4+/5) good plus   Left Hip ADD (4+/5) good plus (4+/5) good plus   Right Knee Flexion (4+/5) good plus (4+/5) good plus   Left Knee Flexion (4+/5) good plus (4+/5) good plus   Right Knee Extension (4+/5) good plus (4+/5) good plus   Left Knee Extension (4+/5) good plus (4+/5) good plus   Right Ankle DF (4+/5) good plus (4+/5) good plus   Left Ankle DF (4+/5) good plus (4+/5) good plus   Right Ankle PF (4+/5) good plus (4+/5) good plus   Left Ankle PF (4+/5) good plus (4+/5) good plus         Today's Treatment::    Education provided:  None/NA    LYMPHEDEMA PT FLOWSHEET  Eval performed and pt verbalized understanding of POC   PT Lymph Exercises Current Session Time                        THER ACT  CPT 99578 TOTAL TIME FOR SESSION Not performed   Bed Mobility    Transfer Training    Body Mechanics/Work Training    Patient Education    THER EX  CPT 61567 TOTAL TIME FOR SESSION Not performed   HEP LE decongestive exercises yes   Nu Step    UBE    STRENGTHENING     Supine Ther-Ex    Standing Ther-Ex    Seated Ther-Ex    STRETCHING    Core Stabilization    LE Stretching    UE Stretching    Spinal Stretching    Postural     REPEATED MOVEMENTS                                     GAIT TRAINING  CPT 29416 TOTAL TIME FOR SESSION Not performed   Ambulation     Dynamic Gait    MANUAL   CPT 21191 TOTAL TIME FOR SESSION 53-67 Minutes   Progress note yes   Mobilization    Massage- Deep Tissue, Scar, Transverse Friction    Manual Lymph Drainage Reji LE short neck abdominal sequence- NT  Consider pump carepoint- pump and garments ordered with [employer]- yes 5/23/24 5/23/24: pt issued and reviewed self  MLD handouts short neck and abdominal breathing sequence- yes   Skin Care- Lotion/Wrapping    Measurement/Fitting Mobiderm below knee velcro  size I jay  Juzo wrap liner size small x2  Juxta lite interlocking noncustomizable size small jay  Medi reduction lower leg regular standard jay  Yes all ordered above on 5/15/24 with hope 4 healing    Skin Stretching/Mobilization for Cording              Goals Addressed                      This Visit's Progress       Patient Stated      pt goals (pt-stated)         To get the swelling down especially in my toes since they affect my balance partially met         Other      jay LE lymphedema goals          Short term goals   Short Term Goals Time Frame Result Comment/Progress   Pt will increase LE MMT by >/= 1/2 grade 6 weeks  met             Pt will report decreased pain at worst 2/10 6weeks  not met     Pt will demonstrate w/ supervision HEP/compression/self MLD techniques 6 weeks  met     Pt will decrease volume by 2% 6 weeks  met     Pt will verbalize independently proper skin care  6 weeks met                      Long term goals   LongTerm Goals Time Frame Result Comment/Progress                   Pt will report decreased pain at worst 0/10 12 weeks  ongoing     Pt will demonstrate w/ (i) HEP/compression/self MLD techniques 12 weeks  ongoing     Pt will decrease volume by 3-5% 12weeks  ongoing     By therapist touch, pt will demonstrate dec fibrosis as evidenced by softer subcutaneous tissues in identified areas 12 weeks ongoing    Pt will improve LLIS score by 5 12weeks Not met LLIS 62                Mutually agreed upon pain goal         Mutually agreed upon pain goal: 0-1/10 not met              Delphine Richardson, PT

## 2024-05-29 NOTE — OP PT TREATMENT LOG
LYMPHEDEMA PT FLOWSHEET  Eval performed and pt verbalized understanding of POC   PT Lymph Exercises Current Session Time                        THER ACT  CPT 86840 TOTAL TIME FOR SESSION Not performed   Bed Mobility    Transfer Training    Body Mechanics/Work Training    Patient Education    THER EX  CPT 18586 TOTAL TIME FOR SESSION Not performed   HEP LE decongestive exercises yes   Nu Step    UBE    STRENGTHENING     Supine Ther-Ex    Standing Ther-Ex    Seated Ther-Ex    STRETCHING    Core Stabilization    LE Stretching    UE Stretching    Spinal Stretching    Postural     REPEATED MOVEMENTS                                     GAIT TRAINING  CPT 92211 TOTAL TIME FOR SESSION Not performed   Ambulation     Dynamic Gait    MANUAL   CPT 85200 TOTAL TIME FOR SESSION 53-67 Minutes   Progress note yes   Mobilization    Massage- Deep Tissue, Scar, Transverse Friction    Manual Lymph Drainage Reji LE short neck abdominal sequence- NT  Consider pump carepoint- pump and garments ordered with [employer]- yes 5/23/24 5/23/24: pt issued and reviewed self MLD handouts short neck and abdominal breathing sequence- yes   Skin Care- Lotion/Wrapping    Measurement/Fitting Mobiderm below knee velcro  size I reji  Juzo wrap liner size small x2  Juxta lite interlocking noncustomizable size small reji  Medi reduction lower leg regular standard reji  Yes all ordered above on 5/15/24 with hope 4 healing    Skin Stretching/Mobilization for Cording

## 2024-06-03 ENCOUNTER — HOSPITAL ENCOUNTER (OUTPATIENT)
Dept: PHYSICAL THERAPY | Facility: CLINIC | Age: 69
Setting detail: THERAPIES SERIES
Discharge: HOME | End: 2024-06-03
Attending: FAMILY MEDICINE
Payer: COMMERCIAL

## 2024-06-03 DIAGNOSIS — L88 PYODERMA GANGRENOSUM: ICD-10-CM

## 2024-06-03 DIAGNOSIS — I89.0 LYMPHEDEMA, NOT ELSEWHERE CLASSIFIED: Primary | ICD-10-CM

## 2024-06-03 PROCEDURE — 97140 MANUAL THERAPY 1/> REGIONS: CPT | Mod: GP

## 2024-06-03 NOTE — OP PT TREATMENT LOG
LYMPHEDEMA PT FLOWSHEET  Eval performed and pt verbalized understanding of POC   PT Lymph Exercises Current Session Time                        THER ACT  CPT 56789 TOTAL TIME FOR SESSION Not performed   Bed Mobility    Transfer Training    Body Mechanics/Work Training    Patient Education    THER EX  CPT 58088 TOTAL TIME FOR SESSION Not performed   HEP LE decongestive exercises yes   Nu Step    UBE    STRENGTHENING     Supine Ther-Ex    Standing Ther-Ex    Seated Ther-Ex    STRETCHING    Core Stabilization    LE Stretching    UE Stretching    Spinal Stretching    Postural     REPEATED MOVEMENTS                                     GAIT TRAINING  CPT 74422 TOTAL TIME FOR SESSION Not performed   Ambulation     Dynamic Gait    MANUAL   CPT 75312 TOTAL TIME FOR SESSION 53-67 Minutes   Progress note yes   Mobilization    Massage- Deep Tissue, Scar, Transverse Friction    Manual Lymph Drainage Reji LE short neck abdominal sequence- yes  Consider pump carepoint- pump and garments ordered with [employer]- yes 5/23/24    Educated on donning and doffing of juzo liner and juxta fit interlocking pt independent awaiting wraps lower leg and pumps yes    5/23/24: pt issued and reviewed self MLD handouts short neck and abdominal breathing sequence-    Skin Care- Lotion/Wrapping    Measurement/Fitting Mobiderm below knee velcro  size I reji  Juzo wrap liner size small x2  Juxta lite interlocking noncustomizable size small reji  Medi reduction lower leg regular standard reji  Yes all ordered above on 5/15/24 with hope 4 healing    Skin Stretching/Mobilization for Cording

## 2024-06-03 NOTE — ADDENDUM NOTE
Encounter addended by: Delphine Richardson, PT on: 6/3/2024 11:01 AM   Actions taken: Follow-up modified

## 2024-06-03 NOTE — PROGRESS NOTES
Physical Therapy Visit    PT DAILY NOTE FOR OUTPATIENT THERAPY    Patient: Mary Moreno MRN: [id number]  : 1955 68 y.o.  Referring Physician: Leeanne Farah DO  Date of Visit: 6/3/2024    Certification Dates: 24 through 24    Diagnosis:   1. Lymphedema, not elsewhere classified    2. Pyoderma gangrenosum        Chief Complaints:  lymphedema LE    Precautions:   Existing Precautions/Restrictions: limb restrictions  Precautions comments: Open wounds on ankles B      TODAY'S VISIT    Time In Session:  Start Time: 1002  Stop Time: 1058  Time Calculation (min): 56 min   History/Vitals/Pain/Encounter Info - 24 1015          Injury History/Precautions/Daily Required Info    Document Type daily treatment     Primary Therapist Delphine Richardson PT, MPT,CLT, CCST     Chief Complaint/Reason for Visit  lymphedema LE     Referring Physician Dr. Leeanne Farah     Existing Precautions/Restrictions limb restrictions     Precautions comments Open wounds on ankles B     History of present illness/functional impairment Pt with refractory celiac disease that was diagnosed in . Pt with uncontrolled celiac and h/o of pyaderma gangroseum. Pt with sores on jay LE. Pt with h/o of jay feet grafts in . Pt with jay LE lymphedema and recommended for PT. Pt had PT for lymphedema at [facility] 2 years ago. Pt does have compression garments that caused a wound. Pt does not have a pump. 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 Pt does has wraps for wounds not healed.     Patient/Family/Caregiver Comments/Observations Pt reports 4/10 pain today. Pt reports getting juzo liner and foot compression     Patient reported fall since last visit No        Pain Assessment    Currently in pain Yes     Preferred Pain Scale number (Numeric Rating Pain Scale)     Pain Side/Orientation bilateral     Pain: Body location Leg     Pain Rating (0-10): Pre Activity 4     Pain Rating  (0-10): Activity 4     Pain Rating (0-10): Post Activity 4        Pain Intervention    Intervention  mt     Post Intervention Comments no increase in pain                    Daily Treatment Assessment and Plan - 06/03/24 1056          Daily Treatment Assessment and Plan    Progress toward goals Progressing     Daily Outcome Summary Pt will benefit from continue PT to assess lower leg garments when it arrives. Pt educated on juzo liner and juxtafit interlocking. Pt able to donne and doffe at home. Pt to be away on vacation in [place] and will make appts when she arrives back     Plan and Recommendations assess garments when it arrives focus on MLD and self MLD                         OBJECTIVE DATA TAKEN TODAY:    None taken    Today's Treatment:    LYMPHEDEMA PT FLOWSHEET  Eval performed and pt verbalized understanding of POC   PT Lymph Exercises Current Session Time                        THER ACT  CPT 84564 TOTAL TIME FOR SESSION Not performed   Bed Mobility    Transfer Training    Body Mechanics/Work Training    Patient Education    THER EX  CPT 84579 TOTAL TIME FOR SESSION Not performed   HEP LE decongestive exercises yes   Nu Step    UBE    STRENGTHENING     Supine Ther-Ex    Standing Ther-Ex    Seated Ther-Ex    STRETCHING    Core Stabilization    LE Stretching    UE Stretching    Spinal Stretching    Postural     REPEATED MOVEMENTS                                     GAIT TRAINING  CPT 56986 TOTAL TIME FOR SESSION Not performed   Ambulation     Dynamic Gait    MANUAL   CPT 16917 TOTAL TIME FOR SESSION 53-67 Minutes   Progress note yes   Mobilization    Massage- Deep Tissue, Scar, Transverse Friction    Manual Lymph Drainage Reji LE short neck abdominal sequence- yes  Consider pump carepoint- pump and garments ordered with [employer]- yes 5/23/24    Educated on donning and doffing of juzo liner and juxta fit interlocking pt independent awaiting wraps lower leg and pumps yes    5/23/24: pt issued and  reviewed self MLD handouts short neck and abdominal breathing sequence-    Skin Care- Lotion/Wrapping    Measurement/Fitting Mobiderm below knee velcro  size I jay  Juzo wrap liner size small x2  Juxta lite interlocking noncustomizable size small jay  Medi reduction lower leg regular standard jay  Yes all ordered above on 5/15/24 with hope 4 healing    Skin Stretching/Mobilization for Cording

## 2024-06-25 ENCOUNTER — HOSPITAL ENCOUNTER (OUTPATIENT)
Dept: PHYSICAL THERAPY | Facility: CLINIC | Age: 69
Setting detail: THERAPIES SERIES
Discharge: HOME | End: 2024-06-25
Attending: FAMILY MEDICINE
Payer: COMMERCIAL

## 2024-06-25 DIAGNOSIS — I89.0 LYMPHEDEMA, NOT ELSEWHERE CLASSIFIED: Primary | ICD-10-CM

## 2024-06-25 PROCEDURE — 97140 MANUAL THERAPY 1/> REGIONS: CPT | Mod: GP

## 2024-06-25 NOTE — PROGRESS NOTES
Physical Therapy Visit    PT DAILY NOTE FOR OUTPATIENT THERAPY    Patient: Mary Moreno MRN: [id number]  : 1955 68 y.o.  Referring Physician: Leeanne Farah DO  Date of Visit: 2024    Certification Dates: 24 through 24    Diagnosis:   1. Lymphedema, not elsewhere classified        Chief Complaints:  lymphedema LE    Precautions:   Existing Precautions/Restrictions: limb restrictions  Precautions comments: Open wounds on ankles B      TODAY'S VISIT    Time In Session:  Start Time: 1015  Stop Time: 1100  Time Calculation (min): 45 min   History/Vitals/Pain/Encounter Info - 24 1013          Injury History/Precautions/Daily Required Info    Document Type daily treatment     Primary Therapist Delphine Richardson PT, MPT,CLT, CCST     Chief Complaint/Reason for Visit  lymphedema LE     Referring Physician Dr. Leeanne Farah     Existing Precautions/Restrictions limb restrictions     Precautions comments Open wounds on ankles B     History of present illness/functional impairment Pt with refractory celiac disease that was diagnosed in . Pt with uncontrolled celiac and h/o of pyaderma gangroseum. Pt with sores on jay LE. Pt with h/o of jay feet grafts in . Pt with jay LE lymphedema and recommended for PT. Pt had PT for lymphedema at [facility] 2 years ago. Pt does have compression garments that caused a wound. Pt does not have a pump. 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 Pt does has wraps for wounds not healed.     Patient/Family/Caregiver Comments/Observations Arrived at wrong time but able to see late     Patient reported fall since last visit No        Pain Assessment    Currently in pain Yes     Pain Rating (0-10): Pre Activity 5     Pain Rating (0-10): Activity 5     Pain Rating (0-10): Post Activity 5        Pain Intervention    Intervention  mt     Post Intervention Comments no increase in pain                    Daily Treatment  Assessment and Plan - 06/25/24 1103          Daily Treatment Assessment and Plan    Progress toward goals Progressing     Daily Outcome Summary Session focused on MLD and fitting of medi reduction to lower leg RLE. Pt using lotion daily. Pt with min A for donning. Pt to bring garments tomorrow and will focus on LLE garment fitting. Pt to wear RLE garment for 1-2 hrs then assess skin. pt to remove if any issues with increased pain or skin issues     Plan and Recommendations assess response to garment RLE fit medi reduction LLE progress note                         OBJECTIVE DATA TAKEN TODAY:    None taken    Today's Treatment:    LYMPHEDEMA PT FLOWSHEET  Eval performed and pt verbalized understanding of POC   PT Lymph Exercises Current Session Time                                   THER ACT  CPT 49853 TOTAL TIME FOR SESSION Not performed   Bed Mobility     Transfer Training     Body Mechanics/Work Training     Patient Education     THER EX  CPT 43397 TOTAL TIME FOR SESSION Not performed   HEP LE decongestive exercises yes   Nu Step     UBE     STRENGTHENING      Supine Ther-Ex     Standing Ther-Ex     Seated Ther-Ex     STRETCHING     Core Stabilization     LE Stretching     UE Stretching     Spinal Stretching     Postural      REPEATED MOVEMENTS                                                       GAIT TRAINING  CPT 66757 TOTAL TIME FOR SESSION Not performed   Ambulation      Dynamic Gait     MANUAL   CPT 70140 TOTAL TIME FOR SESSION 38-52 Minutes   Progress note nv   Mobilization     Massage- Deep Tissue, Scar, Transverse Friction     Manual Lymph Drainage Reji LE short neck abdominal sequence- yes  Fitted LLE lower leg medi reduction and foot piece juxta fit interlocking pt to wear for 1-2hr if not painful and assess skin. Will fit RLE next visit for medi reduction pt still awaiting pump 6/25/24    Consider pump carepoint- pump and garments ordered with [employer]- yes 5/23/24     Educated on donning and  doffing of juzo liner and juxta fit interlocking pt independent awaiting wraps lower leg and pumps yes     5/23/24: pt issued and reviewed self MLD handouts short neck and abdominal breathing sequence-    Skin Care- Lotion/Wrapping     Measurement/Fitting Mobiderm below knee velcro  size I jay  Juzo wrap liner size small x2  Juxta lite interlocking noncustomizable size small jay  Medi reduction lower leg regular standard jay  Yes all ordered above on 5/15/24 with hope 4 healing    Skin Stretching/Mobilization for Cording

## 2024-06-25 NOTE — OP PT TREATMENT LOG
LYMPHEDEMA PT FLOWSHEET  Eval performed and pt verbalized understanding of POC   PT Lymph Exercises Current Session Time                                   THER ACT  CPT 68124 TOTAL TIME FOR SESSION Not performed   Bed Mobility     Transfer Training     Body Mechanics/Work Training     Patient Education     THER EX  CPT 25493 TOTAL TIME FOR SESSION Not performed   HEP LE decongestive exercises yes   Nu Step     UBE     STRENGTHENING      Supine Ther-Ex     Standing Ther-Ex     Seated Ther-Ex     STRETCHING     Core Stabilization     LE Stretching     UE Stretching     Spinal Stretching     Postural      REPEATED MOVEMENTS                                                       GAIT TRAINING  CPT 60378 TOTAL TIME FOR SESSION Not performed   Ambulation      Dynamic Gait     MANUAL   CPT 58295 TOTAL TIME FOR SESSION 38-52 Minutes   Progress note nv   Mobilization     Massage- Deep Tissue, Scar, Transverse Friction     Manual Lymph Drainage Reji LE short neck abdominal sequence- yes  Fitted LLE lower leg medi reduction and foot piece juxta fit interlocking pt to wear for 1-2hr if not painful and assess skin. Will fit RLE next visit for medi reduction pt still awaiting pump 6/25/24    Consider pump carepoint- pump and garments ordered with [employer]- yes 5/23/24     Educated on donning and doffing of juzo liner and juxta fit interlocking pt independent awaiting wraps lower leg and pumps yes     5/23/24: pt issued and reviewed self MLD handouts short neck and abdominal breathing sequence-    Skin Care- Lotion/Wrapping     Measurement/Fitting Mobiderm below knee velcro  size I reji  Juzo wrap liner size small x2  Juxta lite interlocking noncustomizable size small reji  Medi reduction lower leg regular standard reji  Yes all ordered above on 5/15/24 with hope 4 healing    Skin Stretching/Mobilization for Cording

## 2024-06-26 ENCOUNTER — TRANSCRIBE ORDERS (OUTPATIENT)
Dept: LAB | Facility: CLINIC | Age: 69
End: 2024-06-26

## 2024-06-26 ENCOUNTER — APPOINTMENT (OUTPATIENT)
Dept: LAB | Facility: CLINIC | Age: 69
End: 2024-06-26
Attending: DERMATOLOGY
Payer: COMMERCIAL

## 2024-06-26 DIAGNOSIS — Z79.899 OTHER LONG TERM (CURRENT) DRUG THERAPY: ICD-10-CM

## 2024-06-26 DIAGNOSIS — Z79.899 OTHER LONG TERM (CURRENT) DRUG THERAPY: Primary | ICD-10-CM

## 2024-06-26 LAB
ALBUMIN SERPL-MCNC: 4 G/DL (ref 3.5–5.7)
ALP SERPL-CCNC: 143 IU/L (ref 34–125)
ALT SERPL-CCNC: 21 IU/L (ref 7–52)
ANION GAP SERPL CALC-SCNC: 9 MEQ/L (ref 3–15)
AST SERPL-CCNC: 53 IU/L (ref 13–39)
BASOPHILS # BLD: 0.12 K/UL (ref 0.01–0.1)
BASOPHILS NFR BLD: 1.6 %
BILIRUB SERPL-MCNC: 0.6 MG/DL (ref 0.3–1.2)
BUN SERPL-MCNC: 7 MG/DL (ref 7–25)
CALCIUM SERPL-MCNC: 9.2 MG/DL (ref 8.6–10.3)
CHLORIDE SERPL-SCNC: 100 MEQ/L (ref 98–107)
CO2 SERPL-SCNC: 32 MEQ/L (ref 21–31)
CREAT SERPL-MCNC: 0.6 MG/DL (ref 0.6–1.2)
DIFFERENTIAL METHOD BLD: ABNORMAL
EGFRCR SERPLBLD CKD-EPI 2021: >60 ML/MIN/1.73M*2
EOSINOPHIL # BLD: 0.04 K/UL (ref 0.04–0.36)
EOSINOPHIL NFR BLD: 0.5 %
ERYTHROCYTE [DISTWIDTH] IN BLOOD BY AUTOMATED COUNT: 13.3 % (ref 11.7–14.4)
GLUCOSE SERPL-MCNC: 124 MG/DL (ref 70–99)
HCT VFR BLD AUTO: 40.8 % (ref 35–45)
HGB BLD-MCNC: 13.1 G/DL (ref 11.8–15.7)
HIV 1+2 AB+HIV1 P24 AG SERPL QL IA: NONREACTIVE
HYPOCHROMIA BLD QL SMEAR: ABNORMAL
IMM GRANULOCYTES # BLD AUTO: 0.04 K/UL (ref 0–0.08)
IMM GRANULOCYTES NFR BLD AUTO: 0.5 %
LYMPHOCYTES # BLD: 1.91 K/UL (ref 1.2–3.5)
LYMPHOCYTES NFR BLD: 25.2 %
MACROCYTES BLD QL SMEAR: ABNORMAL
MCH RBC QN AUTO: 35.8 PG (ref 28–33.2)
MCHC RBC AUTO-ENTMCNC: 32.1 G/DL (ref 32.2–35.5)
MCV RBC AUTO: 111.5 FL (ref 83–98)
MONOCYTES # BLD: 0.87 K/UL (ref 0.28–0.8)
MONOCYTES NFR BLD: 11.5 %
NEUTROPHILS # BLD: 4.6 K/UL (ref 1.7–7)
NEUTS SEG NFR BLD: 60.7 %
NRBC BLD-RTO: 0 %
PDW BLD AUTO: 9.5 FL (ref 9.4–12.3)
PLAT MORPH BLD: NORMAL
PLATELET # BLD AUTO: 371 K/UL (ref 150–369)
PLATELET # BLD EST: ABNORMAL 10*3/UL
POTASSIUM SERPL-SCNC: 3.9 MEQ/L (ref 3.5–5.1)
PROT SERPL-MCNC: 7.3 G/DL (ref 6–8.2)
RBC # BLD AUTO: 3.66 M/UL (ref 3.93–5.22)
SODIUM SERPL-SCNC: 141 MEQ/L (ref 136–145)
WBC # BLD AUTO: 7.58 K/UL (ref 3.8–10.5)

## 2024-06-26 PROCEDURE — 82040 ASSAY OF SERUM ALBUMIN: CPT

## 2024-06-26 PROCEDURE — 80053 COMPREHEN METABOLIC PANEL: CPT

## 2024-06-26 PROCEDURE — 36415 COLL VENOUS BLD VENIPUNCTURE: CPT

## 2024-06-26 PROCEDURE — 85025 COMPLETE CBC W/AUTO DIFF WBC: CPT

## 2024-06-26 PROCEDURE — 87389 HIV-1 AG W/HIV-1&-2 AB AG IA: CPT

## 2024-06-27 ENCOUNTER — HOSPITAL ENCOUNTER (OUTPATIENT)
Dept: PHYSICAL THERAPY | Facility: CLINIC | Age: 69
Setting detail: THERAPIES SERIES
Discharge: HOME | End: 2024-06-27
Attending: FAMILY MEDICINE
Payer: COMMERCIAL

## 2024-06-27 DIAGNOSIS — L88 PYODERMA GANGRENOSUM: ICD-10-CM

## 2024-06-27 DIAGNOSIS — I89.0 LYMPHEDEMA, NOT ELSEWHERE CLASSIFIED: Primary | ICD-10-CM

## 2024-06-27 PROCEDURE — 97140 MANUAL THERAPY 1/> REGIONS: CPT | Mod: GP

## 2024-06-27 NOTE — OP PT TREATMENT LOG
LYMPHEDEMA PT FLOWSHEET  Eval performed and pt verbalized understanding of POC   PT Lymph Exercises Current Session Time                                   THER ACT  CPT 31702 TOTAL TIME FOR SESSION Not performed   Bed Mobility     Transfer Training     Body Mechanics/Work Training     Patient Education     THER EX  CPT 84232 TOTAL TIME FOR SESSION Not performed   HEP LE decongestive exercises yes   Nu Step     UBE     STRENGTHENING      Supine Ther-Ex     Standing Ther-Ex     Seated Ther-Ex     STRETCHING     Core Stabilization     LE Stretching     UE Stretching     Spinal Stretching     Postural      REPEATED MOVEMENTS                                                       GAIT TRAINING  CPT 95012 TOTAL TIME FOR SESSION Not performed   Ambulation      Dynamic Gait     MANUAL   CPT 42283 TOTAL TIME FOR SESSION 53-60 Minutes   Progress note yes   Mobilization     Massage- Deep Tissue, Scar, Transverse Friction     Manual Lymph Drainage         Reji LE short neck abdominal sequence-   Fitted RLE lower leg medi reduction and foot piece juxta fit interlocking pt to wear  reji LE garment through the night but  1-2hr if not painful and assess skin. 6/27/24    Consider pump carepoint- pump and garments ordered with [employer]- yes 5/23/24     Educated on donning and doffing of juzo liner and juxta fit interlocking pt independent awaiting wraps lower leg and pumps yes     5/23/24: pt issued and reviewed self MLD handouts short neck and abdominal breathing sequence-    Skin Care- Lotion/Wrapping     Measurement/Fitting Fit for ease adjust in the next 2-3 visits          Mobiderm below knee velcro  size I reji  Juzo wrap liner size small x2  Juxta lite interlocking noncustomizable size small reji  Medi reduction lower leg regular standard reji  Yes all ordered above on 5/15/24 with hope 4 healing    Skin Stretching/Mobilization for Cording

## 2024-06-27 NOTE — PROGRESS NOTES
Physical Therapy Progress Note    PT PROGRESS NOTE FOR OUTPATIENT THERAPY    Patient: Mary Moreno   MRN: [id number]  : 1955 68 y.o.    Referring Physician: Leeanne Farah DO  Date of Visit: 2024    Certification Dates: 24 through 24    Recommended Frequency & Duration:  2 times/week for up to 3 months        Diagnosis:   1. Lymphedema, not elsewhere classified    2. Pyoderma gangrenosum        Chief Complaints:  No chief complaint on file.      Precautions:   Existing Precautions/Restrictions: limb restrictions  Precautions comments: Open wounds on ankles B    TODAY'S VISIT:    Time In Session:  Start Time: 1305  Stop Time: 1358  Time Calculation (min): 53 min   General Information - 24 1315          Session Details    Document Type progress note     Mode of Treatment individual therapy        General Information    Referring Physician Dr. Leeanne Farah     History of present illness/functional impairment Pt with refractory celiac disease that was diagnosed in . Pt with uncontrolled celiac and h/o of pyaderma gangroseum. Pt with sores on jay LE. Pt with h/o of jay feet grafts in . Pt with jay LE lymphedema and recommended for PT. Pt had PT for lymphedema at [facility] [place] 2 years ago. Pt does have compression garments that caused a wound. Pt does not have a pump. 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 Pt does has wraps for wounds not healed.     Patient/Family/Caregiver Comments/Observations Pt reports wearing garment through the night     Existing Precautions/Restrictions limb restrictions     Precautions comments Open wounds on ankles B         Services    Do You Speak a Language Other Than English at Home? no        Behavioral Health Related Communication    Suicidal Ideation No                    Daily Falls Screen - 24 1315          Daily Falls Assessment    Patient reported fall since last visit No                     Pain/Vitals - 06/27/24 1315          Pain Assessment    Currently in pain Yes     Preferred Pain Scale number (Numeric Rating Pain Scale)     Pain Side/Orientation bilateral     Pain: Body location Leg     Pain Rating (0-10): Pre Activity 3     Pain Rating (0-10): Activity 3     Pain Rating (0-10): Post Activity 3        Pain Intervention    Intervention  mt     Post Intervention Comments no increase in pain                    PT - 06/27/24 1315          Physical Therapy    Physical Therapy Specialty Lymphedema Program        PT Plan    Frequency of treatment 2 times/week     PT Duration 3 months     PT Cert From 05/01/24     PT Cert To 08/01/24     Date PT POC was sent to provider 05/01/24     Signed PT Plan of Care received?  Yes                       OBJECTIVE MEASUREMENTS/DATA:    Lymphedema:    Lymphedema Assessment       Row Name 06/27/24 1300       BODY LOCATION    Upper Body / Lower Body / Face and Neck Lower body       LE Skin    Skin Condition Impaired    Fibrosis severe    Scar/Incision bound down    Color pink    Quality fragile;thickened    Edema +2       LE/Back/Trunk Palpation    LE Palpation  pos stemmer jay feet pitting edema       Lower Body Outcome Measures    LLIS results/comments  34       LE Volumetrics    LE Volume Measurements B/L LE       Affected LE Measurements    Affected limb laterality Right    MTP 22 cm    -8 cm 21.5 cm    -12 cm 25 cm    0 cm 22 cm    4 cm 20.8 cm    8 cm 24 cm    12 cm 27 cm    16 cm 27 cm    20 cm 25.5 cm    24 cm 25.5 cm    28 cm 26 cm    32 cm 28 cm    Affected LE Total Volume (ml) 31039.61 ml       Other Affected LE Circumference    Other affected limb laterality Left    MTP 22 cm    -8 cm 22.5 cm    -12 cm 25.5 cm    0 cm 23.5 cm    4 cm 19.5 cm    8 cm 23 cm    12 cm 26 cm    16 cm 27.5 cm    20 cm 25 cm    24 cm 25.5 cm    28 cm 27.5 cm    32 cm 27 cm    Other Affected LE Total Volume (ml) 21223.1 ml       LE B/L Volume Difference    Which limb is  affected more? Equal severity    B/L LE Volume Difference 37.49    Difference % 0.14       RIGHT: Lower Extremity Manual Muscle Test Assessment    Hip Flexion gross movement (4+/5) good plus    Hip Extension gross movement (4+/5) good plus    Hip Abduction gross movement (4+/5) good plus    Hip Adduction gross movement (4+/5) good plus    Knee Flexion strength (4+/5) good plus    Knee Extension strength (4+/5) good plus    Ankle Dorsiflexion gross movement (4+/5) good plus    Ankle Plantarflexion gross movement (4+/5) good plus       LEFT: Lower Extremity Manual Muscle Test Assessment    Hip Flexion gross movement (4+/5) good plus    Hip Extension gross movement (4+/5) good plus    Hip Abduction gross movement (4+/5) good plus    Hip Adduction gross movement (4+/5) good plus    Knee Flexion strength (4+/5) good plus    Knee Extension strength (4+/5) good plus    Ankle Dorsiflexion gross movement (4+/5) good plus    Ankle Plantarflexion gross movement (4+/5) good plus       Assessment    Plan of Care reviewed and patient/family in agreement Yes    System Pathology/Pathophysiology Noted integumentary;lymphatic;musculoskeletal    Functional Limitations in Following Categories (PT Eval) self-care;home management;community/leisure    Rehab Potential/Prognosis good, to achieve stated therapy goals    Problem List decreased flexibility;decreased ROM;decreased strength;edema;pain    Clinical Assessment Pt with improving volumetrics. Pt with less discomfort. Pt just received garment and beginning to use at home. Pt awaiting for a pump for jay LE. Pt attempting self MLD at home. Pt making steady progress towards LTGs. To continue PT per POC.    Plan and Recommendations assess response to jay LE lymphedema medi reduction garments    [employer] CPT 90645 Manual therapy;CPT 13669 Therapeutic activities;CPT 42710 Self-care/Home management training;CPT 85513 Therapeutic exercises                  Lymph Cumulative Data:    Lymphedema          Most Recent Value    5/1/2024 - 6/27/2024 5/1/2024    10:17 5/29/2024    13:00 6/27/2024    13:00   VOLUMETRICS   Affected limb laterality Right  6/27/2024 Right Right Right   MTP 22 cm  6/27/2024 22.5 cm 24 cm 22 cm   -8 cm 21.5 cm  6/27/2024 23 cm 21.5 cm 21.5 cm   -12 cm 25 cm  6/27/2024 25 cm 25 cm 25 cm   0 cm 22 cm  6/27/2024 22 cm 23 cm 22 cm   4 cm 20.8 cm  6/27/2024 20 cm 21 cm 20.8 cm   8 cm 24 cm  6/27/2024 25 cm 24 cm 24 cm   12 cm 27 cm  6/27/2024 27 cm 26.6 cm 27 cm   16 cm 27 cm  6/27/2024 26.5 cm 28.5 cm 27 cm   20 cm 25.5 cm  6/27/2024 25.7 cm 27 cm 25.5 cm   24 cm 25.5 cm  6/27/2024 27 cm 25.5 cm 25.5 cm   28 cm 26 cm  6/27/2024 30 cm 26 cm 26 cm   32 cm 28 cm  6/27/2024 28 cm 28 cm 28 cm   Affected LE Total Volume (ml) 07931.61 ml  6/27/2024 45528.49 ml 59453.99 ml 40198.61 ml   Other affected limb laterality Left  6/27/2024 Left Left Left   MTP 22 cm  6/27/2024 22 cm 22 cm 22 cm   -8 cm 22.5 cm  6/27/2024 23.8 cm 22.5 cm 22.5 cm   -12 cm 25.5 cm  6/27/2024 26 cm 25.5 cm 25.5 cm   0 cm 23.5 cm  6/27/2024 22 cm 23.5 cm 23.5 cm   4 cm 19.5 cm  6/27/2024 21 cm 19.6 cm 19.5 cm   8 cm 23 cm  6/27/2024 26 cm 23.5 cm 23 cm   12 cm 26 cm  6/27/2024 27 cm 26 cm 26 cm   16 cm 27.5 cm  6/27/2024 25 cm 28 cm 27.5 cm   20 cm 25 cm  6/27/2024 25 cm 25.6 cm 25 cm   24 cm 25.5 cm  6/27/2024 27.5 cm 25.5 cm 25.5 cm   28 cm 27.5 cm  6/27/2024 29.7 cm 28 cm 27.5 cm   32 cm 27 cm  6/27/2024 27 cm 27 cm 27 cm   Other Affected LE Total Volume (ml) 88168.1 ml  6/27/2024 07384.13 ml 13089.1 ml 59302.1 ml   Lymph General Data   Psychosocial comments pt crying during session due to frustration  5/1/2024 pt crying during session due to frustration         ROM and MMT          5/1/2024    10:17 5/29/2024    13:00 6/27/2024    13:00   PT LE ROM Measurements   AROM: Right LE Gross Movement WFL     AROM: Left LE Gross Movement WFL     PT LE MMT   Right Hip Flexion (4+/5) good plus (4+/5) good plus (4+/5)  good plus   Left Hip Flexion (4+/5) good plus (4+/5) good plus (4+/5) good plus   Right Hip Extension (4/5) good (4+/5) good plus (4+/5) good plus   Left Hip Extension (4/5) good (4+/5) good plus (4+/5) good plus   Right Hip ABD (4+/5) good plus (4+/5) good plus (4+/5) good plus   Left Hip ABD (4+/5) good plus (4+/5) good plus (4+/5) good plus   Right Hip ADD (4+/5) good plus (4+/5) good plus (4+/5) good plus   Left Hip ADD (4+/5) good plus (4+/5) good plus (4+/5) good plus   Right Knee Flexion (4+/5) good plus (4+/5) good plus (4+/5) good plus   Left Knee Flexion (4+/5) good plus (4+/5) good plus (4+/5) good plus   Right Knee Extension (4+/5) good plus (4+/5) good plus (4+/5) good plus   Left Knee Extension (4+/5) good plus (4+/5) good plus (4+/5) good plus   Right Ankle DF (4+/5) good plus (4+/5) good plus (4+/5) good plus   Left Ankle DF (4+/5) good plus (4+/5) good plus (4+/5) good plus   Right Ankle PF (4+/5) good plus (4+/5) good plus (4+/5) good plus   Left Ankle PF (4+/5) good plus (4+/5) good plus (4+/5) good plus         Today's Treatment::    Education provided:  Yes: See treatment log for details of education provided  Methods: Discussion and Handout    LYMPHEDEMA PT FLOWSHEET  Eval performed and pt verbalized understanding of POC   PT Lymph Exercises Current Session Time                                   THER ACT  CPT 98453 TOTAL TIME FOR SESSION Not performed   Bed Mobility     Transfer Training     Body Mechanics/Work Training     Patient Education     THER EX  CPT 10320 TOTAL TIME FOR SESSION Not performed   HEP LE decongestive exercises yes   Nu Step     UBE     STRENGTHENING      Supine Ther-Ex     Standing Ther-Ex     Seated Ther-Ex     STRETCHING     Core Stabilization     LE Stretching     UE Stretching     Spinal Stretching     Postural      REPEATED MOVEMENTS                                                       GAIT TRAINING  CPT 35384 TOTAL TIME FOR SESSION Not performed   Ambulation       Dynamic Gait     MANUAL   CPT 75381 TOTAL TIME FOR SESSION 53-60 Minutes   Progress note yes   Mobilization     Massage- Deep Tissue, Scar, Transverse Friction     Manual Lymph Drainage         Reji LE short neck abdominal sequence-   Fitted RLE lower leg medi reduction and foot piece juxta fit interlocking pt to wear  reji LE garment through the night but  1-2hr if not painful and assess skin. 6/27/24    Consider pump carepoint- pump and garments ordered with [employer]- yes 5/23/24     Educated on donning and doffing of juzo liner and juxta fit interlocking pt independent awaiting wraps lower leg and pumps yes     5/23/24: pt issued and reviewed self MLD handouts short neck and abdominal breathing sequence-    Skin Care- Lotion/Wrapping     Measurement/Fitting Fit for ease adjust in the next 2-3 visits          Mobiderm below knee velcro  size I reji  Juzo wrap liner size small x2  Juxta lite interlocking noncustomizable size small reji  Medi reduction lower leg regular standard reji  Yes all ordered above on 5/15/24 with hope 4 healing    Skin Stretching/Mobilization for Cording                  Goals Addressed                   This Visit's Progress     reji LE lymphedema goals         Short term goals   Short Term Goals Time Frame Result Comment/Progress   Pt will increase LE MMT by >/= 1/2 grade 6 weeks  met             Pt will report decreased pain at worst 2/10 6weeks  not met  improving   Pt will demonstrate w/ supervision HEP/compression/self MLD techniques 6 weeks  met     Pt will decrease volume by 2% 6 weeks  met     Pt will verbalize independently proper skin care  6 weeks met                      Long term goals   LongTerm Goals Time Frame Result Comment/Progress                   Pt will report decreased pain at worst 0/10 12 weeks  ongoing     Pt will demonstrate w/ (i) HEP/compression/self MLD techniques 12 weeks  partially met     Pt will decrease volume by 3-5% 12weeks  improving     By  therapist touch, pt will demonstrate dec fibrosis as evidenced by softer subcutaneous tissues in identified areas 12 weeks improving    Pt will improve LLIS score by 5 12weeks met LLIS 34                      Delphine Richardson, PT

## 2024-07-10 ENCOUNTER — HOSPITAL ENCOUNTER (OUTPATIENT)
Dept: PHYSICAL THERAPY | Facility: CLINIC | Age: 69
Setting detail: THERAPIES SERIES
Discharge: HOME | End: 2024-07-10
Attending: FAMILY MEDICINE
Payer: COMMERCIAL

## 2024-07-10 DIAGNOSIS — I89.0 LYMPHEDEMA, NOT ELSEWHERE CLASSIFIED: Primary | ICD-10-CM

## 2024-07-10 DIAGNOSIS — L88 PYODERMA GANGRENOSUM: ICD-10-CM

## 2024-07-10 PROCEDURE — 97140 MANUAL THERAPY 1/> REGIONS: CPT | Mod: GP

## 2024-07-10 NOTE — OP PT TREATMENT LOG
LYMPHEDEMA PT FLOWSHEET  Eval performed and pt verbalized understanding of POC   PT Lymph Exercises Current Session Time                                   THER ACT  CPT 02731 TOTAL TIME FOR SESSION Not performed   Bed Mobility     Transfer Training     Body Mechanics/Work Training     Patient Education     THER EX  CPT 17446 TOTAL TIME FOR SESSION Not performed   HEP LE decongestive exercises yes   Nu Step     UBE     STRENGTHENING      Supine Ther-Ex     Standing Ther-Ex     Seated Ther-Ex     STRETCHING     Core Stabilization     LE Stretching     UE Stretching     Spinal Stretching     Postural      REPEATED MOVEMENTS                                                       GAIT TRAINING  CPT 53363 TOTAL TIME FOR SESSION Not performed   Ambulation      Dynamic Gait     MANUAL   CPT 29804 TOTAL TIME FOR SESSION 53-60 Minutes   Progress note    Mobilization     Massage- Deep Tissue, Scar, Transverse Friction     Manual Lymph Drainage         Reji LE short neck abdominal sequence- yes          Fitted RLE lower leg medi reduction and foot piece juxta fit interlocking pt to wear  reji LE garment through the night but  1-2hr if not painful and assess skin. 6/27/24    Consider pump carepoint- pump and garments ordered with [email or web address]- yes 5/23/24     Educated on donning and doffing of juzo liner and juxta fit interlocking pt independent awaiting wraps lower leg and pumps yes     5/23/24: pt issued and reviewed self MLD handouts short neck and abdominal breathing sequence-    Skin Care- Lotion/Wrapping     Measurement/Fitting Fit for ease adjust in the next 2-3 visits          Mobiderm below knee velcro  size I reji  Juzo wrap liner size small x2  Juxta lite interlocking noncustomizable size small reji  Medi reduction lower leg regular standard reji  Yes all ordered above on 5/15/24 with hope 4 healing    Skin Stretching/Mobilization for Cording

## 2024-07-10 NOTE — PROGRESS NOTES
Physical Therapy Visit    PT DAILY NOTE FOR OUTPATIENT THERAPY    Patient: Mary Moreno MRN: [id number]  : 1955 68 y.o.  Referring Physician: Leeanne Farah DO  Date of Visit: 7/10/2024    Certification Dates: 24 through 24    Diagnosis:   1. Lymphedema, not elsewhere classified    2. Pyoderma gangrenosum        Chief Complaints:  lymphedema LE    Precautions:   Existing Precautions/Restrictions: limb restrictions  Precautions comments: Open wounds on ankles B      TODAY'S VISIT    Time In Session:  Start Time: 1500  Stop Time: 1555  Time Calculation (min): 55 min   History/Vitals/Pain/Encounter Info - 07/10/24 1504          Injury History/Precautions/Daily Required Info    Document Type daily treatment     Primary Therapist Delphine Richardson PT, MPT,CLT, CCST     Chief Complaint/Reason for Visit  lymphedema LE     Referring Physician Dr. Leeanne Farah     Existing Precautions/Restrictions limb restrictions     Precautions comments Open wounds on ankles B     History of present illness/functional impairment Pt with refractory celiac disease that was diagnosed in . Pt with uncontrolled celiac and h/o of pyaderma gangroseum. Pt with sores on jay LE. Pt with h/o of jay feet grafts in . Pt with jay LE lymphedema and recommended for PT. Pt had PT for lymphedema at [facility] 2 years ago. Pt does have compression garments that caused a wound. Pt does not have a pump. 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 Pt does has wraps for wounds not healed.     Patient/Family/Caregiver Comments/Observations Pt reports garments helping and blister gone on toe     Patient reported fall since last visit No        Pain Assessment    Currently in pain Yes     Preferred Pain Scale number (Numeric Rating Pain Scale)     Pain Side/Orientation bilateral     Pain: Body location Leg     Pain Rating (0-10): Pre Activity 3     Pain Rating (0-10): Activity 3     Pain  Rating (0-10): Post Activity 3        Pain Intervention    Intervention  mt     Post Intervention Comments no increase in pain                    Daily Treatment Assessment and Plan - 07/10/24 5795          Daily Treatment Assessment and Plan    Progress toward goals Progressing     Daily Outcome Summary Pt doing well with medi reduction. Pt attempting self MLD at home. Pt should receive pump shortly. Pt with improving skin coloration and responding well to compression. Pt independent in decongestive exercises     Plan and Recommendations cont with MLD f/u on pump                         OBJECTIVE DATA TAKEN TODAY:    None taken    Today's Treatment:    LYMPHEDEMA PT FLOWSHEET  Eval performed and pt verbalized understanding of POC   PT Lymph Exercises Current Session Time                                   THER ACT  CPT 22044 TOTAL TIME FOR SESSION Not performed   Bed Mobility     Transfer Training     Body Mechanics/Work Training     Patient Education     THER EX  CPT 69733 TOTAL TIME FOR SESSION Not performed   HEP LE decongestive exercises yes   Nu Step     UBE     STRENGTHENING      Supine Ther-Ex     Standing Ther-Ex     Seated Ther-Ex     STRETCHING     Core Stabilization     LE Stretching     UE Stretching     Spinal Stretching     Postural      REPEATED MOVEMENTS                                                       GAIT TRAINING  CPT 09766 TOTAL TIME FOR SESSION Not performed   Ambulation      Dynamic Gait     MANUAL   CPT 34971 TOTAL TIME FOR SESSION 53-60 Minutes   Progress note    Mobilization     Massage- Deep Tissue, Scar, Transverse Friction     Manual Lymph Drainage         Reji LE short neck abdominal sequence- yes          Fitted RLE lower leg medi reduction and foot piece juxta fit interlocking pt to wear  reji LE garment through the night but  1-2hr if not painful and assess skin. 6/27/24    Consider pump carepoint- pump and garments ordered with [employer]- yes 5/23/24     Educated on  donning and doffing of juzo liner and juxta fit interlocking pt independent awaiting wraps lower leg and pumps yes     5/23/24: pt issued and reviewed self MLD handouts short neck and abdominal breathing sequence-    Skin Care- Lotion/Wrapping     Measurement/Fitting Fit for ease adjust in the next 2-3 visits          Mobiderm below knee velcro  size I jay  Juzo wrap liner size small x2  Juxta lite interlocking noncustomizable size small jay  Medi reduction lower leg regular standard jay  Yes all ordered above on 5/15/24 with hope 4 healing    Skin Stretching/Mobilization for Cording

## 2024-07-11 ENCOUNTER — HOSPITAL ENCOUNTER (OUTPATIENT)
Dept: PHYSICAL THERAPY | Facility: CLINIC | Age: 69
Setting detail: THERAPIES SERIES
Discharge: HOME | End: 2024-07-11
Attending: FAMILY MEDICINE
Payer: COMMERCIAL

## 2024-07-11 DIAGNOSIS — I89.0 LYMPHEDEMA, NOT ELSEWHERE CLASSIFIED: Primary | ICD-10-CM

## 2024-07-11 PROCEDURE — 97140 MANUAL THERAPY 1/> REGIONS: CPT | Mod: GP

## 2024-07-11 NOTE — OP PT TREATMENT LOG
LYMPHEDEMA PT FLOWSHEET  Eval performed and pt verbalized understanding of POC   PT Lymph Exercises Current Session Time                                   THER ACT  CPT 76615 TOTAL TIME FOR SESSION Not performed   Bed Mobility     Transfer Training     Body Mechanics/Work Training     Patient Education     THER EX  CPT 88990 TOTAL TIME FOR SESSION Not performed   HEP LE decongestive exercises yes   Nu Step     UBE     STRENGTHENING      Supine Ther-Ex     Standing Ther-Ex     Seated Ther-Ex     STRETCHING     Core Stabilization     LE Stretching     UE Stretching     Spinal Stretching     Postural      REPEATED MOVEMENTS                                                       GAIT TRAINING  CPT 44142 TOTAL TIME FOR SESSION Not performed   Ambulation      Dynamic Gait     MANUAL   CPT 04158 TOTAL TIME FOR SESSION 53-60 Minutes   Progress note    Mobilization     Massage- Deep Tissue, Scar, Transverse Friction     Manual Lymph Drainage         Reji LE short neck abdominal sequence- yes          Fitted RLE lower leg medi reduction and foot piece juxta fit interlocking pt to wear  reji LE garment through the night but  1-2hr if not painful and assess skin. 6/27/24    Consider pump carepoint- pump and garments ordered with [employer]- yes 5/23/24     Educated on donning and doffing of juzo liner and juxta fit interlocking pt independent awaiting wraps lower leg and pumps yes     5/23/24: pt issued and reviewed self MLD handouts short neck and abdominal breathing sequence-    Skin Care- Lotion/Wrapping     Measurement/Fitting Fit for ease adjust in the next 2-3 visits          Mobiderm below knee velcro  size I reji  Juzo wrap liner size small x2  Juxta lite interlocking noncustomizable size small reji  Medi reduction lower leg regular standard reji  Yes all ordered above on 5/15/24 with hope 4 healing    Skin Stretching/Mobilization for Cording

## 2024-07-11 NOTE — PROGRESS NOTES
Physical Therapy Visit    PT DAILY NOTE FOR OUTPATIENT THERAPY    Patient: Mary Moreno MRN: [id number]  : 1955 68 y.o.  Referring Physician: Leeanne Farah DO  Date of Visit: 2024    Certification Dates: 24 through 24    Diagnosis:   1. Lymphedema, not elsewhere classified        Chief Complaints:  lymphedema LE    Precautions:   Existing Precautions/Restrictions: limb restrictions  Precautions comments: Open wounds on ankles B      TODAY'S VISIT    Time In Session:  Start Time: 1310  Stop Time: 1357  Time Calculation (min): 47 min   History/Vitals/Pain/Encounter Info - 24 1313          Injury History/Precautions/Daily Required Info    Document Type daily treatment     Primary Therapist Delphine Richardson PT, MPT,CLT, CCST     Chief Complaint/Reason for Visit  lymphedema LE     Referring Physician Dr. Leeanne Farah     Existing Precautions/Restrictions limb restrictions     Precautions comments Open wounds on ankles B     History of present illness/functional impairment Pt with refractory celiac disease that was diagnosed in . Pt with uncontrolled celiac and h/o of pyaderma gangroseum. Pt with sores on ajy LE. Pt with h/o of jay feet grafts in . Pt with jay LE lymphedema and recommended for PT. Pt had PT for lymphedema at [facility] office 2 years ago. Pt does have compression garments that caused a wound. Pt does not have a pump. 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 Pt does has wraps for wounds not healed.     Patient/Family/Caregiver Comments/Observations Pt arrived late due to traffic. Pt reports swelling in toes better. Pt saw rheumatologist and will see her again in Nov for prolia shot and blood work. Pt to get pump on TUE     Patient reported fall since last visit No        Pain Assessment    Currently in pain Yes     Preferred Pain Scale number (Numeric Rating Pain Scale)     Pain Side/Orientation bilateral     Pain: Body  location Leg     Pain Rating (0-10): Pre Activity 4     Pain Rating (0-10): Activity 4     Pain Rating (0-10): Post Activity 4        Pain Intervention    Intervention  mt     Post Intervention Comments NO increase in pain                    Daily Treatment Assessment and Plan - 07/11/24 1356          Daily Treatment Assessment and Plan    Progress toward goals Progressing     Daily Outcome Summary Pt with softening of the lower leg with MLD. Pt to get pump on Tuesday. Pt educated to do neck/stomach/breathing prior to use of pump.     Plan and Recommendations cont with MLD f/u on pump assess response                         OBJECTIVE DATA TAKEN TODAY:    None taken    Today's Treatment:    LYMPHEDEMA PT FLOWSHEET  Eval performed and pt verbalized understanding of POC   PT Lymph Exercises Current Session Time                                   THER ACT  CPT 71151 TOTAL TIME FOR SESSION Not performed   Bed Mobility     Transfer Training     Body Mechanics/Work Training     Patient Education     THER EX  CPT 14586 TOTAL TIME FOR SESSION Not performed   HEP LE decongestive exercises yes   Nu Step     UBE     STRENGTHENING      Supine Ther-Ex     Standing Ther-Ex     Seated Ther-Ex     STRETCHING     Core Stabilization     LE Stretching     UE Stretching     Spinal Stretching     Postural      REPEATED MOVEMENTS                                                       GAIT TRAINING  CPT 77567 TOTAL TIME FOR SESSION Not performed   Ambulation      Dynamic Gait     MANUAL   CPT 95608 TOTAL TIME FOR SESSION 53-60 Minutes   Progress note    Mobilization     Massage- Deep Tissue, Scar, Transverse Friction     Manual Lymph Drainage         Reji LE short neck abdominal sequence- yes          Fitted RLE lower leg medi reduction and foot piece juxta fit interlocking pt to wear  reji LE garment through the night but  1-2hr if not painful and assess skin. 6/27/24    Consider pump carepoint- pump and garments ordered with [employer]- yes 5/23/24     Educated on donning and doffing of juzo liner and juxta fit interlocking pt independent awaiting wraps lower leg and pumps yes     5/23/24: pt issued and reviewed self MLD handouts short neck and abdominal breathing sequence-    Skin Care- Lotion/Wrapping     Measurement/Fitting Fit for ease adjust in the next 2-3 visits          Mobiderm below knee velcro  size I jay  Juzo wrap liner size small x2  Juxta lite interlocking noncustomizable size small jay  Medi reduction lower leg regular standard jay  Yes all ordered above on 5/15/24 with hope 4 healing    Skin Stretching/Mobilization for Cording

## 2024-07-15 ENCOUNTER — HOSPITAL ENCOUNTER (OUTPATIENT)
Dept: PHYSICAL THERAPY | Facility: CLINIC | Age: 69
Setting detail: THERAPIES SERIES
Discharge: HOME | End: 2024-07-15
Attending: FAMILY MEDICINE
Payer: COMMERCIAL

## 2024-07-15 DIAGNOSIS — I89.0 LYMPHEDEMA, NOT ELSEWHERE CLASSIFIED: Primary | ICD-10-CM

## 2024-07-15 DIAGNOSIS — L88 PYODERMA GANGRENOSUM: ICD-10-CM

## 2024-07-15 PROCEDURE — 97140 MANUAL THERAPY 1/> REGIONS: CPT | Mod: GP

## 2024-07-15 NOTE — OP PT TREATMENT LOG
LYMPHEDEMA PT FLOWSHEET  Eval performed and pt verbalized understanding of POC   PT Lymph Exercises Current Session Time                                   THER ACT  CPT 72047 TOTAL TIME FOR SESSION Not performed   Bed Mobility     Transfer Training     Body Mechanics/Work Training     Patient Education     THER EX  CPT 23057 TOTAL TIME FOR SESSION Not performed   HEP LE decongestive exercises yes   Nu Step     UBE     STRENGTHENING      Supine Ther-Ex     Standing Ther-Ex     Seated Ther-Ex     STRETCHING     Core Stabilization     LE Stretching     UE Stretching     Spinal Stretching     Postural      REPEATED MOVEMENTS                                                       GAIT TRAINING  CPT 27822 TOTAL TIME FOR SESSION Not performed   Ambulation      Dynamic Gait     MANUAL   CPT 52643 TOTAL TIME FOR SESSION 53-60 Minutes   Progress note    Mobilization     Massage- Deep Tissue, Scar, Transverse Friction     Manual Lymph Drainage         Reji LE short neck abdominal sequence- yes          Fitted RLE lower leg medi reduction and foot piece juxta fit interlocking pt to wear  reji LE garment through the night but  1-2hr if not painful and assess skin. 6/27/24    Consider pump carepoint- pump and garments ordered with [employer]- yes 5/23/24     Educated on donning and doffing of juzo liner and juxta fit interlocking pt independent awaiting wraps lower leg and pumps yes     5/23/24: pt issued and reviewed self MLD handouts short neck and abdominal breathing sequence-    Skin Care- Lotion/Wrapping     Measurement/Fitting Fit for ease adjust in the next 2-3 visits          Mobiderm below knee velcro  size I reji  Juzo wrap liner size small x2  Juxta lite interlocking noncustomizable size small reji  Medi reduction lower leg regular standard reji  Yes all ordered above on 5/15/24 with hope 4 healing    Skin Stretching/Mobilization for Cording

## 2024-07-15 NOTE — PROGRESS NOTES
Physical Therapy Visit    PT DAILY NOTE FOR OUTPATIENT THERAPY    Patient: Mary Moreno MRN: [id number]  : 1955 68 y.o.  Referring Physician: Leeanne Farah DO  Date of Visit: 7/15/2024    Certification Dates: 24 through 24    Diagnosis:   1. Lymphedema, not elsewhere classified    2. Pyoderma gangrenosum        Chief Complaints:  lymphedema LE    Precautions:   Existing Precautions/Restrictions: limb restrictions  Precautions comments: Open wounds on ankles B      TODAY'S VISIT    Time In Session:  Start Time: 1105  Stop Time: 1158  Time Calculation (min): 53 min   History/Vitals/Pain/Encounter Info - 07/15/24 1107          Injury History/Precautions/Daily Required Info    Document Type daily treatment     Primary Therapist Delphine Richardson PT, MPT,CLT, CCST     Chief Complaint/Reason for Visit  lymphedema LE     Referring Physician Dr. Leeanne Farah     Existing Precautions/Restrictions limb restrictions     Precautions comments Open wounds on ankles B     History of present illness/functional impairment Pt with refractory celiac disease that was diagnosed in . Pt with uncontrolled celiac and h/o of pyaderma gangroseum. Pt with sores on jay LE. Pt with h/o of jay feet grafts in . Pt with jay LE lymphedema and recommended for PT. Pt had PT for lymphedema at [facility] [place] 2 years ago. Pt does have compression garments that caused a wound. Pt does not have a pump. 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 Pt does has wraps for wounds not healed.     Patient/Family/Caregiver Comments/Observations Pump comes tomorrow     Patient reported fall since last visit No        Pain Assessment    Currently in pain Yes     Preferred Pain Scale number (Numeric Rating Pain Scale)     Pain Side/Orientation bilateral     Pain: Body location Leg     Pain Rating (0-10): Pre Activity 2     Pain Rating (0-10): Activity 2     Pain Rating (0-10): Post Activity 2         Pain Intervention    Intervention  mt     Post Intervention Comments no increase in pain                    Daily Treatment Assessment and Plan - 07/15/24 1107          Daily Treatment Assessment and Plan    Progress toward goals Progressing     Daily Outcome Summary Pt with less swelling in the legs and shoes too big. Pt with less overall fibrosis in the leg. Pt with imprvoing skin overall. Pt will benefit from pump for reji LE swelling managment. Pt independent in decongestive exercises, use of lotion, and self MLD and use of compression. Pt awaiting pump which should arrive tomorrow. Pt to start new medication to help with pyoderma     Plan and Recommendations cont with MLD f/u on pump assess response discharge next visit                         OBJECTIVE DATA TAKEN TODAY:    None taken    Today's Treatment:    LYMPHEDEMA PT FLOWSHEET  Eval performed and pt verbalized understanding of POC   PT Lymph Exercises Current Session Time                                   THER ACT  CPT 05266 TOTAL TIME FOR SESSION Not performed   Bed Mobility     Transfer Training     Body Mechanics/Work Training     Patient Education     THER EX  CPT 33789 TOTAL TIME FOR SESSION Not performed   HEP LE decongestive exercises yes   Nu Step     UBE     STRENGTHENING      Supine Ther-Ex     Standing Ther-Ex     Seated Ther-Ex     STRETCHING     Core Stabilization     LE Stretching     UE Stretching     Spinal Stretching     Postural      REPEATED MOVEMENTS                                                       GAIT TRAINING  CPT 06183 TOTAL TIME FOR SESSION Not performed   Ambulation      Dynamic Gait     MANUAL   CPT 12446 TOTAL TIME FOR SESSION 53-60 Minutes   Progress note    Mobilization     Massage- Deep Tissue, Scar, Transverse Friction     Manual Lymph Drainage         Reji LE short neck abdominal sequence- yes          Fitted RLE lower leg medi reduction and foot piece juxta fit interlocking pt to wear  reji LE garment through the  night but  1-2hr if not painful and assess skin. 6/27/24    Consider pump carepoint- pump and garments ordered with [facility]- yes 5/23/24     Educated on donning and doffing of juzo liner and juxta fit interlocking pt independent awaiting wraps lower leg and pumps yes     5/23/24: pt issued and reviewed self MLD handouts short neck and abdominal breathing sequence-    Skin Care- Lotion/Wrapping     Measurement/Fitting Fit for ease adjust in the next 2-3 visits          Mobiderm below knee velcro  size I jay  Juzo wrap liner size small x2  Juxta lite interlocking noncustomizable size small jay  Medi reduction lower leg regular standard jay  Yes all ordered above on 5/15/24 with hope 4 healing    Skin Stretching/Mobilization for Cording

## 2024-07-18 ENCOUNTER — TELEPHONE (OUTPATIENT)
Dept: PHYSICAL THERAPY | Facility: CLINIC | Age: 69
End: 2024-07-18
Payer: COMMERCIAL

## 2024-08-08 ENCOUNTER — APPOINTMENT (OUTPATIENT)
Dept: URBAN - METROPOLITAN AREA CLINIC 203 | Age: 69
Setting detail: DERMATOLOGY
End: 2024-08-08

## 2024-08-08 DIAGNOSIS — L98.8 OTHER SPECIFIED DISORDERS OF THE SKIN AND SUBCUTANEOUS TISSUE: ICD-10-CM

## 2024-08-08 PROCEDURE — OTHER JEUVEAU (U OR CC): OTHER

## 2024-08-08 ASSESSMENT — LOCATION DETAILED DESCRIPTION DERM
LOCATION DETAILED: RIGHT MID TEMPLE
LOCATION DETAILED: LEFT LATERAL EYEBROW
LOCATION DETAILED: RIGHT INFERIOR TEMPLE
LOCATION DETAILED: GLABELLA
LOCATION DETAILED: RIGHT MEDIAL EYEBROW
LOCATION DETAILED: LEFT INFERIOR TEMPLE

## 2024-08-08 ASSESSMENT — LOCATION ZONE DERM: LOCATION ZONE: FACE

## 2024-08-08 ASSESSMENT — LOCATION SIMPLE DESCRIPTION DERM
LOCATION SIMPLE: RIGHT EYEBROW
LOCATION SIMPLE: LEFT TEMPLE
LOCATION SIMPLE: GLABELLA
LOCATION SIMPLE: RIGHT TEMPLE
LOCATION SIMPLE: LEFT EYEBROW

## 2024-08-08 NOTE — PROCEDURE: JEUVEAU (U OR CC)
Show Inventory Tab: Show
Detail Level: Detailed
Consent: Written consent obtained. Risks include but not limited to lid/brow ptosis, bruising, swelling, diplopia, temporary effect, incomplete chemical denervation.
Measure In Units Or Cc's?: units
Dilution (U/0.1 Cc): 4
Post-Care Instructions: Patient instructed to not lie down for 4 hours and limit physical activity for 24 hours.
Quantity Per Injection Site (Units): 2

## 2024-08-19 ENCOUNTER — TRANSCRIBE ORDERS (OUTPATIENT)
Dept: REGISTRATION | Facility: REHABILITATION | Age: 69
End: 2024-08-19

## 2024-08-19 DIAGNOSIS — I89.0 LYMPHEDEMA, NOT ELSEWHERE CLASSIFIED: Primary | ICD-10-CM

## 2024-08-20 ENCOUNTER — DOCUMENTATION (OUTPATIENT)
Dept: PHYSICAL THERAPY | Facility: CLINIC | Age: 69
End: 2024-08-20
Payer: COMMERCIAL

## 2024-08-20 NOTE — PROGRESS NOTES
Physical Therapy Discharge      PT DISCHARGE NOTE FOR OUTPATIENT THERAPY    Patient: Mary Moreno MRN: [id number]  : 1955 69 y.o.  Referring Physician: No ref. provider found  Date of Visit: 2024      Certification Dates:   through      Total Visit Count: 12    Diagnosis: No diagnosis found.    Chief Complaints:  No chief complaint on file.      Precautions:         TODAY'S VISIT:    Time In Session:                 OBJECTIVE MEASUREMENTS/DATA:    None taken    ROM and MMT          2024    10:17 2024    13:00 2024    13:00   PT LE ROM Measurements   AROM: Right LE Gross Movement WFL     AROM: Left LE Gross Movement WFL     PT LE MMT   Right Hip Flexion (4+/5) good plus (4+/5) good plus (4+/5) good plus   Left Hip Flexion (4+/5) good plus (4+/5) good plus (4+/5) good plus   Right Hip Extension (4/5) good (4+/5) good plus (4+/5) good plus   Left Hip Extension (4/5) good (4+/5) good plus (4+/5) good plus   Right Hip ABD (4+/5) good plus (4+/5) good plus (4+/5) good plus   Left Hip ABD (4+/5) good plus (4+/5) good plus (4+/5) good plus   Right Hip ADD (4+/5) good plus (4+/5) good plus (4+/5) good plus   Left Hip ADD (4+/5) good plus (4+/5) good plus (4+/5) good plus   Right Knee Flexion (4+/5) good plus (4+/5) good plus (4+/5) good plus   Left Knee Flexion (4+/5) good plus (4+/5) good plus (4+/5) good plus   Right Knee Extension (4+/5) good plus (4+/5) good plus (4+/5) good plus   Left Knee Extension (4+/5) good plus (4+/5) good plus (4+/5) good plus   Right Ankle DF (4+/5) good plus (4+/5) good plus (4+/5) good plus   Left Ankle DF (4+/5) good plus (4+/5) good plus (4+/5) good plus   Right Ankle PF (4+/5) good plus (4+/5) good plus (4+/5) good plus   Left Ankle PF (4+/5) good plus (4+/5) good plus (4+/5) good plus         Today's Treatment:  No visit discharge. Pt did not return to therapy and past POC date.   Education provided:  None/NA         Goals Addressed    None

## 2024-09-24 ENCOUNTER — HOSPITAL ENCOUNTER (OUTPATIENT)
Dept: PHYSICAL THERAPY | Facility: CLINIC | Age: 69
Setting detail: THERAPIES SERIES
Discharge: HOME | End: 2024-09-24
Attending: FAMILY MEDICINE
Payer: COMMERCIAL

## 2024-09-24 DIAGNOSIS — I89.0 LYMPHEDEMA, NOT ELSEWHERE CLASSIFIED: ICD-10-CM

## 2024-09-24 PROCEDURE — 97162 PT EVAL MOD COMPLEX 30 MIN: CPT | Mod: GP

## 2024-09-24 NOTE — LETTER
Dear DR. Farah,    Thank you for this referral. Please review the attached notes and plan of care for your approval.  Please contact our department with any questions.     Sincerely,     Delphine Richardson, PT  [address]  [address]  [facility] [address]  Phone [phone]  Fax  [phone]    By co-signing this Plan of Care (POC) you agree to the following:  I have reviewed the the Plan of Care established by the therapist within this document and certify that the services are skilled and medically necessary. I have reviewed the plan and recommend that these services continue to meet the goals stated in this document.    PHYSICIAN SIGNATURE: __________________________________     DATE: ___________________  TIME: _____________           Physical Therapy Plan of Care 24   Effective from: 2024  Effective to: 2024    Plan ID: [id number]            Participants as of Finalize on 2024    Name Type Comments Contact Info    Leeanne Farah DO PCP - General  [phone]    Delphine Richardson, PT Physical Therapist         Last Progress Notes Note     Author: Delphine Richardson, PT Status: Addendum Last edited: 2024 10:00 AM       Physical Therapy Evaluation    [facility] Fax: [phone]    PT EVALUATION FOR OUTPATIENT THERAPY    Patient: Mary Moreno    MRN: [id number]  : 1955 69 y.o.     Referring Physician: Leeanne Farah DO  Date of Visit: 2024      Certification Dates:  24 through 24         Recommended Frequency & Duration:  2 times/week for up to 3 months     Diagnosis:   1. Lymphedema, not elsewhere classified        Chief Complaints: No chief complaint on file.      Precautions: limb restrictions  Precautions additional comments: Open wounds on ankles B    Past Medical History:   Past Medical History:   Diagnosis Date   • Celiac disease    • Fractures    • Lymphedema    • Osteopenia    • Pyoderma  gangrenosum    • Ulcerative colitis (CMS/HCC)        Past Surgical History:   Past Surgical History   Procedure Laterality Date   •  section     • Foot surgery     • HIP ARTHROPLASTY TOTAL Right 2020    Performed by Terrell Estrella MD at  OR   • Hysterectomy     • AR EDG US EXAM SURGICAL ALTER STOM DUODENUM/JEJUNUM [01868 (CPT®)] N/A 2023    Performed by Sarath Melendrez MD at [facility]   • Skin graft     • UPPER GASTROINTESTINAL ENDOSCOPY WITH BIOPSY N/A 2023    Performed by Sarath Melendrez MD at [facility]         LEARNING ASSESSMENT    Assessment completed:  Yes    Learner name:  [email or web address]    Learner: Patient    Learning Barriers:  Learning barriers: No Barriers    Preferred Language: English     Needed: No    Education Provided:   Method: Discussion  Readiness: acceptance  Response: Demonstrated understanding      CO-LEARNER ASSESSMENT:    Completed: No      Welcome letter discussed: Yes Patient provided with Welcome Letter, which includes attendance policy. Provided education regarding cancellation and no-show policy. Education regarding the importance of participation and regular attendance to maximize goal attainment.       OBJECTIVE MEASUREMENTS/DATA:    Time In Session:  Start Time: 1015  Stop Time: 1058  Time Calculation (min): 43 min     General Information - 24 1016          Session Details    Document Type initial evaluation     Mode of Treatment individual therapy        General Information    Referring Physician Dr. Leeanne Farah     History of present illness/functional impairment Pt with refractory celiac disease that was diagnosed in . Pt with uncontrolled celiac and h/o of pyaderma gangroseum. Pt with sores on jay LE. Pt with h/o of jay feet grafts in . Pt with jay LE lymphedema and recommended for PT. Pt had PT for lymphedema at [facility] [place] 2 years ago. Pt was previously 2 months ago. Pt has pump at home and compression garments. Pt is  following Dr. Osullivan for pain managment and is going to a walking program. Pt is seeing a dermatologist at [place] Dr. Atkinson for pyaderma gangroseum. Pt with infection in Aug requiring antibiotic unable to wear compression due to seepage. pt was on antibiotics for 15 days and the pain stopped. Pt to see dermatologist next week. Pt was recommended  back to PT from primary care.     Patient/Family/Caregiver Comments/Observations chief complaint exaccerbation of lymphedema symptoms and new night time garment     Existing Precautions/Restrictions limb restrictions     Precautions comments Open wounds on ankles B         Services    Do You Speak a Language Other Than English at Home? no        Behavioral Health Related Communication    Suicidal Ideation No                    Pain/Vitals - 09/24/24 1016          Pain Assessment    Currently in pain Yes     Preferred Pain Scale number (Numeric Rating Pain Scale)     Pain Side/Orientation right     Pain: Body location Knee     Pain Rating (0-10): Pre Activity 4     Pain Rating (0-10): Activity 4     Pain Rating (0-10): Post Activity 4        Pain Intervention    Intervention  eval     Post Intervention Comments no increase in pain                    Falls/Food Screening - 09/24/24 1016          Initial Falls Assessment    One or more falls in the last year Yes     How many times 1     Fall prevention interventions recommended [place] and re-orient the patient to the environment        Food Insecurity    Within the past 12 months, you worried that your food would run out before you got the money to buy more. Never true     Within the past 12 months, the food you bought just didn't last and you didn't have money to get more. Never true                    PT - 09/24/24 1016          Physical Therapy    Physical Therapy Specialty Lymphedema Program        PT Plan    Frequency of treatment 2 times/week     PT Duration 3 months     PT Cert From 09/24/24     PT Cert To  12/24/24     Date PT POC was sent to provider 09/24/24     Signed PT Plan of Care received?  No                       Lymphedema:    Lymphedema Assessment       Row Name 09/24/24 1000       BODY LOCATION    Upper Body / Lower Body / Face and Neck Lower body       LE Skin    Skin Condition Impaired    Fibrosis severe    Scar/Incision bound down    Color pink    Quality fragile;thickened    Edema +2    Wound seepage    LE Wound comments jay ankle wounds       LE/Back/Trunk Palpation    LE Palpation  pos stemmer jay feet       Lower Body Outcome Measures    LLIS results/comments  38       LE Volumetrics    LE Volume Measurements B/L LE       Affected LE Measurements    Affected limb laterality Right    MTP 22 cm    -8 cm 24 cm    -12 cm 28 cm    0 cm 22 cm    4 cm 21 cm    8 cm 23 cm    12 cm 25 cm    16 cm 26.5 cm    20 cm 26 cm    24 cm 25.7 cm    28 cm 26 cm    32 cm 29.5 cm    Affected LE Total Volume (ml) 56404.15 ml       Other Affected LE Circumference    Other affected limb laterality Left    MTP 21 cm    -8 cm 22 cm    -12 cm 24.5 cm    0 cm 24 cm    4 cm 20.6 cm    8 cm 22.5 cm    12 cm 25 cm    16 cm 25 cm    20 cm 24 cm    24 cm 26 cm    28 cm 27 cm    32 cm 29 cm    Other Affected LE Total Volume (ml) 07907.75 ml       LE B/L Volume Difference    Which limb is affected more? Equal severity    B/L LE Volume Difference 1519.4    Difference % 5.35       RIGHT: Lower Extremity Manual Muscle Test Assessment    Hip Flexion gross movement (4+/5) good plus    Hip Extension gross movement (4+/5) good plus    Hip Abduction gross movement (4+/5) good plus    Hip Adduction gross movement (4+/5) good plus    Knee Flexion strength (4+/5) good plus    Knee Extension strength (4+/5) good plus    Ankle Dorsiflexion gross movement (4+/5) good plus    Ankle Plantarflexion gross movement (4+/5) good plus       LEFT: Lower Extremity Manual Muscle Test Assessment    Hip Flexion gross movement (4+/5) good plus    Hip Extension  gross movement (4+/5) good plus    Hip Abduction gross movement (4+/5) good plus    Hip Adduction gross movement (4+/5) good plus    Knee Flexion strength (4+/5) good plus    Knee Extension strength (4+/5) good plus    Ankle Dorsiflexion gross movement (4+/5) good plus    Ankle Plantarflexion gross movement (4+/5) good plus       Assessment    Plan of Care reviewed and patient/family in agreement Yes    System Pathology/Pathophysiology Noted integumentary;musculoskeletal;lymphatic    Functional Limitations in Following Categories (PT Eval) self-care;home management;community/leisure    Rehab Potential/Prognosis good, to achieve stated therapy goals    Problem List decreased flexibility;decreased ROM;decreased strength;edema    Clinical Assessment Pt presents with exaccerbation of lymphedema symptoms. Pt present today for jay LE lymphedema, education on proper donning and doffing of mobiderm night time garment, newer daytime velcro garments and education on focus of self MLD to maintain swelling in the legs. Pt independent with decongestive exercises. Pt will benefit from full CDT for MLD,TA,TE,MT, and self care. It is recommended for PT 2x a week for 8-12 weeks    Plan and Recommendations iniate MLD fit for ease adjust jay lower leg    Planned Services CPT 87466 Gait training;CPT 82623 Self-care/Home management training;CPT 14799 Therapeutic activities;CPT 98489 Therapeutic exercises                     Goals          Patient Stated    •  pt goal (pt-stated)       Pt goal to get newer and thinner garments and decrease the exaccerbation of swelling         Other    •  jay LE lymphedema goals        Short term goals   Short Term Goals Time Frame Result Comment/Progress   Pt will increase LE MMT by >/= 1/2 grade 6 weeks           Pt will report decreased pain at worst 2/10 6weeks     Pt will demonstrate w/ supervision HEP/compression/self MLD techniques 6 weeks     Pt will decrease volume by 2% 6 weeks      Pt will  verbalize independently proper skin care  6 weeks                       Long term goals   LongTerm Goals Time Frame Result Comment/Progress                   Pt will report decreased pain at worst 0/10 12 weeks     Pt will demonstrate w/ (i) HEP/compression/self MLD techniques 12 weeks     Pt will decrease volume by 3-5% 12weeks     By therapist touch, pt will demonstrate dec fibrosis as evidenced by softer subcutaneous tissues in identified areas 12 weeks     Pt will improve LLIS score by 5 12weeks                •  Mutually agreed upon pain goal       Mutually agreed upon pain goal: 0-2/10                TREATMENT PLAN:    LYMPHEDEMA PT FLOWSHEET  Eval performed and pt verbalized understanding of POC yes  PT Lymph Exercises Current Session Time                        THER ACT  CPT 96497 TOTAL TIME FOR SESSION Not performed   Bed Mobility    Transfer Training    Body Mechanics/Work Training    Patient Education    THER EX  CPT 42522 TOTAL TIME FOR SESSION Not performed   CARDIOVASCULAR    Nu Step    UBE    STRENGTHENING     Supine Ther-Ex    Standing Ther-Ex    Seated Ther-Ex    STRETCHING    Core Stabilization    LE Stretching    UE Stretching    Spinal Stretching    Postural     REPEATED MOVEMENTS                                                  MANUAL   CPT 11906 TOTAL TIME FOR SESSION Not performed   Stretching    Mobilization    Massage- Deep Tissue, Scar, Transverse Friction    Manual Lymph Drainage Short neck abdominal sequence jay LE   Skin Care- Lotion/Wrapping    Measurement/Fitting Go over donning and doffing of mobiderm  Fit for ease adjust   Skin Stretching/Mobilization for Cording           ASSESSMENT:    This 69 y.o. year old female presents to PT with above stated diagnosis. Physical Therapy evaluation reveals decreased flexibility, decreased ROM, decreased strength, edema resulting in self-care, home management, community/leisure limitations. Mary Moreno will benefit from skilled PT services  to address limitation, work towards rehab and patient goals and maximize PLOF of chosen ADLs.     Planned Services: The patient's treatment will include CPT 60891 Gait training, CPT 32775 Self-care/Home management training, CPT 23645 Therapeutic activities, CPT 86910 Therapeutic exercises, .     Delphine Richardson, PT                           Current Participants as of 9/24/2024    Name Type Comments Contact Info    Leeanne Farah DO PCP - General  [phone]    Signature pending    Delphine Richardson PT Physical Therapist      Electronically signed by Delphine Richardson PT at 9/24/2024 1102 EDT

## 2024-09-24 NOTE — OP PT TREATMENT LOG
LYMPHEDEMA PT FLOWSHEET  Eval performed and pt verbalized understanding of POC yes  PT Lymph Exercises Current Session Time                        THER ACT  CPT 52631 TOTAL TIME FOR SESSION Not performed   Bed Mobility    Transfer Training    Body Mechanics/Work Training    Patient Education    THER EX  CPT 86454 TOTAL TIME FOR SESSION Not performed   CARDIOVASCULAR    Nu Step    UBE    STRENGTHENING     Supine Ther-Ex    Standing Ther-Ex    Seated Ther-Ex    STRETCHING    Core Stabilization    LE Stretching    UE Stretching    Spinal Stretching    Postural     REPEATED MOVEMENTS                                                  MANUAL   CPT 64702 TOTAL TIME FOR SESSION Not performed   Stretching    Mobilization    Massage- Deep Tissue, Scar, Transverse Friction    Manual Lymph Drainage Short neck abdominal sequence jay LE   Skin Care- Lotion/Wrapping    Measurement/Fitting Go over donning and doffing of mobiderm  Fit for ease adjust   Skin Stretching/Mobilization for Cording

## 2024-09-24 NOTE — PROGRESS NOTES
Physical Therapy Evaluation    [facility] Fax: [phone]    PT EVALUATION FOR OUTPATIENT THERAPY    Patient: Mary Moreno    MRN: [id number]  : 1955 69 y.o.     Referring Physician: Leeanne Farah DO  Date of Visit: 2024      Certification Dates:  24 through 24         Recommended Frequency & Duration:  2 times/week for up to 3 months     Diagnosis:   1. Lymphedema, not elsewhere classified        Chief Complaints: No chief complaint on file.      Precautions: limb restrictions  Precautions additional comments: Open wounds on ankles B    Past Medical History:   Past Medical History:   Diagnosis Date    Celiac disease     Fractures     Lymphedema     Osteopenia     Pyoderma gangrenosum     Ulcerative colitis (CMS/HCC)        Past Surgical History:   Past Surgical History   Procedure Laterality Date     section      Foot surgery      HIP ARTHROPLASTY TOTAL Right 2020    Performed by Terrell Estrella MD at  OR    Hysterectomy      MS EDG US EXAM SURGICAL ALTER STOM DUODENUM/JEJUNUM [10807 (CPT®)] N/A 2023    Performed by Sarath Melendrez MD at [facility]    Skin graft      UPPER GASTROINTESTINAL ENDOSCOPY WITH BIOPSY N/A 2023    Performed by Sarath Melendrez MD at [facility]         LEARNING ASSESSMENT    Assessment completed:  Yes    Learner name:  mary    Learner: Patient    Learning Barriers:  Learning barriers: No Barriers    Preferred Language: English     Needed: No    Education Provided:   Method: Discussion  Readiness: acceptance  Response: Demonstrated understanding      CO-LEARNER ASSESSMENT:    Completed: No      Welcome letter discussed: Yes Patient provided with Welcome Letter, which includes attendance policy. Provided education regarding cancellation and no-show policy. Education regarding the importance of participation and regular attendance to maximize goal attainment.       OBJECTIVE MEASUREMENTS/DATA:    Time In  Session:  Start Time: 1015  Stop Time: 1058  Time Calculation (min): 43 min     General Information - 09/24/24 1016          Session Details    Document Type initial evaluation     Mode of Treatment individual therapy        General Information    Referring Physician Dr. Leeanne Farah     History of present illness/functional impairment Pt with refractory celiac disease that was diagnosed in 2020. Pt with uncontrolled celiac and h/o of pyaderma gangroseum. Pt with sores on jay LE. Pt with h/o of jay feet grafts in 2013. Pt with jay LE lymphedema and recommended for PT. Pt had PT for lymphedema at [facility] 2 years ago. Pt was previously 2 months ago. Pt has pump at home and compression garments. Pt is following Dr. Osullivan for pain managment and is going to a walking program. Pt is seeing a dermatologist at [place] Dr. Atkinson for pyaderma gangroseum. Pt with infection in Aug requiring antibiotic unable to wear compression due to seepage. pt was on antibiotics for 15 days and the pain stopped. Pt to see dermatologist next week. Pt was recommended  back to PT from primary care.     Patient/Family/Caregiver Comments/Observations chief complaint exaccerbation of lymphedema symptoms and new night time garment     Existing Precautions/Restrictions limb restrictions     Precautions comments Open wounds on ankles B         Services    Do You Speak a Language Other Than English at Home? no        Behavioral Health Related Communication    Suicidal Ideation No                    Pain/Vitals - 09/24/24 1016          Pain Assessment    Currently in pain Yes     Preferred Pain Scale number (Numeric Rating Pain Scale)     Pain Side/Orientation right     Pain: Body location Knee     Pain Rating (0-10): Pre Activity 4     Pain Rating (0-10): Activity 4     Pain Rating (0-10): Post Activity 4        Pain Intervention    Intervention  eval     Post Intervention Comments no increase in pain                     Falls/Food Screening - 09/24/24 1016          Initial Falls Assessment    One or more falls in the last year Yes     How many times 1     Fall prevention interventions recommended [place] and re-orient the patient to the environment        Food Insecurity    Within the past 12 months, you worried that your food would run out before you got the money to buy more. Never true     Within the past 12 months, the food you bought just didn't last and you didn't have money to get more. Never true                    PT - 09/24/24 1016          Physical Therapy    Physical Therapy Specialty Lymphedema Program        PT Plan    Frequency of treatment 2 times/week     PT Duration 3 months     PT Cert From 09/24/24     PT Cert To 12/24/24     Date PT POC was sent to provider 09/24/24     Signed PT Plan of Care received?  No                       Lymphedema:    Lymphedema Assessment       Row Name 09/24/24 1000       BODY LOCATION    Upper Body / Lower Body / Face and Neck Lower body       LE Skin    Skin Condition Impaired    Fibrosis severe    Scar/Incision bound down    Color pink    Quality fragile;thickened    Edema +2    Wound seepage    LE Wound comments jay ankle wounds       LE/Back/Trunk Palpation    LE Palpation  pos stemmer jay feet       Lower Body Outcome Measures    LLIS results/comments  38       LE Volumetrics    LE Volume Measurements B/L LE       Affected LE Measurements    Affected limb laterality Right    MTP 22 cm    -8 cm 24 cm    -12 cm 28 cm    0 cm 22 cm    4 cm 21 cm    8 cm 23 cm    12 cm 25 cm    16 cm 26.5 cm    20 cm 26 cm    24 cm 25.7 cm    28 cm 26 cm    32 cm 29.5 cm    Affected LE Total Volume (ml) 97743.15 ml       Other Affected LE Circumference    Other affected limb laterality Left    MTP 21 cm    -8 cm 22 cm    -12 cm 24.5 cm    0 cm 24 cm    4 cm 20.6 cm    8 cm 22.5 cm    12 cm 25 cm    16 cm 25 cm    20 cm 24 cm    24 cm 26 cm    28 cm 27 cm    32 cm 29 cm    Other Affected LE Total  Volume (ml) 91241.75 ml       LE B/L Volume Difference    Which limb is affected more? Equal severity    B/L LE Volume Difference 1519.4    Difference % 5.35       RIGHT: Lower Extremity Manual Muscle Test Assessment    Hip Flexion gross movement (4+/5) good plus    Hip Extension gross movement (4+/5) good plus    Hip Abduction gross movement (4+/5) good plus    Hip Adduction gross movement (4+/5) good plus    Knee Flexion strength (4+/5) good plus    Knee Extension strength (4+/5) good plus    Ankle Dorsiflexion gross movement (4+/5) good plus    Ankle Plantarflexion gross movement (4+/5) good plus       LEFT: Lower Extremity Manual Muscle Test Assessment    Hip Flexion gross movement (4+/5) good plus    Hip Extension gross movement (4+/5) good plus    Hip Abduction gross movement (4+/5) good plus    Hip Adduction gross movement (4+/5) good plus    Knee Flexion strength (4+/5) good plus    Knee Extension strength (4+/5) good plus    Ankle Dorsiflexion gross movement (4+/5) good plus    Ankle Plantarflexion gross movement (4+/5) good plus       Assessment    Plan of Care reviewed and patient/family in agreement Yes    System Pathology/Pathophysiology Noted integumentary;musculoskeletal;lymphatic    Functional Limitations in Following Categories (PT Eval) self-care;home management;community/leisure    Rehab Potential/Prognosis good, to achieve stated therapy goals    Problem List decreased flexibility;decreased ROM;decreased strength;edema    Clinical Assessment Pt presents with exaccerbation of lymphedema symptoms. Pt present today for jay LE lymphedema, education on proper donning and doffing of mobiderm night time garment, newer daytime velcro garments and education on focus of self MLD to maintain swelling in the legs. Pt independent with decongestive exercises. Pt will benefit from full CDT for MLD,TA,TE,MT, and self care. It is recommended for PT 2x a week for 8-12 weeks    Plan and Recommendations iniate MLD  fit for ease adjust jay lower leg    Planned Services CPT 39867 Gait training;CPT 71728 Self-care/Home management training;CPT 33138 Therapeutic activities;CPT 87061 Therapeutic exercises                     Goals          Patient Stated      pt goal (pt-stated)       Pt goal to get newer and thinner garments and decrease the exaccerbation of swelling         Other      jay LE lymphedema goals        Short term goals   Short Term Goals Time Frame Result Comment/Progress   Pt will increase LE MMT by >/= 1/2 grade 6 weeks           Pt will report decreased pain at worst 2/10 6weeks     Pt will demonstrate w/ supervision HEP/compression/self MLD techniques 6 weeks     Pt will decrease volume by 2% 6 weeks      Pt will verbalize independently proper skin care  6 weeks                       Long term goals   LongTerm Goals Time Frame Result Comment/Progress                   Pt will report decreased pain at worst 0/10 12 weeks     Pt will demonstrate w/ (i) HEP/compression/self MLD techniques 12 weeks     Pt will decrease volume by 3-5% 12weeks     By therapist touch, pt will demonstrate dec fibrosis as evidenced by softer subcutaneous tissues in identified areas 12 weeks     Pt will improve LLIS score by 5 12weeks                  Mutually agreed upon pain goal       Mutually agreed upon pain goal: 0-2/10                TREATMENT PLAN:    LYMPHEDEMA PT FLOWSHEET  Eval performed and pt verbalized understanding of POC yes  PT Lymph Exercises Current Session Time                        THER ACT  CPT 67850 TOTAL TIME FOR SESSION Not performed   Bed Mobility    Transfer Training    Body Mechanics/Work Training    Patient Education    THER EX  CPT 80300 TOTAL TIME FOR SESSION Not performed   CARDIOVASCULAR    Nu Step    UBE    STRENGTHENING     Supine Ther-Ex    Standing Ther-Ex    Seated Ther-Ex    STRETCHING    Core Stabilization    LE Stretching    UE Stretching    Spinal Stretching    Postural     REPEATED MOVEMENTS                                                   MANUAL   CPT 52088 TOTAL TIME FOR SESSION Not performed   Stretching    Mobilization    Massage- Deep Tissue, Scar, Transverse Friction    Manual Lymph Drainage Short neck abdominal sequence jay LE   Skin Care- Lotion/Wrapping    Measurement/Fitting Go over donning and doffing of mobiderm  Fit for ease adjust   Skin Stretching/Mobilization for Cording           ASSESSMENT:    This 69 y.o. year old female presents to PT with above stated diagnosis. Physical Therapy evaluation reveals decreased flexibility, decreased ROM, decreased strength, edema resulting in self-care, home management, community/leisure limitations. Mary Moreno will benefit from skilled PT services to address limitation, work towards rehab and patient goals and maximize PLOF of chosen ADLs.     Planned Services: The patient's treatment will include CPT 23770 Gait training, CPT 01224 Self-care/Home management training, CPT 46499 Therapeutic activities, CPT 01303 Therapeutic exercises, .     Delphine Richardson, PT

## 2024-10-01 ENCOUNTER — APPOINTMENT (OUTPATIENT)
Dept: LAB | Facility: CLINIC | Age: 69
End: 2024-10-01
Attending: FAMILY MEDICINE
Payer: COMMERCIAL

## 2024-10-01 ENCOUNTER — HOSPITAL ENCOUNTER (OUTPATIENT)
Dept: PHYSICAL THERAPY | Facility: CLINIC | Age: 69
Setting detail: THERAPIES SERIES
Discharge: HOME | End: 2024-10-01
Attending: FAMILY MEDICINE
Payer: COMMERCIAL

## 2024-10-01 ENCOUNTER — TRANSCRIBE ORDERS (OUTPATIENT)
Dept: LAB | Facility: CLINIC | Age: 69
End: 2024-10-01

## 2024-10-01 DIAGNOSIS — E78.2 MIXED HYPERLIPIDEMIA: Primary | ICD-10-CM

## 2024-10-01 DIAGNOSIS — R74.01 ELEVATION OF LEVELS OF LIVER TRANSAMINASE LEVELS: ICD-10-CM

## 2024-10-01 DIAGNOSIS — E72.11 HOMOCYSTINURIA (CMS/HCC): ICD-10-CM

## 2024-10-01 DIAGNOSIS — E53.8 DEFICIENCY OF OTHER SPECIFIED B GROUP VITAMINS: ICD-10-CM

## 2024-10-01 DIAGNOSIS — M81.0 AGE-RELATED OSTEOPOROSIS WITHOUT CURRENT PATHOLOGICAL FRACTURE: ICD-10-CM

## 2024-10-01 DIAGNOSIS — E72.12 METHYLENETETRAHYDROFOLATE REDUCTASE DEFICIENCY (CMS/HCC): ICD-10-CM

## 2024-10-01 DIAGNOSIS — E61.1 IRON DEFICIENCY: ICD-10-CM

## 2024-10-01 DIAGNOSIS — K90.0 CELIAC DISEASE: ICD-10-CM

## 2024-10-01 DIAGNOSIS — R73.03 PREDIABETES: ICD-10-CM

## 2024-10-01 DIAGNOSIS — E79.0 HYPERURICEMIA WITHOUT SIGNS OF INFLAMMATORY ARTHRITIS AND TOPHACEOUS DISEASE: ICD-10-CM

## 2024-10-01 DIAGNOSIS — E03.9 HYPOTHYROIDISM, UNSPECIFIED: ICD-10-CM

## 2024-10-01 DIAGNOSIS — E63.0 ESSENTIAL FATTY ACID (EFA) DEFICIENCY: ICD-10-CM

## 2024-10-01 DIAGNOSIS — R79.82 ELEVATED C-REACTIVE PROTEIN (CRP): ICD-10-CM

## 2024-10-01 DIAGNOSIS — L88 PYODERMA GANGRENOSUM: ICD-10-CM

## 2024-10-01 DIAGNOSIS — I89.0 LYMPHEDEMA, NOT ELSEWHERE CLASSIFIED: Primary | ICD-10-CM

## 2024-10-01 DIAGNOSIS — E83.42 HYPOMAGNESEMIA: ICD-10-CM

## 2024-10-01 DIAGNOSIS — E78.2 MIXED HYPERLIPIDEMIA: ICD-10-CM

## 2024-10-01 LAB
25(OH)D3 SERPL-MCNC: 58 NG/ML (ref 30–100)
ALBUMIN SERPL-MCNC: 3.6 G/DL (ref 3.5–5.7)
ALP SERPL-CCNC: 232 IU/L (ref 34–125)
ALT SERPL-CCNC: 22 IU/L (ref 7–52)
ANION GAP SERPL CALC-SCNC: 7 MEQ/L (ref 3–15)
AST SERPL-CCNC: 54 IU/L (ref 13–39)
BACTERIA URNS QL MICRO: ABNORMAL /HPF
BASOPHILS # BLD: 0.09 K/UL (ref 0.01–0.1)
BASOPHILS NFR BLD: 1.1 %
BILIRUB SERPL-MCNC: 0.7 MG/DL (ref 0.3–1.2)
BILIRUB UR QL STRIP.AUTO: NEGATIVE MG/DL
BUN SERPL-MCNC: 9 MG/DL (ref 7–25)
CALCIUM SERPL-MCNC: 8.9 MG/DL (ref 8.6–10.3)
CHLORIDE SERPL-SCNC: 98 MEQ/L (ref 98–107)
CHOLEST SERPL-MCNC: 188 MG/DL
CLARITY UR REFRACT.AUTO: CLEAR
CO2 SERPL-SCNC: 34 MEQ/L (ref 21–31)
COLOR UR AUTO: YELLOW
CREAT SERPL-MCNC: 0.5 MG/DL (ref 0.6–1.2)
CRP SERPL HS-MCNC: 5.25 MG/L
DIFFERENTIAL METHOD BLD: ABNORMAL
EGFRCR SERPLBLD CKD-EPI 2021: >60 ML/MIN/1.73M*2
EOSINOPHIL # BLD: 0.22 K/UL (ref 0.04–0.36)
EOSINOPHIL NFR BLD: 2.6 %
ERYTHROCYTE [DISTWIDTH] IN BLOOD BY AUTOMATED COUNT: 14.9 % (ref 11.7–14.4)
EST. AVERAGE GLUCOSE BLD GHB EST-MCNC: 100 MG/DL
FERRITIN SERPL-MCNC: 89 NG/ML (ref 11–250)
GGT SERPL-CCNC: 224 IU/L (ref 9–64)
GLUCOSE SERPL-MCNC: 89 MG/DL (ref 70–99)
GLUCOSE UR STRIP.AUTO-MCNC: NEGATIVE MG/DL
HBA1C MFR BLD: 5.1 %
HCT VFR BLD AUTO: 40.6 % (ref 35–45)
HCYS SERPL-SCNC: 31.4 UMOL/L (ref 5–15)
HDLC SERPL-MCNC: 63 MG/DL
HDLC SERPL: 3 {RATIO}
HGB BLD-MCNC: 12.8 G/DL (ref 11.8–15.7)
HGB UR QL STRIP.AUTO: NEGATIVE
HYALINE CASTS #/AREA URNS LPF: ABNORMAL /LPF
IMM GRANULOCYTES # BLD AUTO: 0.03 K/UL (ref 0–0.08)
IMM GRANULOCYTES NFR BLD AUTO: 0.4 %
IRON SATN MFR SERPL: 57 % (ref 15–45)
IRON SERPL-MCNC: 176 UG/DL (ref 35–150)
KETONES UR STRIP.AUTO-MCNC: NEGATIVE MG/DL
LDLC SERPL CALC-MCNC: 87 MG/DL
LEUKOCYTE ESTERASE UR QL STRIP.AUTO: NEGATIVE
LYMPHOCYTES # BLD: 1.36 K/UL (ref 1.2–3.5)
LYMPHOCYTES NFR BLD: 16.2 %
MACROCYTES BLD QL SMEAR: ABNORMAL
MAGNESIUM SERPL-MCNC: 1.5 MG/DL (ref 1.8–2.5)
MCH RBC QN AUTO: 34.9 PG (ref 28–33.2)
MCHC RBC AUTO-ENTMCNC: 31.5 G/DL (ref 32.2–35.5)
MCV RBC AUTO: 110.6 FL (ref 83–98)
MONOCYTES # BLD: 0.85 K/UL (ref 0.28–0.8)
MONOCYTES NFR BLD: 10.1 %
MUCOUS THREADS URNS QL MICRO: ABNORMAL /LPF
NEUTROPHILS # BLD: 5.86 K/UL (ref 1.7–7)
NEUTS SEG NFR BLD: 69.6 %
NITRITE UR QL STRIP.AUTO: NEGATIVE
NONHDLC SERPL-MCNC: 125 MG/DL
NRBC BLD-RTO: 0 %
PDW BLD AUTO: 9.4 FL (ref 9.4–12.3)
PH UR STRIP.AUTO: 6.5 [PH]
PLAT MORPH BLD: NORMAL
PLATELET # BLD AUTO: 385 K/UL (ref 150–369)
PLATELET # BLD EST: ABNORMAL 10*3/UL
PLATELET CLUMP BLD QL SMEAR: PRESENT
POTASSIUM SERPL-SCNC: 4 MEQ/L (ref 3.5–5.1)
PROT SERPL-MCNC: 7.1 G/DL (ref 6–8.2)
PROT UR QL STRIP.AUTO: ABNORMAL
RBC # BLD AUTO: 3.67 M/UL (ref 3.93–5.22)
RBC #/AREA URNS HPF: ABNORMAL /HPF
SODIUM SERPL-SCNC: 139 MEQ/L (ref 136–145)
SP GR UR REFRACT.AUTO: 1.02
SQUAMOUS URNS QL MICRO: ABNORMAL /HPF
T3FREE SERPL-MCNC: 5.91 PG/ML (ref 2.1–3.7)
T4 FREE SERPL-MCNC: 0.86 NG/DL (ref 0.58–1.64)
TARGETS BLD QL SMEAR: ABNORMAL
TIBC SERPL-MCNC: 311 UG/DL (ref 270–460)
TRIGL SERPL-MCNC: 190 MG/DL
TSH SERPL DL<=0.05 MIU/L-ACNC: 4.36 MIU/L (ref 0.34–5.6)
UIBC SERPL-MCNC: 135 UG/DL (ref 180–360)
URATE SERPL-MCNC: 4.4 MG/DL (ref 2.3–6)
UROBILINOGEN UR STRIP-ACNC: 0.2 EU/DL
VIT B12 SERPL-MCNC: 395 PG/ML (ref 180–914)
WBC # BLD AUTO: 8.41 K/UL (ref 3.8–10.5)
WBC #/AREA URNS HPF: ABNORMAL /HPF

## 2024-10-01 PROCEDURE — 36415 COLL VENOUS BLD VENIPUNCTURE: CPT

## 2024-10-01 PROCEDURE — 82306 VITAMIN D 25 HYDROXY: CPT

## 2024-10-01 PROCEDURE — 82784 ASSAY IGA/IGD/IGG/IGM EACH: CPT

## 2024-10-01 PROCEDURE — 84550 ASSAY OF BLOOD/URIC ACID: CPT

## 2024-10-01 PROCEDURE — 84439 ASSAY OF FREE THYROXINE: CPT

## 2024-10-01 PROCEDURE — 80053 COMPREHEN METABOLIC PANEL: CPT

## 2024-10-01 PROCEDURE — 82172 ASSAY OF APOLIPOPROTEIN: CPT

## 2024-10-01 PROCEDURE — 86258 DGP ANTIBODY EACH IG CLASS: CPT

## 2024-10-01 PROCEDURE — 83090 ASSAY OF HOMOCYSTEINE: CPT

## 2024-10-01 PROCEDURE — 80061 LIPID PANEL: CPT | Mod: 59

## 2024-10-01 PROCEDURE — 86141 C-REACTIVE PROTEIN HS: CPT

## 2024-10-01 PROCEDURE — 82542 COL CHROMOTOGRAPHY QUAL/QUAN: CPT

## 2024-10-01 PROCEDURE — 81001 URINALYSIS AUTO W/SCOPE: CPT

## 2024-10-01 PROCEDURE — 82607 VITAMIN B-12: CPT

## 2024-10-01 PROCEDURE — 82728 ASSAY OF FERRITIN: CPT

## 2024-10-01 PROCEDURE — 86364 TISS TRNSGLTMNASE EA IG CLAS: CPT

## 2024-10-01 PROCEDURE — 84443 ASSAY THYROID STIM HORMONE: CPT

## 2024-10-01 PROCEDURE — 30000001 MISCELLANEOUS LAB TEST (NOT TO BE USED FOR COVID19)

## 2024-10-01 PROCEDURE — 85025 COMPLETE CBC W/AUTO DIFF WBC: CPT

## 2024-10-01 PROCEDURE — 83704 LIPOPROTEIN BLD QUAN PART: CPT

## 2024-10-01 PROCEDURE — 84481 FREE ASSAY (FT-3): CPT

## 2024-10-01 PROCEDURE — 83735 ASSAY OF MAGNESIUM: CPT

## 2024-10-01 PROCEDURE — 97140 MANUAL THERAPY 1/> REGIONS: CPT | Mod: GP

## 2024-10-01 PROCEDURE — 83036 HEMOGLOBIN GLYCOSYLATED A1C: CPT

## 2024-10-01 PROCEDURE — 83550 IRON BINDING TEST: CPT

## 2024-10-01 PROCEDURE — 82977 ASSAY OF GGT: CPT

## 2024-10-01 NOTE — OP PT TREATMENT LOG
LYMPHEDEMA PT FLOWSHEET  Eval performed and pt verbalized understanding of POC   PT Lymph Exercises Current Session Time                        THER ACT  CPT 89112 TOTAL TIME FOR SESSION Not performed   Bed Mobility    Transfer Training    Body Mechanics/Work Training    Patient Education    THER EX  CPT 86241 TOTAL TIME FOR SESSION Not performed   CARDIOVASCULAR    Nu Step    UBE    STRENGTHENING     Supine Ther-Ex    Standing Ther-Ex    Seated Ther-Ex    STRETCHING    Core Stabilization    LE Stretching    UE Stretching    Spinal Stretching    Postural     REPEATED MOVEMENTS                                                  MANUAL   CPT 93063 TOTAL TIME FOR SESSION 53-67 Minutes   Stretching    Mobilization    Massage- Deep Tissue, Scar, Transverse Friction    Manual Lymph Drainage Short neck abdominal sequence jay LE yes   Skin Care- Lotion/Wrapping    Measurement/Fitting Go over donning and doffing of mobiderm  Fit for ease adjust see attached fax to [employer] Matthew Peoples 10/1/24 yes   Skin Stretching/Mobilization for Cording

## 2024-10-01 NOTE — PROGRESS NOTES
Physical Therapy Visit    PT DAILY NOTE FOR OUTPATIENT THERAPY    Patient: Mary Moreno MRN: [id number]  : 1955 69 y.o.  Referring Physician: Leeanne Farah DO  Date of Visit: 10/1/2024    Certification Dates: 24 through 24    Diagnosis:   1. Lymphedema, not elsewhere classified    2. Pyoderma gangrenosum        Chief Complaints:  lymphedema LE    Precautions:   Existing Precautions/Restrictions: limb restrictions  Precautions comments: Open wounds on ankles B      TODAY'S VISIT    Time In Session:  Start Time: 805  Stop Time: 858  Time Calculation (min): 53 min   History/Vitals/Pain/Encounter Info - 10/01/24 0803          Injury History/Precautions/Daily Required Info    Document Type daily treatment     Primary Therapist Delphine Richardson PT, MPT,CLT, CCST     Chief Complaint/Reason for Visit  lymphedema LE     Referring Physician Dr. Leeanne Farah     Existing Precautions/Restrictions limb restrictions     Precautions comments Open wounds on ankles B     History of present illness/functional impairment Pt with refractory celiac disease that was diagnosed in . Pt with uncontrolled celiac and h/o of pyaderma gangroseum. Pt with sores on jay LE. Pt with h/o of jay feet grafts in . Pt with jay LE lymphedema and recommended for PT. Pt had PT for lymphedema at [facility] 2 years ago. Pt was previously 2 months ago. Pt has pump at home and compression garments. Pt is following Dr. Osullivan for pain managment and is going to a walking program. Pt is seeing a dermatologist at [place] Dr. Atkinson for pyaderma gangroseum. Pt with infection in Aug requiring antibiotic unable to wear compression due to seepage. pt was on antibiotics for 15 days and the pain stopped. Pt to see dermatologist next week. Pt was recommended  back to PT from primary care.     Patient/Family/Caregiver Comments/Observations Pt reports wearing mobiderm for 30 min. Pt reports knee discomfort from fall  that was a while ago. Pt did see primary care MD last week and that it is just a bruise on the knee     Patient reported fall since last visit No        Pain Assessment    Currently in pain No/Denies        Pain Intervention    Intervention  mt     Post Intervention Comments no increase in pain                    Daily Treatment Assessment and Plan - 10/01/24 0900          Daily Treatment Assessment and Plan    Progress toward goals Progressing     Daily Outcome Summary Fitted for ease adjust. Pt wearing mobiderm for 30 min encouraged to wear longer. Pt with knee pain due to bruising limited mobiderm wear. Pt with softening of the lower leg with MLD     Plan and Recommendations cont with MLD f/u on garment order                         OBJECTIVE DATA TAKEN TODAY:    None taken    Today's Treatment:    LYMPHEDEMA PT FLOWSHEET  Eval performed and pt verbalized understanding of POC   PT Lymph Exercises Current Session Time                        THER ACT  CPT 36348 TOTAL TIME FOR SESSION Not performed   Bed Mobility    Transfer Training    Body Mechanics/Work Training    Patient Education    THER EX  CPT 69738 TOTAL TIME FOR SESSION Not performed   CARDIOVASCULAR    Nu Step    UBE    STRENGTHENING     Supine Ther-Ex    Standing Ther-Ex    Seated Ther-Ex    STRETCHING    Core Stabilization    LE Stretching    UE Stretching    Spinal Stretching    Postural     REPEATED MOVEMENTS                                                  MANUAL   CPT 85931 TOTAL TIME FOR SESSION 53-67 Minutes   Stretching    Mobilization    Massage- Deep Tissue, Scar, Transverse Friction    Manual Lymph Drainage Short neck abdominal sequence jay LE yes   Skin Care- Lotion/Wrapping    Measurement/Fitting Go over donning and doffing of mobiderm  Fit for ease adjust see attached fax to walt Peoples 10/1/24 yes   Skin Stretching/Mobilization for Cording

## 2024-10-02 LAB
APO B SERPL-MCNC: 102 MG/DL
GLIADIN PEPTIDE IGA SER IA-ACNC: >142 ELIA U/ML
GLIADIN PEPTIDE IGG SER IA-ACNC: 65 ELIA U/ML
IGA SERPL-MCNC: 663 MG/DL (ref 84.5–499)
TTG IGA SER IA-ACNC: 107 ELIA U/ML
TTG IGG SER IA-ACNC: <0.6 ELIA U/ML

## 2024-10-03 LAB — GLIADIN IGG SER IA-ACNC: 168.2 U/ML

## 2024-10-07 LAB — LABORATORY REPORT: NORMAL

## 2024-10-09 ENCOUNTER — HOSPITAL ENCOUNTER (OUTPATIENT)
Dept: PHYSICAL THERAPY | Facility: CLINIC | Age: 69
Setting detail: THERAPIES SERIES
Discharge: HOME | End: 2024-10-09
Attending: FAMILY MEDICINE
Payer: COMMERCIAL

## 2024-10-09 DIAGNOSIS — I89.0 LYMPHEDEMA, NOT ELSEWHERE CLASSIFIED: Primary | ICD-10-CM

## 2024-10-09 DIAGNOSIS — L88 PYODERMA GANGRENOSUM: ICD-10-CM

## 2024-10-09 PROCEDURE — 97140 MANUAL THERAPY 1/> REGIONS: CPT | Mod: GP,CQ

## 2024-10-09 NOTE — PROGRESS NOTES
Physical Therapy Visit    PT DAILY NOTE FOR OUTPATIENT THERAPY    Patient: Mary Moreno MRN: [id number]  : 1955 69 y.o.  Referring Physician: Leeanne Farah DO  Date of Visit: 10/9/2024    Certification Dates: 24 through 24    Diagnosis:   1. Lymphedema, not elsewhere classified    2. Pyoderma gangrenosum        Chief Complaints:  lymphedema LE    Precautions:   Existing Precautions/Restrictions: limb restrictions  Precautions comments: Open wounds on ankles B      TODAY'S VISIT    Time In Session:  Start Time: 1405  Stop Time: 1459  Time Calculation (min): 54 min   History/Vitals/Pain/Encounter Info - 10/09/24 1406          Injury History/Precautions/Daily Required Info    Document Type daily treatment     Primary Therapist Delphine Richardson PT, MPT,CLT, CCST     Chief Complaint/Reason for Visit  lymphedema LE     Referring Physician Dr. Leeanne Farah     Existing Precautions/Restrictions limb restrictions     Precautions comments Open wounds on ankles B     History of present illness/functional impairment Pt with refractory celiac disease that was diagnosed in . Pt with uncontrolled celiac and h/o of pyaderma gangroseum. Pt with sores on jay LE. Pt with h/o of jay feet grafts in . Pt with jay LE lymphedema and recommended for PT. Pt had PT for lymphedema at [facility] 2 years ago. Pt was previously 2 months ago. Pt has pump at home and compression garments. Pt is following Dr. Osullivan for pain managment and is going to a walking program. Pt is seeing a dermatologist at [employer] Dr. Atkinson for pyaderma gangroseum. Pt with infection in Aug requiring antibiotic unable to wear compression due to seepage. pt was on antibiotics for 15 days and the pain stopped. Pt to see dermatologist next week. Pt was recommended  back to PT from primary care.     Patient/Family/Caregiver Comments/Observations Pt reports the knee is feeling better. Has not worn mobiderm since last session.      Patient reported fall since last visit No        Pain Assessment    Currently in pain No/Denies                    Daily Treatment Assessment and Plan - 10/09/24 1406          Daily Treatment Assessment and Plan    Progress toward goals Progressing     Daily Outcome Summary Pt reports she has not heard about her garment order. Has not worn mobiderm since last visit. Encouraged her to resume wearing mobiderm per PT recommendations now that there is no brusing present and pain has lessened. Continued with MLD to BLE avoiding ankles since they are wrapped from wound care.     Plan and Recommendations cont with MLD f/u on garment order                         OBJECTIVE DATA TAKEN TODAY:    None taken    Today's Treatment:    LYMPHEDEMA PT FLOWSHEET  Eval performed and pt verbalized understanding of POC   PT Lymph Exercises Current Session Time                        THER ACT  CPT 06415 TOTAL TIME FOR SESSION Not performed   Bed Mobility    Transfer Training    Body Mechanics/Work Training    Patient Education    THER EX  CPT 20066 TOTAL TIME FOR SESSION Not performed   CARDIOVASCULAR    Nu Step    UBE    STRENGTHENING     Supine Ther-Ex    Standing Ther-Ex    Seated Ther-Ex    STRETCHING    Core Stabilization    LE Stretching    UE Stretching    Spinal Stretching    Postural     REPEATED MOVEMENTS                                                  MANUAL   CPT 74885 TOTAL TIME FOR SESSION 53-67 Minutes   Stretching    Mobilization    Massage- Deep Tissue, Scar, Transverse Friction    Manual Lymph Drainage Short neck abdominal sequence jay LE yes   Skin Care- Lotion/Wrapping    Measurement/Fitting Go over donning and doffing of mobiderm  Fit for ease adjust see attached fax to walt Peoples 10/1/24 yes   Skin Stretching/Mobilization for Cording

## 2024-10-09 NOTE — OP PT TREATMENT LOG
LYMPHEDEMA PT FLOWSHEET  Eval performed and pt verbalized understanding of POC   PT Lymph Exercises Current Session Time                        THER ACT  CPT 66979 TOTAL TIME FOR SESSION Not performed   Bed Mobility    Transfer Training    Body Mechanics/Work Training    Patient Education    THER EX  CPT 85335 TOTAL TIME FOR SESSION Not performed   CARDIOVASCULAR    Nu Step    UBE    STRENGTHENING     Supine Ther-Ex    Standing Ther-Ex    Seated Ther-Ex    STRETCHING    Core Stabilization    LE Stretching    UE Stretching    Spinal Stretching    Postural     REPEATED MOVEMENTS                                                  MANUAL   CPT 29787 TOTAL TIME FOR SESSION 53-67 Minutes   Stretching    Mobilization    Massage- Deep Tissue, Scar, Transverse Friction    Manual Lymph Drainage Short neck abdominal sequence jay LE yes   Skin Care- Lotion/Wrapping    Measurement/Fitting Go over donning and doffing of mobiderm  Fit for ease adjust see attached fax to [employer] Matthew Peoples 10/1/24 yes   Skin Stretching/Mobilization for Cording

## 2024-10-14 LAB
AA+LINOLEATE/FATTY ACIDS.C14-C22 MFR BLD: 26.1 % BY WT
AA/EPA BLD: 14.7 {RATIO} (ref 3.7–40.7)
AA/FATTY ACIDS.C14-C22 MFR BLD: 8.9 % BY WT (ref 8.6–15.6)
DHA/FATTY ACIDS.C14-C22 MFR BLD: 2.1 % BY WT (ref 1.4–5.1)
DPA/FATTY ACIDS.C14-C22 MFR BLD: 0.9 % BY WT (ref 0.8–1.8)
EPA+DPA+DHA/FA.C14-C22 RISK BLD IMP: 3.6 % BY WT
EPA+DPA+DHA/FATTY ACIDS.C14-C22 MFR BLD: 3.6 % BY WT
EPA/FATTY ACIDS.C14-C22 MFR BLD: 0.6 % BY WT (ref 0.2–2.3)
LINOLEATE/FATTY ACIDS.C14-C22 MFR BLD: 14.4 % BY WT (ref 18.6–29.5)
W6 FA/W3 FA BLD: 7.2 {RATIO} (ref 3.7–14.4)

## 2024-10-22 ENCOUNTER — HOSPITAL ENCOUNTER (OUTPATIENT)
Dept: PHYSICAL THERAPY | Facility: CLINIC | Age: 69
Setting detail: THERAPIES SERIES
Discharge: HOME | End: 2024-10-22
Attending: FAMILY MEDICINE
Payer: COMMERCIAL

## 2024-10-22 DIAGNOSIS — I89.0 LYMPHEDEMA, NOT ELSEWHERE CLASSIFIED: Primary | ICD-10-CM

## 2024-10-22 PROCEDURE — 97140 MANUAL THERAPY 1/> REGIONS: CPT | Mod: GP

## 2024-10-22 NOTE — PROGRESS NOTES
Physical Therapy Visit    PT DAILY NOTE FOR OUTPATIENT THERAPY    Patient: Mary Moreno MRN: [id number]  : 1955 69 y.o.  Referring Physician: Leeanne Farah DO  Date of Visit: 10/22/2024    Certification Dates: 24 through 24    Diagnosis:   1. Lymphedema, not elsewhere classified        Chief Complaints:  lymphedema LE    Precautions:   Precautions comments: Open wounds on ankles B      TODAY'S VISIT    Time In Session:  Start Time: 1422  Stop Time: 1500  Time Calculation (min): 38 min   History/Vitals/Pain/Encounter Info - 10/22/24 142          Injury History/Precautions/Daily Required Info    Document Type daily treatment     Primary Therapist Delphine Richardson PT, MPT,CLT, CCST     Chief Complaint/Reason for Visit  lymphedema LE     Referring Physician Dr. Leeanne Farah     Precautions comments Open wounds on ankles B     History of present illness/functional impairment Pt with refractory celiac disease that was diagnosed in . Pt with uncontrolled celiac and h/o of pyaderma gangroseum. Pt with sores on jay LE. Pt with h/o of jay feet grafts in . Pt with jay LE lymphedema and recommended for PT. Pt had PT for lymphedema at [facility] 2 years ago. Pt was previously 2 months ago. Pt has pump at home and compression garments. Pt is following Dr. Osullivan for pain managment and is going to a walking program. Pt is seeing a dermatologist at [place] Dr. Atkinson for pyaderma gangroseum. Pt with infection in Aug requiring antibiotic unable to wear compression due to seepage. pt was on antibiotics for 15 days and the pain stopped. Pt to see dermatologist next week. Pt was recommended  back to PT from primary care.     Patient/Family/Caregiver Comments/Observations Pt arrived late limited session.     Patient reported fall since last visit No        Pain Assessment    Currently in pain No/Denies        Pain Intervention    Intervention  mt     Post Intervention Comments no  increase in pain                    Daily Treatment Assessment and Plan - 10/22/24 1422          Daily Treatment Assessment and Plan    Progress toward goals Progressing     Daily Outcome Summary Pt with improving coloration after MLD. Pt awaiting garments. Pt with less discomfort overall. Pt encouraged to call about compression garments with vendor     Plan and Recommendations cont with MLD f/u on garment order                         OBJECTIVE DATA TAKEN TODAY:    None taken    Today's Treatment:    LYMPHEDEMA PT FLOWSHEET  Eval performed and pt verbalized understanding of POC   PT Lymph Exercises Current Session Time                        THER ACT  CPT 06347 TOTAL TIME FOR SESSION Not performed   Bed Mobility    Transfer Training    Body Mechanics/Work Training    Patient Education    THER EX  CPT 15449 TOTAL TIME FOR SESSION Not performed   CARDIOVASCULAR    Nu Step    UBE    STRENGTHENING     Supine Ther-Ex    Standing Ther-Ex    Seated Ther-Ex    STRETCHING    Core Stabilization    LE Stretching    UE Stretching    Spinal Stretching    Postural     REPEATED MOVEMENTS                                                  MANUAL   CPT 13463 TOTAL TIME FOR SESSION 38-52 Minutes   Stretching    Mobilization    Massage- Deep Tissue, Scar, Transverse Friction    Manual Lymph Drainage Short neck abdominal sequence jay LE yes   Skin Care- Lotion/Wrapping    Measurement/Fitting Go over donning and doffing of mobiderm  Fit for ease adjust see attached fax to walt Peoples 10/1/24 yes   Skin Stretching/Mobilization for Cording

## 2024-10-22 NOTE — OP PT TREATMENT LOG
LYMPHEDEMA PT FLOWSHEET  Eval performed and pt verbalized understanding of POC   PT Lymph Exercises Current Session Time                        THER ACT  CPT 13373 TOTAL TIME FOR SESSION Not performed   Bed Mobility    Transfer Training    Body Mechanics/Work Training    Patient Education    THER EX  CPT 75133 TOTAL TIME FOR SESSION Not performed   CARDIOVASCULAR    Nu Step    UBE    STRENGTHENING     Supine Ther-Ex    Standing Ther-Ex    Seated Ther-Ex    STRETCHING    Core Stabilization    LE Stretching    UE Stretching    Spinal Stretching    Postural     REPEATED MOVEMENTS                                                  MANUAL   CPT 28558 TOTAL TIME FOR SESSION 38-52 Minutes   Stretching    Mobilization    Massage- Deep Tissue, Scar, Transverse Friction    Manual Lymph Drainage Short neck abdominal sequence jay LE yes   Skin Care- Lotion/Wrapping    Measurement/Fitting Go over donning and doffing of mobiderm  Fit for ease adjust see attached fax to [employer] Matthew Peoples 10/1/24 yes   Skin Stretching/Mobilization for Cording

## 2024-11-07 ENCOUNTER — APPOINTMENT (OUTPATIENT)
Dept: URBAN - METROPOLITAN AREA CLINIC 203 | Age: 69
Setting detail: DERMATOLOGY
End: 2024-11-15

## 2024-11-07 ENCOUNTER — HOSPITAL ENCOUNTER (OUTPATIENT)
Dept: PHYSICAL THERAPY | Facility: CLINIC | Age: 69
Setting detail: THERAPIES SERIES
Discharge: HOME | End: 2024-11-07
Attending: FAMILY MEDICINE
Payer: COMMERCIAL

## 2024-11-07 ENCOUNTER — APPOINTMENT (OUTPATIENT)
Dept: URBAN - METROPOLITAN AREA CLINIC 203 | Age: 69
Setting detail: DERMATOLOGY
End: 2024-11-07

## 2024-11-07 DIAGNOSIS — L98.8 OTHER SPECIFIED DISORDERS OF THE SKIN AND SUBCUTANEOUS TISSUE: ICD-10-CM

## 2024-11-07 DIAGNOSIS — L65.9 NONSCARRING HAIR LOSS, UNSPECIFIED: ICD-10-CM

## 2024-11-07 DIAGNOSIS — I89.0 LYMPHEDEMA, NOT ELSEWHERE CLASSIFIED: Primary | ICD-10-CM

## 2024-11-07 PROCEDURE — 97140 MANUAL THERAPY 1/> REGIONS: CPT | Mod: GP

## 2024-11-07 PROCEDURE — OTHER PRESCRIPTION: OTHER

## 2024-11-07 PROCEDURE — OTHER JEUVEAU (U OR CC): OTHER

## 2024-11-07 RX ORDER — BIMATOPROST 0.3 MG/ML
SOLUTION/ DROPS OPHTHALMIC
Qty: 2.5 | Refills: 4 | Status: ERX

## 2024-11-07 ASSESSMENT — LOCATION SIMPLE DESCRIPTION DERM
LOCATION SIMPLE: RIGHT TEMPLE
LOCATION SIMPLE: GLABELLA
LOCATION SIMPLE: LEFT CHEEK
LOCATION SIMPLE: RIGHT SUPERIOR EYELID
LOCATION SIMPLE: LEFT LOWER LID TARSAL MARGIN
LOCATION SIMPLE: LEFT SUPERIOR EYELID
LOCATION SIMPLE: LEFT TEMPLE
LOCATION SIMPLE: LEFT EYEBROW
LOCATION SIMPLE: RIGHT EYEBROW

## 2024-11-07 ASSESSMENT — LOCATION DETAILED DESCRIPTION DERM
LOCATION DETAILED: GLABELLA
LOCATION DETAILED: RIGHT MID TEMPLE
LOCATION DETAILED: LEFT TARSAL MARGIN OF THE INFERIOR EYELID
LOCATION DETAILED: LEFT INFERIOR TEMPLE
LOCATION DETAILED: RIGHT INFERIOR TEMPLE
LOCATION DETAILED: LEFT MEDIAL SUPERIOR EYELID
LOCATION DETAILED: LEFT MID TEMPLE
LOCATION DETAILED: LEFT MEDIAL EYEBROW
LOCATION DETAILED: LEFT SUPERIOR LATERAL MALAR CHEEK
LOCATION DETAILED: RIGHT MEDIAL SUPERIOR EYELID
LOCATION DETAILED: RIGHT MEDIAL EYEBROW

## 2024-11-07 ASSESSMENT — LOCATION ZONE DERM
LOCATION ZONE: EYELID
LOCATION ZONE: FACE

## 2024-11-07 NOTE — OP PT TREATMENT LOG
al performed and pt verbalized understanding of POC   PT Lymph Exercises Current Session Time                                   THER ACT  CPT 33499 TOTAL TIME FOR SESSION Not performed   Bed Mobility     Transfer Training     Body Mechanics/Work Training     Patient Education     THER EX  CPT 34025 TOTAL TIME FOR SESSION Not performed   CARDIOVASCULAR     Nu Step     UBE     STRENGTHENING      Supine Ther-Ex     Standing Ther-Ex     Seated Ther-Ex     STRETCHING     Core Stabilization     LE Stretching     UE Stretching     Spinal Stretching     Postural      REPEATED MOVEMENTS                                                                           MANUAL   CPT 31131 TOTAL TIME FOR SESSION 38-52 Minutes   Progress note  yes   Mobilization     Massage- Deep Tissue, Scar, Transverse Friction     Manual Lymph Drainage Short neck abdominal sequence jay LE yes   Skin Care- Lotion/Wrapping     Measurement/Fitting Go over donning and doffing of mobiderm  Fit for ease adjust see attached fax to walt Peoples 10/1/24 yes   Skin Stretching/Mobilization for Cording

## 2024-11-07 NOTE — PROGRESS NOTES
Physical Therapy Progress Note    PT PROGRESS NOTE FOR OUTPATIENT THERAPY    Patient: Mary Moreno   MRN: [id number]  : 1955 69 y.o.    Referring Physician: Leeanne Farah DO  Date of Visit: 2024    Certification Dates: 24 through 24    Recommended Frequency & Duration:  2 times/week for up to 3 months        Diagnosis:   1. Lymphedema, not elsewhere classified        Chief Complaints:  No chief complaint on file.      Precautions:   Existing Precautions/Restrictions: limb restrictions  Precautions comments: Open wounds on ankles B    TODAY'S VISIT:    Time In Session:  Start Time: 1314  Stop Time: 1359  Time Calculation (min): 45 min   General Information - 24 1318          Session Details    Document Type progress note     Mode of Treatment individual therapy        General Information    Referring Physician Dr. Leeanne Farah     History of present illness/functional impairment Pt with refractory celiac disease that was diagnosed in . Pt with uncontrolled celiac and h/o of pyaderma gangroseum. Pt with sores on jay LE. Pt with h/o of jay feet grafts in . Pt with jay LE lymphedema and recommended for PT. Pt had PT for lymphedema at [facility] 2 years ago. Pt was previously 2 months ago. Pt has pump at home and compression garments. Pt is following Dr. Osullivan for pain managment and is going to a walking program. Pt is seeing a dermatologist at [place] Dr. Atkinson for pyaderma gangroseum. Pt with infection in Aug requiring antibiotic unable to wear compression due to seepage. pt was on antibiotics for 15 days and the pain stopped. Pt to see dermatologist next week. Pt was recommended  back to PT from primary care.     Patient/Family/Caregiver Comments/Observations Pt arrived late limited session     Existing Precautions/Restrictions limb restrictions     Precautions comments Open wounds on ankles B         Services    Do You Speak a Language  Other Than English at Home? no        Behavioral Health Related Communication    Suicidal Ideation No                    Daily Falls Screen - 11/07/24 1318          Daily Falls Assessment    Patient reported fall since last visit No                    Pain/Vitals - 11/07/24 1318          Pain Assessment    Currently in pain Yes     Preferred Pain Scale number (Numeric Rating Pain Scale)     Pain Side/Orientation bilateral     Pain: Body location Leg     Pain Rating (0-10): Pre Activity 4     Pain Rating (0-10): Activity 4     Pain Rating (0-10): Post Activity 4     Pain Onset/Duration R>L        Pain Intervention    Intervention  progress note     Post Intervention Comments no increase in pain                    PT - 11/07/24 1318          Physical Therapy    Physical Therapy Specialty Lymphedema Program        PT Plan    Frequency of treatment 2 times/week     PT Duration 3 months     PT Cert From 09/24/24     PT Cert To 12/24/24     Date PT POC was sent to provider 09/24/24     Signed PT Plan of Care received?  Yes                       OBJECTIVE MEASUREMENTS/DATA:    Lymphedema:    Lymphedema Assessment       Row Name 11/07/24 1300       BODY LOCATION    Upper Body / Lower Body / Face and Neck Lower body       LE Skin    Skin Condition Impaired    Fibrosis severe    Scar/Incision bound down    Color pink    Quality thickened;fragile    Edema +2    Wound seepage    LE Wound comments jay ankle wounds       LE/Back/Trunk Palpation    LE Palpation  pos stemmer jay feet       Lower Body Outcome Measures    LLIS results/comments  26       LE Volumetrics    LE Volume Measurements B/L LE       Affected LE Measurements    Affected limb laterality Right    MTP 22 cm    -8 cm 22 cm    -12 cm 24.5 cm    0 cm 19 cm    4 cm 22 cm    8 cm 23.8 cm    12 cm 26 cm    16 cm 26 cm    20 cm 25 cm    24 cm 26 cm    28 cm 26 cm    32 cm 28.5 cm    Affected LE Total Volume (ml) 43429.76 ml       Other Affected LE Circumference    Other  affected limb laterality Left    MTP 21 cm    -8 cm 21.5 cm    -12 cm 24.5 cm    0 cm 22 cm    4 cm 18.5 cm    8 cm 21.5 cm    12 cm 24.5 cm    16 cm 25 cm    20 cm 24 cm    24 cm 24.5 cm    28 cm 26 cm    32 cm 28.5 cm    Other Affected LE Total Volume (ml) 50461.59 ml       LE B/L Volume Difference    Which limb is affected more? Equal severity    B/L LE Volume Difference 1694.17    Difference % 6.29       RIGHT: Lower Extremity Manual Muscle Test Assessment    Hip Flexion gross movement (4+/5) good plus    Hip Extension gross movement (4+/5) good plus    Hip Abduction gross movement (4+/5) good plus    Hip Adduction gross movement (4+/5) good plus    Knee Flexion strength (4+/5) good plus    Knee Extension strength (4+/5) good plus    Ankle Dorsiflexion gross movement (4+/5) good plus    Ankle Plantarflexion gross movement (4+/5) good plus       LEFT: Lower Extremity Manual Muscle Test Assessment    Hip Flexion gross movement (4+/5) good plus    Hip Extension gross movement (4+/5) good plus    Hip Abduction gross movement (4+/5) good plus    Hip Adduction gross movement (4+/5) good plus    Knee Flexion strength (4+/5) good plus    Knee Extension strength (4+/5) good plus    Ankle Dorsiflexion gross movement (4+/5) good plus    Ankle Plantarflexion gross movement (4+/5) good plus       Assessment    Plan of Care reviewed and patient/family in agreement Yes    System Pathology/Pathophysiology Noted integumentary;lymphatic;musculoskeletal    Functional Limitations in Following Categories (PT Eval) self-care;home management;community/leisure    Rehab Potential/Prognosis good, to achieve stated therapy goals    Problem List decreased flexibility;decreased ROM;decreased strength;decreased endurance;edema    Clinical Assessment Pt awaiting compression garments. Pt awaiting ease adjust from [employer]. Pt has used mobiderm and no issues. Pt has the pump and encourage to use daily. Pt is being followed by  dermatologist. Pt with min SELENE for self MLD. Pt session to focus on HEP and daytime compression ease adjust    Plan and Recommendations cont with MLD f/u on ease adjust    [employer] CPT 28718 Manual therapy;CPT 10772 Self-care/Home management training;CPT 50755 Therapeutic activities;CPT 97452 Therapeutic exercises                  Lymph Cumulative Data:   Lymphedema          Most Recent Value    5/29/2024 - 11/7/2024 5/29/2024    13:00 6/27/2024    13:00 9/24/2024    10:00 11/7/2024    13:00   VOLUMETRICS   Affected limb laterality Right  11/7/2024 Right Right Right Right   MTP 22 cm  11/7/2024 24 cm 22 cm 22 cm 22 cm   -8 cm 22 cm  11/7/2024 21.5 cm 21.5 cm 24 cm 22 cm   -12 cm 24.5 cm  11/7/2024 25 cm 25 cm 28 cm 24.5 cm   0 cm 19 cm  11/7/2024 23 cm 22 cm 22 cm 19 cm   4 cm 22 cm  11/7/2024 21 cm 20.8 cm 21 cm 22 cm   8 cm 23.8 cm  11/7/2024 24 cm 24 cm 23 cm 23.8 cm   12 cm 26 cm  11/7/2024 26.6 cm 27 cm 25 cm 26 cm   16 cm 26 cm  11/7/2024 28.5 cm 27 cm 26.5 cm 26 cm   20 cm 25 cm  11/7/2024 27 cm 25.5 cm 26 cm 25 cm   24 cm 26 cm  11/7/2024 25.5 cm 25.5 cm 25.7 cm 26 cm   28 cm 26 cm  11/7/2024 26 cm 26 cm 26 cm 26 cm   32 cm 28.5 cm  11/7/2024 28 cm 28 cm 29.5 cm 28.5 cm   Affected LE Total Volume (ml) 00833.76 ml  11/7/2024 78673.99 ml 15156.61 ml 87833.15 ml 11274.76 ml   Other affected limb laterality Left  11/7/2024 Left Left Left Left   MTP 21 cm  11/7/2024 22 cm 22 cm 21 cm 21 cm   -8 cm 21.5 cm  11/7/2024 22.5 cm 22.5 cm 22 cm 21.5 cm   -12 cm 24.5 cm  11/7/2024 25.5 cm 25.5 cm 24.5 cm 24.5 cm   0 cm 22 cm  11/7/2024 23.5 cm 23.5 cm 24 cm 22 cm   4 cm 18.5 cm  11/7/2024 19.6 cm 19.5 cm 20.6 cm 18.5 cm   8 cm 21.5 cm  11/7/2024 23.5 cm 23 cm 22.5 cm 21.5 cm   12 cm 24.5 cm  11/7/2024 26 cm 26 cm 25 cm 24.5 cm   16 cm 25 cm  11/7/2024 28 cm 27.5 cm 25 cm 25 cm   20 cm 24 cm  11/7/2024 25.6 cm 25 cm 24 cm 24 cm   24 cm 24.5 cm  11/7/2024 25.5 cm 25.5 cm 26 cm 24.5 cm   28 cm 26 cm  11/7/2024 28 cm  27.5 cm 27 cm 26 cm   32 cm 28.5 cm  11/7/2024 27 cm 27 cm 29 cm 28.5 cm   Other Affected LE Total Volume (ml) 05846.59 ml  11/7/2024 01887.1 ml 41609.1 ml 17500.75 ml 50357.59 ml       ROM and MMT          5/29/2024    13:00 6/27/2024    13:00 9/24/2024    10:00 11/7/2024    13:00   PT LE MMT   Right Hip Flexion (4+/5) good plus (4+/5) good plus (4+/5) good plus (4+/5) good plus   Left Hip Flexion (4+/5) good plus (4+/5) good plus (4+/5) good plus (4+/5) good plus   Right Hip Extension (4+/5) good plus (4+/5) good plus (4+/5) good plus (4+/5) good plus   Left Hip Extension (4+/5) good plus (4+/5) good plus (4+/5) good plus (4+/5) good plus   Right Hip ABD (4+/5) good plus (4+/5) good plus (4+/5) good plus (4+/5) good plus   Left Hip ABD (4+/5) good plus (4+/5) good plus (4+/5) good plus (4+/5) good plus   Right Hip ADD (4+/5) good plus (4+/5) good plus (4+/5) good plus (4+/5) good plus   Left Hip ADD (4+/5) good plus (4+/5) good plus (4+/5) good plus (4+/5) good plus   Right Knee Flexion (4+/5) good plus (4+/5) good plus (4+/5) good plus (4+/5) good plus   Left Knee Flexion (4+/5) good plus (4+/5) good plus (4+/5) good plus (4+/5) good plus   Right Knee Extension (4+/5) good plus (4+/5) good plus (4+/5) good plus (4+/5) good plus   Left Knee Extension (4+/5) good plus (4+/5) good plus (4+/5) good plus (4+/5) good plus   Right Ankle DF (4+/5) good plus (4+/5) good plus (4+/5) good plus (4+/5) good plus   Left Ankle DF (4+/5) good plus (4+/5) good plus (4+/5) good plus (4+/5) good plus   Right Ankle PF (4+/5) good plus (4+/5) good plus (4+/5) good plus (4+/5) good plus   Left Ankle PF (4+/5) good plus (4+/5) good plus (4+/5) good plus (4+/5) good plus         Today's Treatment::    Education provided:  None/NA    al performed and pt verbalized understanding of POC   PT Lymph Exercises Current Session Time                                   THER ACT  CPT 35736 TOTAL TIME FOR SESSION Not performed   Bed Mobility      Transfer Training     Body Mechanics/Work Training     Patient Education     THER EX  CPT 62360 TOTAL TIME FOR SESSION Not performed   CARDIOVASCULAR     Nu Step     UBE     STRENGTHENING      Supine Ther-Ex     Standing Ther-Ex     Seated Ther-Ex     STRETCHING     Core Stabilization     LE Stretching     UE Stretching     Spinal Stretching     Postural      REPEATED MOVEMENTS                                                                           MANUAL   CPT 81363 TOTAL TIME FOR SESSION 38-52 Minutes   Progress note  yes   Mobilization     Massage- Deep Tissue, Scar, Transverse Friction     Manual Lymph Drainage Short neck abdominal sequence jay LE yes   Skin Care- Lotion/Wrapping     Measurement/Fitting Go over donning and doffing of singhiderconnie  Fit for ease adjust see attached fax to [employer] Matthew Peoples 10/1/24 yes   Skin Stretching/Mobilization for Cording               Goals Addressed                      This Visit's Progress       Patient Stated      pt goal (pt-stated)         Pt goal to get newer and thinner garments and decrease the exaccerbation of swelling partially met ordered ease adjust but awaiting from [employer] ordered on 10/1/24         Other      jay LE lymphedema goals          Short term goals   Short Term Goals Time Frame Result Comment/Progress   Pt will increase LE MMT by >/= 1/2 grade 6 weeks met          Pt will report decreased pain at worst 2/10 6weeks Not met    Pt will demonstrate w/ supervision HEP/compression/self MLD techniques 6 weeks met Ordered ease adjust 10/1/24   Pt will decrease volume by 2% 6 weeks met     Pt will verbalize independently proper skin care  6 weeks met Using lotion daily                     Long term goals   LongTerm Goals Time Frame Result Comment/Progress                   Pt will report decreased pain at worst 0/10 12 weeks ongoing    Pt will demonstrate w/ (i) HEP/compression/self MLD techniques 12 weeks ongoing    Pt will decrease volume by 3-5%  12weeks ongoing    By therapist touch, pt will demonstrate dec fibrosis as evidenced by softer subcutaneous tissues in identified areas 12 weeks improving    Pt will improve LLIS score by 5 12weeks ongoing LLIS 26                Mutually agreed upon pain goal         Mutually agreed upon pain goal: 0-2/10 improving ongoing              Delphine Richardson, PT

## 2024-11-07 NOTE — PROCEDURE: JEUVEAU (U OR CC)
Dilution (U/0.1 Cc): 4
Post-Care Instructions: Patient instructed to not lie down for 4 hours and limit physical activity for 24 hours.
Measure In Units Or Cc's?: units
Detail Level: Detailed
Consent: Written consent obtained. Risks include but not limited to lid/brow ptosis, bruising, swelling, diplopia, temporary effect, incomplete chemical denervation.
Show Inventory Tab: Show
Quantity Per Injection Site (Units): 2
Quantity Per Injection Site (Units): 1

## 2024-11-15 ENCOUNTER — HOSPITAL ENCOUNTER (OUTPATIENT)
Dept: PHYSICAL THERAPY | Facility: CLINIC | Age: 69
Setting detail: THERAPIES SERIES
Discharge: HOME | End: 2024-11-15
Attending: FAMILY MEDICINE
Payer: COMMERCIAL

## 2024-11-15 DIAGNOSIS — I89.0 LYMPHEDEMA, NOT ELSEWHERE CLASSIFIED: Primary | ICD-10-CM

## 2024-11-15 DIAGNOSIS — L88 PYODERMA GANGRENOSUM: ICD-10-CM

## 2024-11-15 PROCEDURE — 97140 MANUAL THERAPY 1/> REGIONS: CPT | Mod: GP,CQ

## 2024-11-15 NOTE — PROGRESS NOTES
Physical Therapy Visit    PT DAILY NOTE FOR OUTPATIENT THERAPY    Patient: Mary Moreno MRN: [id number]  : 1955 69 y.o.  Referring Physician: Leeanne Farah DO  Date of Visit: 11/15/2024    Certification Dates: 24 through 24    Diagnosis:   1. Lymphedema, not elsewhere classified    2. Pyoderma gangrenosum        Chief Complaints:  lymphedema LE    Precautions:   Existing Precautions/Restrictions: limb restrictions  Precautions comments: Open wounds on ankles B      TODAY'S VISIT    Time In Session:  Start Time: 908  Stop Time: 1000  Time Calculation (min): 52 min   History/Vitals/Pain/Encounter Info - 11/15/24 0908          Injury History/Precautions/Daily Required Info    Document Type daily treatment     Primary Therapist Delphine Richardson PT, MPT,CLT, CCST     Chief Complaint/Reason for Visit  lymphedema LE     Referring Physician Dr. Leeanne Farah     Existing Precautions/Restrictions limb restrictions     Precautions comments Open wounds on ankles B     History of present illness/functional impairment Pt with refractory celiac disease that was diagnosed in . Pt with uncontrolled celiac and h/o of pyaderma gangroseum. Pt with sores on jay LE. Pt with h/o of jay feet grafts in . Pt with jay LE lymphedema and recommended for PT. Pt had PT for lymphedema at [facility] 2 years ago. Pt was previously 2 months ago. Pt has pump at home and compression garments. Pt is following Dr. Osullivan for pain managment and is going to a walking program. Pt is seeing a dermatologist at [place] Dr. Atkinson for pyaderma gangroseum. Pt with infection in Aug requiring antibiotic unable to wear compression due to seepage. pt was on antibiotics for 15 days and the pain stopped. Pt to see dermatologist next week. Pt was recommended  back to PT from primary care.     Patient/Family/Caregiver Comments/Observations Pt reports no new c/o at arrival. Pt not sure if her garments came in because  she was away in [place]/ [place]     Patient reported fall since last visit No        Pain Assessment    Currently in pain Yes     Preferred Pain Scale number (Numeric Rating Pain Scale)     Pain Side/Orientation bilateral     Pain: Body location Leg     Pain Rating (0-10): Pre Activity 4     Pain Rating (0-10): Activity 4     Pain Rating (0-10): Post Activity 4     Pain Radiation to leg, left;leg, right        Pain Intervention    Intervention  manual     Post Intervention Comments see assessment                    Daily Treatment Assessment and Plan - 11/15/24 1003          Daily Treatment Assessment and Plan    Progress toward goals Progressing     Daily Outcome Summary LIMITED SESSION DUE TO LATENESS. Short neck and abdominal breathing sequence followed by MLD VINICIO LEs working around B ankle wounds wrapped. Some redness noted to R lower leg but no changes in skin temperature palpated. Pt advised to bring in ease adjust garments when received.     Plan and Recommendations Cont per PT with MLD. F/u of ease adjust from [employer]                         OBJECTIVE DATA TAKEN TODAY:    None taken    Today's Treatment:    al performed and pt verbalized understanding of POC   PT Lymph Exercises Current Session Time                                   THER ACT  CPT 50976 TOTAL TIME FOR SESSION Not performed   Bed Mobility     Transfer Training     Body Mechanics/Work Training     Patient Education     THER EX  CPT 07891 TOTAL TIME FOR SESSION Not performed   CARDIOVASCULAR     Nu Step     UBE     STRENGTHENING      Supine Ther-Ex     Standing Ther-Ex     Seated Ther-Ex     STRETCHING     Core Stabilization     LE Stretching     UE Stretching     Spinal Stretching     Postural      REPEATED MOVEMENTS                                                                           MANUAL   CPT 26125 TOTAL TIME FOR SESSION 38-52 Minutes   Progress note     Mobilization     Massage- Deep Tissue, Scar, Transverse Friction     Manual Lymph  Drainage Short neck abdominal sequence jay LE yes   Skin Care- Lotion/Wrapping     Measurement/Fitting Go over donning and doffing of mobiderm  Fit for ease adjust see attached fax to [employer] Matthew Peoples 10/1/24 yes   Skin Stretching/Mobilization for Cording

## 2024-11-15 NOTE — OP PT TREATMENT LOG
al performed and pt verbalized understanding of POC   PT Lymph Exercises Current Session Time                                   THER ACT  CPT 59982 TOTAL TIME FOR SESSION Not performed   Bed Mobility     Transfer Training     Body Mechanics/Work Training     Patient Education     THER EX  CPT 37683 TOTAL TIME FOR SESSION Not performed   CARDIOVASCULAR     Nu Step     UBE     STRENGTHENING      Supine Ther-Ex     Standing Ther-Ex     Seated Ther-Ex     STRETCHING     Core Stabilization     LE Stretching     UE Stretching     Spinal Stretching     Postural      REPEATED MOVEMENTS                                                                           MANUAL   CPT 75304 TOTAL TIME FOR SESSION 38-52 Minutes   Progress note     Mobilization     Massage- Deep Tissue, Scar, Transverse Friction     Manual Lymph Drainage Short neck abdominal sequence jay LE yes   Skin Care- Lotion/Wrapping     Measurement/Fitting Go over donning and doffing of mobiderm  Fit for ease adjust see attached fax to walt Peoples 10/1/24 yes   Skin Stretching/Mobilization for Cording

## 2024-11-21 ENCOUNTER — HOSPITAL ENCOUNTER (OUTPATIENT)
Dept: PHYSICAL THERAPY | Facility: CLINIC | Age: 69
Setting detail: THERAPIES SERIES
Discharge: HOME | End: 2024-11-21
Attending: FAMILY MEDICINE
Payer: COMMERCIAL

## 2024-11-21 DIAGNOSIS — I89.0 LYMPHEDEMA, NOT ELSEWHERE CLASSIFIED: Primary | ICD-10-CM

## 2024-11-21 DIAGNOSIS — L88 PYODERMA GANGRENOSUM: ICD-10-CM

## 2024-11-21 PROCEDURE — 97140 MANUAL THERAPY 1/> REGIONS: CPT | Mod: GP,CQ

## 2024-11-21 NOTE — OP PT TREATMENT LOG
al performed and pt verbalized understanding of POC   PT Lymph Exercises Current Session Time                                   THER ACT  CPT 69394 TOTAL TIME FOR SESSION Not performed   Bed Mobility     Transfer Training     Body Mechanics/Work Training     Patient Education     THER EX  CPT 37670 TOTAL TIME FOR SESSION Not performed   CARDIOVASCULAR     Nu Step     UBE     STRENGTHENING      Supine Ther-Ex     Standing Ther-Ex     Seated Ther-Ex     STRETCHING     Core Stabilization     LE Stretching     UE Stretching     Spinal Stretching     Postural      REPEATED MOVEMENTS                                                                           MANUAL   CPT 50351 TOTAL TIME FOR SESSION 53-67 Minutes   Progress note     Mobilization     Massage- Deep Tissue, Scar, Transverse Friction     Manual Lymph Drainage Short neck abdominal sequence jay LE- yes   Skin Care- Lotion/Wrapping     Measurement/Fitting Go over donning and doffing of mobiderm  Fit for ease adjust see attached fax to walt Peoples 10/1/24 yes   Skin Stretching/Mobilization for Cording

## 2024-11-22 NOTE — PROGRESS NOTES
Physical Therapy Visit    PT DAILY NOTE FOR OUTPATIENT THERAPY    Patient: Mary Moreno MRN: [id number]  : 1955 69 y.o.  Referring Physician: Leeanne Farah DO  Date of Visit: 2024    Certification Dates: 24 through 24    Diagnosis:   1. Lymphedema, not elsewhere classified    2. Pyoderma gangrenosum        Chief Complaints:  lymphedema LE    Precautions:   Existing Precautions/Restrictions: limb restrictions  Precautions comments: Open wounds on ankles B      TODAY'S VISIT    Time In Session:  Start Time: 1208  Stop Time: 1302  Time Calculation (min): 54 min   History/Vitals/Pain/Encounter Info - 24 1208          Injury History/Precautions/Daily Required Info    Document Type daily treatment     Primary Therapist Delphine Richardson PT, MPT,CLT, CCST     Chief Complaint/Reason for Visit  lymphedema LE     Referring Physician Dr. Leeanne Farah     Existing Precautions/Restrictions limb restrictions     Precautions comments Open wounds on ankles B     History of present illness/functional impairment Pt with refractory celiac disease that was diagnosed in . Pt with uncontrolled celiac and h/o of pyaderma gangroseum. Pt with sores on jay LE. Pt with h/o of jay feet grafts in . Pt with jay LE lymphedema and recommended for PT. Pt had PT for lymphedema at [facility] 2 years ago. Pt was previously 2 months ago. Pt has pump at home and compression garments. Pt is following Dr. Osullivan for pain managment and is going to a walking program. Pt is seeing a dermatologist at [place] Dr. Atkinson for pyaderma gangroseum. Pt with infection in Aug requiring antibiotic unable to wear compression due to seepage. pt was on antibiotics for 15 days and the pain stopped. Pt to see dermatologist next week. Pt was recommended  back to PT from primary care.     Patient/Family/Caregiver Comments/Observations Pt reports not getting her garments from [facility] yet- asked for Matthew's contact  info to reach out regarding her order status.     Patient reported fall since last visit No        Pain Assessment    Currently in pain Yes     Preferred Pain Scale number (Numeric Rating Pain Scale)     Pain Side/Orientation bilateral     Pain: Body location Leg     Pain Rating (0-10): Pre Activity 4     Pain Rating (0-10): Activity 4     Pain Rating (0-10): Post Activity 4     Pain Radiation to leg, left;leg, right        Pain Intervention    Intervention  manual     Post Intervention Comments see assessment                    Daily Treatment Assessment and Plan - 11/21/24 2460          Daily Treatment Assessment and Plan    Progress toward goals Progressing     Daily Outcome Summary Short neck and abdominal breathing sequence followed by MLD B LEs. Cont redness noted to lower leg R LE. Discussed importance of pt completing self MLD and using pump consistently for lymphedema management until garments received. Pt verbalized understanding.     Plan and Recommendations Cont per PT with MLD. F/u of ease adjust from [facility]                         OBJECTIVE DATA TAKEN TODAY:    None taken    Today's Treatment:    al performed and pt verbalized understanding of POC   PT Lymph Exercises Current Session Time                                   THER ACT  CPT 07974 TOTAL TIME FOR SESSION Not performed   Bed Mobility     Transfer Training     Body Mechanics/Work Training     Patient Education     THER EX  CPT 87009 TOTAL TIME FOR SESSION Not performed   CARDIOVASCULAR     Nu Step     UBE     STRENGTHENING      Supine Ther-Ex     Standing Ther-Ex     Seated Ther-Ex     STRETCHING     Core Stabilization     LE Stretching     UE Stretching     Spinal Stretching     Postural      REPEATED MOVEMENTS                                                                           MANUAL   CPT 54511 TOTAL TIME FOR SESSION 53-67 Minutes   Progress note     Mobilization     Massage- Deep Tissue, Scar, Transverse Friction     Manual Lymph  Drainage Short neck abdominal sequence jay LE- yes   Skin Care- Lotion/Wrapping     Measurement/Fitting Go over donning and doffing of mobiderm  Fit for ease adjust see attached fax to [facility] Matthew Peoples 10/1/24 yes   Skin Stretching/Mobilization for Cording

## 2025-01-14 ENCOUNTER — DOCUMENTATION (OUTPATIENT)
Dept: PHYSICAL THERAPY | Facility: CLINIC | Age: 70
End: 2025-01-14
Payer: COMMERCIAL

## 2025-01-14 NOTE — PROGRESS NOTES
No visit discharge. Has not returned to therapy no shows/cancels out of POC dates. D/c for PT services at this time  Physical Therapy Discharge      PT DISCHARGE NOTE FOR OUTPATIENT THERAPY    Patient: Mary Moreno MRN: [id number]  : 1955 69 y.o.  Referring Physician: No ref. provider found  Date of Visit: 2025      Certification Dates:   through      Total Visit Count: 6    Diagnosis: No diagnosis found.    Chief Complaints:  No chief complaint on file.      Precautions:         TODAY'S VISIT:    Time In Session:                 OBJECTIVE MEASUREMENTS/DATA:    None taken    ROM and MMT          2024    10:00 2024    13:00   PT LE MMT   Right Hip Flexion (4+/5) good plus (4+/5) good plus   Left Hip Flexion (4+/5) good plus (4+/5) good plus   Right Hip Extension (4+/5) good plus (4+/5) good plus   Left Hip Extension (4+/5) good plus (4+/5) good plus   Right Hip ABD (4+/5) good plus (4+/5) good plus   Left Hip ABD (4+/5) good plus (4+/5) good plus   Right Hip ADD (4+/5) good plus (4+/5) good plus   Left Hip ADD (4+/5) good plus (4+/5) good plus   Right Knee Flexion (4+/5) good plus (4+/5) good plus   Left Knee Flexion (4+/5) good plus (4+/5) good plus   Right Knee Extension (4+/5) good plus (4+/5) good plus   Left Knee Extension (4+/5) good plus (4+/5) good plus   Right Ankle DF (4+/5) good plus (4+/5) good plus   Left Ankle DF (4+/5) good plus (4+/5) good plus   Right Ankle PF (4+/5) good plus (4+/5) good plus   Left Ankle PF (4+/5) good plus (4+/5) good plus         Today's Treatment:    Education provided:  None/NA         Goals Addressed    None

## 2025-05-05 ENCOUNTER — TRANSCRIBE ORDERS (OUTPATIENT)
Dept: LAB | Facility: CLINIC | Age: 70
End: 2025-05-05

## 2025-05-05 ENCOUNTER — APPOINTMENT (OUTPATIENT)
Dept: LAB | Facility: CLINIC | Age: 70
End: 2025-05-05
Attending: INTERNAL MEDICINE
Payer: MEDICARE

## 2025-05-05 DIAGNOSIS — K90.0 CELIAC DISEASE: Primary | ICD-10-CM

## 2025-05-05 DIAGNOSIS — Z51.81 ENCOUNTER FOR THERAPEUTIC DRUG MONITORING: Primary | ICD-10-CM

## 2025-05-05 DIAGNOSIS — Z51.81 ENCOUNTER FOR THERAPEUTIC DRUG MONITORING: ICD-10-CM

## 2025-05-05 DIAGNOSIS — M81.0 OSTEOPOROSIS: ICD-10-CM

## 2025-05-05 DIAGNOSIS — K90.0 CELIAC DISEASE: ICD-10-CM

## 2025-05-05 LAB
25(OH)D3 SERPL-MCNC: 150 NG/ML (ref 30–100)
ALBUMIN SERPL-MCNC: 3.5 G/DL (ref 3.5–5.7)
ALBUMIN SERPL-MCNC: 3.5 G/DL (ref 3.5–5.7)
ALP SERPL-CCNC: 170 IU/L (ref 34–125)
ALP SERPL-CCNC: 170 IU/L (ref 34–125)
ALT SERPL-CCNC: 20 IU/L (ref 7–52)
ALT SERPL-CCNC: 20 IU/L (ref 7–52)
ANION GAP SERPL CALC-SCNC: 8 MEQ/L (ref 3–15)
AST SERPL-CCNC: 37 IU/L (ref 13–39)
AST SERPL-CCNC: 37 IU/L (ref 13–39)
BASOPHILS # BLD: 0.08 K/UL (ref 0.01–0.1)
BASOPHILS NFR BLD: 1 %
BILIRUB DIRECT SERPL-MCNC: 0.1 MG/DL
BILIRUB SERPL-MCNC: 0.5 MG/DL (ref 0.3–1.2)
BILIRUB SERPL-MCNC: 0.5 MG/DL (ref 0.3–1.2)
BUN SERPL-MCNC: 26 MG/DL (ref 7–25)
CALCIUM SERPL-MCNC: 9.7 MG/DL (ref 8.6–10.3)
CHLORIDE SERPL-SCNC: 96 MEQ/L (ref 98–107)
CO2 SERPL-SCNC: 31 MEQ/L (ref 21–31)
CREAT SERPL-MCNC: 1.2 MG/DL (ref 0.6–1.2)
DIFFERENTIAL METHOD BLD: ABNORMAL
EGFRCR SERPLBLD CKD-EPI 2021: 49.7 ML/MIN/1.73M*2
EOSINOPHIL # BLD: 0.07 K/UL (ref 0.04–0.36)
EOSINOPHIL NFR BLD: 0.9 %
ERYTHROCYTE [DISTWIDTH] IN BLOOD BY AUTOMATED COUNT: 13.1 % (ref 11.7–14.4)
FERRITIN SERPL-MCNC: 146 NG/ML (ref 11–250)
FOLATE SERPL-MCNC: >20 NG/ML
GLUCOSE SERPL-MCNC: 75 MG/DL (ref 70–99)
HCT VFR BLD AUTO: 35.6 % (ref 35–45)
HGB BLD-MCNC: 11.4 G/DL (ref 11.8–15.7)
IMM GRANULOCYTES # BLD AUTO: 0.02 K/UL (ref 0–0.08)
IMM GRANULOCYTES NFR BLD AUTO: 0.3 %
INR PPP: 1
IRON SATN MFR SERPL: 39 % (ref 15–45)
IRON SERPL-MCNC: 94 UG/DL (ref 35–150)
LYMPHOCYTES # BLD: 1.45 K/UL (ref 1.2–3.5)
LYMPHOCYTES NFR BLD: 19 %
MAGNESIUM SERPL-MCNC: 1.6 MG/DL (ref 1.8–2.5)
MCH RBC QN AUTO: 34.2 PG (ref 28–33.2)
MCHC RBC AUTO-ENTMCNC: 32 G/DL (ref 32.2–35.5)
MCV RBC AUTO: 106.9 FL (ref 83–98)
MONOCYTES # BLD: 0.79 K/UL (ref 0.28–0.8)
MONOCYTES NFR BLD: 10.4 %
NEUTROPHILS # BLD: 5.21 K/UL (ref 1.7–7)
NEUTS SEG NFR BLD: 68.4 %
NRBC BLD-RTO: 0 %
PLATELET # BLD AUTO: 507 K/UL (ref 150–369)
PMV BLD AUTO: 9.6 FL (ref 9.4–12.3)
POTASSIUM SERPL-SCNC: 3.3 MEQ/L (ref 3.5–5.1)
PROT SERPL-MCNC: 6.6 G/DL (ref 6–8.2)
PROT SERPL-MCNC: 6.6 G/DL (ref 6–8.2)
PROTHROMBIN TIME: 13 SEC (ref 12.2–14.5)
RBC # BLD AUTO: 3.33 M/UL (ref 3.93–5.22)
SODIUM SERPL-SCNC: 135 MEQ/L (ref 136–145)
TIBC SERPL-MCNC: 241 UG/DL (ref 270–460)
UIBC SERPL-MCNC: 147 UG/DL (ref 180–360)
VIT B12 SERPL-MCNC: 1156 PG/ML (ref 180–914)
WBC # BLD AUTO: 7.62 K/UL (ref 3.8–10.5)

## 2025-05-05 PROCEDURE — 82728 ASSAY OF FERRITIN: CPT

## 2025-05-05 PROCEDURE — 82525 ASSAY OF COPPER: CPT

## 2025-05-05 PROCEDURE — 85025 COMPLETE CBC W/AUTO DIFF WBC: CPT

## 2025-05-05 PROCEDURE — 84590 ASSAY OF VITAMIN A: CPT

## 2025-05-05 PROCEDURE — 36415 COLL VENOUS BLD VENIPUNCTURE: CPT

## 2025-05-05 PROCEDURE — 82306 VITAMIN D 25 HYDROXY: CPT

## 2025-05-05 PROCEDURE — 83735 ASSAY OF MAGNESIUM: CPT

## 2025-05-05 PROCEDURE — 84446 ASSAY OF VITAMIN E: CPT

## 2025-05-05 PROCEDURE — 84630 ASSAY OF ZINC: CPT

## 2025-05-05 PROCEDURE — 80053 COMPREHEN METABOLIC PANEL: CPT

## 2025-05-05 PROCEDURE — 81382 HLA II TYPING 1 LOC HR: CPT | Mod: 59

## 2025-05-05 PROCEDURE — 83540 ASSAY OF IRON: CPT

## 2025-05-05 PROCEDURE — 85610 PROTHROMBIN TIME: CPT

## 2025-05-05 PROCEDURE — 86364 TISS TRNSGLTMNASE EA IG CLAS: CPT

## 2025-05-05 PROCEDURE — 82746 ASSAY OF FOLIC ACID SERUM: CPT

## 2025-05-05 PROCEDURE — 82607 VITAMIN B-12: CPT

## 2025-05-05 PROCEDURE — 82784 ASSAY IGA/IGD/IGG/IGM EACH: CPT

## 2025-05-06 LAB
IGA SERPL-MCNC: 589 MG/DL (ref 84.5–499)
IGG SERPL-MCNC: 959 MG/DL (ref 537–1535)
IGM SERPL-MCNC: 94 MG/DL (ref 35–242)

## 2025-05-07 LAB
COPPER SERPL-MCNC: 135 MCG/DL (ref 70–175)
ZINC SERPL-MCNC: 37 MCG/DL (ref 60–130)

## 2025-05-08 LAB — TTG IGA SER IA-ACNC: 40 ELIA U/ML

## 2025-05-10 LAB
A-TOCOPHEROL VIT E SERPL-MCNC: 13.7 MG/L (ref 5.7–19.9)
BETA+GAMMA TOCOPHEROL SERPL-MCNC: <1 MG/L
VIT A SERPL-MCNC: 26 MCG/DL (ref 38–98)

## 2025-05-13 LAB
GENETICIST REVIEW: NORMAL
HLA AG-IMP: NORMAL
HLA-DQ2 QL: NEGATIVE
HLA-DQ8 QL: POSITIVE
HLA-DQA1 QL: 2
HLA-DQA1 QL: 3
HLA-DQB1 QL: 202
HLA-DQB1 QL: 302

## 2025-06-18 ENCOUNTER — APPOINTMENT (OUTPATIENT)
Dept: LAB | Facility: CLINIC | Age: 70
End: 2025-06-18
Payer: MEDICARE

## 2025-06-18 DIAGNOSIS — K90.0 CELIAC DISEASE: ICD-10-CM

## 2025-06-18 DIAGNOSIS — Z51.81 ENCOUNTER FOR THERAPEUTIC DRUG MONITORING: ICD-10-CM

## 2025-06-18 PROCEDURE — 36415 COLL VENOUS BLD VENIPUNCTURE: CPT

## 2025-06-18 PROCEDURE — 82955 ASSAY OF G6PD ENZYME: CPT

## 2025-06-18 PROCEDURE — 87177 OVA AND PARASITES SMEARS: CPT

## 2025-06-18 PROCEDURE — 83993 ASSAY FOR CALPROTECTIN FECAL: CPT

## 2025-06-19 LAB — O+P SPEC MICRO: NORMAL

## 2025-06-20 LAB — G6PD RBC-CCNT: 18.2 U/G HGB (ref 7–20.5)

## 2025-06-25 LAB — CALPROTECTIN STL-MCNT: 7 MCG/G

## (undated) DEVICE — TIP BOVIE EXTENSION REUSABLE 4IN

## (undated) DEVICE — FORCEP COLD RADIAL JAW

## (undated) DEVICE — SUTURE QUILL MONODERM 2-0 YA-2024Q

## (undated) DEVICE — COVER MAYO STAND

## (undated) DEVICE — APPLICATOR CHLORAPREP 26ML ORANGE TINT

## (undated) DEVICE — MOUTHPIECE 60FR ENDO BITEBLOCK

## (undated) DEVICE — SPONGE LAP 4X18 SAFE-T RFID ENHANCED XRAY

## (undated) DEVICE — SUTURE QUILL PDO 2 RX-1066Q

## (undated) DEVICE — FIBERWIRE #2 W/TAPERED NEEDLE

## (undated) DEVICE — LABEL MEDICATION NEUO ORTHO

## (undated) DEVICE — PACK OR TOWEL

## (undated) DEVICE — PACK TOTAL HIP

## (undated) DEVICE — BALLOON ENDO ULTRASOUND

## (undated) DEVICE — BLADE SCALPEL #10

## (undated) DEVICE — MANIFOLD FOUR PORT NEPTUNE

## (undated) DEVICE — PAD UNIV LAT POSTIONER CLIP-ON

## (undated) DEVICE — SOLN IRRIG .9%SOD 1000ML

## (undated) DEVICE — SUTURE VICRYL 1 J196H CP UNDYED 27IN

## (undated) DEVICE — RETRIEVER SUTURE HEWSON

## (undated) DEVICE — SUTURE MONOCRYL 2-0 Y266H

## (undated) DEVICE — GLOVE PROTEXIS PI ORTHO 8.5

## (undated) DEVICE — COVER LIGHTHANDLE

## (undated) DEVICE — STOCKINETTE IMPRVS LG STRL 12X48

## (undated) DEVICE — ADHESIVE SKIN DERMABOND ADVANCED 0.7ML

## (undated) DEVICE — GLOVE PROTEXIS PI ORTHO 8.0

## (undated) DEVICE — UNDERPAD DURASORB 30 X 30

## (undated) DEVICE — SOLN IV 0.9% NSS 1000ML

## (undated) DEVICE — SOLUTION PROV-IODINE .75 OZ

## (undated) DEVICE — PILLOW ABDUCTION MEDIUM

## (undated) DEVICE — DRESSING AQUACEL ADVA ANTIMCROBIAL 3.5 X 10IN

## (undated) DEVICE — PENEVAC1 NONSTICK SMOKE EVAC

## (undated) DEVICE — BLADE SAGITTAL 18.6 X .64 X 61.1MM